# Patient Record
Sex: MALE | Race: WHITE | Employment: OTHER | URBAN - METROPOLITAN AREA
[De-identification: names, ages, dates, MRNs, and addresses within clinical notes are randomized per-mention and may not be internally consistent; named-entity substitution may affect disease eponyms.]

---

## 2021-01-01 ENCOUNTER — APPOINTMENT (OUTPATIENT)
Dept: MRI IMAGING | Age: 81
DRG: 190 | End: 2021-01-01
Payer: MEDICARE

## 2021-01-01 ENCOUNTER — APPOINTMENT (OUTPATIENT)
Dept: GENERAL RADIOLOGY | Age: 81
DRG: 291 | End: 2021-01-01
Payer: MEDICARE

## 2021-01-01 ENCOUNTER — APPOINTMENT (OUTPATIENT)
Dept: GENERAL RADIOLOGY | Age: 81
DRG: 190 | End: 2021-01-01
Payer: MEDICARE

## 2021-01-01 ENCOUNTER — HOSPITAL ENCOUNTER (INPATIENT)
Age: 81
LOS: 25 days | DRG: 291 | End: 2021-11-28
Attending: EMERGENCY MEDICINE | Admitting: INTERNAL MEDICINE
Payer: MEDICARE

## 2021-01-01 ENCOUNTER — APPOINTMENT (OUTPATIENT)
Dept: CT IMAGING | Age: 81
DRG: 190 | End: 2021-01-01
Payer: MEDICARE

## 2021-01-01 ENCOUNTER — ANESTHESIA EVENT (OUTPATIENT)
Dept: OPERATING ROOM | Age: 81
DRG: 291 | End: 2021-01-01
Payer: MEDICARE

## 2021-01-01 ENCOUNTER — APPOINTMENT (OUTPATIENT)
Dept: GENERAL RADIOLOGY | Age: 81
End: 2021-01-01
Payer: MEDICARE

## 2021-01-01 ENCOUNTER — APPOINTMENT (OUTPATIENT)
Dept: CT IMAGING | Age: 81
DRG: 291 | End: 2021-01-01
Payer: MEDICARE

## 2021-01-01 ENCOUNTER — HOSPITAL ENCOUNTER (EMERGENCY)
Age: 81
Discharge: HOME | End: 2021-04-30
Attending: EMERGENCY MEDICINE
Payer: MEDICARE

## 2021-01-01 ENCOUNTER — ANESTHESIA (OUTPATIENT)
Dept: OPERATING ROOM | Age: 81
DRG: 291 | End: 2021-01-01
Payer: MEDICARE

## 2021-01-01 ENCOUNTER — HOSPITAL ENCOUNTER (INPATIENT)
Age: 81
LOS: 7 days | Discharge: NURSING, REHAB OR LONG-TERM CARE | DRG: 190 | End: 2021-09-16
Attending: EMERGENCY MEDICINE | Admitting: INTERNAL MEDICINE
Payer: MEDICARE

## 2021-01-01 VITALS
BODY MASS INDEX: 33.65 KG/M2 | SYSTOLIC BLOOD PRESSURE: 101 MMHG | DIASTOLIC BLOOD PRESSURE: 59 MMHG | HEIGHT: 68 IN | RESPIRATION RATE: 28 BRPM | OXYGEN SATURATION: 80 % | HEART RATE: 93 BPM | WEIGHT: 222 LBS | TEMPERATURE: 97.1 F

## 2021-01-01 VITALS
OXYGEN SATURATION: 93 % | DIASTOLIC BLOOD PRESSURE: 80 MMHG | WEIGHT: 220 LBS | HEART RATE: 79 BPM | BODY MASS INDEX: 34.53 KG/M2 | TEMPERATURE: 97.3 F | RESPIRATION RATE: 20 BRPM | HEIGHT: 67 IN | SYSTOLIC BLOOD PRESSURE: 139 MMHG

## 2021-01-01 VITALS
TEMPERATURE: 97.8 F | SYSTOLIC BLOOD PRESSURE: 125 MMHG | RESPIRATION RATE: 20 BRPM | BODY MASS INDEX: 26.87 KG/M2 | WEIGHT: 177.3 LBS | OXYGEN SATURATION: 91 % | DIASTOLIC BLOOD PRESSURE: 83 MMHG | HEIGHT: 68 IN | HEART RATE: 79 BPM

## 2021-01-01 VITALS
DIASTOLIC BLOOD PRESSURE: 64 MMHG | TEMPERATURE: 97.2 F | SYSTOLIC BLOOD PRESSURE: 117 MMHG | RESPIRATION RATE: 24 BRPM | OXYGEN SATURATION: 96 %

## 2021-01-01 DIAGNOSIS — N35.919 STRICTURE OF MALE URETHRA, UNSPECIFIED STRICTURE TYPE: ICD-10-CM

## 2021-01-01 DIAGNOSIS — T45.515A WARFARIN-INDUCED COAGULOPATHY (HCC): ICD-10-CM

## 2021-01-01 DIAGNOSIS — D68.32 WARFARIN-INDUCED COAGULOPATHY (HCC): ICD-10-CM

## 2021-01-01 DIAGNOSIS — R77.8 ELEVATED TROPONIN: ICD-10-CM

## 2021-01-01 DIAGNOSIS — E87.70 HYPERVOLEMIA, UNSPECIFIED HYPERVOLEMIA TYPE: ICD-10-CM

## 2021-01-01 DIAGNOSIS — E87.29 RESPIRATORY ACIDOSIS: Primary | ICD-10-CM

## 2021-01-01 DIAGNOSIS — S61.412A SKIN TEAR OF LEFT HAND WITHOUT COMPLICATION, INITIAL ENCOUNTER: Primary | ICD-10-CM

## 2021-01-01 DIAGNOSIS — R62.7 ADULT FAILURE TO THRIVE SYNDROME: Primary | ICD-10-CM

## 2021-01-01 DIAGNOSIS — R06.89 HYPERCARBIA: ICD-10-CM

## 2021-01-01 DIAGNOSIS — R53.83 FATIGUE, UNSPECIFIED TYPE: ICD-10-CM

## 2021-01-01 LAB
ACANTHOCYTES: ABNORMAL
ALBUMIN SERPL-MCNC: 2.9 G/DL (ref 3.5–5.2)
ALBUMIN SERPL-MCNC: 2.9 G/DL (ref 3.5–5.2)
ALBUMIN SERPL-MCNC: 3 G/DL (ref 3.5–5.2)
ALBUMIN SERPL-MCNC: 3 G/DL (ref 3.5–5.2)
ALBUMIN SERPL-MCNC: 3.1 G/DL (ref 3.5–5.2)
ALBUMIN SERPL-MCNC: 3.1 G/DL (ref 3.5–5.2)
ALBUMIN SERPL-MCNC: 3.2 G/DL (ref 3.5–5.2)
ALBUMIN SERPL-MCNC: 3.3 G/DL (ref 3.5–5.2)
ALBUMIN SERPL-MCNC: 3.3 G/DL (ref 3.5–5.2)
ALBUMIN SERPL-MCNC: 3.5 G/DL (ref 3.5–5.2)
ALP BLD-CCNC: 108 U/L (ref 40–129)
ALP BLD-CCNC: 118 U/L (ref 40–129)
ALP BLD-CCNC: 118 U/L (ref 40–129)
ALP BLD-CCNC: 136 U/L (ref 40–129)
ALP BLD-CCNC: 137 U/L (ref 40–129)
ALP BLD-CCNC: 139 U/L (ref 40–129)
ALP BLD-CCNC: 144 U/L (ref 40–129)
ALP BLD-CCNC: 146 U/L (ref 40–129)
ALP BLD-CCNC: 154 U/L (ref 40–129)
ALP BLD-CCNC: 156 U/L (ref 40–129)
ALP BLD-CCNC: 156 U/L (ref 40–129)
ALP BLD-CCNC: 157 U/L (ref 40–129)
ALP BLD-CCNC: 158 U/L (ref 40–129)
ALP BLD-CCNC: 171 U/L (ref 40–129)
ALP BLD-CCNC: 192 U/L (ref 40–129)
ALT SERPL-CCNC: 10 U/L (ref 0–40)
ALT SERPL-CCNC: 10 U/L (ref 0–40)
ALT SERPL-CCNC: 11 U/L (ref 0–40)
ALT SERPL-CCNC: 15 U/L (ref 0–40)
ALT SERPL-CCNC: 16 U/L (ref 0–40)
ALT SERPL-CCNC: 16 U/L (ref 0–40)
ALT SERPL-CCNC: 18 U/L (ref 0–40)
ALT SERPL-CCNC: 19 U/L (ref 0–40)
ALT SERPL-CCNC: 20 U/L (ref 0–40)
ALT SERPL-CCNC: 20 U/L (ref 0–40)
ALT SERPL-CCNC: 25 U/L (ref 0–40)
ALT SERPL-CCNC: 8 U/L (ref 0–40)
ALT SERPL-CCNC: 9 U/L (ref 0–40)
ANION GAP SERPL CALCULATED.3IONS-SCNC: 10 MMOL/L (ref 7–16)
ANION GAP SERPL CALCULATED.3IONS-SCNC: 10 MMOL/L (ref 7–16)
ANION GAP SERPL CALCULATED.3IONS-SCNC: 11 MMOL/L (ref 7–16)
ANION GAP SERPL CALCULATED.3IONS-SCNC: 11 MMOL/L (ref 7–16)
ANION GAP SERPL CALCULATED.3IONS-SCNC: 12 MMOL/L (ref 7–16)
ANION GAP SERPL CALCULATED.3IONS-SCNC: 13 MMOL/L (ref 7–16)
ANION GAP SERPL CALCULATED.3IONS-SCNC: 5 MMOL/L (ref 7–16)
ANION GAP SERPL CALCULATED.3IONS-SCNC: 5 MMOL/L (ref 7–16)
ANION GAP SERPL CALCULATED.3IONS-SCNC: 6 MMOL/L (ref 7–16)
ANION GAP SERPL CALCULATED.3IONS-SCNC: 6 MMOL/L (ref 7–16)
ANION GAP SERPL CALCULATED.3IONS-SCNC: 7 MMOL/L (ref 7–16)
ANION GAP SERPL CALCULATED.3IONS-SCNC: 8 MMOL/L (ref 7–16)
ANION GAP SERPL CALCULATED.3IONS-SCNC: 9 MMOL/L (ref 7–16)
ANISOCYTOSIS: ABNORMAL
AST SERPL-CCNC: 14 U/L (ref 0–39)
AST SERPL-CCNC: 15 U/L (ref 0–39)
AST SERPL-CCNC: 16 U/L (ref 0–39)
AST SERPL-CCNC: 17 U/L (ref 0–39)
AST SERPL-CCNC: 18 U/L (ref 0–39)
AST SERPL-CCNC: 18 U/L (ref 0–39)
AST SERPL-CCNC: 20 U/L (ref 0–39)
AST SERPL-CCNC: 20 U/L (ref 0–39)
AST SERPL-CCNC: 21 U/L (ref 0–39)
AST SERPL-CCNC: 21 U/L (ref 0–39)
AST SERPL-CCNC: 22 U/L (ref 0–39)
AST SERPL-CCNC: 22 U/L (ref 0–39)
AST SERPL-CCNC: 23 U/L (ref 0–39)
AST SERPL-CCNC: 24 U/L (ref 0–39)
AST SERPL-CCNC: 40 U/L (ref 0–39)
B.E.: 0.3 MMOL/L (ref -3–3)
B.E.: 13.2 MMOL/L (ref -3–3)
B.E.: 2.5 MMOL/L (ref -3–3)
B.E.: 4 MMOL/L (ref -3–3)
BACTERIA: ABNORMAL /HPF
BACTERIA: NORMAL /HPF
BACTERIA: NORMAL /HPF
BASOPHILS ABSOLUTE: 0 E9/L (ref 0–0.2)
BASOPHILS ABSOLUTE: 0.01 E9/L (ref 0–0.2)
BASOPHILS ABSOLUTE: 0.03 E9/L (ref 0–0.2)
BASOPHILS ABSOLUTE: 0.04 E9/L (ref 0–0.2)
BASOPHILS ABSOLUTE: 0.05 E9/L (ref 0–0.2)
BASOPHILS RELATIVE PERCENT: 0 % (ref 0–2)
BASOPHILS RELATIVE PERCENT: 0.1 % (ref 0–2)
BASOPHILS RELATIVE PERCENT: 0.3 % (ref 0–2)
BASOPHILS RELATIVE PERCENT: 0.3 % (ref 0–2)
BASOPHILS RELATIVE PERCENT: 0.5 % (ref 0–2)
BASOPHILS RELATIVE PERCENT: 0.5 % (ref 0–2)
BASOPHILS RELATIVE PERCENT: 0.8 % (ref 0–2)
BILIRUB SERPL-MCNC: 0.3 MG/DL (ref 0–1.2)
BILIRUB SERPL-MCNC: 0.4 MG/DL (ref 0–1.2)
BILIRUB SERPL-MCNC: 0.5 MG/DL (ref 0–1.2)
BILIRUB SERPL-MCNC: 0.5 MG/DL (ref 0–1.2)
BILIRUB SERPL-MCNC: 0.6 MG/DL (ref 0–1.2)
BILIRUB SERPL-MCNC: 0.6 MG/DL (ref 0–1.2)
BILIRUB SERPL-MCNC: 0.7 MG/DL (ref 0–1.2)
BILIRUB SERPL-MCNC: 0.9 MG/DL (ref 0–1.2)
BILIRUB SERPL-MCNC: 1.1 MG/DL (ref 0–1.2)
BILIRUB SERPL-MCNC: 1.2 MG/DL (ref 0–1.2)
BILIRUBIN URINE: NEGATIVE
BLASTS RELATIVE PERCENT: 0 % (ref 0–0)
BLOOD, URINE: ABNORMAL
BUN BLDV-MCNC: 23 MG/DL (ref 6–23)
BUN BLDV-MCNC: 24 MG/DL (ref 6–23)
BUN BLDV-MCNC: 24 MG/DL (ref 6–23)
BUN BLDV-MCNC: 25 MG/DL (ref 6–23)
BUN BLDV-MCNC: 25 MG/DL (ref 6–23)
BUN BLDV-MCNC: 29 MG/DL (ref 6–23)
BUN BLDV-MCNC: 32 MG/DL (ref 6–23)
BUN BLDV-MCNC: 32 MG/DL (ref 6–23)
BUN BLDV-MCNC: 33 MG/DL (ref 6–23)
BUN BLDV-MCNC: 33 MG/DL (ref 6–23)
BUN BLDV-MCNC: 35 MG/DL (ref 6–23)
BUN BLDV-MCNC: 36 MG/DL (ref 6–23)
BUN BLDV-MCNC: 37 MG/DL (ref 6–23)
BUN BLDV-MCNC: 38 MG/DL (ref 6–23)
BUN BLDV-MCNC: 42 MG/DL (ref 6–23)
BUN BLDV-MCNC: 42 MG/DL (ref 6–23)
BUN BLDV-MCNC: 43 MG/DL (ref 6–23)
BUN BLDV-MCNC: 46 MG/DL (ref 6–23)
BUN BLDV-MCNC: 47 MG/DL (ref 6–23)
BUN BLDV-MCNC: 47 MG/DL (ref 6–23)
BUN BLDV-MCNC: 48 MG/DL (ref 6–23)
BUN BLDV-MCNC: 49 MG/DL (ref 6–23)
BUN BLDV-MCNC: 51 MG/DL (ref 6–23)
BUN BLDV-MCNC: 51 MG/DL (ref 6–23)
BUN BLDV-MCNC: 54 MG/DL (ref 6–23)
C-REACTIVE PROTEIN, HIGH SENSITIVITY: 79.4 MG/L (ref 0–3)
C-REACTIVE PROTEIN: 7.5 MG/DL (ref 0–0.4)
CALCIUM SERPL-MCNC: 8.1 MG/DL (ref 8.6–10.2)
CALCIUM SERPL-MCNC: 8.2 MG/DL (ref 8.6–10.2)
CALCIUM SERPL-MCNC: 8.2 MG/DL (ref 8.6–10.2)
CALCIUM SERPL-MCNC: 8.3 MG/DL (ref 8.6–10.2)
CALCIUM SERPL-MCNC: 8.4 MG/DL (ref 8.6–10.2)
CALCIUM SERPL-MCNC: 8.5 MG/DL (ref 8.6–10.2)
CALCIUM SERPL-MCNC: 8.6 MG/DL (ref 8.6–10.2)
CALCIUM SERPL-MCNC: 8.7 MG/DL (ref 8.6–10.2)
CALCIUM SERPL-MCNC: 8.8 MG/DL (ref 8.6–10.2)
CALCIUM SERPL-MCNC: 8.8 MG/DL (ref 8.6–10.2)
CALCIUM SERPL-MCNC: 8.9 MG/DL (ref 8.6–10.2)
CALCIUM SERPL-MCNC: 8.9 MG/DL (ref 8.6–10.2)
CALCIUM SERPL-MCNC: 9 MG/DL (ref 8.6–10.2)
CALCIUM SERPL-MCNC: 9 MG/DL (ref 8.6–10.2)
CHLORIDE BLD-SCNC: 100 MMOL/L (ref 98–107)
CHLORIDE BLD-SCNC: 103 MMOL/L (ref 98–107)
CHLORIDE BLD-SCNC: 103 MMOL/L (ref 98–107)
CHLORIDE BLD-SCNC: 106 MMOL/L (ref 98–107)
CHLORIDE BLD-SCNC: 106 MMOL/L (ref 98–107)
CHLORIDE BLD-SCNC: 107 MMOL/L (ref 98–107)
CHLORIDE BLD-SCNC: 108 MMOL/L (ref 98–107)
CHLORIDE BLD-SCNC: 92 MMOL/L (ref 98–107)
CHLORIDE BLD-SCNC: 93 MMOL/L (ref 98–107)
CHLORIDE BLD-SCNC: 93 MMOL/L (ref 98–107)
CHLORIDE BLD-SCNC: 94 MMOL/L (ref 98–107)
CHLORIDE BLD-SCNC: 94 MMOL/L (ref 98–107)
CHLORIDE BLD-SCNC: 95 MMOL/L (ref 98–107)
CHLORIDE BLD-SCNC: 96 MMOL/L (ref 98–107)
CHLORIDE BLD-SCNC: 97 MMOL/L (ref 98–107)
CHLORIDE BLD-SCNC: 98 MMOL/L (ref 98–107)
CHLORIDE BLD-SCNC: 99 MMOL/L (ref 98–107)
CHOLESTEROL, TOTAL: 130 MG/DL (ref 0–199)
CHOLESTEROL, TOTAL: 136 MG/DL (ref 0–199)
CHP ED QC CHECK: NORMAL
CK MB: 2.9 NG/ML (ref 0–7.7)
CLARITY: ABNORMAL
CLARITY: CLEAR
CLARITY: CLEAR
CO2: 21 MMOL/L (ref 22–29)
CO2: 22 MMOL/L (ref 22–29)
CO2: 23 MMOL/L (ref 22–29)
CO2: 25 MMOL/L (ref 22–29)
CO2: 26 MMOL/L (ref 22–29)
CO2: 26 MMOL/L (ref 22–29)
CO2: 32 MMOL/L (ref 22–29)
CO2: 33 MMOL/L (ref 22–29)
CO2: 34 MMOL/L (ref 22–29)
CO2: 35 MMOL/L (ref 22–29)
CO2: 35 MMOL/L (ref 22–29)
CO2: 36 MMOL/L (ref 22–29)
CO2: 37 MMOL/L (ref 22–29)
CO2: 38 MMOL/L (ref 22–29)
CO2: 39 MMOL/L (ref 22–29)
CO2: 40 MMOL/L (ref 22–29)
COHB: 0.2 % (ref 0–1.5)
COHB: 0.2 % (ref 0–1.5)
COHB: 0.5 % (ref 0–1.5)
COHB: 0.5 % (ref 0–1.5)
COLOR: ABNORMAL
COLOR: YELLOW
COLOR: YELLOW
COMMENT: ABNORMAL
CREAT SERPL-MCNC: 0.6 MG/DL (ref 0.7–1.2)
CREAT SERPL-MCNC: 0.7 MG/DL (ref 0.7–1.2)
CREAT SERPL-MCNC: 0.8 MG/DL (ref 0.7–1.2)
CREAT SERPL-MCNC: 0.9 MG/DL (ref 0.7–1.2)
CREAT SERPL-MCNC: 1 MG/DL (ref 0.7–1.2)
CRITICAL: ABNORMAL
CRITICAL: NORMAL
DATE ANALYZED: ABNORMAL
DATE ANALYZED: NORMAL
DATE OF COLLECTION: ABNORMAL
DATE OF COLLECTION: NORMAL
EKG ATRIAL RATE: 104 BPM
EKG ATRIAL RATE: 375 BPM
EKG ATRIAL RATE: 65 BPM
EKG Q-T INTERVAL: 374 MS
EKG Q-T INTERVAL: 374 MS
EKG Q-T INTERVAL: 396 MS
EKG QRS DURATION: 82 MS
EKG QRS DURATION: 84 MS
EKG QRS DURATION: 86 MS
EKG QTC CALCULATION (BAZETT): 412 MS
EKG QTC CALCULATION (BAZETT): 427 MS
EKG QTC CALCULATION (BAZETT): 428 MS
EKG R AXIS: -57 DEGREES
EKG R AXIS: -6 DEGREES
EKG R AXIS: -68 DEGREES
EKG T AXIS: 24 DEGREES
EKG T AXIS: 30 DEGREES
EKG T AXIS: 8 DEGREES
EKG VENTRICULAR RATE: 70 BPM
EKG VENTRICULAR RATE: 73 BPM
EKG VENTRICULAR RATE: 79 BPM
EOSINOPHILS ABSOLUTE: 0 E9/L (ref 0.05–0.5)
EOSINOPHILS ABSOLUTE: 0.04 E9/L (ref 0.05–0.5)
EOSINOPHILS ABSOLUTE: 0.05 E9/L (ref 0.05–0.5)
EOSINOPHILS ABSOLUTE: 0.06 E9/L (ref 0.05–0.5)
EOSINOPHILS ABSOLUTE: 0.08 E9/L (ref 0.05–0.5)
EOSINOPHILS ABSOLUTE: 0.15 E9/L (ref 0.05–0.5)
EOSINOPHILS ABSOLUTE: 0.2 E9/L (ref 0.05–0.5)
EOSINOPHILS ABSOLUTE: 0.36 E9/L (ref 0.05–0.5)
EOSINOPHILS RELATIVE PERCENT: 0 % (ref 0–6)
EOSINOPHILS RELATIVE PERCENT: 0.5 % (ref 0–6)
EOSINOPHILS RELATIVE PERCENT: 1 % (ref 0–6)
EOSINOPHILS RELATIVE PERCENT: 1 % (ref 0–6)
EOSINOPHILS RELATIVE PERCENT: 2.3 % (ref 0–6)
EOSINOPHILS RELATIVE PERCENT: 3.3 % (ref 0–6)
EOSINOPHILS RELATIVE PERCENT: 7 % (ref 0–6)
EPITHELIAL CELLS, UA: NORMAL /HPF
EPITHELIAL CELLS, UA: NORMAL /HPF
FIO2: 35 %
GFR AFRICAN AMERICAN: >60
GFR NON-AFRICAN AMERICAN: >60 ML/MIN/1.73
GLUCOSE BLD-MCNC: 102 MG/DL (ref 74–99)
GLUCOSE BLD-MCNC: 107 MG/DL (ref 74–99)
GLUCOSE BLD-MCNC: 112 MG/DL (ref 74–99)
GLUCOSE BLD-MCNC: 120 MG/DL (ref 74–99)
GLUCOSE BLD-MCNC: 121 MG/DL (ref 74–99)
GLUCOSE BLD-MCNC: 122 MG/DL (ref 74–99)
GLUCOSE BLD-MCNC: 122 MG/DL (ref 74–99)
GLUCOSE BLD-MCNC: 123 MG/DL (ref 74–99)
GLUCOSE BLD-MCNC: 127 MG/DL (ref 74–99)
GLUCOSE BLD-MCNC: 127 MG/DL (ref 74–99)
GLUCOSE BLD-MCNC: 131 MG/DL (ref 74–99)
GLUCOSE BLD-MCNC: 134 MG/DL (ref 74–99)
GLUCOSE BLD-MCNC: 135 MG/DL (ref 74–99)
GLUCOSE BLD-MCNC: 146 MG/DL (ref 74–99)
GLUCOSE BLD-MCNC: 82 MG/DL (ref 74–99)
GLUCOSE BLD-MCNC: 82 MG/DL (ref 74–99)
GLUCOSE BLD-MCNC: 83 MG/DL (ref 74–99)
GLUCOSE BLD-MCNC: 86 MG/DL (ref 74–99)
GLUCOSE BLD-MCNC: 86 MG/DL (ref 74–99)
GLUCOSE BLD-MCNC: 88 MG/DL (ref 74–99)
GLUCOSE BLD-MCNC: 88 MG/DL (ref 74–99)
GLUCOSE BLD-MCNC: 89 MG/DL (ref 74–99)
GLUCOSE BLD-MCNC: 91 MG/DL
GLUCOSE BLD-MCNC: 91 MG/DL (ref 74–99)
GLUCOSE BLD-MCNC: 92 MG/DL (ref 74–99)
GLUCOSE BLD-MCNC: 92 MG/DL (ref 74–99)
GLUCOSE BLD-MCNC: 93 MG/DL (ref 74–99)
GLUCOSE BLD-MCNC: 96 MG/DL (ref 74–99)
GLUCOSE BLD-MCNC: 97 MG/DL (ref 74–99)
GLUCOSE BLD-MCNC: 97 MG/DL (ref 74–99)
GLUCOSE URINE: NEGATIVE MG/DL
HCO3: 24 MMOL/L (ref 22–26)
HCO3: 30.7 MMOL/L (ref 22–26)
HCO3: 32.8 MMOL/L (ref 22–26)
HCO3: 39.1 MMOL/L (ref 22–26)
HCT VFR BLD CALC: 36.2 % (ref 37–54)
HCT VFR BLD CALC: 36.9 % (ref 37–54)
HCT VFR BLD CALC: 37.2 % (ref 37–54)
HCT VFR BLD CALC: 37.7 % (ref 37–54)
HCT VFR BLD CALC: 37.8 % (ref 37–54)
HCT VFR BLD CALC: 38.4 % (ref 37–54)
HCT VFR BLD CALC: 38.4 % (ref 37–54)
HCT VFR BLD CALC: 38.7 % (ref 37–54)
HCT VFR BLD CALC: 39 % (ref 37–54)
HCT VFR BLD CALC: 39.1 % (ref 37–54)
HCT VFR BLD CALC: 39.4 % (ref 37–54)
HCT VFR BLD CALC: 39.6 % (ref 37–54)
HCT VFR BLD CALC: 40.3 % (ref 37–54)
HCT VFR BLD CALC: 40.7 % (ref 37–54)
HCT VFR BLD CALC: 41.2 % (ref 37–54)
HCT VFR BLD CALC: 41.4 % (ref 37–54)
HCT VFR BLD CALC: 42.5 % (ref 37–54)
HCT VFR BLD CALC: 44.6 % (ref 37–54)
HDLC SERPL-MCNC: 67 MG/DL
HDLC SERPL-MCNC: 81 MG/DL
HEMOGLOBIN: 11 G/DL (ref 12.5–16.5)
HEMOGLOBIN: 11.1 G/DL (ref 12.5–16.5)
HEMOGLOBIN: 11.4 G/DL (ref 12.5–16.5)
HEMOGLOBIN: 11.7 G/DL (ref 12.5–16.5)
HEMOGLOBIN: 11.7 G/DL (ref 12.5–16.5)
HEMOGLOBIN: 11.8 G/DL (ref 12.5–16.5)
HEMOGLOBIN: 11.8 G/DL (ref 12.5–16.5)
HEMOGLOBIN: 11.9 G/DL (ref 12.5–16.5)
HEMOGLOBIN: 12 G/DL (ref 12.5–16.5)
HEMOGLOBIN: 12.1 G/DL (ref 12.5–16.5)
HEMOGLOBIN: 12.5 G/DL (ref 12.5–16.5)
HEMOGLOBIN: 12.6 G/DL (ref 12.5–16.5)
HEMOGLOBIN: 13.3 G/DL (ref 12.5–16.5)
HEMOGLOBIN: 13.4 G/DL (ref 12.5–16.5)
HEMOGLOBIN: 13.7 G/DL (ref 12.5–16.5)
HEMOGLOBIN: 14.6 G/DL (ref 12.5–16.5)
HHB: 2.5 % (ref 0–5)
HHB: 3.7 % (ref 0–5)
HHB: 3.8 % (ref 0–5)
HHB: 5.4 % (ref 0–5)
IMMATURE GRANULOCYTES #: 0.01 E9/L
IMMATURE GRANULOCYTES #: 0.02 E9/L
IMMATURE GRANULOCYTES #: 0.02 E9/L
IMMATURE GRANULOCYTES #: 0.03 E9/L
IMMATURE GRANULOCYTES #: 0.04 E9/L
IMMATURE GRANULOCYTES #: 0.05 E9/L
IMMATURE GRANULOCYTES #: 0.07 E9/L
IMMATURE GRANULOCYTES %: 0.3 % (ref 0–5)
IMMATURE GRANULOCYTES %: 0.4 % (ref 0–5)
IMMATURE GRANULOCYTES %: 0.4 % (ref 0–5)
IMMATURE GRANULOCYTES %: 0.6 % (ref 0–5)
IMMATURE GRANULOCYTES %: 0.7 % (ref 0–5)
INR BLD: 1.3
INR BLD: 1.4
INR BLD: 1.4
INR BLD: 1.5
INR BLD: 1.6
INR BLD: 1.6
INR BLD: 1.7
INR BLD: 1.8
INR BLD: 1.8
INR BLD: 1.9
INR BLD: 2
INR BLD: 2
INR BLD: 2.1
INR BLD: 2.2
INR BLD: 2.3
INR BLD: 2.3
INR BLD: 2.4
INR BLD: 2.7
INR BLD: 2.8
INR BLD: 2.9
INR BLD: 2.9
INR BLD: 3
INR BLD: 3
INR BLD: 3.2
INR BLD: 3.3
INR BLD: 3.5
INR BLD: 4.7
KETONES, URINE: ABNORMAL MG/DL
KETONES, URINE: NEGATIVE MG/DL
KETONES, URINE: NEGATIVE MG/DL
LAB: ABNORMAL
LAB: NORMAL
LACTIC ACID, SEPSIS: 1.1 MMOL/L (ref 0.5–1.9)
LACTIC ACID, SEPSIS: 1.5 MMOL/L (ref 0.5–1.9)
LACTIC ACID: 0.9 MMOL/L (ref 0.5–2.2)
LDL CHOLESTEROL CALCULATED: 46 MG/DL (ref 0–99)
LDL CHOLESTEROL CALCULATED: 51 MG/DL (ref 0–99)
LEUKOCYTE ESTERASE, URINE: ABNORMAL
LEUKOCYTE ESTERASE, URINE: NEGATIVE
LEUKOCYTE ESTERASE, URINE: NEGATIVE
LIPASE: 74 U/L (ref 13–60)
LYMPHOCYTES ABSOLUTE: 0.08 E9/L (ref 1.5–4)
LYMPHOCYTES ABSOLUTE: 0.15 E9/L (ref 1.5–4)
LYMPHOCYTES ABSOLUTE: 0.16 E9/L (ref 1.5–4)
LYMPHOCYTES ABSOLUTE: 0.16 E9/L (ref 1.5–4)
LYMPHOCYTES ABSOLUTE: 0.22 E9/L (ref 1.5–4)
LYMPHOCYTES ABSOLUTE: 0.3 E9/L (ref 1.5–4)
LYMPHOCYTES ABSOLUTE: 0.33 E9/L (ref 1.5–4)
LYMPHOCYTES ABSOLUTE: 0.51 E9/L (ref 1.5–4)
LYMPHOCYTES ABSOLUTE: 0.61 E9/L (ref 1.5–4)
LYMPHOCYTES ABSOLUTE: 0.62 E9/L (ref 1.5–4)
LYMPHOCYTES ABSOLUTE: 0.62 E9/L (ref 1.5–4)
LYMPHOCYTES ABSOLUTE: 0.63 E9/L (ref 1.5–4)
LYMPHOCYTES ABSOLUTE: 0.85 E9/L (ref 1.5–4)
LYMPHOCYTES ABSOLUTE: 0.86 E9/L (ref 1.5–4)
LYMPHOCYTES ABSOLUTE: 1.26 E9/L (ref 1.5–4)
LYMPHOCYTES RELATIVE PERCENT: 0.9 % (ref 20–42)
LYMPHOCYTES RELATIVE PERCENT: 1.4 % (ref 20–42)
LYMPHOCYTES RELATIVE PERCENT: 10 % (ref 20–42)
LYMPHOCYTES RELATIVE PERCENT: 10.2 % (ref 20–42)
LYMPHOCYTES RELATIVE PERCENT: 10.9 % (ref 20–42)
LYMPHOCYTES RELATIVE PERCENT: 13.3 % (ref 20–42)
LYMPHOCYTES RELATIVE PERCENT: 2.5 % (ref 20–42)
LYMPHOCYTES RELATIVE PERCENT: 2.6 % (ref 20–42)
LYMPHOCYTES RELATIVE PERCENT: 20 % (ref 20–42)
LYMPHOCYTES RELATIVE PERCENT: 4.3 % (ref 20–42)
LYMPHOCYTES RELATIVE PERCENT: 4.3 % (ref 20–42)
LYMPHOCYTES RELATIVE PERCENT: 6.4 % (ref 20–42)
LYMPHOCYTES RELATIVE PERCENT: 7.5 % (ref 20–42)
LYMPHOCYTES RELATIVE PERCENT: 7.8 % (ref 20–42)
LYMPHOCYTES RELATIVE PERCENT: 9.6 % (ref 20–42)
Lab: ABNORMAL
Lab: NORMAL
MAGNESIUM: 2.2 MG/DL (ref 1.6–2.6)
MAGNESIUM: 2.3 MG/DL (ref 1.6–2.6)
MCH RBC QN AUTO: 31.5 PG (ref 26–35)
MCH RBC QN AUTO: 31.5 PG (ref 26–35)
MCH RBC QN AUTO: 31.6 PG (ref 26–35)
MCH RBC QN AUTO: 31.7 PG (ref 26–35)
MCH RBC QN AUTO: 31.8 PG (ref 26–35)
MCH RBC QN AUTO: 31.8 PG (ref 26–35)
MCH RBC QN AUTO: 32.1 PG (ref 26–35)
MCH RBC QN AUTO: 32.1 PG (ref 26–35)
MCH RBC QN AUTO: 32.2 PG (ref 26–35)
MCH RBC QN AUTO: 32.2 PG (ref 26–35)
MCH RBC QN AUTO: 32.5 PG (ref 26–35)
MCH RBC QN AUTO: 32.5 PG (ref 26–35)
MCH RBC QN AUTO: 32.6 PG (ref 26–35)
MCH RBC QN AUTO: 32.7 PG (ref 26–35)
MCH RBC QN AUTO: 32.9 PG (ref 26–35)
MCH RBC QN AUTO: 33.2 PG (ref 26–35)
MCHC RBC AUTO-ENTMCNC: 29.8 % (ref 32–34.5)
MCHC RBC AUTO-ENTMCNC: 30 % (ref 32–34.5)
MCHC RBC AUTO-ENTMCNC: 30.2 % (ref 32–34.5)
MCHC RBC AUTO-ENTMCNC: 30.4 % (ref 32–34.5)
MCHC RBC AUTO-ENTMCNC: 30.4 % (ref 32–34.5)
MCHC RBC AUTO-ENTMCNC: 30.5 % (ref 32–34.5)
MCHC RBC AUTO-ENTMCNC: 30.5 % (ref 32–34.5)
MCHC RBC AUTO-ENTMCNC: 30.6 % (ref 32–34.5)
MCHC RBC AUTO-ENTMCNC: 30.7 % (ref 32–34.5)
MCHC RBC AUTO-ENTMCNC: 30.7 % (ref 32–34.5)
MCHC RBC AUTO-ENTMCNC: 30.8 % (ref 32–34.5)
MCHC RBC AUTO-ENTMCNC: 32 % (ref 32–34.5)
MCHC RBC AUTO-ENTMCNC: 32.1 % (ref 32–34.5)
MCHC RBC AUTO-ENTMCNC: 32.7 % (ref 32–34.5)
MCHC RBC AUTO-ENTMCNC: 32.8 % (ref 32–34.5)
MCHC RBC AUTO-ENTMCNC: 33.3 % (ref 32–34.5)
MCV RBC AUTO: 100.2 FL (ref 80–99.9)
MCV RBC AUTO: 101.1 FL (ref 80–99.9)
MCV RBC AUTO: 101.9 FL (ref 80–99.9)
MCV RBC AUTO: 102.8 FL (ref 80–99.9)
MCV RBC AUTO: 102.9 FL (ref 80–99.9)
MCV RBC AUTO: 103.1 FL (ref 80–99.9)
MCV RBC AUTO: 103.4 FL (ref 80–99.9)
MCV RBC AUTO: 103.7 FL (ref 80–99.9)
MCV RBC AUTO: 105.2 FL (ref 80–99.9)
MCV RBC AUTO: 105.4 FL (ref 80–99.9)
MCV RBC AUTO: 106.2 FL (ref 80–99.9)
MCV RBC AUTO: 106.6 FL (ref 80–99.9)
MCV RBC AUTO: 107.7 FL (ref 80–99.9)
MCV RBC AUTO: 108.3 FL (ref 80–99.9)
MCV RBC AUTO: 98.8 FL (ref 80–99.9)
MCV RBC AUTO: 99.3 FL (ref 80–99.9)
METAMYELOCYTES RELATIVE PERCENT: 0.9 % (ref 0–1)
METER GLUCOSE: 91 MG/DL (ref 74–99)
METHB: 0.3 % (ref 0–1.5)
METHB: 0.4 % (ref 0–1.5)
MODE: ABNORMAL
MODE: NORMAL
MONOCYTES ABSOLUTE: 0 E9/L (ref 0.1–0.95)
MONOCYTES ABSOLUTE: 0 E9/L (ref 0.1–0.95)
MONOCYTES ABSOLUTE: 0.05 E9/L (ref 0.1–0.95)
MONOCYTES ABSOLUTE: 0.1 E9/L (ref 0.1–0.95)
MONOCYTES ABSOLUTE: 0.15 E9/L (ref 0.1–0.95)
MONOCYTES ABSOLUTE: 0.28 E9/L (ref 0.1–0.95)
MONOCYTES ABSOLUTE: 0.3 E9/L (ref 0.1–0.95)
MONOCYTES ABSOLUTE: 0.31 E9/L (ref 0.1–0.95)
MONOCYTES ABSOLUTE: 0.38 E9/L (ref 0.1–0.95)
MONOCYTES ABSOLUTE: 0.38 E9/L (ref 0.1–0.95)
MONOCYTES ABSOLUTE: 0.49 E9/L (ref 0.1–0.95)
MONOCYTES ABSOLUTE: 0.62 E9/L (ref 0.1–0.95)
MONOCYTES ABSOLUTE: 0.64 E9/L (ref 0.1–0.95)
MONOCYTES ABSOLUTE: 0.64 E9/L (ref 0.1–0.95)
MONOCYTES ABSOLUTE: 0.85 E9/L (ref 0.1–0.95)
MONOCYTES RELATIVE PERCENT: 1.6 % (ref 2–12)
MONOCYTES RELATIVE PERCENT: 1.7 % (ref 2–12)
MONOCYTES RELATIVE PERCENT: 10.2 % (ref 2–12)
MONOCYTES RELATIVE PERCENT: 2.4 % (ref 2–12)
MONOCYTES RELATIVE PERCENT: 2.5 % (ref 2–12)
MONOCYTES RELATIVE PERCENT: 2.5 % (ref 2–12)
MONOCYTES RELATIVE PERCENT: 3.9 % (ref 2–12)
MONOCYTES RELATIVE PERCENT: 4.3 % (ref 2–12)
MONOCYTES RELATIVE PERCENT: 5.2 % (ref 2–12)
MONOCYTES RELATIVE PERCENT: 5.8 % (ref 2–12)
MONOCYTES RELATIVE PERCENT: 6 % (ref 2–12)
MONOCYTES RELATIVE PERCENT: 6 % (ref 2–12)
MONOCYTES RELATIVE PERCENT: 8.2 % (ref 2–12)
MONOCYTES RELATIVE PERCENT: 8.7 % (ref 2–12)
MONOCYTES RELATIVE PERCENT: 9.9 % (ref 2–12)
MRSA CULTURE ONLY: NORMAL
NEUTROPHILS ABSOLUTE: 10.57 E9/L (ref 1.8–7.3)
NEUTROPHILS ABSOLUTE: 2.75 E9/L (ref 1.8–7.3)
NEUTROPHILS ABSOLUTE: 3.93 E9/L (ref 1.8–7.3)
NEUTROPHILS ABSOLUTE: 4.59 E9/L (ref 1.8–7.3)
NEUTROPHILS ABSOLUTE: 4.6 E9/L (ref 1.8–7.3)
NEUTROPHILS ABSOLUTE: 4.75 E9/L (ref 1.8–7.3)
NEUTROPHILS ABSOLUTE: 4.85 E9/L (ref 1.8–7.3)
NEUTROPHILS ABSOLUTE: 4.99 E9/L (ref 1.8–7.3)
NEUTROPHILS ABSOLUTE: 5.28 E9/L (ref 1.8–7.3)
NEUTROPHILS ABSOLUTE: 6.13 E9/L (ref 1.8–7.3)
NEUTROPHILS ABSOLUTE: 6.69 E9/L (ref 1.8–7.3)
NEUTROPHILS ABSOLUTE: 7.14 E9/L (ref 1.8–7.3)
NEUTROPHILS ABSOLUTE: 7.19 E9/L (ref 1.8–7.3)
NEUTROPHILS ABSOLUTE: 7.87 E9/L (ref 1.8–7.3)
NEUTROPHILS ABSOLUTE: 8.13 E9/L (ref 1.8–7.3)
NEUTROPHILS RELATIVE PERCENT: 73 % (ref 43–80)
NEUTROPHILS RELATIVE PERCENT: 73.4 % (ref 43–80)
NEUTROPHILS RELATIVE PERCENT: 75.5 % (ref 43–80)
NEUTROPHILS RELATIVE PERCENT: 77 % (ref 43–80)
NEUTROPHILS RELATIVE PERCENT: 79 % (ref 43–80)
NEUTROPHILS RELATIVE PERCENT: 83.6 % (ref 43–80)
NEUTROPHILS RELATIVE PERCENT: 85.5 % (ref 43–80)
NEUTROPHILS RELATIVE PERCENT: 87 % (ref 43–80)
NEUTROPHILS RELATIVE PERCENT: 88.2 % (ref 43–80)
NEUTROPHILS RELATIVE PERCENT: 93.9 % (ref 43–80)
NEUTROPHILS RELATIVE PERCENT: 95 % (ref 43–80)
NEUTROPHILS RELATIVE PERCENT: 95.6 % (ref 43–80)
NEUTROPHILS RELATIVE PERCENT: 95.7 % (ref 43–80)
NITRITE, URINE: NEGATIVE
O2 CONTENT: 16.5 ML/DL
O2 CONTENT: 16.7 ML/DL
O2 CONTENT: 17.4 ML/DL
O2 CONTENT: 17.7 ML/DL
O2 SATURATION: 94.6 % (ref 92–98.5)
O2 SATURATION: 96.2 % (ref 92–98.5)
O2 SATURATION: 96.3 % (ref 92–98.5)
O2 SATURATION: 97.5 % (ref 92–98.5)
O2HB: 94.1 % (ref 94–97)
O2HB: 95.3 % (ref 94–97)
O2HB: 95.8 % (ref 94–97)
O2HB: 96.7 % (ref 94–97)
OPERATOR ID: 1102
OPERATOR ID: 2323
OPERATOR ID: 2658
OPERATOR ID: 516
ORGANISM: ABNORMAL
OVALOCYTES: ABNORMAL
PATIENT TEMP: 37
PATIENT TEMP: 37 C
PCO2: 35.9 MMHG (ref 35–45)
PCO2: 54.9 MMHG (ref 35–45)
PCO2: 56.2 MMHG (ref 35–45)
PCO2: 85 MMHG (ref 35–45)
PDW BLD-RTO: 14.1 FL (ref 11.5–15)
PDW BLD-RTO: 14.2 FL (ref 11.5–15)
PDW BLD-RTO: 14.4 FL (ref 11.5–15)
PDW BLD-RTO: 14.4 FL (ref 11.5–15)
PDW BLD-RTO: 14.6 FL (ref 11.5–15)
PDW BLD-RTO: 14.7 FL (ref 11.5–15)
PDW BLD-RTO: 14.7 FL (ref 11.5–15)
PDW BLD-RTO: 14.8 FL (ref 11.5–15)
PDW BLD-RTO: 14.8 FL (ref 11.5–15)
PDW BLD-RTO: 14.9 FL (ref 11.5–15)
PDW BLD-RTO: 15.2 FL (ref 11.5–15)
PDW BLD-RTO: 15.2 FL (ref 11.5–15)
PDW BLD-RTO: 15.5 FL (ref 11.5–15)
PFO2: 3.02 MMHG/%
PH BLOOD GAS: 7.2 (ref 7.35–7.45)
PH BLOOD GAS: 7.36 (ref 7.35–7.45)
PH BLOOD GAS: 7.44 (ref 7.35–7.45)
PH BLOOD GAS: 7.47 (ref 7.35–7.45)
PH UA: 5 (ref 5–9)
PH UA: 5.5 (ref 5–9)
PH UA: 5.5 (ref 5–9)
PHOSPHORUS: 4.2 MG/DL (ref 2.5–4.5)
PHOSPHORUS: 4.4 MG/DL (ref 2.5–4.5)
PLATELET # BLD: 101 E9/L (ref 130–450)
PLATELET # BLD: 106 E9/L (ref 130–450)
PLATELET # BLD: 109 E9/L (ref 130–450)
PLATELET # BLD: 112 E9/L (ref 130–450)
PLATELET # BLD: 112 E9/L (ref 130–450)
PLATELET # BLD: 115 E9/L (ref 130–450)
PLATELET # BLD: 115 E9/L (ref 130–450)
PLATELET # BLD: 116 E9/L (ref 130–450)
PLATELET # BLD: 120 E9/L (ref 130–450)
PLATELET # BLD: 121 E9/L (ref 130–450)
PLATELET # BLD: 146 E9/L (ref 130–450)
PLATELET # BLD: 155 E9/L (ref 130–450)
PLATELET # BLD: 182 E9/L (ref 130–450)
PLATELET # BLD: 199 E9/L (ref 130–450)
PLATELET # BLD: 76 E9/L (ref 130–450)
PLATELET # BLD: 85 E9/L (ref 130–450)
PLATELET CONFIRMATION: NORMAL
PLATELET CONFIRMATION: NORMAL
PMV BLD AUTO: 10.6 FL (ref 7–12)
PMV BLD AUTO: 11.1 FL (ref 7–12)
PMV BLD AUTO: 11.3 FL (ref 7–12)
PMV BLD AUTO: 11.4 FL (ref 7–12)
PMV BLD AUTO: 11.4 FL (ref 7–12)
PMV BLD AUTO: 11.5 FL (ref 7–12)
PMV BLD AUTO: 11.5 FL (ref 7–12)
PMV BLD AUTO: 11.8 FL (ref 7–12)
PMV BLD AUTO: 12 FL (ref 7–12)
PMV BLD AUTO: 12.1 FL (ref 7–12)
PO2: 105.6 MMHG (ref 75–100)
PO2: 69.4 MMHG (ref 75–100)
PO2: 85.1 MMHG (ref 75–100)
PO2: 97.4 MMHG (ref 75–100)
POIKILOCYTES: ABNORMAL
POLYCHROMASIA: ABNORMAL
POTASSIUM REFLEX MAGNESIUM: 4.8 MMOL/L (ref 3.5–5)
POTASSIUM REFLEX MAGNESIUM: 6.5 MMOL/L (ref 3.5–5)
POTASSIUM SERPL-SCNC: 3.7 MMOL/L (ref 3.5–5)
POTASSIUM SERPL-SCNC: 3.7 MMOL/L (ref 3.5–5)
POTASSIUM SERPL-SCNC: 3.9 MMOL/L (ref 3.5–5)
POTASSIUM SERPL-SCNC: 4 MMOL/L (ref 3.5–5)
POTASSIUM SERPL-SCNC: 4.1 MMOL/L (ref 3.5–5)
POTASSIUM SERPL-SCNC: 4.2 MMOL/L (ref 3.5–5)
POTASSIUM SERPL-SCNC: 4.4 MMOL/L (ref 3.5–5)
POTASSIUM SERPL-SCNC: 4.5 MMOL/L (ref 3.5–5)
POTASSIUM SERPL-SCNC: 4.6 MMOL/L (ref 3.5–5)
POTASSIUM SERPL-SCNC: 4.7 MMOL/L (ref 3.5–5)
POTASSIUM SERPL-SCNC: 5 MMOL/L (ref 3.5–5)
POTASSIUM SERPL-SCNC: 5 MMOL/L (ref 3.5–5)
PRO-BNP: 3901 PG/ML (ref 0–450)
PRO-BNP: 4499 PG/ML (ref 0–450)
PROCALCITONIN: 0.04 NG/ML (ref 0–0.08)
PROTEIN UA: NEGATIVE MG/DL
PROTHROMBIN TIME: 14.9 SEC (ref 9.3–12.4)
PROTHROMBIN TIME: 16.3 SEC (ref 9.3–12.4)
PROTHROMBIN TIME: 16.3 SEC (ref 9.3–12.4)
PROTHROMBIN TIME: 17.4 SEC (ref 9.3–12.4)
PROTHROMBIN TIME: 18.9 SEC (ref 9.3–12.4)
PROTHROMBIN TIME: 19 SEC (ref 9.3–12.4)
PROTHROMBIN TIME: 20.2 SEC (ref 9.3–12.4)
PROTHROMBIN TIME: 20.8 SEC (ref 9.3–12.4)
PROTHROMBIN TIME: 21.6 SEC (ref 9.3–12.4)
PROTHROMBIN TIME: 21.9 SEC (ref 9.3–12.4)
PROTHROMBIN TIME: 22.1 SEC (ref 9.3–12.4)
PROTHROMBIN TIME: 22.6 SEC (ref 9.3–12.4)
PROTHROMBIN TIME: 23.4 SEC (ref 9.3–12.4)
PROTHROMBIN TIME: 23.8 SEC (ref 9.3–12.4)
PROTHROMBIN TIME: 24.8 SEC (ref 9.3–12.4)
PROTHROMBIN TIME: 25.4 SEC (ref 9.3–12.4)
PROTHROMBIN TIME: 25.9 SEC (ref 9.3–12.4)
PROTHROMBIN TIME: 26.3 SEC (ref 9.3–12.4)
PROTHROMBIN TIME: 26.3 SEC (ref 9.3–12.4)
PROTHROMBIN TIME: 26.9 SEC (ref 9.3–12.4)
PROTHROMBIN TIME: 27.7 SEC (ref 9.3–12.4)
PROTHROMBIN TIME: 27.7 SEC (ref 9.3–12.4)
PROTHROMBIN TIME: 28.1 SEC (ref 9.3–12.4)
PROTHROMBIN TIME: 28.2 SEC (ref 9.3–12.4)
PROTHROMBIN TIME: 31.9 SEC (ref 9.3–12.4)
PROTHROMBIN TIME: 33.2 SEC (ref 9.3–12.4)
PROTHROMBIN TIME: 33.7 SEC (ref 9.3–12.4)
PROTHROMBIN TIME: 34 SEC (ref 9.3–12.4)
PROTHROMBIN TIME: 35 SEC (ref 9.3–12.4)
PROTHROMBIN TIME: 35.1 SEC (ref 9.3–12.4)
PROTHROMBIN TIME: 38 SEC (ref 9.3–12.4)
PROTHROMBIN TIME: 38.7 SEC (ref 9.3–12.4)
PROTHROMBIN TIME: 41.4 SEC (ref 9.3–12.4)
PROTHROMBIN TIME: 55.5 SEC (ref 9.3–12.4)
RBC # BLD: 3.36 E12/L (ref 3.8–5.8)
RBC # BLD: 3.49 E12/L (ref 3.8–5.8)
RBC # BLD: 3.55 E12/L (ref 3.8–5.8)
RBC # BLD: 3.62 E12/L (ref 3.8–5.8)
RBC # BLD: 3.65 E12/L (ref 3.8–5.8)
RBC # BLD: 3.67 E12/L (ref 3.8–5.8)
RBC # BLD: 3.72 E12/L (ref 3.8–5.8)
RBC # BLD: 3.78 E12/L (ref 3.8–5.8)
RBC # BLD: 3.79 E12/L (ref 3.8–5.8)
RBC # BLD: 3.8 E12/L (ref 3.8–5.8)
RBC # BLD: 3.8 E12/L (ref 3.8–5.8)
RBC # BLD: 3.84 E12/L (ref 3.8–5.8)
RBC # BLD: 3.96 E12/L (ref 3.8–5.8)
RBC # BLD: 4.13 E12/L (ref 3.8–5.8)
RBC # BLD: 4.17 E12/L (ref 3.8–5.8)
RBC # BLD: 4.49 E12/L (ref 3.8–5.8)
RBC # BLD: NORMAL 10*6/UL
RBC # BLD: NORMAL 10*6/UL
RBC UA: >20 /HPF (ref 0–2)
RBC UA: ABNORMAL /HPF (ref 0–2)
RBC UA: NORMAL /HPF (ref 0–2)
REASON FOR REJECTION: NORMAL
REASON FOR REJECTION: NORMAL
REJECTED TEST: NORMAL
REJECTED TEST: NORMAL
RI(T): 0.66
SARS-COV-2, NAAT: ABNORMAL
SARS-COV-2, NAAT: NOT DETECTED
SARS-COV-2, PCR: NOT DETECTED
SEDIMENTATION RATE, ERYTHROCYTE: 37 MM/HR (ref 0–15)
SODIUM BLD-SCNC: 136 MMOL/L (ref 132–146)
SODIUM BLD-SCNC: 137 MMOL/L (ref 132–146)
SODIUM BLD-SCNC: 137 MMOL/L (ref 132–146)
SODIUM BLD-SCNC: 138 MMOL/L (ref 132–146)
SODIUM BLD-SCNC: 139 MMOL/L (ref 132–146)
SODIUM BLD-SCNC: 140 MMOL/L (ref 132–146)
SODIUM BLD-SCNC: 141 MMOL/L (ref 132–146)
SODIUM BLD-SCNC: 142 MMOL/L (ref 132–146)
SODIUM BLD-SCNC: 143 MMOL/L (ref 132–146)
SODIUM BLD-SCNC: 143 MMOL/L (ref 132–146)
SODIUM BLD-SCNC: 144 MMOL/L (ref 132–146)
SOURCE, BLOOD GAS: ABNORMAL
SOURCE, BLOOD GAS: NORMAL
SPECIFIC GRAVITY UA: 1.01 (ref 1–1.03)
SPECIFIC GRAVITY UA: 1.01 (ref 1–1.03)
SPECIFIC GRAVITY UA: 1.02 (ref 1–1.03)
STREP PNEUMONIAE ANTIGEN, URINE: NORMAL
T4 FREE: 1.1 NG/DL (ref 0.93–1.7)
THB: 12 G/DL (ref 11.5–16.5)
THB: 12.4 G/DL (ref 11.5–16.5)
THB: 13.1 G/DL (ref 11.5–16.5)
THB: 13.1 G/DL (ref 11.5–16.5)
TIME ANALYZED: 454
TIME ANALYZED: 506
TIME ANALYZED: 904
TIME ANALYZED: 916
TOTAL CK: 76 U/L (ref 20–200)
TOTAL PROTEIN: 5.3 G/DL (ref 6.4–8.3)
TOTAL PROTEIN: 5.5 G/DL (ref 6.4–8.3)
TOTAL PROTEIN: 5.7 G/DL (ref 6.4–8.3)
TOTAL PROTEIN: 5.8 G/DL (ref 6.4–8.3)
TOTAL PROTEIN: 5.8 G/DL (ref 6.4–8.3)
TOTAL PROTEIN: 6 G/DL (ref 6.4–8.3)
TOTAL PROTEIN: 6.1 G/DL (ref 6.4–8.3)
TOTAL PROTEIN: 6.1 G/DL (ref 6.4–8.3)
TOTAL PROTEIN: 6.2 G/DL (ref 6.4–8.3)
TOTAL PROTEIN: 6.3 G/DL (ref 6.4–8.3)
TOTAL PROTEIN: 6.8 G/DL (ref 6.4–8.3)
TOTAL PROTEIN: 6.9 G/DL (ref 6.4–8.3)
TOTAL PROTEIN: 7 G/DL (ref 6.4–8.3)
TRIGL SERPL-MCNC: 46 MG/DL (ref 0–149)
TRIGL SERPL-MCNC: 58 MG/DL (ref 0–149)
TROPONIN, HIGH SENSITIVITY: 102 NG/L (ref 0–11)
TROPONIN, HIGH SENSITIVITY: 113 NG/L (ref 0–11)
TROPONIN, HIGH SENSITIVITY: 114 NG/L (ref 0–11)
TROPONIN, HIGH SENSITIVITY: 120 NG/L (ref 0–11)
TROPONIN, HIGH SENSITIVITY: 138 NG/L (ref 0–11)
TSH SERPL DL<=0.05 MIU/L-ACNC: 2.24 UIU/ML (ref 0.27–4.2)
TSH SERPL DL<=0.05 MIU/L-ACNC: 4.56 UIU/ML (ref 0.27–4.2)
URINE CULTURE, ROUTINE: ABNORMAL
URINE CULTURE, ROUTINE: NORMAL
URINE CULTURE, ROUTINE: NORMAL
UROBILINOGEN, URINE: 0.2 E.U./DL
VITAMIN D 25-HYDROXY: 23 NG/ML (ref 30–100)
VLDLC SERPL CALC-MCNC: 12 MG/DL
VLDLC SERPL CALC-MCNC: 9 MG/DL
WBC # BLD: 11.1 E9/L (ref 4.5–11.5)
WBC # BLD: 3.1 E9/L (ref 4.5–11.5)
WBC # BLD: 5.1 E9/L (ref 4.5–11.5)
WBC # BLD: 5.1 E9/L (ref 4.5–11.5)
WBC # BLD: 5.2 E9/L (ref 4.5–11.5)
WBC # BLD: 5.5 E9/L (ref 4.5–11.5)
WBC # BLD: 6.1 E9/L (ref 4.5–11.5)
WBC # BLD: 6.3 E9/L (ref 4.5–11.5)
WBC # BLD: 6.5 E9/L (ref 4.5–11.5)
WBC # BLD: 7.2 E9/L (ref 4.5–11.5)
WBC # BLD: 7.6 E9/L (ref 4.5–11.5)
WBC # BLD: 7.6 E9/L (ref 4.5–11.5)
WBC # BLD: 7.8 E9/L (ref 4.5–11.5)
WBC # BLD: 8.2 E9/L (ref 4.5–11.5)
WBC # BLD: 8.4 E9/L (ref 4.5–11.5)
WBC # BLD: 9.7 E9/L (ref 4.5–11.5)
WBC UA: ABNORMAL /HPF (ref 0–5)
WBC UA: NORMAL /HPF (ref 0–5)
WBC UA: NORMAL /HPF (ref 0–5)

## 2021-01-01 PROCEDURE — 87635 SARS-COV-2 COVID-19 AMP PRB: CPT

## 2021-01-01 PROCEDURE — 2700000000 HC OXYGEN THERAPY PER DAY

## 2021-01-01 PROCEDURE — 6360000002 HC RX W HCPCS: Performed by: INTERNAL MEDICINE

## 2021-01-01 PROCEDURE — 80048 BASIC METABOLIC PNL TOTAL CA: CPT

## 2021-01-01 PROCEDURE — 94640 AIRWAY INHALATION TREATMENT: CPT

## 2021-01-01 PROCEDURE — 6370000000 HC RX 637 (ALT 250 FOR IP)

## 2021-01-01 PROCEDURE — 6370000000 HC RX 637 (ALT 250 FOR IP): Performed by: INTERNAL MEDICINE

## 2021-01-01 PROCEDURE — 97535 SELF CARE MNGMENT TRAINING: CPT

## 2021-01-01 PROCEDURE — 85014 HEMATOCRIT: CPT

## 2021-01-01 PROCEDURE — 94002 VENT MGMT INPAT INIT DAY: CPT

## 2021-01-01 PROCEDURE — 2060000000 HC ICU INTERMEDIATE R&B

## 2021-01-01 PROCEDURE — 2580000003 HC RX 258: Performed by: INTERNAL MEDICINE

## 2021-01-01 PROCEDURE — 84100 ASSAY OF PHOSPHORUS: CPT

## 2021-01-01 PROCEDURE — 36415 COLL VENOUS BLD VENIPUNCTURE: CPT

## 2021-01-01 PROCEDURE — 94660 CPAP INITIATION&MGMT: CPT

## 2021-01-01 PROCEDURE — 87088 URINE BACTERIA CULTURE: CPT

## 2021-01-01 PROCEDURE — 97161 PT EVAL LOW COMPLEX 20 MIN: CPT | Performed by: PHYSICAL THERAPIST

## 2021-01-01 PROCEDURE — 97530 THERAPEUTIC ACTIVITIES: CPT

## 2021-01-01 PROCEDURE — 6360000004 HC RX CONTRAST MEDICATION: Performed by: UROLOGY

## 2021-01-01 PROCEDURE — 85018 HEMOGLOBIN: CPT

## 2021-01-01 PROCEDURE — 72146 MRI CHEST SPINE W/O DYE: CPT

## 2021-01-01 PROCEDURE — 82805 BLOOD GASES W/O2 SATURATION: CPT

## 2021-01-01 PROCEDURE — 6370000000 HC RX 637 (ALT 250 FOR IP): Performed by: GENERAL PRACTICE

## 2021-01-01 PROCEDURE — 85025 COMPLETE CBC W/AUTO DIFF WBC: CPT

## 2021-01-01 PROCEDURE — 97530 THERAPEUTIC ACTIVITIES: CPT | Performed by: OCCUPATIONAL THERAPIST

## 2021-01-01 PROCEDURE — C9113 INJ PANTOPRAZOLE SODIUM, VIA: HCPCS | Performed by: INTERNAL MEDICINE

## 2021-01-01 PROCEDURE — 85610 PROTHROMBIN TIME: CPT

## 2021-01-01 PROCEDURE — 80053 COMPREHEN METABOLIC PANEL: CPT

## 2021-01-01 PROCEDURE — 2580000003 HC RX 258: Performed by: NURSE ANESTHETIST, CERTIFIED REGISTERED

## 2021-01-01 PROCEDURE — 2580000003 HC RX 258: Performed by: UROLOGY

## 2021-01-01 PROCEDURE — 97110 THERAPEUTIC EXERCISES: CPT

## 2021-01-01 PROCEDURE — 84145 PROCALCITONIN (PCT): CPT

## 2021-01-01 PROCEDURE — 83605 ASSAY OF LACTIC ACID: CPT

## 2021-01-01 PROCEDURE — 81001 URINALYSIS AUTO W/SCOPE: CPT

## 2021-01-01 PROCEDURE — 84484 ASSAY OF TROPONIN QUANT: CPT

## 2021-01-01 PROCEDURE — 94760 N-INVAS EAR/PLS OXIMETRY 1: CPT

## 2021-01-01 PROCEDURE — 71045 X-RAY EXAM CHEST 1 VIEW: CPT

## 2021-01-01 PROCEDURE — 87077 CULTURE AEROBIC IDENTIFY: CPT

## 2021-01-01 PROCEDURE — 36600 WITHDRAWAL OF ARTERIAL BLOOD: CPT

## 2021-01-01 PROCEDURE — 80061 LIPID PANEL: CPT

## 2021-01-01 PROCEDURE — 3600000013 HC SURGERY LEVEL 3 ADDTL 15MIN: Performed by: UROLOGY

## 2021-01-01 PROCEDURE — 82962 GLUCOSE BLOOD TEST: CPT

## 2021-01-01 PROCEDURE — 84439 ASSAY OF FREE THYROXINE: CPT

## 2021-01-01 PROCEDURE — 82306 VITAMIN D 25 HYDROXY: CPT

## 2021-01-01 PROCEDURE — 86140 C-REACTIVE PROTEIN: CPT

## 2021-01-01 PROCEDURE — 83880 ASSAY OF NATRIURETIC PEPTIDE: CPT

## 2021-01-01 PROCEDURE — 72100 X-RAY EXAM L-S SPINE 2/3 VWS: CPT

## 2021-01-01 PROCEDURE — U0003 INFECTIOUS AGENT DETECTION BY NUCLEIC ACID (DNA OR RNA); SEVERE ACUTE RESPIRATORY SYNDROME CORONAVIRUS 2 (SARS-COV-2) (CORONAVIRUS DISEASE [COVID-19]), AMPLIFIED PROBE TECHNIQUE, MAKING USE OF HIGH THROUGHPUT TECHNOLOGIES AS DESCRIBED BY CMS-2020-01-R: HCPCS

## 2021-01-01 PROCEDURE — 85027 COMPLETE CBC AUTOMATED: CPT

## 2021-01-01 PROCEDURE — 86141 C-REACTIVE PROTEIN HS: CPT

## 2021-01-01 PROCEDURE — P9047 ALBUMIN (HUMAN), 25%, 50ML: HCPCS | Performed by: INTERNAL MEDICINE

## 2021-01-01 PROCEDURE — 83735 ASSAY OF MAGNESIUM: CPT

## 2021-01-01 PROCEDURE — 3600000003 HC SURGERY LEVEL 3 BASE: Performed by: UROLOGY

## 2021-01-01 PROCEDURE — 2580000003 HC RX 258: Performed by: EMERGENCY MEDICINE

## 2021-01-01 PROCEDURE — 83690 ASSAY OF LIPASE: CPT

## 2021-01-01 PROCEDURE — 3700000001 HC ADD 15 MINUTES (ANESTHESIA): Performed by: UROLOGY

## 2021-01-01 PROCEDURE — 93005 ELECTROCARDIOGRAM TRACING: CPT | Performed by: EMERGENCY MEDICINE

## 2021-01-01 PROCEDURE — 0TJB8ZZ INSPECTION OF BLADDER, VIA NATURAL OR ARTIFICIAL OPENING ENDOSCOPIC: ICD-10-PCS | Performed by: UROLOGY

## 2021-01-01 PROCEDURE — 2500000003 HC RX 250 WO HCPCS: Performed by: INTERNAL MEDICINE

## 2021-01-01 PROCEDURE — 93010 ELECTROCARDIOGRAM REPORT: CPT | Performed by: INTERNAL MEDICINE

## 2021-01-01 PROCEDURE — 85651 RBC SED RATE NONAUTOMATED: CPT

## 2021-01-01 PROCEDURE — 99231 SBSQ HOSP IP/OBS SF/LOW 25: CPT | Performed by: FAMILY MEDICINE

## 2021-01-01 PROCEDURE — 82553 CREATINE MB FRACTION: CPT

## 2021-01-01 PROCEDURE — 72148 MRI LUMBAR SPINE W/O DYE: CPT

## 2021-01-01 PROCEDURE — 71250 CT THORAX DX C-: CPT

## 2021-01-01 PROCEDURE — 84443 ASSAY THYROID STIM HORMONE: CPT

## 2021-01-01 PROCEDURE — 97161 PT EVAL LOW COMPLEX 20 MIN: CPT

## 2021-01-01 PROCEDURE — C1769 GUIDE WIRE: HCPCS | Performed by: UROLOGY

## 2021-01-01 PROCEDURE — 6360000004 HC RX CONTRAST MEDICATION: Performed by: RADIOLOGY

## 2021-01-01 PROCEDURE — 7100000000 HC PACU RECOVERY - FIRST 15 MIN: Performed by: UROLOGY

## 2021-01-01 PROCEDURE — 97165 OT EVAL LOW COMPLEX 30 MIN: CPT | Performed by: OCCUPATIONAL THERAPIST

## 2021-01-01 PROCEDURE — 73130 X-RAY EXAM OF HAND: CPT

## 2021-01-01 PROCEDURE — 93306 TTE W/DOPPLER COMPLETE: CPT

## 2021-01-01 PROCEDURE — 99221 1ST HOSP IP/OBS SF/LOW 40: CPT | Performed by: FAMILY MEDICINE

## 2021-01-01 PROCEDURE — 99282 EMERGENCY DEPT VISIT SF MDM: CPT

## 2021-01-01 PROCEDURE — 93005 ELECTROCARDIOGRAM TRACING: CPT | Performed by: GENERAL PRACTICE

## 2021-01-01 PROCEDURE — 94664 DEMO&/EVAL PT USE INHALER: CPT

## 2021-01-01 PROCEDURE — 51798 US URINE CAPACITY MEASURE: CPT

## 2021-01-01 PROCEDURE — U0005 INFEC AGEN DETEC AMPLI PROBE: HCPCS

## 2021-01-01 PROCEDURE — 2709999900 HC NON-CHARGEABLE SUPPLY: Performed by: UROLOGY

## 2021-01-01 PROCEDURE — 87449 NOS EACH ORGANISM AG IA: CPT

## 2021-01-01 PROCEDURE — 99442 PR PHYS/QHP TELEPHONE EVALUATION 11-20 MIN: CPT

## 2021-01-01 PROCEDURE — 87081 CULTURE SCREEN ONLY: CPT

## 2021-01-01 PROCEDURE — 93005 ELECTROCARDIOGRAM TRACING: CPT | Performed by: INTERNAL MEDICINE

## 2021-01-01 PROCEDURE — 0T7D8ZZ DILATION OF URETHRA, VIA NATURAL OR ARTIFICIAL OPENING ENDOSCOPIC: ICD-10-PCS | Performed by: UROLOGY

## 2021-01-01 PROCEDURE — 99285 EMERGENCY DEPT VISIT HI MDM: CPT

## 2021-01-01 PROCEDURE — 7100000001 HC PACU RECOVERY - ADDTL 15 MIN: Performed by: UROLOGY

## 2021-01-01 PROCEDURE — 87186 SC STD MICRODIL/AGAR DIL: CPT

## 2021-01-01 PROCEDURE — 82550 ASSAY OF CK (CPK): CPT

## 2021-01-01 PROCEDURE — 74400 UROGRAPHY IV +-KUB TOMOG: CPT

## 2021-01-01 PROCEDURE — 2720000010 HC SURG SUPPLY STERILE: Performed by: UROLOGY

## 2021-01-01 PROCEDURE — 97535 SELF CARE MNGMENT TRAINING: CPT | Performed by: OCCUPATIONAL THERAPIST

## 2021-01-01 PROCEDURE — 3700000000 HC ANESTHESIA ATTENDED CARE: Performed by: UROLOGY

## 2021-01-01 PROCEDURE — 6360000002 HC RX W HCPCS: Performed by: NURSE ANESTHETIST, CERTIFIED REGISTERED

## 2021-01-01 PROCEDURE — 99284 EMERGENCY DEPT VISIT MOD MDM: CPT

## 2021-01-01 PROCEDURE — 97110 THERAPEUTIC EXERCISES: CPT | Performed by: PHYSICAL THERAPIST

## 2021-01-01 PROCEDURE — 74176 CT ABD & PELVIS W/O CONTRAST: CPT

## 2021-01-01 PROCEDURE — 97165 OT EVAL LOW COMPLEX 30 MIN: CPT

## 2021-01-01 RX ORDER — SODIUM CHLORIDE, SODIUM LACTATE, POTASSIUM CHLORIDE, CALCIUM CHLORIDE 600; 310; 30; 20 MG/100ML; MG/100ML; MG/100ML; MG/100ML
INJECTION, SOLUTION INTRAVENOUS CONTINUOUS PRN
Status: DISCONTINUED | OUTPATIENT
Start: 2021-01-01 | End: 2021-01-01 | Stop reason: SDUPTHER

## 2021-01-01 RX ORDER — DOXYCYCLINE HYCLATE 100 MG/1
100 CAPSULE ORAL EVERY 12 HOURS SCHEDULED
Status: DISCONTINUED | OUTPATIENT
Start: 2021-01-01 | End: 2021-01-01

## 2021-01-01 RX ORDER — PREDNISONE 20 MG/1
20 TABLET ORAL DAILY
Status: DISCONTINUED | OUTPATIENT
Start: 2021-01-01 | End: 2021-01-01

## 2021-01-01 RX ORDER — SODIUM CHLORIDE 9 MG/ML
INJECTION, SOLUTION INTRAVENOUS CONTINUOUS
Status: DISCONTINUED | OUTPATIENT
Start: 2021-01-01 | End: 2021-01-01 | Stop reason: HOSPADM

## 2021-01-01 RX ORDER — TAMSULOSIN HYDROCHLORIDE 0.4 MG/1
0.4 CAPSULE ORAL DAILY
COMMUNITY

## 2021-01-01 RX ORDER — ASPIRIN 81 MG/1
TABLET, CHEWABLE ORAL
Status: DISPENSED
Start: 2021-01-01 | End: 2021-01-01

## 2021-01-01 RX ORDER — ALBUTEROL SULFATE 2.5 MG/3ML
2.5 SOLUTION RESPIRATORY (INHALATION) EVERY 4 HOURS PRN
Status: DISCONTINUED | OUTPATIENT
Start: 2021-01-01 | End: 2021-11-29 | Stop reason: HOSPADM

## 2021-01-01 RX ORDER — 0.9 % SODIUM CHLORIDE 0.9 %
1000 INTRAVENOUS SOLUTION INTRAVENOUS ONCE
Status: COMPLETED | OUTPATIENT
Start: 2021-01-01 | End: 2021-01-01

## 2021-01-01 RX ORDER — IPRATROPIUM BROMIDE AND ALBUTEROL SULFATE 2.5; .5 MG/3ML; MG/3ML
3 SOLUTION RESPIRATORY (INHALATION) 4 TIMES DAILY
Qty: 360 ML | DISCHARGE
Start: 2021-01-01

## 2021-01-01 RX ORDER — WARFARIN SODIUM 2 MG/1
4 TABLET ORAL DAILY
Status: DISCONTINUED | OUTPATIENT
Start: 2021-01-01 | End: 2021-11-29 | Stop reason: HOSPADM

## 2021-01-01 RX ORDER — PANTOPRAZOLE SODIUM 40 MG/10ML
40 INJECTION, POWDER, LYOPHILIZED, FOR SOLUTION INTRAVENOUS DAILY
Status: DISCONTINUED | OUTPATIENT
Start: 2021-01-01 | End: 2021-11-29 | Stop reason: HOSPADM

## 2021-01-01 RX ORDER — PREDNISONE 10 MG/1
10 TABLET ORAL DAILY
Status: DISCONTINUED | OUTPATIENT
Start: 2021-01-01 | End: 2021-11-29 | Stop reason: HOSPADM

## 2021-01-01 RX ORDER — METHYLPREDNISOLONE SODIUM SUCCINATE 40 MG/ML
40 INJECTION, POWDER, LYOPHILIZED, FOR SOLUTION INTRAMUSCULAR; INTRAVENOUS DAILY
Status: DISCONTINUED | OUTPATIENT
Start: 2021-01-01 | End: 2021-01-01

## 2021-01-01 RX ORDER — MIDODRINE HYDROCHLORIDE 5 MG/1
5 TABLET ORAL
Status: DISCONTINUED | OUTPATIENT
Start: 2021-01-01 | End: 2021-11-29 | Stop reason: HOSPADM

## 2021-01-01 RX ORDER — TAMSULOSIN HYDROCHLORIDE 0.4 MG/1
0.4 CAPSULE ORAL DAILY
Status: DISCONTINUED | OUTPATIENT
Start: 2021-01-01 | End: 2021-01-01 | Stop reason: HOSPADM

## 2021-01-01 RX ORDER — SODIUM CHLORIDE 9 MG/ML
10 INJECTION INTRAVENOUS DAILY
Status: DISCONTINUED | OUTPATIENT
Start: 2021-01-01 | End: 2021-11-29 | Stop reason: HOSPADM

## 2021-01-01 RX ORDER — FUROSEMIDE 10 MG/ML
20 INJECTION INTRAMUSCULAR; INTRAVENOUS DAILY
Status: DISCONTINUED | OUTPATIENT
Start: 2021-01-01 | End: 2021-01-01 | Stop reason: SDUPTHER

## 2021-01-01 RX ORDER — IPRATROPIUM BROMIDE AND ALBUTEROL SULFATE 2.5; .5 MG/3ML; MG/3ML
3 SOLUTION RESPIRATORY (INHALATION) 4 TIMES DAILY
Status: DISCONTINUED | OUTPATIENT
Start: 2021-01-01 | End: 2021-11-29 | Stop reason: HOSPADM

## 2021-01-01 RX ORDER — TRANEXAMIC ACID 100 MG/ML
100 INJECTION, SOLUTION INTRAVENOUS ONCE
Status: DISCONTINUED | OUTPATIENT
Start: 2021-01-01 | End: 2021-01-01 | Stop reason: HOSPADM

## 2021-01-01 RX ORDER — LORAZEPAM 2 MG/ML
0.5 INJECTION INTRAMUSCULAR
Status: DISCONTINUED | OUTPATIENT
Start: 2021-01-01 | End: 2021-11-29 | Stop reason: HOSPADM

## 2021-01-01 RX ORDER — ASPIRIN 81 MG/1
81 TABLET ORAL DAILY
Status: DISCONTINUED | OUTPATIENT
Start: 2021-01-01 | End: 2021-11-29 | Stop reason: HOSPADM

## 2021-01-01 RX ORDER — HYDROXYZINE PAMOATE 25 MG/1
25 CAPSULE ORAL EVERY 6 HOURS PRN
Status: DISCONTINUED | OUTPATIENT
Start: 2021-01-01 | End: 2021-11-29 | Stop reason: HOSPADM

## 2021-01-01 RX ORDER — DOXAZOSIN 2 MG/1
4 TABLET ORAL DAILY
Status: DISCONTINUED | OUTPATIENT
Start: 2021-01-01 | End: 2021-01-01

## 2021-01-01 RX ORDER — WARFARIN SODIUM 2 MG/1
4 TABLET ORAL DAILY
Status: DISCONTINUED | OUTPATIENT
Start: 2021-01-01 | End: 2021-01-01

## 2021-01-01 RX ORDER — FINASTERIDE 5 MG/1
5 TABLET, FILM COATED ORAL DAILY
Status: DISCONTINUED | OUTPATIENT
Start: 2021-01-01 | End: 2021-01-01

## 2021-01-01 RX ORDER — HYDROCODONE BITARTRATE AND ACETAMINOPHEN 5; 325 MG/1; MG/1
1 TABLET ORAL EVERY 4 HOURS PRN
Status: DISCONTINUED | OUTPATIENT
Start: 2021-01-01 | End: 2021-11-29 | Stop reason: HOSPADM

## 2021-01-01 RX ORDER — DOCUSATE SODIUM 100 MG/1
100 CAPSULE, LIQUID FILLED ORAL 2 TIMES DAILY
Status: DISCONTINUED | OUTPATIENT
Start: 2021-01-01 | End: 2021-11-29 | Stop reason: HOSPADM

## 2021-01-01 RX ORDER — ALBUTEROL SULFATE 2.5 MG/3ML
2.5 SOLUTION RESPIRATORY (INHALATION) EVERY 4 HOURS PRN
Qty: 120 EACH | Refills: 3 | DISCHARGE
Start: 2021-01-01

## 2021-01-01 RX ORDER — WARFARIN SODIUM 3 MG/1
6 TABLET ORAL DAILY
Status: DISCONTINUED | OUTPATIENT
Start: 2021-01-01 | End: 2021-01-01

## 2021-01-01 RX ORDER — FUROSEMIDE 10 MG/ML
40 INJECTION INTRAMUSCULAR; INTRAVENOUS ONCE
Status: COMPLETED | OUTPATIENT
Start: 2021-01-01 | End: 2021-01-01

## 2021-01-01 RX ORDER — ASPIRIN 81 MG/1
324 TABLET, CHEWABLE ORAL ONCE
Status: COMPLETED | OUTPATIENT
Start: 2021-01-01 | End: 2021-01-01

## 2021-01-01 RX ORDER — IPRATROPIUM BROMIDE AND ALBUTEROL SULFATE 2.5; .5 MG/3ML; MG/3ML
1 SOLUTION RESPIRATORY (INHALATION) EVERY 4 HOURS
Status: DISCONTINUED | OUTPATIENT
Start: 2021-01-01 | End: 2021-01-01 | Stop reason: HOSPADM

## 2021-01-01 RX ORDER — FUROSEMIDE 10 MG/ML
20 INJECTION INTRAMUSCULAR; INTRAVENOUS DAILY
Status: DISCONTINUED | OUTPATIENT
Start: 2021-01-01 | End: 2021-01-01

## 2021-01-01 RX ORDER — MORPHINE SULFATE 10 MG/.5ML
2.5 SOLUTION ORAL EVERY 4 HOURS PRN
Status: DISCONTINUED | OUTPATIENT
Start: 2021-01-01 | End: 2021-01-01

## 2021-01-01 RX ORDER — GLYCOPYRROLATE 0.2 MG/ML
0.2 INJECTION INTRAMUSCULAR; INTRAVENOUS EVERY 4 HOURS PRN
Status: DISCONTINUED | OUTPATIENT
Start: 2021-01-01 | End: 2021-11-29 | Stop reason: HOSPADM

## 2021-01-01 RX ORDER — DEXAMETHASONE 2 MG/1
2 TABLET ORAL DAILY
Qty: 24 TABLET | Refills: 0 | DISCHARGE
Start: 2021-01-01 | End: 2021-01-01

## 2021-01-01 RX ORDER — PROPOFOL 10 MG/ML
INJECTION, EMULSION INTRAVENOUS PRN
Status: DISCONTINUED | OUTPATIENT
Start: 2021-01-01 | End: 2021-01-01 | Stop reason: SDUPTHER

## 2021-01-01 RX ORDER — LIDOCAINE HYDROCHLORIDE 20 MG/ML
INJECTION, SOLUTION INTRAVENOUS PRN
Status: DISCONTINUED | OUTPATIENT
Start: 2021-01-01 | End: 2021-01-01 | Stop reason: SDUPTHER

## 2021-01-01 RX ORDER — ALBUMIN (HUMAN) 12.5 G/50ML
25 SOLUTION INTRAVENOUS ONCE
Status: COMPLETED | OUTPATIENT
Start: 2021-01-01 | End: 2021-01-01

## 2021-01-01 RX ORDER — FINASTERIDE 5 MG/1
5 TABLET, FILM COATED ORAL DAILY
Status: DISCONTINUED | OUTPATIENT
Start: 2021-01-01 | End: 2021-01-01 | Stop reason: HOSPADM

## 2021-01-01 RX ORDER — DEXAMETHASONE 6 MG/1
6 TABLET ORAL DAILY
Status: DISCONTINUED | OUTPATIENT
Start: 2021-01-01 | End: 2021-01-01 | Stop reason: SDUPTHER

## 2021-01-01 RX ORDER — METHYLPREDNISOLONE SODIUM SUCCINATE 125 MG/2ML
60 INJECTION, POWDER, LYOPHILIZED, FOR SOLUTION INTRAMUSCULAR; INTRAVENOUS EVERY 8 HOURS
Status: DISCONTINUED | OUTPATIENT
Start: 2021-01-01 | End: 2021-01-01

## 2021-01-01 RX ORDER — WARFARIN SODIUM 3 MG/1
3 TABLET ORAL DAILY
Status: DISCONTINUED | OUTPATIENT
Start: 2021-01-01 | End: 2021-01-01

## 2021-01-01 RX ORDER — ASPIRIN 81 MG/1
81 TABLET, CHEWABLE ORAL DAILY
Status: DISCONTINUED | OUTPATIENT
Start: 2021-01-01 | End: 2021-01-01 | Stop reason: HOSPADM

## 2021-01-01 RX ORDER — METOPROLOL SUCCINATE 50 MG/1
50 TABLET, EXTENDED RELEASE ORAL DAILY
Status: DISCONTINUED | OUTPATIENT
Start: 2021-01-01 | End: 2021-01-01 | Stop reason: HOSPADM

## 2021-01-01 RX ORDER — ALBUTEROL SULFATE 2.5 MG/3ML
2.5 SOLUTION RESPIRATORY (INHALATION)
Status: DISCONTINUED | OUTPATIENT
Start: 2021-01-01 | End: 2021-01-01 | Stop reason: HOSPADM

## 2021-01-01 RX ORDER — METOPROLOL SUCCINATE 25 MG/1
50 TABLET, EXTENDED RELEASE ORAL DAILY
Status: DISCONTINUED | OUTPATIENT
Start: 2021-01-01 | End: 2021-11-29 | Stop reason: HOSPADM

## 2021-01-01 RX ORDER — ACETAMINOPHEN 500 MG
500 TABLET ORAL EVERY 6 HOURS PRN
Status: DISCONTINUED | OUTPATIENT
Start: 2021-01-01 | End: 2021-11-29 | Stop reason: HOSPADM

## 2021-01-01 RX ORDER — FUROSEMIDE 10 MG/ML
40 INJECTION INTRAMUSCULAR; INTRAVENOUS 2 TIMES DAILY
Status: DISCONTINUED | OUTPATIENT
Start: 2021-01-01 | End: 2021-01-01

## 2021-01-01 RX ORDER — WARFARIN SODIUM 4 MG/1
TABLET ORAL
Qty: 30 TABLET | Refills: 3 | DISCHARGE
Start: 2021-01-01

## 2021-01-01 RX ORDER — VITAMIN B COMPLEX
2000 TABLET ORAL DAILY
Status: DISCONTINUED | OUTPATIENT
Start: 2021-01-01 | End: 2021-11-29 | Stop reason: HOSPADM

## 2021-01-01 RX ORDER — ACETAMINOPHEN 500 MG
500 TABLET ORAL EVERY 6 HOURS PRN
Status: DISCONTINUED | OUTPATIENT
Start: 2021-01-01 | End: 2021-01-01 | Stop reason: HOSPADM

## 2021-01-01 RX ORDER — HYDROMORPHONE HYDROCHLORIDE 1 MG/ML
0.5 INJECTION, SOLUTION INTRAMUSCULAR; INTRAVENOUS; SUBCUTANEOUS EVERY 4 HOURS PRN
Status: DISCONTINUED | OUTPATIENT
Start: 2021-01-01 | End: 2021-01-01

## 2021-01-01 RX ORDER — FENTANYL CITRATE 50 UG/ML
INJECTION, SOLUTION INTRAMUSCULAR; INTRAVENOUS PRN
Status: DISCONTINUED | OUTPATIENT
Start: 2021-01-01 | End: 2021-01-01 | Stop reason: SDUPTHER

## 2021-01-01 RX ORDER — FUROSEMIDE 10 MG/ML
INJECTION INTRAMUSCULAR; INTRAVENOUS
Status: DISCONTINUED
Start: 2021-01-01 | End: 2021-01-01

## 2021-01-01 RX ORDER — DEXAMETHASONE 6 MG/1
6 TABLET ORAL DAILY
Qty: 4 TABLET | Refills: 0 | DISCHARGE
Start: 2021-01-01 | End: 2021-01-01

## 2021-01-01 RX ORDER — WARFARIN SODIUM 5 MG/1
5 TABLET ORAL DAILY
Status: DISCONTINUED | OUTPATIENT
Start: 2021-01-01 | End: 2021-01-01

## 2021-01-01 RX ORDER — DIMETHICONE, CAMPHOR (SYNTHETIC), MENTHOL, AND PHENOL 1.1; .5; .625; .5 G/100G; G/100G; G/100G; G/100G
OINTMENT TOPICAL PRN
Status: DISCONTINUED | OUTPATIENT
Start: 2021-01-01 | End: 2021-11-29 | Stop reason: HOSPADM

## 2021-01-01 RX ORDER — DOXYCYCLINE HYCLATE 100 MG/1
100 CAPSULE ORAL 2 TIMES DAILY
Qty: 14 CAPSULE | Refills: 0 | DISCHARGE
Start: 2021-01-01 | End: 2021-01-01

## 2021-01-01 RX ORDER — POTASSIUM CHLORIDE 20 MEQ/1
20 TABLET, EXTENDED RELEASE ORAL ONCE
Status: COMPLETED | OUTPATIENT
Start: 2021-01-01 | End: 2021-01-01

## 2021-01-01 RX ORDER — WARFARIN SODIUM 7.5 MG/1
7.5 TABLET ORAL DAILY
Status: DISCONTINUED | OUTPATIENT
Start: 2021-01-01 | End: 2021-01-01

## 2021-01-01 RX ORDER — FINASTERIDE 5 MG/1
5 TABLET, FILM COATED ORAL DAILY
Status: DISCONTINUED | OUTPATIENT
Start: 2021-01-01 | End: 2021-11-29 | Stop reason: HOSPADM

## 2021-01-01 RX ORDER — TAMSULOSIN HYDROCHLORIDE 0.4 MG/1
0.4 CAPSULE ORAL
Status: DISCONTINUED | OUTPATIENT
Start: 2021-01-01 | End: 2021-11-29 | Stop reason: HOSPADM

## 2021-01-01 RX ORDER — HYDROMORPHONE HYDROCHLORIDE 1 MG/ML
1 INJECTION, SOLUTION INTRAMUSCULAR; INTRAVENOUS; SUBCUTANEOUS EVERY 4 HOURS PRN
Status: DISCONTINUED | OUTPATIENT
Start: 2021-01-01 | End: 2021-01-01

## 2021-01-01 RX ORDER — POLYETHYLENE GLYCOL 3350 17 G/17G
17 POWDER, FOR SOLUTION ORAL ONCE
Status: COMPLETED | OUTPATIENT
Start: 2021-01-01 | End: 2021-01-01

## 2021-01-01 RX ORDER — DEXAMETHASONE 6 MG/1
6 TABLET ORAL DAILY
Status: DISCONTINUED | OUTPATIENT
Start: 2021-01-01 | End: 2021-01-01 | Stop reason: HOSPADM

## 2021-01-01 RX ORDER — ARFORMOTEROL TARTRATE 15 UG/2ML
15 SOLUTION RESPIRATORY (INHALATION) 2 TIMES DAILY
Status: DISCONTINUED | OUTPATIENT
Start: 2021-01-01 | End: 2021-11-29 | Stop reason: HOSPADM

## 2021-01-01 RX ORDER — LORAZEPAM 2 MG/ML
1 INJECTION INTRAMUSCULAR
Status: DISCONTINUED | OUTPATIENT
Start: 2021-01-01 | End: 2021-11-29 | Stop reason: HOSPADM

## 2021-01-01 RX ORDER — WARFARIN SODIUM 1 MG/1
1 TABLET ORAL DAILY
Status: DISCONTINUED | OUTPATIENT
Start: 2021-01-01 | End: 2021-01-01 | Stop reason: HOSPADM

## 2021-01-01 RX ORDER — SODIUM CHLORIDE, SODIUM LACTATE, POTASSIUM CHLORIDE, CALCIUM CHLORIDE 600; 310; 30; 20 MG/100ML; MG/100ML; MG/100ML; MG/100ML
INJECTION, SOLUTION INTRAVENOUS CONTINUOUS
Status: DISCONTINUED | OUTPATIENT
Start: 2021-01-01 | End: 2021-01-01

## 2021-01-01 RX ORDER — WARFARIN SODIUM 1 MG/1
1 TABLET ORAL DAILY
Status: DISCONTINUED | OUTPATIENT
Start: 2021-01-01 | End: 2021-01-01

## 2021-01-01 RX ORDER — IPRATROPIUM BROMIDE AND ALBUTEROL SULFATE 2.5; .5 MG/3ML; MG/3ML
1 SOLUTION RESPIRATORY (INHALATION)
Status: DISCONTINUED | OUTPATIENT
Start: 2021-01-01 | End: 2021-01-01

## 2021-01-01 RX ORDER — FUROSEMIDE 10 MG/ML
40 INJECTION INTRAMUSCULAR; INTRAVENOUS 2 TIMES DAILY
Status: DISCONTINUED | OUTPATIENT
Start: 2021-01-01 | End: 2021-11-29 | Stop reason: HOSPADM

## 2021-01-01 RX ORDER — POLYETHYLENE GLYCOL 3350 17 G/17G
17 POWDER, FOR SOLUTION ORAL DAILY PRN
Status: DISCONTINUED | OUTPATIENT
Start: 2021-01-01 | End: 2021-11-29 | Stop reason: HOSPADM

## 2021-01-01 RX ORDER — WARFARIN SODIUM 2 MG/1
2 TABLET ORAL DAILY
Status: DISCONTINUED | OUTPATIENT
Start: 2021-01-01 | End: 2021-01-01

## 2021-01-01 RX ADMIN — IPRATROPIUM BROMIDE AND ALBUTEROL SULFATE 3 ML: .5; 2.5 SOLUTION RESPIRATORY (INHALATION) at 10:44

## 2021-01-01 RX ADMIN — FINASTERIDE 5 MG: 5 TABLET, FILM COATED ORAL at 10:36

## 2021-01-01 RX ADMIN — DOXAZOSIN 4 MG: 2 TABLET ORAL at 09:45

## 2021-01-01 RX ADMIN — Medication 2000 UNITS: at 09:34

## 2021-01-01 RX ADMIN — PANTOPRAZOLE SODIUM 40 MG: 40 INJECTION, POWDER, FOR SOLUTION INTRAVENOUS at 09:34

## 2021-01-01 RX ADMIN — FUROSEMIDE 40 MG: 10 INJECTION, SOLUTION INTRAMUSCULAR; INTRAVENOUS at 18:18

## 2021-01-01 RX ADMIN — ARFORMOTEROL TARTRATE 15 MCG: 15 SOLUTION RESPIRATORY (INHALATION) at 18:29

## 2021-01-01 RX ADMIN — DOCUSATE SODIUM 100 MG: 100 CAPSULE ORAL at 09:23

## 2021-01-01 RX ADMIN — ARFORMOTEROL TARTRATE 15 MCG: 15 SOLUTION RESPIRATORY (INHALATION) at 06:43

## 2021-01-01 RX ADMIN — SODIUM CHLORIDE, PRESERVATIVE FREE 10 ML: 5 INJECTION INTRAVENOUS at 09:15

## 2021-01-01 RX ADMIN — WARFARIN 6 MG: 3 TABLET ORAL at 17:53

## 2021-01-01 RX ADMIN — FUROSEMIDE 40 MG: 10 INJECTION, SOLUTION INTRAMUSCULAR; INTRAVENOUS at 10:29

## 2021-01-01 RX ADMIN — IPRATROPIUM BROMIDE AND ALBUTEROL SULFATE 3 ML: .5; 2.5 SOLUTION RESPIRATORY (INHALATION) at 17:43

## 2021-01-01 RX ADMIN — WARFARIN SODIUM 2 MG: 2 TABLET ORAL at 18:24

## 2021-01-01 RX ADMIN — DOXYCYCLINE HYCLATE 100 MG: 100 CAPSULE ORAL at 21:24

## 2021-01-01 RX ADMIN — FUROSEMIDE 40 MG: 10 INJECTION, SOLUTION INTRAMUSCULAR; INTRAVENOUS at 10:28

## 2021-01-01 RX ADMIN — PANTOPRAZOLE SODIUM 40 MG: 40 INJECTION, POWDER, FOR SOLUTION INTRAVENOUS at 08:56

## 2021-01-01 RX ADMIN — ARFORMOTEROL TARTRATE 15 MCG: 15 SOLUTION RESPIRATORY (INHALATION) at 07:23

## 2021-01-01 RX ADMIN — WATER 1000 MG: 1 INJECTION INTRAMUSCULAR; INTRAVENOUS; SUBCUTANEOUS at 12:11

## 2021-01-01 RX ADMIN — DOCUSATE SODIUM 100 MG: 100 CAPSULE ORAL at 12:30

## 2021-01-01 RX ADMIN — IPRATROPIUM BROMIDE AND ALBUTEROL SULFATE 3 ML: .5; 2.5 SOLUTION RESPIRATORY (INHALATION) at 08:26

## 2021-01-01 RX ADMIN — DOXYCYCLINE HYCLATE 100 MG: 100 CAPSULE ORAL at 08:41

## 2021-01-01 RX ADMIN — IPRATROPIUM BROMIDE AND ALBUTEROL SULFATE 3 ML: .5; 2.5 SOLUTION RESPIRATORY (INHALATION) at 08:11

## 2021-01-01 RX ADMIN — IPRATROPIUM BROMIDE AND ALBUTEROL SULFATE 1 AMPULE: .5; 3 SOLUTION RESPIRATORY (INHALATION) at 03:49

## 2021-01-01 RX ADMIN — TAMSULOSIN HYDROCHLORIDE 0.4 MG: 0.4 CAPSULE ORAL at 07:43

## 2021-01-01 RX ADMIN — ARFORMOTEROL TARTRATE 15 MCG: 15 SOLUTION RESPIRATORY (INHALATION) at 19:12

## 2021-01-01 RX ADMIN — ARFORMOTEROL TARTRATE 15 MCG: 15 SOLUTION RESPIRATORY (INHALATION) at 06:19

## 2021-01-01 RX ADMIN — IPRATROPIUM BROMIDE AND ALBUTEROL SULFATE 3 ML: .5; 2.5 SOLUTION RESPIRATORY (INHALATION) at 10:08

## 2021-01-01 RX ADMIN — ASPIRIN 81 MG: 81 TABLET, COATED ORAL at 09:47

## 2021-01-01 RX ADMIN — SERTRALINE 50 MG: 50 TABLET, FILM COATED ORAL at 09:36

## 2021-01-01 RX ADMIN — SODIUM CHLORIDE, PRESERVATIVE FREE 10 ML: 5 INJECTION INTRAVENOUS at 09:45

## 2021-01-01 RX ADMIN — FINASTERIDE 5 MG: 5 TABLET, FILM COATED ORAL at 09:47

## 2021-01-01 RX ADMIN — ASPIRIN 81 MG: 81 TABLET, CHEWABLE ORAL at 10:44

## 2021-01-01 RX ADMIN — ASPIRIN 81 MG: 81 TABLET, CHEWABLE ORAL at 08:52

## 2021-01-01 RX ADMIN — WATER 1000 MG: 1 INJECTION INTRAMUSCULAR; INTRAVENOUS; SUBCUTANEOUS at 11:45

## 2021-01-01 RX ADMIN — ARFORMOTEROL TARTRATE 15 MCG: 15 SOLUTION RESPIRATORY (INHALATION) at 18:16

## 2021-01-01 RX ADMIN — FINASTERIDE 5 MG: 5 TABLET, FILM COATED ORAL at 09:39

## 2021-01-01 RX ADMIN — METHYLPREDNISOLONE SODIUM SUCCINATE 60 MG: 125 INJECTION, POWDER, FOR SOLUTION INTRAMUSCULAR; INTRAVENOUS at 04:37

## 2021-01-01 RX ADMIN — ACETAMINOPHEN 500 MG: 500 TABLET ORAL at 00:50

## 2021-01-01 RX ADMIN — Medication 2000 UNITS: at 10:27

## 2021-01-01 RX ADMIN — ARFORMOTEROL TARTRATE 15 MCG: 15 SOLUTION RESPIRATORY (INHALATION) at 06:07

## 2021-01-01 RX ADMIN — IPRATROPIUM BROMIDE AND ALBUTEROL SULFATE 3 ML: .5; 2.5 SOLUTION RESPIRATORY (INHALATION) at 06:52

## 2021-01-01 RX ADMIN — PREDNISONE 20 MG: 20 TABLET ORAL at 10:03

## 2021-01-01 RX ADMIN — DOXAZOSIN 4 MG: 2 TABLET ORAL at 10:35

## 2021-01-01 RX ADMIN — WARFARIN 1 MG: 1 TABLET ORAL at 17:16

## 2021-01-01 RX ADMIN — METOPROLOL SUCCINATE 50 MG: 50 TABLET, EXTENDED RELEASE ORAL at 15:12

## 2021-01-01 RX ADMIN — IPRATROPIUM BROMIDE AND ALBUTEROL SULFATE 3 ML: .5; 2.5 SOLUTION RESPIRATORY (INHALATION) at 15:18

## 2021-01-01 RX ADMIN — FUROSEMIDE 40 MG: 10 INJECTION, SOLUTION INTRAMUSCULAR; INTRAVENOUS at 09:56

## 2021-01-01 RX ADMIN — DOXYCYCLINE HYCLATE 100 MG: 100 CAPSULE ORAL at 11:46

## 2021-01-01 RX ADMIN — IPRATROPIUM BROMIDE AND ALBUTEROL SULFATE 3 ML: .5; 2.5 SOLUTION RESPIRATORY (INHALATION) at 20:58

## 2021-01-01 RX ADMIN — SODIUM CHLORIDE, PRESERVATIVE FREE 10 ML: 5 INJECTION INTRAVENOUS at 10:25

## 2021-01-01 RX ADMIN — FINASTERIDE 5 MG: 5 TABLET, FILM COATED ORAL at 15:12

## 2021-01-01 RX ADMIN — PREDNISONE 20 MG: 20 TABLET ORAL at 11:34

## 2021-01-01 RX ADMIN — IPRATROPIUM BROMIDE AND ALBUTEROL SULFATE 3 ML: .5; 2.5 SOLUTION RESPIRATORY (INHALATION) at 14:13

## 2021-01-01 RX ADMIN — IPRATROPIUM BROMIDE AND ALBUTEROL SULFATE 3 ML: .5; 2.5 SOLUTION RESPIRATORY (INHALATION) at 19:19

## 2021-01-01 RX ADMIN — METHYLPREDNISOLONE SODIUM SUCCINATE 60 MG: 125 INJECTION, POWDER, FOR SOLUTION INTRAMUSCULAR; INTRAVENOUS at 20:25

## 2021-01-01 RX ADMIN — METOPROLOL SUCCINATE 50 MG: 25 TABLET, EXTENDED RELEASE ORAL at 09:23

## 2021-01-01 RX ADMIN — SERTRALINE 50 MG: 50 TABLET, FILM COATED ORAL at 09:54

## 2021-01-01 RX ADMIN — DEXAMETHASONE 6 MG: 6 TABLET ORAL at 09:39

## 2021-01-01 RX ADMIN — WATER 1000 MG: 1 INJECTION INTRAMUSCULAR; INTRAVENOUS; SUBCUTANEOUS at 12:20

## 2021-01-01 RX ADMIN — SODIUM CHLORIDE, PRESERVATIVE FREE 10 ML: 5 INJECTION INTRAVENOUS at 09:52

## 2021-01-01 RX ADMIN — SODIUM CHLORIDE, PRESERVATIVE FREE 10 ML: 5 INJECTION INTRAVENOUS at 16:11

## 2021-01-01 RX ADMIN — PROPOFOL 10 MG: 10 INJECTION, EMULSION INTRAVENOUS at 14:51

## 2021-01-01 RX ADMIN — SODIUM CHLORIDE, POTASSIUM CHLORIDE, SODIUM LACTATE AND CALCIUM CHLORIDE: 600; 310; 30; 20 INJECTION, SOLUTION INTRAVENOUS at 14:45

## 2021-01-01 RX ADMIN — IPRATROPIUM BROMIDE AND ALBUTEROL SULFATE 3 ML: .5; 2.5 SOLUTION RESPIRATORY (INHALATION) at 10:58

## 2021-01-01 RX ADMIN — ASPIRIN 81 MG: 81 TABLET, COATED ORAL at 12:20

## 2021-01-01 RX ADMIN — FUROSEMIDE 20 MG: 10 INJECTION, SOLUTION INTRAMUSCULAR; INTRAVENOUS at 09:36

## 2021-01-01 RX ADMIN — ASPIRIN 81 MG: 81 TABLET, COATED ORAL at 09:13

## 2021-01-01 RX ADMIN — IPRATROPIUM BROMIDE AND ALBUTEROL SULFATE 3 ML: .5; 2.5 SOLUTION RESPIRATORY (INHALATION) at 09:45

## 2021-01-01 RX ADMIN — WARFARIN 6 MG: 3 TABLET ORAL at 18:30

## 2021-01-01 RX ADMIN — DOXAZOSIN 4 MG: 2 TABLET ORAL at 08:43

## 2021-01-01 RX ADMIN — SODIUM CHLORIDE, PRESERVATIVE FREE 10 ML: 5 INJECTION INTRAVENOUS at 08:44

## 2021-01-01 RX ADMIN — Medication 2000 UNITS: at 09:28

## 2021-01-01 RX ADMIN — PANTOPRAZOLE SODIUM 40 MG: 40 INJECTION, POWDER, FOR SOLUTION INTRAVENOUS at 08:41

## 2021-01-01 RX ADMIN — IPRATROPIUM BROMIDE AND ALBUTEROL SULFATE 3 ML: .5; 2.5 SOLUTION RESPIRATORY (INHALATION) at 14:19

## 2021-01-01 RX ADMIN — DOCUSATE SODIUM 100 MG: 100 CAPSULE ORAL at 20:39

## 2021-01-01 RX ADMIN — MIDODRINE HYDROCHLORIDE 5 MG: 5 TABLET ORAL at 17:43

## 2021-01-01 RX ADMIN — PANTOPRAZOLE SODIUM 40 MG: 40 INJECTION, POWDER, FOR SOLUTION INTRAVENOUS at 10:28

## 2021-01-01 RX ADMIN — DOCUSATE SODIUM 100 MG: 100 CAPSULE ORAL at 13:20

## 2021-01-01 RX ADMIN — PANTOPRAZOLE SODIUM 40 MG: 40 INJECTION, POWDER, FOR SOLUTION INTRAVENOUS at 09:27

## 2021-01-01 RX ADMIN — PROPOFOL 10 MG: 10 INJECTION, EMULSION INTRAVENOUS at 15:06

## 2021-01-01 RX ADMIN — PROPOFOL 10 MG: 10 INJECTION, EMULSION INTRAVENOUS at 14:59

## 2021-01-01 RX ADMIN — PANTOPRAZOLE SODIUM 40 MG: 40 INJECTION, POWDER, FOR SOLUTION INTRAVENOUS at 16:11

## 2021-01-01 RX ADMIN — SODIUM CHLORIDE, POTASSIUM CHLORIDE, SODIUM LACTATE AND CALCIUM CHLORIDE: 600; 310; 30; 20 INJECTION, SOLUTION INTRAVENOUS at 04:43

## 2021-01-01 RX ADMIN — TAMSULOSIN HYDROCHLORIDE 0.4 MG: 0.4 CAPSULE ORAL at 17:46

## 2021-01-01 RX ADMIN — FUROSEMIDE 40 MG: 10 INJECTION, SOLUTION INTRAMUSCULAR; INTRAVENOUS at 09:50

## 2021-01-01 RX ADMIN — ARFORMOTEROL TARTRATE 15 MCG: 15 SOLUTION RESPIRATORY (INHALATION) at 06:12

## 2021-01-01 RX ADMIN — FUROSEMIDE 40 MG: 10 INJECTION, SOLUTION INTRAMUSCULAR; INTRAVENOUS at 16:52

## 2021-01-01 RX ADMIN — WARFARIN 6 MG: 3 TABLET ORAL at 17:19

## 2021-01-01 RX ADMIN — ARFORMOTEROL TARTRATE 15 MCG: 15 SOLUTION RESPIRATORY (INHALATION) at 18:35

## 2021-01-01 RX ADMIN — IPRATROPIUM BROMIDE AND ALBUTEROL SULFATE 3 ML: .5; 2.5 SOLUTION RESPIRATORY (INHALATION) at 06:53

## 2021-01-01 RX ADMIN — Medication 2000 UNITS: at 08:43

## 2021-01-01 RX ADMIN — Medication 2000 UNITS: at 09:22

## 2021-01-01 RX ADMIN — SERTRALINE 50 MG: 50 TABLET, FILM COATED ORAL at 09:13

## 2021-01-01 RX ADMIN — TAMSULOSIN HYDROCHLORIDE 0.4 MG: 0.4 CAPSULE ORAL at 18:28

## 2021-01-01 RX ADMIN — DOCUSATE SODIUM 100 MG: 100 CAPSULE ORAL at 09:28

## 2021-01-01 RX ADMIN — DOXYCYCLINE HYCLATE 100 MG: 100 CAPSULE ORAL at 20:26

## 2021-01-01 RX ADMIN — Medication 2000 UNITS: at 10:09

## 2021-01-01 RX ADMIN — IPRATROPIUM BROMIDE AND ALBUTEROL SULFATE 1 AMPULE: .5; 3 SOLUTION RESPIRATORY (INHALATION) at 09:28

## 2021-01-01 RX ADMIN — WARFARIN SODIUM 4 MG: 2 TABLET ORAL at 18:28

## 2021-01-01 RX ADMIN — ARFORMOTEROL TARTRATE 15 MCG: 15 SOLUTION RESPIRATORY (INHALATION) at 18:28

## 2021-01-01 RX ADMIN — FINASTERIDE 5 MG: 5 TABLET, FILM COATED ORAL at 08:18

## 2021-01-01 RX ADMIN — WARFARIN 1 MG: 1 TABLET ORAL at 16:52

## 2021-01-01 RX ADMIN — FUROSEMIDE 20 MG: 10 INJECTION, SOLUTION INTRAMUSCULAR; INTRAVENOUS at 15:54

## 2021-01-01 RX ADMIN — IPRATROPIUM BROMIDE AND ALBUTEROL SULFATE 1 AMPULE: .5; 3 SOLUTION RESPIRATORY (INHALATION) at 10:12

## 2021-01-01 RX ADMIN — IPRATROPIUM BROMIDE AND ALBUTEROL SULFATE 3 ML: .5; 2.5 SOLUTION RESPIRATORY (INHALATION) at 20:30

## 2021-01-01 RX ADMIN — DEXAMETHASONE 6 MG: 6 TABLET ORAL at 08:18

## 2021-01-01 RX ADMIN — WATER 1000 MG: 1 INJECTION INTRAMUSCULAR; INTRAVENOUS; SUBCUTANEOUS at 12:14

## 2021-01-01 RX ADMIN — SERTRALINE 50 MG: 50 TABLET, FILM COATED ORAL at 10:26

## 2021-01-01 RX ADMIN — DOXAZOSIN 4 MG: 2 TABLET ORAL at 09:34

## 2021-01-01 RX ADMIN — ARFORMOTEROL TARTRATE 15 MCG: 15 SOLUTION RESPIRATORY (INHALATION) at 07:47

## 2021-01-01 RX ADMIN — METHYLPREDNISOLONE SODIUM SUCCINATE 60 MG: 125 INJECTION, POWDER, FOR SOLUTION INTRAMUSCULAR; INTRAVENOUS at 13:20

## 2021-01-01 RX ADMIN — METOPROLOL SUCCINATE 50 MG: 25 TABLET, EXTENDED RELEASE ORAL at 09:49

## 2021-01-01 RX ADMIN — DOXYCYCLINE HYCLATE 100 MG: 100 CAPSULE ORAL at 09:34

## 2021-01-01 RX ADMIN — FUROSEMIDE 40 MG: 10 INJECTION, SOLUTION INTRAMUSCULAR; INTRAVENOUS at 11:34

## 2021-01-01 RX ADMIN — IPRATROPIUM BROMIDE AND ALBUTEROL SULFATE 3 ML: .5; 2.5 SOLUTION RESPIRATORY (INHALATION) at 11:07

## 2021-01-01 RX ADMIN — IPRATROPIUM BROMIDE AND ALBUTEROL SULFATE 1 AMPULE: .5; 3 SOLUTION RESPIRATORY (INHALATION) at 18:50

## 2021-01-01 RX ADMIN — IPRATROPIUM BROMIDE AND ALBUTEROL SULFATE 3 ML: .5; 2.5 SOLUTION RESPIRATORY (INHALATION) at 19:31

## 2021-01-01 RX ADMIN — PANTOPRAZOLE SODIUM 40 MG: 40 INJECTION, POWDER, FOR SOLUTION INTRAVENOUS at 08:48

## 2021-01-01 RX ADMIN — IPRATROPIUM BROMIDE AND ALBUTEROL SULFATE 3 ML: .5; 2.5 SOLUTION RESPIRATORY (INHALATION) at 14:52

## 2021-01-01 RX ADMIN — SODIUM CHLORIDE: 9 INJECTION, SOLUTION INTRAVENOUS at 15:13

## 2021-01-01 RX ADMIN — METOPROLOL SUCCINATE 50 MG: 50 TABLET, EXTENDED RELEASE ORAL at 08:52

## 2021-01-01 RX ADMIN — SODIUM CHLORIDE, PRESERVATIVE FREE 10 ML: 5 INJECTION INTRAVENOUS at 08:41

## 2021-01-01 RX ADMIN — IPRATROPIUM BROMIDE AND ALBUTEROL SULFATE 3 ML: .5; 2.5 SOLUTION RESPIRATORY (INHALATION) at 18:28

## 2021-01-01 RX ADMIN — IPRATROPIUM BROMIDE AND ALBUTEROL SULFATE 3 ML: .5; 2.5 SOLUTION RESPIRATORY (INHALATION) at 06:11

## 2021-01-01 RX ADMIN — FINASTERIDE 5 MG: 5 TABLET, FILM COATED ORAL at 10:27

## 2021-01-01 RX ADMIN — ARFORMOTEROL TARTRATE 15 MCG: 15 SOLUTION RESPIRATORY (INHALATION) at 20:59

## 2021-01-01 RX ADMIN — ASPIRIN 81 MG: 81 TABLET, COATED ORAL at 09:39

## 2021-01-01 RX ADMIN — FINASTERIDE 5 MG: 5 TABLET, FILM COATED ORAL at 09:15

## 2021-01-01 RX ADMIN — Medication 2000 UNITS: at 10:24

## 2021-01-01 RX ADMIN — DOXYCYCLINE HYCLATE 100 MG: 100 CAPSULE ORAL at 21:09

## 2021-01-01 RX ADMIN — DOCUSATE SODIUM 100 MG: 100 CAPSULE ORAL at 20:09

## 2021-01-01 RX ADMIN — IPRATROPIUM BROMIDE AND ALBUTEROL SULFATE 1 AMPULE: .5; 3 SOLUTION RESPIRATORY (INHALATION) at 10:06

## 2021-01-01 RX ADMIN — METHYLPREDNISOLONE SODIUM SUCCINATE 60 MG: 125 INJECTION, POWDER, FOR SOLUTION INTRAMUSCULAR; INTRAVENOUS at 13:22

## 2021-01-01 RX ADMIN — TAMSULOSIN HYDROCHLORIDE 0.4 MG: 0.4 CAPSULE ORAL at 18:38

## 2021-01-01 RX ADMIN — PREDNISONE 20 MG: 20 TABLET ORAL at 09:22

## 2021-01-01 RX ADMIN — ARFORMOTEROL TARTRATE 15 MCG: 15 SOLUTION RESPIRATORY (INHALATION) at 18:56

## 2021-01-01 RX ADMIN — FUROSEMIDE 40 MG: 10 INJECTION, SOLUTION INTRAMUSCULAR; INTRAVENOUS at 17:54

## 2021-01-01 RX ADMIN — DOXAZOSIN 4 MG: 2 TABLET ORAL at 10:24

## 2021-01-01 RX ADMIN — WARFARIN SODIUM 3 MG: 3 TABLET ORAL at 17:49

## 2021-01-01 RX ADMIN — IPRATROPIUM BROMIDE AND ALBUTEROL SULFATE 3 ML: .5; 2.5 SOLUTION RESPIRATORY (INHALATION) at 18:16

## 2021-01-01 RX ADMIN — FUROSEMIDE 40 MG: 10 INJECTION, SOLUTION INTRAMUSCULAR; INTRAVENOUS at 09:28

## 2021-01-01 RX ADMIN — METHYLPREDNISOLONE SODIUM SUCCINATE 40 MG: 40 INJECTION, POWDER, FOR SOLUTION INTRAMUSCULAR; INTRAVENOUS at 09:25

## 2021-01-01 RX ADMIN — IPRATROPIUM BROMIDE AND ALBUTEROL SULFATE 3 ML: .5; 2.5 SOLUTION RESPIRATORY (INHALATION) at 09:39

## 2021-01-01 RX ADMIN — METOPROLOL SUCCINATE 50 MG: 25 TABLET, EXTENDED RELEASE ORAL at 08:41

## 2021-01-01 RX ADMIN — WARFARIN 6 MG: 3 TABLET ORAL at 17:29

## 2021-01-01 RX ADMIN — FUROSEMIDE 40 MG: 10 INJECTION, SOLUTION INTRAMUSCULAR; INTRAVENOUS at 09:40

## 2021-01-01 RX ADMIN — METOPROLOL SUCCINATE 50 MG: 50 TABLET, EXTENDED RELEASE ORAL at 08:13

## 2021-01-01 RX ADMIN — SODIUM CHLORIDE, PRESERVATIVE FREE 10 ML: 5 INJECTION INTRAVENOUS at 09:00

## 2021-01-01 RX ADMIN — FUROSEMIDE 40 MG: 10 INJECTION, SOLUTION INTRAMUSCULAR; INTRAVENOUS at 08:43

## 2021-01-01 RX ADMIN — METOPROLOL SUCCINATE 50 MG: 25 TABLET, EXTENDED RELEASE ORAL at 09:48

## 2021-01-01 RX ADMIN — SODIUM CHLORIDE, PRESERVATIVE FREE 10 ML: 5 INJECTION INTRAVENOUS at 09:41

## 2021-01-01 RX ADMIN — IPRATROPIUM BROMIDE AND ALBUTEROL SULFATE 1 AMPULE: .5; 3 SOLUTION RESPIRATORY (INHALATION) at 19:36

## 2021-01-01 RX ADMIN — IPRATROPIUM BROMIDE AND ALBUTEROL SULFATE 3 ML: .5; 2.5 SOLUTION RESPIRATORY (INHALATION) at 19:01

## 2021-01-01 RX ADMIN — ASPIRIN 81 MG: 81 TABLET, COATED ORAL at 14:00

## 2021-01-01 RX ADMIN — ACETAMINOPHEN 500 MG: 500 TABLET ORAL at 21:35

## 2021-01-01 RX ADMIN — DOCUSATE SODIUM 100 MG: 100 CAPSULE ORAL at 20:58

## 2021-01-01 RX ADMIN — SODIUM CHLORIDE, PRESERVATIVE FREE 10 ML: 5 INJECTION INTRAVENOUS at 09:48

## 2021-01-01 RX ADMIN — TAMSULOSIN HYDROCHLORIDE 0.4 MG: 0.4 CAPSULE ORAL at 16:52

## 2021-01-01 RX ADMIN — IPRATROPIUM BROMIDE AND ALBUTEROL SULFATE 3 ML: .5; 2.5 SOLUTION RESPIRATORY (INHALATION) at 07:00

## 2021-01-01 RX ADMIN — ARFORMOTEROL TARTRATE 15 MCG: 15 SOLUTION RESPIRATORY (INHALATION) at 18:14

## 2021-01-01 RX ADMIN — SERTRALINE 50 MG: 50 TABLET, FILM COATED ORAL at 11:39

## 2021-01-01 RX ADMIN — ACETAMINOPHEN 500 MG: 500 TABLET ORAL at 16:34

## 2021-01-01 RX ADMIN — ASPIRIN 81 MG: 81 TABLET, COATED ORAL at 09:28

## 2021-01-01 RX ADMIN — METHYLPREDNISOLONE SODIUM SUCCINATE 60 MG: 125 INJECTION, POWDER, FOR SOLUTION INTRAMUSCULAR; INTRAVENOUS at 04:13

## 2021-01-01 RX ADMIN — IPRATROPIUM BROMIDE AND ALBUTEROL SULFATE 3 ML: .5; 2.5 SOLUTION RESPIRATORY (INHALATION) at 18:35

## 2021-01-01 RX ADMIN — METHYLPREDNISOLONE SODIUM SUCCINATE 60 MG: 125 INJECTION, POWDER, FOR SOLUTION INTRAMUSCULAR; INTRAVENOUS at 20:06

## 2021-01-01 RX ADMIN — METOPROLOL SUCCINATE 50 MG: 25 TABLET, EXTENDED RELEASE ORAL at 08:52

## 2021-01-01 RX ADMIN — FINASTERIDE 5 MG: 5 TABLET, FILM COATED ORAL at 10:25

## 2021-01-01 RX ADMIN — METHYLPREDNISOLONE SODIUM SUCCINATE 60 MG: 125 INJECTION, POWDER, FOR SOLUTION INTRAMUSCULAR; INTRAVENOUS at 12:38

## 2021-01-01 RX ADMIN — TAMSULOSIN HYDROCHLORIDE 0.4 MG: 0.4 CAPSULE ORAL at 17:53

## 2021-01-01 RX ADMIN — Medication 2000 UNITS: at 09:54

## 2021-01-01 RX ADMIN — IPRATROPIUM BROMIDE AND ALBUTEROL SULFATE 3 ML: .5; 2.5 SOLUTION RESPIRATORY (INHALATION) at 10:10

## 2021-01-01 RX ADMIN — SERTRALINE 50 MG: 50 TABLET, FILM COATED ORAL at 09:12

## 2021-01-01 RX ADMIN — METHYLPREDNISOLONE SODIUM SUCCINATE 60 MG: 125 INJECTION, POWDER, FOR SOLUTION INTRAMUSCULAR; INTRAVENOUS at 16:34

## 2021-01-01 RX ADMIN — ASPIRIN 81 MG: 81 TABLET, COATED ORAL at 09:49

## 2021-01-01 RX ADMIN — ARFORMOTEROL TARTRATE 15 MCG: 15 SOLUTION RESPIRATORY (INHALATION) at 17:43

## 2021-01-01 RX ADMIN — TAMSULOSIN HYDROCHLORIDE 0.4 MG: 0.4 CAPSULE ORAL at 19:46

## 2021-01-01 RX ADMIN — DOCUSATE SODIUM 100 MG: 100 CAPSULE ORAL at 09:12

## 2021-01-01 RX ADMIN — FUROSEMIDE 40 MG: 10 INJECTION, SOLUTION INTRAMUSCULAR; INTRAVENOUS at 16:27

## 2021-01-01 RX ADMIN — DOXAZOSIN 4 MG: 2 TABLET ORAL at 09:23

## 2021-01-01 RX ADMIN — FINASTERIDE 5 MG: 5 TABLET, FILM COATED ORAL at 09:11

## 2021-01-01 RX ADMIN — METOPROLOL SUCCINATE 50 MG: 25 TABLET, EXTENDED RELEASE ORAL at 10:07

## 2021-01-01 RX ADMIN — FINASTERIDE 5 MG: 5 TABLET, FILM COATED ORAL at 08:43

## 2021-01-01 RX ADMIN — Medication 2000 UNITS: at 09:13

## 2021-01-01 RX ADMIN — ASPIRIN 81 MG: 81 TABLET, COATED ORAL at 09:23

## 2021-01-01 RX ADMIN — TAMSULOSIN HYDROCHLORIDE 0.4 MG: 0.4 CAPSULE ORAL at 08:13

## 2021-01-01 RX ADMIN — TAMSULOSIN HYDROCHLORIDE 0.4 MG: 0.4 CAPSULE ORAL at 18:24

## 2021-01-01 RX ADMIN — ARFORMOTEROL TARTRATE 15 MCG: 15 SOLUTION RESPIRATORY (INHALATION) at 06:52

## 2021-01-01 RX ADMIN — TAMSULOSIN HYDROCHLORIDE 0.4 MG: 0.4 CAPSULE ORAL at 20:09

## 2021-01-01 RX ADMIN — IPRATROPIUM BROMIDE AND ALBUTEROL SULFATE 3 ML: .5; 2.5 SOLUTION RESPIRATORY (INHALATION) at 14:56

## 2021-01-01 RX ADMIN — IPRATROPIUM BROMIDE AND ALBUTEROL SULFATE 3 ML: .5; 2.5 SOLUTION RESPIRATORY (INHALATION) at 07:12

## 2021-01-01 RX ADMIN — ARFORMOTEROL TARTRATE 15 MCG: 15 SOLUTION RESPIRATORY (INHALATION) at 18:48

## 2021-01-01 RX ADMIN — IPRATROPIUM BROMIDE AND ALBUTEROL SULFATE 1 AMPULE: .5; 3 SOLUTION RESPIRATORY (INHALATION) at 01:39

## 2021-01-01 RX ADMIN — METHYLPREDNISOLONE SODIUM SUCCINATE 60 MG: 125 INJECTION, POWDER, FOR SOLUTION INTRAMUSCULAR; INTRAVENOUS at 04:43

## 2021-01-01 RX ADMIN — METHYLPREDNISOLONE SODIUM SUCCINATE 60 MG: 125 INJECTION, POWDER, FOR SOLUTION INTRAMUSCULAR; INTRAVENOUS at 04:57

## 2021-01-01 RX ADMIN — FINASTERIDE 5 MG: 5 TABLET, FILM COATED ORAL at 09:40

## 2021-01-01 RX ADMIN — ASPIRIN 81 MG: 81 TABLET, COATED ORAL at 08:41

## 2021-01-01 RX ADMIN — ASPIRIN 81 MG: 81 TABLET, CHEWABLE ORAL at 09:39

## 2021-01-01 RX ADMIN — IPRATROPIUM BROMIDE AND ALBUTEROL SULFATE 3 ML: .5; 2.5 SOLUTION RESPIRATORY (INHALATION) at 13:47

## 2021-01-01 RX ADMIN — Medication 2000 UNITS: at 09:51

## 2021-01-01 RX ADMIN — IPRATROPIUM BROMIDE AND ALBUTEROL SULFATE 1 AMPULE: .5; 3 SOLUTION RESPIRATORY (INHALATION) at 07:21

## 2021-01-01 RX ADMIN — PANTOPRAZOLE SODIUM 40 MG: 40 INJECTION, POWDER, FOR SOLUTION INTRAVENOUS at 08:43

## 2021-01-01 RX ADMIN — IPRATROPIUM BROMIDE AND ALBUTEROL SULFATE 3 ML: .5; 2.5 SOLUTION RESPIRATORY (INHALATION) at 08:41

## 2021-01-01 RX ADMIN — IPRATROPIUM BROMIDE AND ALBUTEROL SULFATE 3 ML: .5; 2.5 SOLUTION RESPIRATORY (INHALATION) at 13:59

## 2021-01-01 RX ADMIN — PANTOPRAZOLE SODIUM 40 MG: 40 INJECTION, POWDER, FOR SOLUTION INTRAVENOUS at 09:52

## 2021-01-01 RX ADMIN — FUROSEMIDE 40 MG: 10 INJECTION, SOLUTION INTRAMUSCULAR; INTRAVENOUS at 10:45

## 2021-01-01 RX ADMIN — WARFARIN SODIUM 5 MG: 5 TABLET ORAL at 18:18

## 2021-01-01 RX ADMIN — METHYLPREDNISOLONE SODIUM SUCCINATE 60 MG: 125 INJECTION, POWDER, FOR SOLUTION INTRAMUSCULAR; INTRAVENOUS at 11:46

## 2021-01-01 RX ADMIN — FUROSEMIDE 40 MG: 10 INJECTION, SOLUTION INTRAMUSCULAR; INTRAVENOUS at 14:59

## 2021-01-01 RX ADMIN — FUROSEMIDE 40 MG: 10 INJECTION, SOLUTION INTRAMUSCULAR; INTRAVENOUS at 09:23

## 2021-01-01 RX ADMIN — IPRATROPIUM BROMIDE AND ALBUTEROL SULFATE 3 ML: .5; 2.5 SOLUTION RESPIRATORY (INHALATION) at 18:23

## 2021-01-01 RX ADMIN — METOPROLOL SUCCINATE 50 MG: 25 TABLET, EXTENDED RELEASE ORAL at 17:18

## 2021-01-01 RX ADMIN — DOCUSATE SODIUM 100 MG: 100 CAPSULE ORAL at 10:27

## 2021-01-01 RX ADMIN — SODIUM CHLORIDE, PRESERVATIVE FREE 10 ML: 5 INJECTION INTRAVENOUS at 10:11

## 2021-01-01 RX ADMIN — ARFORMOTEROL TARTRATE 15 MCG: 15 SOLUTION RESPIRATORY (INHALATION) at 19:09

## 2021-01-01 RX ADMIN — DOXYCYCLINE HYCLATE 100 MG: 100 CAPSULE ORAL at 08:48

## 2021-01-01 RX ADMIN — FUROSEMIDE 40 MG: 10 INJECTION, SOLUTION INTRAMUSCULAR; INTRAVENOUS at 18:38

## 2021-01-01 RX ADMIN — WARFARIN 6 MG: 3 TABLET ORAL at 18:13

## 2021-01-01 RX ADMIN — DOXAZOSIN 4 MG: 2 TABLET ORAL at 18:22

## 2021-01-01 RX ADMIN — TAMSULOSIN HYDROCHLORIDE 0.4 MG: 0.4 CAPSULE ORAL at 17:43

## 2021-01-01 RX ADMIN — DOCUSATE SODIUM 100 MG: 100 CAPSULE ORAL at 20:36

## 2021-01-01 RX ADMIN — DOCUSATE SODIUM 100 MG: 100 CAPSULE ORAL at 08:48

## 2021-01-01 RX ADMIN — WATER 1000 MG: 1 INJECTION INTRAMUSCULAR; INTRAVENOUS; SUBCUTANEOUS at 12:38

## 2021-01-01 RX ADMIN — WARFARIN SODIUM 4 MG: 2 TABLET ORAL at 18:48

## 2021-01-01 RX ADMIN — METOPROLOL SUCCINATE 50 MG: 25 TABLET, EXTENDED RELEASE ORAL at 10:28

## 2021-01-01 RX ADMIN — TAMSULOSIN HYDROCHLORIDE 0.4 MG: 0.4 CAPSULE ORAL at 08:52

## 2021-01-01 RX ADMIN — DOXYCYCLINE 100 MG: 100 INJECTION, POWDER, LYOPHILIZED, FOR SOLUTION INTRAVENOUS at 07:43

## 2021-01-01 RX ADMIN — SODIUM CHLORIDE, PRESERVATIVE FREE 10 ML: 5 INJECTION INTRAVENOUS at 08:49

## 2021-01-01 RX ADMIN — IPRATROPIUM BROMIDE AND ALBUTEROL SULFATE 1 AMPULE: .5; 3 SOLUTION RESPIRATORY (INHALATION) at 19:06

## 2021-01-01 RX ADMIN — METOPROLOL SUCCINATE 50 MG: 25 TABLET, EXTENDED RELEASE ORAL at 08:48

## 2021-01-01 RX ADMIN — SODIUM CHLORIDE 1000 ML: 9 INJECTION, SOLUTION INTRAVENOUS at 20:38

## 2021-01-01 RX ADMIN — FUROSEMIDE 40 MG: 10 INJECTION, SOLUTION INTRAMUSCULAR; INTRAVENOUS at 18:17

## 2021-01-01 RX ADMIN — METOPROLOL SUCCINATE 50 MG: 25 TABLET, EXTENDED RELEASE ORAL at 11:34

## 2021-01-01 RX ADMIN — IPRATROPIUM BROMIDE AND ALBUTEROL SULFATE 3 ML: .5; 2.5 SOLUTION RESPIRATORY (INHALATION) at 15:46

## 2021-01-01 RX ADMIN — PANTOPRAZOLE SODIUM 40 MG: 40 INJECTION, POWDER, FOR SOLUTION INTRAVENOUS at 10:09

## 2021-01-01 RX ADMIN — DOXAZOSIN 4 MG: 2 TABLET ORAL at 10:07

## 2021-01-01 RX ADMIN — METHYLPREDNISOLONE SODIUM SUCCINATE 60 MG: 125 INJECTION, POWDER, FOR SOLUTION INTRAMUSCULAR; INTRAVENOUS at 03:44

## 2021-01-01 RX ADMIN — SODIUM CHLORIDE, PRESERVATIVE FREE 10 ML: 5 INJECTION INTRAVENOUS at 09:50

## 2021-01-01 RX ADMIN — PANTOPRAZOLE SODIUM 40 MG: 40 INJECTION, POWDER, FOR SOLUTION INTRAVENOUS at 09:47

## 2021-01-01 RX ADMIN — PANTOPRAZOLE SODIUM 40 MG: 40 INJECTION, POWDER, FOR SOLUTION INTRAVENOUS at 09:12

## 2021-01-01 RX ADMIN — METHYLPREDNISOLONE SODIUM SUCCINATE 60 MG: 125 INJECTION, POWDER, FOR SOLUTION INTRAMUSCULAR; INTRAVENOUS at 20:38

## 2021-01-01 RX ADMIN — IPRATROPIUM BROMIDE AND ALBUTEROL SULFATE 3 ML: .5; 2.5 SOLUTION RESPIRATORY (INHALATION) at 18:29

## 2021-01-01 RX ADMIN — IPRATROPIUM BROMIDE AND ALBUTEROL SULFATE 3 ML: .5; 2.5 SOLUTION RESPIRATORY (INHALATION) at 19:02

## 2021-01-01 RX ADMIN — FUROSEMIDE 40 MG: 10 INJECTION, SOLUTION INTRAMUSCULAR; INTRAVENOUS at 09:47

## 2021-01-01 RX ADMIN — IPRATROPIUM BROMIDE AND ALBUTEROL SULFATE 3 ML: .5; 2.5 SOLUTION RESPIRATORY (INHALATION) at 06:19

## 2021-01-01 RX ADMIN — IPRATROPIUM BROMIDE AND ALBUTEROL SULFATE 3 ML: .5; 2.5 SOLUTION RESPIRATORY (INHALATION) at 08:39

## 2021-01-01 RX ADMIN — TAMSULOSIN HYDROCHLORIDE 0.4 MG: 0.4 CAPSULE ORAL at 08:19

## 2021-01-01 RX ADMIN — WARFARIN SODIUM 7.5 MG: 7.5 TABLET ORAL at 18:15

## 2021-01-01 RX ADMIN — FUROSEMIDE 40 MG: 10 INJECTION, SOLUTION INTRAMUSCULAR; INTRAVENOUS at 17:16

## 2021-01-01 RX ADMIN — DOXYCYCLINE HYCLATE 100 MG: 100 CAPSULE ORAL at 10:27

## 2021-01-01 RX ADMIN — DOXAZOSIN 4 MG: 2 TABLET ORAL at 09:46

## 2021-01-01 RX ADMIN — ARFORMOTEROL TARTRATE 15 MCG: 15 SOLUTION RESPIRATORY (INHALATION) at 07:00

## 2021-01-01 RX ADMIN — WARFARIN SODIUM 4 MG: 2 TABLET ORAL at 17:40

## 2021-01-01 RX ADMIN — METOPROLOL SUCCINATE 50 MG: 50 TABLET, EXTENDED RELEASE ORAL at 07:42

## 2021-01-01 RX ADMIN — SODIUM CHLORIDE, PRESERVATIVE FREE 10 ML: 5 INJECTION INTRAVENOUS at 09:56

## 2021-01-01 RX ADMIN — PANTOPRAZOLE SODIUM 40 MG: 40 INJECTION, POWDER, FOR SOLUTION INTRAVENOUS at 09:51

## 2021-01-01 RX ADMIN — ARFORMOTEROL TARTRATE 15 MCG: 15 SOLUTION RESPIRATORY (INHALATION) at 19:23

## 2021-01-01 RX ADMIN — ARFORMOTEROL TARTRATE 15 MCG: 15 SOLUTION RESPIRATORY (INHALATION) at 17:22

## 2021-01-01 RX ADMIN — FINASTERIDE 5 MG: 5 TABLET, FILM COATED ORAL at 09:23

## 2021-01-01 RX ADMIN — METOPROLOL SUCCINATE 50 MG: 25 TABLET, EXTENDED RELEASE ORAL at 09:46

## 2021-01-01 RX ADMIN — DOXAZOSIN 4 MG: 2 TABLET ORAL at 10:27

## 2021-01-01 RX ADMIN — FUROSEMIDE 40 MG: 10 INJECTION, SOLUTION INTRAMUSCULAR; INTRAVENOUS at 09:12

## 2021-01-01 RX ADMIN — IPRATROPIUM BROMIDE AND ALBUTEROL SULFATE 3 ML: .5; 2.5 SOLUTION RESPIRATORY (INHALATION) at 19:36

## 2021-01-01 RX ADMIN — IPRATROPIUM BROMIDE AND ALBUTEROL SULFATE 3 ML: .5; 2.5 SOLUTION RESPIRATORY (INHALATION) at 04:58

## 2021-01-01 RX ADMIN — IPRATROPIUM BROMIDE AND ALBUTEROL SULFATE 1 AMPULE: .5; 3 SOLUTION RESPIRATORY (INHALATION) at 13:35

## 2021-01-01 RX ADMIN — FUROSEMIDE 40 MG: 10 INJECTION, SOLUTION INTRAMUSCULAR; INTRAVENOUS at 17:43

## 2021-01-01 RX ADMIN — ASPIRIN 81 MG: 81 TABLET, COATED ORAL at 08:52

## 2021-01-01 RX ADMIN — SODIUM CHLORIDE: 9 INJECTION, SOLUTION INTRAVENOUS at 18:16

## 2021-01-01 RX ADMIN — FINASTERIDE 5 MG: 5 TABLET, FILM COATED ORAL at 10:07

## 2021-01-01 RX ADMIN — METHYLPREDNISOLONE SODIUM SUCCINATE 60 MG: 125 INJECTION, POWDER, FOR SOLUTION INTRAMUSCULAR; INTRAVENOUS at 23:32

## 2021-01-01 RX ADMIN — ARFORMOTEROL TARTRATE 15 MCG: 15 SOLUTION RESPIRATORY (INHALATION) at 07:12

## 2021-01-01 RX ADMIN — PREDNISONE 20 MG: 20 TABLET ORAL at 11:38

## 2021-01-01 RX ADMIN — ASPIRIN 81 MG: 81 TABLET, COATED ORAL at 09:22

## 2021-01-01 RX ADMIN — Medication 2000 UNITS: at 08:52

## 2021-01-01 RX ADMIN — METOPROLOL SUCCINATE 50 MG: 25 TABLET, EXTENDED RELEASE ORAL at 09:35

## 2021-01-01 RX ADMIN — FINASTERIDE 5 MG: 5 TABLET, FILM COATED ORAL at 08:48

## 2021-01-01 RX ADMIN — PROPOFOL 10 MG: 10 INJECTION, EMULSION INTRAVENOUS at 14:55

## 2021-01-01 RX ADMIN — IPRATROPIUM BROMIDE AND ALBUTEROL SULFATE 3 ML: .5; 2.5 SOLUTION RESPIRATORY (INHALATION) at 07:46

## 2021-01-01 RX ADMIN — IPRATROPIUM BROMIDE AND ALBUTEROL SULFATE 3 ML: .5; 2.5 SOLUTION RESPIRATORY (INHALATION) at 15:10

## 2021-01-01 RX ADMIN — HYDROCODONE BITARTRATE AND ACETAMINOPHEN 1 TABLET: 5; 325 TABLET ORAL at 23:33

## 2021-01-01 RX ADMIN — ASPIRIN 81 MG: 81 TABLET, CHEWABLE ORAL at 15:12

## 2021-01-01 RX ADMIN — Medication 2000 UNITS: at 09:36

## 2021-01-01 RX ADMIN — Medication 2000 UNITS: at 10:30

## 2021-01-01 RX ADMIN — IOHEXOL 50 ML: 240 INJECTION, SOLUTION INTRATHECAL; INTRAVASCULAR; INTRAVENOUS; ORAL at 16:29

## 2021-01-01 RX ADMIN — METHYLPREDNISOLONE SODIUM SUCCINATE 60 MG: 125 INJECTION, POWDER, FOR SOLUTION INTRAMUSCULAR; INTRAVENOUS at 20:55

## 2021-01-01 RX ADMIN — ASPIRIN 81 MG: 81 TABLET, COATED ORAL at 10:08

## 2021-01-01 RX ADMIN — IPRATROPIUM BROMIDE AND ALBUTEROL SULFATE 3 ML: .5; 2.5 SOLUTION RESPIRATORY (INHALATION) at 13:51

## 2021-01-01 RX ADMIN — MIDODRINE HYDROCHLORIDE 5 MG: 5 TABLET ORAL at 13:13

## 2021-01-01 RX ADMIN — IPRATROPIUM BROMIDE AND ALBUTEROL SULFATE 3 ML: .5; 2.5 SOLUTION RESPIRATORY (INHALATION) at 18:48

## 2021-01-01 RX ADMIN — WARFARIN SODIUM 1 MG: 1 TABLET ORAL at 17:43

## 2021-01-01 RX ADMIN — ARFORMOTEROL TARTRATE 15 MCG: 15 SOLUTION RESPIRATORY (INHALATION) at 18:33

## 2021-01-01 RX ADMIN — FINASTERIDE 5 MG: 5 TABLET, FILM COATED ORAL at 09:36

## 2021-01-01 RX ADMIN — METHYLPREDNISOLONE SODIUM SUCCINATE 60 MG: 125 INJECTION, POWDER, FOR SOLUTION INTRAMUSCULAR; INTRAVENOUS at 05:00

## 2021-01-01 RX ADMIN — WATER 1000 MG: 1 INJECTION INTRAMUSCULAR; INTRAVENOUS; SUBCUTANEOUS at 12:30

## 2021-01-01 RX ADMIN — FINASTERIDE 5 MG: 5 TABLET, FILM COATED ORAL at 08:52

## 2021-01-01 RX ADMIN — IPRATROPIUM BROMIDE AND ALBUTEROL SULFATE 3 ML: .5; 2.5 SOLUTION RESPIRATORY (INHALATION) at 19:11

## 2021-01-01 RX ADMIN — DOXAZOSIN 4 MG: 2 TABLET ORAL at 12:15

## 2021-01-01 RX ADMIN — PROPOFOL 10 MG: 10 INJECTION, EMULSION INTRAVENOUS at 15:03

## 2021-01-01 RX ADMIN — FINASTERIDE 5 MG: 5 TABLET, FILM COATED ORAL at 11:34

## 2021-01-01 RX ADMIN — IPRATROPIUM BROMIDE AND ALBUTEROL SULFATE 3 ML: .5; 2.5 SOLUTION RESPIRATORY (INHALATION) at 05:29

## 2021-01-01 RX ADMIN — FUROSEMIDE 40 MG: 10 INJECTION, SOLUTION INTRAMUSCULAR; INTRAVENOUS at 17:53

## 2021-01-01 RX ADMIN — IPRATROPIUM BROMIDE AND ALBUTEROL SULFATE 3 ML: .5; 2.5 SOLUTION RESPIRATORY (INHALATION) at 13:48

## 2021-01-01 RX ADMIN — FINASTERIDE 5 MG: 5 TABLET, FILM COATED ORAL at 09:28

## 2021-01-01 RX ADMIN — Medication 2000 UNITS: at 09:12

## 2021-01-01 RX ADMIN — ARFORMOTEROL TARTRATE 15 MCG: 15 SOLUTION RESPIRATORY (INHALATION) at 08:11

## 2021-01-01 RX ADMIN — IPRATROPIUM BROMIDE AND ALBUTEROL SULFATE 1 AMPULE: .5; 3 SOLUTION RESPIRATORY (INHALATION) at 10:23

## 2021-01-01 RX ADMIN — IPRATROPIUM BROMIDE AND ALBUTEROL SULFATE 3 ML: .5; 2.5 SOLUTION RESPIRATORY (INHALATION) at 14:34

## 2021-01-01 RX ADMIN — FUROSEMIDE 40 MG: 10 INJECTION, SOLUTION INTRAMUSCULAR; INTRAVENOUS at 14:22

## 2021-01-01 RX ADMIN — DOXAZOSIN 4 MG: 2 TABLET ORAL at 09:28

## 2021-01-01 RX ADMIN — IPRATROPIUM BROMIDE AND ALBUTEROL SULFATE 1 AMPULE: .5; 3 SOLUTION RESPIRATORY (INHALATION) at 01:32

## 2021-01-01 RX ADMIN — ARFORMOTEROL TARTRATE 15 MCG: 15 SOLUTION RESPIRATORY (INHALATION) at 13:49

## 2021-01-01 RX ADMIN — METHYLPREDNISOLONE SODIUM SUCCINATE 60 MG: 125 INJECTION, POWDER, FOR SOLUTION INTRAMUSCULAR; INTRAVENOUS at 21:09

## 2021-01-01 RX ADMIN — DOXYCYCLINE HYCLATE 100 MG: 100 CAPSULE ORAL at 20:53

## 2021-01-01 RX ADMIN — METHYLPREDNISOLONE SODIUM SUCCINATE 60 MG: 125 INJECTION, POWDER, FOR SOLUTION INTRAMUSCULAR; INTRAVENOUS at 04:12

## 2021-01-01 RX ADMIN — FINASTERIDE 5 MG: 5 TABLET, FILM COATED ORAL at 08:13

## 2021-01-01 RX ADMIN — IPRATROPIUM BROMIDE AND ALBUTEROL SULFATE 1 AMPULE: .5; 3 SOLUTION RESPIRATORY (INHALATION) at 21:00

## 2021-01-01 RX ADMIN — DOXYCYCLINE HYCLATE 100 MG: 100 CAPSULE ORAL at 09:46

## 2021-01-01 RX ADMIN — IPRATROPIUM BROMIDE AND ALBUTEROL SULFATE 3 ML: .5; 2.5 SOLUTION RESPIRATORY (INHALATION) at 10:24

## 2021-01-01 RX ADMIN — DOXAZOSIN 4 MG: 2 TABLET ORAL at 08:41

## 2021-01-01 RX ADMIN — METHYLPREDNISOLONE SODIUM SUCCINATE 60 MG: 125 INJECTION, POWDER, FOR SOLUTION INTRAMUSCULAR; INTRAVENOUS at 04:54

## 2021-01-01 RX ADMIN — TAMSULOSIN HYDROCHLORIDE 0.4 MG: 0.4 CAPSULE ORAL at 17:40

## 2021-01-01 RX ADMIN — SODIUM CHLORIDE: 9 INJECTION, SOLUTION INTRAVENOUS at 21:31

## 2021-01-01 RX ADMIN — FUROSEMIDE 40 MG: 10 INJECTION, SOLUTION INTRAMUSCULAR; INTRAVENOUS at 18:02

## 2021-01-01 RX ADMIN — WARFARIN SODIUM 5 MG: 5 TABLET ORAL at 20:09

## 2021-01-01 RX ADMIN — WARFARIN SODIUM 2 MG: 2 TABLET ORAL at 17:46

## 2021-01-01 RX ADMIN — ARFORMOTEROL TARTRATE 15 MCG: 15 SOLUTION RESPIRATORY (INHALATION) at 07:46

## 2021-01-01 RX ADMIN — DOXAZOSIN 4 MG: 2 TABLET ORAL at 09:12

## 2021-01-01 RX ADMIN — FUROSEMIDE 40 MG: 10 INJECTION, SOLUTION INTRAMUSCULAR; INTRAVENOUS at 09:34

## 2021-01-01 RX ADMIN — PREDNISONE 10 MG: 10 TABLET ORAL at 08:52

## 2021-01-01 RX ADMIN — SERTRALINE 50 MG: 50 TABLET, FILM COATED ORAL at 09:48

## 2021-01-01 RX ADMIN — SODIUM CHLORIDE, PRESERVATIVE FREE 10 ML: 5 INJECTION INTRAVENOUS at 08:51

## 2021-01-01 RX ADMIN — ASPIRIN 81 MG: 81 TABLET, COATED ORAL at 08:43

## 2021-01-01 RX ADMIN — IPRATROPIUM BROMIDE AND ALBUTEROL SULFATE 1 AMPULE: .5; 3 SOLUTION RESPIRATORY (INHALATION) at 10:27

## 2021-01-01 RX ADMIN — IPRATROPIUM BROMIDE AND ALBUTEROL SULFATE 3 ML: .5; 2.5 SOLUTION RESPIRATORY (INHALATION) at 14:23

## 2021-01-01 RX ADMIN — IPRATROPIUM BROMIDE AND ALBUTEROL SULFATE 1 AMPULE: .5; 3 SOLUTION RESPIRATORY (INHALATION) at 06:16

## 2021-01-01 RX ADMIN — WARFARIN SODIUM 4 MG: 2 TABLET ORAL at 18:14

## 2021-01-01 RX ADMIN — MIDODRINE HYDROCHLORIDE 5 MG: 5 TABLET ORAL at 08:52

## 2021-01-01 RX ADMIN — ASPIRIN 81 MG: 81 TABLET, CHEWABLE ORAL at 08:18

## 2021-01-01 RX ADMIN — IPRATROPIUM BROMIDE AND ALBUTEROL SULFATE 3 ML: .5; 2.5 SOLUTION RESPIRATORY (INHALATION) at 11:15

## 2021-01-01 RX ADMIN — DOXAZOSIN 4 MG: 2 TABLET ORAL at 09:49

## 2021-01-01 RX ADMIN — PROPOFOL 10 MG: 10 INJECTION, EMULSION INTRAVENOUS at 14:45

## 2021-01-01 RX ADMIN — ARFORMOTEROL TARTRATE 15 MCG: 15 SOLUTION RESPIRATORY (INHALATION) at 06:46

## 2021-01-01 RX ADMIN — ALBUMIN (HUMAN) 25 G: 0.25 INJECTION, SOLUTION INTRAVENOUS at 14:47

## 2021-01-01 RX ADMIN — DOCUSATE SODIUM 100 MG: 100 CAPSULE ORAL at 09:49

## 2021-01-01 RX ADMIN — SODIUM CHLORIDE, PRESERVATIVE FREE 10 ML: 5 INJECTION INTRAVENOUS at 11:43

## 2021-01-01 RX ADMIN — METOPROLOL SUCCINATE 50 MG: 50 TABLET, EXTENDED RELEASE ORAL at 09:11

## 2021-01-01 RX ADMIN — PANTOPRAZOLE SODIUM 40 MG: 40 INJECTION, POWDER, FOR SOLUTION INTRAVENOUS at 09:46

## 2021-01-01 RX ADMIN — ARFORMOTEROL TARTRATE 15 MCG: 15 SOLUTION RESPIRATORY (INHALATION) at 19:01

## 2021-01-01 RX ADMIN — DOXYCYCLINE HYCLATE 100 MG: 100 CAPSULE ORAL at 20:55

## 2021-01-01 RX ADMIN — DOCUSATE SODIUM 100 MG: 100 CAPSULE ORAL at 21:24

## 2021-01-01 RX ADMIN — TAMSULOSIN HYDROCHLORIDE 0.4 MG: 0.4 CAPSULE ORAL at 09:42

## 2021-01-01 RX ADMIN — ARFORMOTEROL TARTRATE 15 MCG: 15 SOLUTION RESPIRATORY (INHALATION) at 19:36

## 2021-01-01 RX ADMIN — TAMSULOSIN HYDROCHLORIDE 0.4 MG: 0.4 CAPSULE ORAL at 18:22

## 2021-01-01 RX ADMIN — IPRATROPIUM BROMIDE AND ALBUTEROL SULFATE 3 ML: .5; 2.5 SOLUTION RESPIRATORY (INHALATION) at 09:28

## 2021-01-01 RX ADMIN — FUROSEMIDE 40 MG: 10 INJECTION, SOLUTION INTRAMUSCULAR; INTRAVENOUS at 08:51

## 2021-01-01 RX ADMIN — SERTRALINE 50 MG: 50 TABLET, FILM COATED ORAL at 09:47

## 2021-01-01 RX ADMIN — FINASTERIDE 5 MG: 5 TABLET, FILM COATED ORAL at 10:09

## 2021-01-01 RX ADMIN — ARFORMOTEROL TARTRATE 15 MCG: 15 SOLUTION RESPIRATORY (INHALATION) at 06:04

## 2021-01-01 RX ADMIN — IPRATROPIUM BROMIDE AND ALBUTEROL SULFATE 3 ML: .5; 2.5 SOLUTION RESPIRATORY (INHALATION) at 07:27

## 2021-01-01 RX ADMIN — FUROSEMIDE 40 MG: 10 INJECTION, SOLUTION INTRAMUSCULAR; INTRAVENOUS at 18:14

## 2021-01-01 RX ADMIN — IPRATROPIUM BROMIDE AND ALBUTEROL SULFATE 3 ML: .5; 2.5 SOLUTION RESPIRATORY (INHALATION) at 17:22

## 2021-01-01 RX ADMIN — SODIUM CHLORIDE, PRESERVATIVE FREE 10 ML: 5 INJECTION INTRAVENOUS at 09:12

## 2021-01-01 RX ADMIN — TAMSULOSIN HYDROCHLORIDE 0.4 MG: 0.4 CAPSULE ORAL at 18:13

## 2021-01-01 RX ADMIN — ASPIRIN 81 MG: 81 TABLET, COATED ORAL at 10:24

## 2021-01-01 RX ADMIN — IPRATROPIUM BROMIDE AND ALBUTEROL SULFATE 1 AMPULE: .5; 3 SOLUTION RESPIRATORY (INHALATION) at 06:46

## 2021-01-01 RX ADMIN — FINASTERIDE 5 MG: 5 TABLET, FILM COATED ORAL at 09:13

## 2021-01-01 RX ADMIN — IPRATROPIUM BROMIDE AND ALBUTEROL SULFATE 1 AMPULE: .5; 3 SOLUTION RESPIRATORY (INHALATION) at 19:13

## 2021-01-01 RX ADMIN — TAMSULOSIN HYDROCHLORIDE 0.4 MG: 0.4 CAPSULE ORAL at 15:12

## 2021-01-01 RX ADMIN — ASPIRIN 81 MG: 81 TABLET, COATED ORAL at 10:34

## 2021-01-01 RX ADMIN — Medication 2000 UNITS: at 08:41

## 2021-01-01 RX ADMIN — FINASTERIDE 5 MG: 5 TABLET, FILM COATED ORAL at 10:30

## 2021-01-01 RX ADMIN — METOPROLOL SUCCINATE 50 MG: 25 TABLET, EXTENDED RELEASE ORAL at 09:34

## 2021-01-01 RX ADMIN — IPRATROPIUM BROMIDE AND ALBUTEROL SULFATE 3 ML: .5; 2.5 SOLUTION RESPIRATORY (INHALATION) at 09:57

## 2021-01-01 RX ADMIN — TAMSULOSIN HYDROCHLORIDE 0.4 MG: 0.4 CAPSULE ORAL at 17:16

## 2021-01-01 RX ADMIN — METHYLPREDNISOLONE SODIUM SUCCINATE 40 MG: 40 INJECTION, POWDER, FOR SOLUTION INTRAMUSCULAR; INTRAVENOUS at 09:45

## 2021-01-01 RX ADMIN — METOPROLOL SUCCINATE 50 MG: 25 TABLET, EXTENDED RELEASE ORAL at 08:43

## 2021-01-01 RX ADMIN — FUROSEMIDE 40 MG: 10 INJECTION, SOLUTION INTRAMUSCULAR; INTRAVENOUS at 18:28

## 2021-01-01 RX ADMIN — FUROSEMIDE 40 MG: 10 INJECTION, SOLUTION INTRAMUSCULAR; INTRAVENOUS at 20:09

## 2021-01-01 RX ADMIN — ARFORMOTEROL TARTRATE 15 MCG: 15 SOLUTION RESPIRATORY (INHALATION) at 18:57

## 2021-01-01 RX ADMIN — IPRATROPIUM BROMIDE AND ALBUTEROL SULFATE 3 ML: .5; 2.5 SOLUTION RESPIRATORY (INHALATION) at 09:42

## 2021-01-01 RX ADMIN — ASPIRIN 81 MG: 81 TABLET, COATED ORAL at 10:35

## 2021-01-01 RX ADMIN — DOCUSATE SODIUM 100 MG: 100 CAPSULE ORAL at 21:16

## 2021-01-01 RX ADMIN — SERTRALINE 50 MG: 50 TABLET, FILM COATED ORAL at 11:34

## 2021-01-01 RX ADMIN — TAMSULOSIN HYDROCHLORIDE 0.4 MG: 0.4 CAPSULE ORAL at 16:10

## 2021-01-01 RX ADMIN — DOCUSATE SODIUM 100 MG: 100 CAPSULE ORAL at 09:46

## 2021-01-01 RX ADMIN — ARFORMOTEROL TARTRATE 15 MCG: 15 SOLUTION RESPIRATORY (INHALATION) at 19:31

## 2021-01-01 RX ADMIN — IPRATROPIUM BROMIDE AND ALBUTEROL SULFATE 3 ML: .5; 2.5 SOLUTION RESPIRATORY (INHALATION) at 06:46

## 2021-01-01 RX ADMIN — IPRATROPIUM BROMIDE AND ALBUTEROL SULFATE 1 AMPULE: .5; 3 SOLUTION RESPIRATORY (INHALATION) at 14:40

## 2021-01-01 RX ADMIN — WARFARIN SODIUM 7.5 MG: 7.5 TABLET ORAL at 19:19

## 2021-01-01 RX ADMIN — IPRATROPIUM BROMIDE AND ALBUTEROL SULFATE 3 ML: .5; 2.5 SOLUTION RESPIRATORY (INHALATION) at 06:28

## 2021-01-01 RX ADMIN — MIDODRINE HYDROCHLORIDE 5 MG: 5 TABLET ORAL at 18:48

## 2021-01-01 RX ADMIN — ASPIRIN 81 MG: 81 TABLET, CHEWABLE ORAL at 08:12

## 2021-01-01 RX ADMIN — PANTOPRAZOLE SODIUM 40 MG: 40 INJECTION, POWDER, FOR SOLUTION INTRAVENOUS at 10:35

## 2021-01-01 RX ADMIN — FINASTERIDE 5 MG: 5 TABLET, FILM COATED ORAL at 09:49

## 2021-01-01 RX ADMIN — ASPIRIN 81 MG: 81 TABLET, CHEWABLE ORAL at 09:11

## 2021-01-01 RX ADMIN — Medication 2000 UNITS: at 09:45

## 2021-01-01 RX ADMIN — TAMSULOSIN HYDROCHLORIDE 0.4 MG: 0.4 CAPSULE ORAL at 17:29

## 2021-01-01 RX ADMIN — PREDNISONE 20 MG: 20 TABLET ORAL at 10:30

## 2021-01-01 RX ADMIN — FUROSEMIDE 40 MG: 10 INJECTION, SOLUTION INTRAMUSCULAR; INTRAVENOUS at 17:04

## 2021-01-01 RX ADMIN — IPRATROPIUM BROMIDE AND ALBUTEROL SULFATE 1 AMPULE: .5; 3 SOLUTION RESPIRATORY (INHALATION) at 06:05

## 2021-01-01 RX ADMIN — FUROSEMIDE 40 MG: 10 INJECTION, SOLUTION INTRAMUSCULAR; INTRAVENOUS at 19:36

## 2021-01-01 RX ADMIN — DOCUSATE SODIUM 100 MG: 100 CAPSULE ORAL at 10:24

## 2021-01-01 RX ADMIN — DEXAMETHASONE 6 MG: 6 TABLET ORAL at 07:42

## 2021-01-01 RX ADMIN — FUROSEMIDE 40 MG: 10 INJECTION, SOLUTION INTRAMUSCULAR; INTRAVENOUS at 08:41

## 2021-01-01 RX ADMIN — DOCUSATE SODIUM 100 MG: 100 CAPSULE ORAL at 20:41

## 2021-01-01 RX ADMIN — IPRATROPIUM BROMIDE AND ALBUTEROL SULFATE 3 ML: .5; 2.5 SOLUTION RESPIRATORY (INHALATION) at 18:20

## 2021-01-01 RX ADMIN — METOPROLOL SUCCINATE 50 MG: 50 TABLET, EXTENDED RELEASE ORAL at 09:39

## 2021-01-01 RX ADMIN — METHYLPREDNISOLONE SODIUM SUCCINATE 40 MG: 40 INJECTION, POWDER, FOR SOLUTION INTRAMUSCULAR; INTRAVENOUS at 10:09

## 2021-01-01 RX ADMIN — METHYLPREDNISOLONE SODIUM SUCCINATE 40 MG: 40 INJECTION, POWDER, FOR SOLUTION INTRAMUSCULAR; INTRAVENOUS at 09:40

## 2021-01-01 RX ADMIN — IPRATROPIUM BROMIDE AND ALBUTEROL SULFATE 3 ML: .5; 2.5 SOLUTION RESPIRATORY (INHALATION) at 06:50

## 2021-01-01 RX ADMIN — IPRATROPIUM BROMIDE AND ALBUTEROL SULFATE 3 ML: .5; 2.5 SOLUTION RESPIRATORY (INHALATION) at 19:12

## 2021-01-01 RX ADMIN — IPRATROPIUM BROMIDE AND ALBUTEROL SULFATE 3 ML: .5; 2.5 SOLUTION RESPIRATORY (INHALATION) at 09:35

## 2021-01-01 RX ADMIN — METOPROLOL SUCCINATE 50 MG: 25 TABLET, EXTENDED RELEASE ORAL at 09:52

## 2021-01-01 RX ADMIN — FUROSEMIDE 40 MG: 10 INJECTION, SOLUTION INTRAMUSCULAR; INTRAVENOUS at 17:48

## 2021-01-01 RX ADMIN — PREDNISONE 20 MG: 20 TABLET ORAL at 18:38

## 2021-01-01 RX ADMIN — ARFORMOTEROL TARTRATE 15 MCG: 15 SOLUTION RESPIRATORY (INHALATION) at 19:02

## 2021-01-01 RX ADMIN — ARFORMOTEROL TARTRATE 15 MCG: 15 SOLUTION RESPIRATORY (INHALATION) at 07:29

## 2021-01-01 RX ADMIN — METOPROLOL SUCCINATE 50 MG: 25 TABLET, EXTENDED RELEASE ORAL at 09:47

## 2021-01-01 RX ADMIN — PANTOPRAZOLE SODIUM 40 MG: 40 INJECTION, POWDER, FOR SOLUTION INTRAVENOUS at 09:36

## 2021-01-01 RX ADMIN — DOXYCYCLINE HYCLATE 100 MG: 100 CAPSULE ORAL at 23:31

## 2021-01-01 RX ADMIN — DOXAZOSIN 4 MG: 2 TABLET ORAL at 11:35

## 2021-01-01 RX ADMIN — ARFORMOTEROL TARTRATE 15 MCG: 15 SOLUTION RESPIRATORY (INHALATION) at 07:36

## 2021-01-01 RX ADMIN — PREDNISONE 10 MG: 10 TABLET ORAL at 09:15

## 2021-01-01 RX ADMIN — PANTOPRAZOLE SODIUM 40 MG: 40 INJECTION, POWDER, FOR SOLUTION INTRAVENOUS at 09:40

## 2021-01-01 RX ADMIN — MIDODRINE HYDROCHLORIDE 5 MG: 5 TABLET ORAL at 13:40

## 2021-01-01 RX ADMIN — IPRATROPIUM BROMIDE AND ALBUTEROL SULFATE 3 ML: .5; 2.5 SOLUTION RESPIRATORY (INHALATION) at 14:42

## 2021-01-01 RX ADMIN — Medication 2000 UNITS: at 09:39

## 2021-01-01 RX ADMIN — FUROSEMIDE 40 MG: 10 INJECTION, SOLUTION INTRAMUSCULAR; INTRAVENOUS at 09:25

## 2021-01-01 RX ADMIN — FUROSEMIDE 20 MG: 10 INJECTION, SOLUTION INTRAMUSCULAR; INTRAVENOUS at 09:13

## 2021-01-01 RX ADMIN — DOCUSATE SODIUM 100 MG: 100 CAPSULE ORAL at 20:26

## 2021-01-01 RX ADMIN — METOPROLOL SUCCINATE 50 MG: 25 TABLET, EXTENDED RELEASE ORAL at 10:35

## 2021-01-01 RX ADMIN — HYDROCODONE BITARTRATE AND ACETAMINOPHEN 1 TABLET: 5; 325 TABLET ORAL at 01:07

## 2021-01-01 RX ADMIN — ACETAMINOPHEN 500 MG: 500 TABLET ORAL at 00:06

## 2021-01-01 RX ADMIN — LIDOCAINE HYDROCHLORIDE 20 MG: 20 INJECTION, SOLUTION INTRAVENOUS at 14:40

## 2021-01-01 RX ADMIN — METHYLPREDNISOLONE SODIUM SUCCINATE 60 MG: 125 INJECTION, POWDER, FOR SOLUTION INTRAMUSCULAR; INTRAVENOUS at 13:32

## 2021-01-01 RX ADMIN — DOXYCYCLINE HYCLATE 100 MG: 100 CAPSULE ORAL at 20:39

## 2021-01-01 RX ADMIN — PANTOPRAZOLE SODIUM 40 MG: 40 INJECTION, POWDER, FOR SOLUTION INTRAVENOUS at 10:25

## 2021-01-01 RX ADMIN — FINASTERIDE 5 MG: 5 TABLET, FILM COATED ORAL at 09:56

## 2021-01-01 RX ADMIN — DOCUSATE SODIUM 100 MG: 100 CAPSULE ORAL at 20:52

## 2021-01-01 RX ADMIN — METHYLPREDNISOLONE SODIUM SUCCINATE 60 MG: 125 INJECTION, POWDER, FOR SOLUTION INTRAMUSCULAR; INTRAVENOUS at 20:20

## 2021-01-01 RX ADMIN — FUROSEMIDE 40 MG: 10 INJECTION, SOLUTION INTRAMUSCULAR; INTRAVENOUS at 10:07

## 2021-01-01 RX ADMIN — DOCUSATE SODIUM 100 MG: 100 CAPSULE ORAL at 08:43

## 2021-01-01 RX ADMIN — ARFORMOTEROL TARTRATE 15 MCG: 15 SOLUTION RESPIRATORY (INHALATION) at 18:20

## 2021-01-01 RX ADMIN — SODIUM CHLORIDE, PRESERVATIVE FREE 10 ML: 5 INJECTION INTRAVENOUS at 09:42

## 2021-01-01 RX ADMIN — ARFORMOTEROL TARTRATE 15 MCG: 15 SOLUTION RESPIRATORY (INHALATION) at 06:53

## 2021-01-01 RX ADMIN — ASPIRIN 81 MG: 81 TABLET, COATED ORAL at 10:09

## 2021-01-01 RX ADMIN — IPRATROPIUM BROMIDE AND ALBUTEROL SULFATE 3 ML: .5; 2.5 SOLUTION RESPIRATORY (INHALATION) at 18:56

## 2021-01-01 RX ADMIN — ARFORMOTEROL TARTRATE 15 MCG: 15 SOLUTION RESPIRATORY (INHALATION) at 06:28

## 2021-01-01 RX ADMIN — PREDNISONE 20 MG: 20 TABLET ORAL at 09:47

## 2021-01-01 RX ADMIN — WARFARIN SODIUM 7.5 MG: 7.5 TABLET ORAL at 17:39

## 2021-01-01 RX ADMIN — METOPROLOL SUCCINATE 50 MG: 50 TABLET, EXTENDED RELEASE ORAL at 08:18

## 2021-01-01 RX ADMIN — IPRATROPIUM BROMIDE AND ALBUTEROL SULFATE 3 ML: .5; 2.5 SOLUTION RESPIRATORY (INHALATION) at 10:25

## 2021-01-01 RX ADMIN — DOXYCYCLINE HYCLATE 100 MG: 100 CAPSULE ORAL at 09:28

## 2021-01-01 RX ADMIN — PANTOPRAZOLE SODIUM 40 MG: 40 INJECTION, POWDER, FOR SOLUTION INTRAVENOUS at 09:25

## 2021-01-01 RX ADMIN — DOXYCYCLINE 100 MG: 100 INJECTION, POWDER, LYOPHILIZED, FOR SOLUTION INTRAVENOUS at 21:42

## 2021-01-01 RX ADMIN — ARFORMOTEROL TARTRATE 15 MCG: 15 SOLUTION RESPIRATORY (INHALATION) at 06:49

## 2021-01-01 RX ADMIN — IPRATROPIUM BROMIDE AND ALBUTEROL SULFATE 3 ML: .5; 2.5 SOLUTION RESPIRATORY (INHALATION) at 07:23

## 2021-01-01 RX ADMIN — TAMSULOSIN HYDROCHLORIDE 0.4 MG: 0.4 CAPSULE ORAL at 18:48

## 2021-01-01 RX ADMIN — SODIUM CHLORIDE, POTASSIUM CHLORIDE, SODIUM LACTATE AND CALCIUM CHLORIDE: 600; 310; 30; 20 INJECTION, SOLUTION INTRAVENOUS at 16:30

## 2021-01-01 RX ADMIN — IPRATROPIUM BROMIDE AND ALBUTEROL SULFATE 3 ML: .5; 2.5 SOLUTION RESPIRATORY (INHALATION) at 18:13

## 2021-01-01 RX ADMIN — TAMSULOSIN HYDROCHLORIDE 0.4 MG: 0.4 CAPSULE ORAL at 17:04

## 2021-01-01 RX ADMIN — ARFORMOTEROL TARTRATE 15 MCG: 15 SOLUTION RESPIRATORY (INHALATION) at 08:26

## 2021-01-01 RX ADMIN — DOCUSATE SODIUM 100 MG: 100 CAPSULE ORAL at 09:22

## 2021-01-01 RX ADMIN — FUROSEMIDE 40 MG: 10 INJECTION, SOLUTION INTRAMUSCULAR; INTRAVENOUS at 10:25

## 2021-01-01 RX ADMIN — IPRATROPIUM BROMIDE AND ALBUTEROL SULFATE 1 AMPULE: .5; 3 SOLUTION RESPIRATORY (INHALATION) at 22:48

## 2021-01-01 RX ADMIN — SERTRALINE 50 MG: 50 TABLET, FILM COATED ORAL at 09:49

## 2021-01-01 RX ADMIN — PREDNISONE 20 MG: 20 TABLET ORAL at 09:13

## 2021-01-01 RX ADMIN — IPRATROPIUM BROMIDE AND ALBUTEROL SULFATE 3 ML: .5; 2.5 SOLUTION RESPIRATORY (INHALATION) at 15:19

## 2021-01-01 RX ADMIN — FUROSEMIDE 40 MG: 10 INJECTION, SOLUTION INTRAMUSCULAR; INTRAVENOUS at 17:46

## 2021-01-01 RX ADMIN — DOXYCYCLINE HYCLATE 100 MG: 100 CAPSULE ORAL at 08:43

## 2021-01-01 RX ADMIN — ACETAMINOPHEN 500 MG: 500 TABLET ORAL at 14:04

## 2021-01-01 RX ADMIN — DOCUSATE SODIUM 100 MG: 100 CAPSULE ORAL at 09:36

## 2021-01-01 RX ADMIN — ARFORMOTEROL TARTRATE 15 MCG: 15 SOLUTION RESPIRATORY (INHALATION) at 19:52

## 2021-01-01 RX ADMIN — ASPIRIN 81 MG: 81 TABLET, CHEWABLE ORAL at 07:42

## 2021-01-01 RX ADMIN — DEXAMETHASONE 6 MG: 6 TABLET ORAL at 06:25

## 2021-01-01 RX ADMIN — WARFARIN SODIUM 4 MG: 2 TABLET ORAL at 17:04

## 2021-01-01 RX ADMIN — IPRATROPIUM BROMIDE AND ALBUTEROL SULFATE 3 ML: .5; 2.5 SOLUTION RESPIRATORY (INHALATION) at 07:36

## 2021-01-01 RX ADMIN — ASPIRIN 81 MG: 81 TABLET, COATED ORAL at 08:48

## 2021-01-01 RX ADMIN — DOXAZOSIN 4 MG: 2 TABLET ORAL at 17:23

## 2021-01-01 RX ADMIN — IPRATROPIUM BROMIDE AND ALBUTEROL SULFATE 1 AMPULE: .5; 3 SOLUTION RESPIRATORY (INHALATION) at 14:31

## 2021-01-01 RX ADMIN — ACETAMINOPHEN 500 MG: 500 TABLET ORAL at 17:39

## 2021-01-01 RX ADMIN — Medication 2000 UNITS: at 11:46

## 2021-01-01 RX ADMIN — SODIUM CHLORIDE, PRESERVATIVE FREE 10 ML: 5 INJECTION INTRAVENOUS at 09:37

## 2021-01-01 RX ADMIN — SODIUM CHLORIDE: 9 INJECTION, SOLUTION INTRAVENOUS at 18:05

## 2021-01-01 RX ADMIN — ARFORMOTEROL TARTRATE 15 MCG: 15 SOLUTION RESPIRATORY (INHALATION) at 07:27

## 2021-01-01 RX ADMIN — ASPIRIN 81 MG: 81 TABLET, COATED ORAL at 09:12

## 2021-01-01 RX ADMIN — PROPOFOL 20 MG: 10 INJECTION, EMULSION INTRAVENOUS at 14:43

## 2021-01-01 RX ADMIN — SODIUM CHLORIDE, PRESERVATIVE FREE 10 ML: 5 INJECTION INTRAVENOUS at 10:29

## 2021-01-01 RX ADMIN — Medication 2000 UNITS: at 11:34

## 2021-01-01 RX ADMIN — IPRATROPIUM BROMIDE AND ALBUTEROL SULFATE 3 ML: .5; 2.5 SOLUTION RESPIRATORY (INHALATION) at 18:57

## 2021-01-01 RX ADMIN — POTASSIUM CHLORIDE 20 MEQ: 1500 TABLET, EXTENDED RELEASE ORAL at 10:44

## 2021-01-01 RX ADMIN — DOCUSATE SODIUM 100 MG: 100 CAPSULE ORAL at 21:55

## 2021-01-01 RX ADMIN — TAMSULOSIN HYDROCHLORIDE 0.4 MG: 0.4 CAPSULE ORAL at 16:27

## 2021-01-01 RX ADMIN — ARFORMOTEROL TARTRATE 15 MCG: 15 SOLUTION RESPIRATORY (INHALATION) at 18:23

## 2021-01-01 RX ADMIN — HYDROCODONE BITARTRATE AND ACETAMINOPHEN 1 TABLET: 5; 325 TABLET ORAL at 09:13

## 2021-01-01 RX ADMIN — METHYLPREDNISOLONE SODIUM SUCCINATE 60 MG: 125 INJECTION, POWDER, FOR SOLUTION INTRAMUSCULAR; INTRAVENOUS at 13:52

## 2021-01-01 RX ADMIN — SERTRALINE 50 MG: 50 TABLET, FILM COATED ORAL at 09:23

## 2021-01-01 RX ADMIN — ASPIRIN 81 MG: 81 TABLET, COATED ORAL at 10:31

## 2021-01-01 RX ADMIN — PANTOPRAZOLE SODIUM 40 MG: 40 INJECTION, POWDER, FOR SOLUTION INTRAVENOUS at 09:23

## 2021-01-01 RX ADMIN — SODIUM CHLORIDE: 9 INJECTION, SOLUTION INTRAVENOUS at 04:04

## 2021-01-01 RX ADMIN — SERTRALINE 50 MG: 50 TABLET, FILM COATED ORAL at 09:40

## 2021-01-01 RX ADMIN — WARFARIN 6 MG: 3 TABLET ORAL at 17:45

## 2021-01-01 RX ADMIN — IPRATROPIUM BROMIDE AND ALBUTEROL SULFATE 3 ML: .5; 2.5 SOLUTION RESPIRATORY (INHALATION) at 06:42

## 2021-01-01 RX ADMIN — SERTRALINE 50 MG: 50 TABLET, FILM COATED ORAL at 10:24

## 2021-01-01 RX ADMIN — TAMSULOSIN HYDROCHLORIDE 0.4 MG: 0.4 CAPSULE ORAL at 10:44

## 2021-01-01 RX ADMIN — DOCUSATE SODIUM 100 MG: 100 CAPSULE ORAL at 19:59

## 2021-01-01 RX ADMIN — Medication 2000 UNITS: at 09:23

## 2021-01-01 RX ADMIN — FINASTERIDE 5 MG: 5 TABLET, FILM COATED ORAL at 07:42

## 2021-01-01 RX ADMIN — DOXYCYCLINE HYCLATE 100 MG: 100 CAPSULE ORAL at 20:23

## 2021-01-01 RX ADMIN — ACETAMINOPHEN 500 MG: 500 TABLET ORAL at 18:01

## 2021-01-01 RX ADMIN — SODIUM CHLORIDE: 9 INJECTION, SOLUTION INTRAVENOUS at 07:42

## 2021-01-01 RX ADMIN — WARFARIN SODIUM 1 MG: 1 TABLET ORAL at 18:05

## 2021-01-01 RX ADMIN — IPRATROPIUM BROMIDE AND ALBUTEROL SULFATE 3 ML: .5; 2.5 SOLUTION RESPIRATORY (INHALATION) at 14:38

## 2021-01-01 RX ADMIN — IPRATROPIUM BROMIDE AND ALBUTEROL SULFATE 3 ML: .5; 2.5 SOLUTION RESPIRATORY (INHALATION) at 19:09

## 2021-01-01 RX ADMIN — FINASTERIDE 5 MG: 5 TABLET, FILM COATED ORAL at 10:44

## 2021-01-01 RX ADMIN — IPRATROPIUM BROMIDE AND ALBUTEROL SULFATE 3 ML: .5; 2.5 SOLUTION RESPIRATORY (INHALATION) at 19:52

## 2021-01-01 RX ADMIN — Medication 2000 UNITS: at 09:47

## 2021-01-01 RX ADMIN — PANTOPRAZOLE SODIUM 40 MG: 40 INJECTION, POWDER, FOR SOLUTION INTRAVENOUS at 09:13

## 2021-01-01 RX ADMIN — IPRATROPIUM BROMIDE AND ALBUTEROL SULFATE 1 AMPULE: .5; 3 SOLUTION RESPIRATORY (INHALATION) at 07:18

## 2021-01-01 RX ADMIN — WARFARIN SODIUM 4 MG: 2 TABLET ORAL at 17:43

## 2021-01-01 RX ADMIN — SODIUM CHLORIDE, PRESERVATIVE FREE 10 ML: 5 INJECTION INTRAVENOUS at 08:53

## 2021-01-01 RX ADMIN — SODIUM CHLORIDE, PRESERVATIVE FREE 10 ML: 5 INJECTION INTRAVENOUS at 10:36

## 2021-01-01 RX ADMIN — FUROSEMIDE 40 MG: 10 INJECTION, SOLUTION INTRAMUSCULAR; INTRAVENOUS at 17:30

## 2021-01-01 RX ADMIN — TAMSULOSIN HYDROCHLORIDE 0.4 MG: 0.4 CAPSULE ORAL at 18:17

## 2021-01-01 RX ADMIN — WARFARIN SODIUM 2 MG: 2 TABLET ORAL at 19:36

## 2021-01-01 RX ADMIN — SERTRALINE 50 MG: 50 TABLET, FILM COATED ORAL at 12:00

## 2021-01-01 RX ADMIN — DOCUSATE SODIUM 100 MG: 100 CAPSULE ORAL at 09:47

## 2021-01-01 RX ADMIN — DOXAZOSIN 4 MG: 2 TABLET ORAL at 08:48

## 2021-01-01 RX ADMIN — IPRATROPIUM BROMIDE AND ALBUTEROL SULFATE 3 ML: .5; 2.5 SOLUTION RESPIRATORY (INHALATION) at 07:29

## 2021-01-01 RX ADMIN — IPRATROPIUM BROMIDE AND ALBUTEROL SULFATE 1 AMPULE: .5; 3 SOLUTION RESPIRATORY (INHALATION) at 14:23

## 2021-01-01 RX ADMIN — WATER 1000 MG: 1 INJECTION INTRAMUSCULAR; INTRAVENOUS; SUBCUTANEOUS at 13:20

## 2021-01-01 RX ADMIN — DOCUSATE SODIUM 100 MG: 100 CAPSULE ORAL at 20:23

## 2021-01-01 RX ADMIN — DEXAMETHASONE 6 MG: 6 TABLET ORAL at 09:11

## 2021-01-01 RX ADMIN — FUROSEMIDE 40 MG: 10 INJECTION, SOLUTION INTRAMUSCULAR; INTRAVENOUS at 18:24

## 2021-01-01 RX ADMIN — PROPOFOL 30 MG: 10 INJECTION, EMULSION INTRAVENOUS at 14:40

## 2021-01-01 RX ADMIN — TAMSULOSIN HYDROCHLORIDE 0.4 MG: 0.4 CAPSULE ORAL at 18:02

## 2021-01-01 RX ADMIN — SODIUM CHLORIDE, PRESERVATIVE FREE 10 ML: 5 INJECTION INTRAVENOUS at 09:23

## 2021-01-01 RX ADMIN — IPRATROPIUM BROMIDE AND ALBUTEROL SULFATE 3 ML: .5; 2.5 SOLUTION RESPIRATORY (INHALATION) at 19:23

## 2021-01-01 RX ADMIN — WATER 1000 MG: 1 INJECTION INTRAMUSCULAR; INTRAVENOUS; SUBCUTANEOUS at 10:34

## 2021-01-01 RX ADMIN — IPRATROPIUM BROMIDE AND ALBUTEROL SULFATE 3 ML: .5; 2.5 SOLUTION RESPIRATORY (INHALATION) at 04:09

## 2021-01-01 RX ADMIN — IPRATROPIUM BROMIDE AND ALBUTEROL SULFATE 1 AMPULE: .5; 3 SOLUTION RESPIRATORY (INHALATION) at 22:19

## 2021-01-01 RX ADMIN — IPRATROPIUM BROMIDE AND ALBUTEROL SULFATE 1 AMPULE: .5; 3 SOLUTION RESPIRATORY (INHALATION) at 04:27

## 2021-01-01 RX ADMIN — SERTRALINE 50 MG: 50 TABLET, FILM COATED ORAL at 09:25

## 2021-01-01 RX ADMIN — SODIUM CHLORIDE, PRESERVATIVE FREE 10 ML: 5 INJECTION INTRAVENOUS at 09:25

## 2021-01-01 RX ADMIN — FENTANYL CITRATE 25 MCG: 50 INJECTION, SOLUTION INTRAMUSCULAR; INTRAVENOUS at 14:48

## 2021-01-01 RX ADMIN — METHYLPREDNISOLONE SODIUM SUCCINATE 60 MG: 125 INJECTION, POWDER, FOR SOLUTION INTRAMUSCULAR; INTRAVENOUS at 13:40

## 2021-01-01 RX ADMIN — PROPOFOL 20 MG: 10 INJECTION, EMULSION INTRAVENOUS at 14:47

## 2021-01-01 RX ADMIN — IPRATROPIUM BROMIDE AND ALBUTEROL SULFATE 3 ML: .5; 2.5 SOLUTION RESPIRATORY (INHALATION) at 11:02

## 2021-01-01 RX ADMIN — Medication 2000 UNITS: at 08:48

## 2021-01-01 RX ADMIN — FUROSEMIDE 40 MG: 10 INJECTION, SOLUTION INTRAMUSCULAR; INTRAVENOUS at 18:12

## 2021-01-01 RX ADMIN — TAMSULOSIN HYDROCHLORIDE 0.4 MG: 0.4 CAPSULE ORAL at 17:19

## 2021-01-01 RX ADMIN — IPRATROPIUM BROMIDE AND ALBUTEROL SULFATE 3 ML: .5; 2.5 SOLUTION RESPIRATORY (INHALATION) at 06:07

## 2021-01-01 RX ADMIN — PREDNISONE 20 MG: 20 TABLET ORAL at 09:36

## 2021-01-01 RX ADMIN — PREDNISONE 20 MG: 20 TABLET ORAL at 09:50

## 2021-01-01 RX ADMIN — WARFARIN SODIUM 1 MG: 1 TABLET ORAL at 18:25

## 2021-01-01 RX ADMIN — PANTOPRAZOLE SODIUM 40 MG: 40 INJECTION, POWDER, FOR SOLUTION INTRAVENOUS at 09:56

## 2021-01-01 RX ADMIN — ASPIRIN 81 MG: 81 TABLET, COATED ORAL at 14:30

## 2021-01-01 RX ADMIN — ARFORMOTEROL TARTRATE 15 MCG: 15 SOLUTION RESPIRATORY (INHALATION) at 19:19

## 2021-01-01 RX ADMIN — IPRATROPIUM BROMIDE AND ALBUTEROL SULFATE 1 AMPULE: .5; 3 SOLUTION RESPIRATORY (INHALATION) at 13:53

## 2021-01-01 RX ADMIN — ARFORMOTEROL TARTRATE 15 MCG: 15 SOLUTION RESPIRATORY (INHALATION) at 04:58

## 2021-01-01 RX ADMIN — IPRATROPIUM BROMIDE AND ALBUTEROL SULFATE 3 ML: .5; 2.5 SOLUTION RESPIRATORY (INHALATION) at 14:06

## 2021-01-01 RX ADMIN — METOPROLOL SUCCINATE 50 MG: 25 TABLET, EXTENDED RELEASE ORAL at 10:24

## 2021-01-01 RX ADMIN — FINASTERIDE 5 MG: 5 TABLET, FILM COATED ORAL at 08:41

## 2021-01-01 RX ADMIN — PANTOPRAZOLE SODIUM 40 MG: 40 INJECTION, POWDER, FOR SOLUTION INTRAVENOUS at 11:34

## 2021-01-01 RX ADMIN — ASPIRIN 324 MG: 81 TABLET, CHEWABLE ORAL at 09:36

## 2021-01-01 RX ADMIN — FUROSEMIDE 40 MG: 10 INJECTION, SOLUTION INTRAMUSCULAR; INTRAVENOUS at 10:23

## 2021-01-01 RX ADMIN — TAMSULOSIN HYDROCHLORIDE 0.4 MG: 0.4 CAPSULE ORAL at 18:18

## 2021-01-01 RX ADMIN — FINASTERIDE 5 MG: 5 TABLET, FILM COATED ORAL at 09:34

## 2021-01-01 RX ADMIN — FUROSEMIDE 40 MG: 10 INJECTION, SOLUTION INTRAMUSCULAR; INTRAVENOUS at 08:48

## 2021-01-01 RX ADMIN — DOXYCYCLINE HYCLATE 100 MG: 100 CAPSULE ORAL at 09:40

## 2021-01-01 RX ADMIN — ARFORMOTEROL TARTRATE 15 MCG: 15 SOLUTION RESPIRATORY (INHALATION) at 20:30

## 2021-01-01 RX ADMIN — WATER 1000 MG: 1 INJECTION INTRAMUSCULAR; INTRAVENOUS; SUBCUTANEOUS at 12:00

## 2021-01-01 RX ADMIN — MORPHINE SULFATE 1 MG/HR: 10 INJECTION, SOLUTION INTRAMUSCULAR; INTRAVENOUS at 18:46

## 2021-01-01 RX ADMIN — FUROSEMIDE 40 MG: 10 INJECTION, SOLUTION INTRAMUSCULAR; INTRAVENOUS at 17:23

## 2021-01-01 RX ADMIN — WARFARIN SODIUM 5 MG: 5 TABLET ORAL at 18:38

## 2021-01-01 RX ADMIN — METOPROLOL SUCCINATE 50 MG: 50 TABLET, EXTENDED RELEASE ORAL at 10:44

## 2021-01-01 RX ADMIN — IPRATROPIUM BROMIDE AND ALBUTEROL SULFATE 3 ML: .5; 2.5 SOLUTION RESPIRATORY (INHALATION) at 10:04

## 2021-01-01 RX ADMIN — FUROSEMIDE 40 MG: 10 INJECTION, SOLUTION INTRAMUSCULAR; INTRAVENOUS at 10:35

## 2021-01-01 RX ADMIN — IPRATROPIUM BROMIDE AND ALBUTEROL SULFATE 3 ML: .5; 2.5 SOLUTION RESPIRATORY (INHALATION) at 11:19

## 2021-01-01 RX ADMIN — SERTRALINE 50 MG: 50 TABLET, FILM COATED ORAL at 08:52

## 2021-01-01 RX ADMIN — IPRATROPIUM BROMIDE AND ALBUTEROL SULFATE 3 ML: .5; 2.5 SOLUTION RESPIRATORY (INHALATION) at 14:55

## 2021-01-01 RX ADMIN — ARFORMOTEROL TARTRATE 15 MCG: 15 SOLUTION RESPIRATORY (INHALATION) at 05:29

## 2021-01-01 RX ADMIN — WARFARIN SODIUM 5 MG: 5 TABLET ORAL at 17:43

## 2021-01-01 RX ADMIN — METHYLPREDNISOLONE SODIUM SUCCINATE 60 MG: 125 INJECTION, POWDER, FOR SOLUTION INTRAMUSCULAR; INTRAVENOUS at 20:23

## 2021-01-01 RX ADMIN — IPRATROPIUM BROMIDE AND ALBUTEROL SULFATE 3 ML: .5; 2.5 SOLUTION RESPIRATORY (INHALATION) at 14:50

## 2021-01-01 RX ADMIN — IPRATROPIUM BROMIDE AND ALBUTEROL SULFATE 1 AMPULE: .5; 3 SOLUTION RESPIRATORY (INHALATION) at 10:07

## 2021-01-01 RX ADMIN — TAMSULOSIN HYDROCHLORIDE 0.4 MG: 0.4 CAPSULE ORAL at 09:11

## 2021-01-01 RX ADMIN — ARFORMOTEROL TARTRATE 15 MCG: 15 SOLUTION RESPIRATORY (INHALATION) at 04:09

## 2021-01-01 RX ADMIN — POLYETHYLENE GLYCOL 3350 17 G: 17 POWDER, FOR SOLUTION ORAL at 13:20

## 2021-01-01 RX ADMIN — IPRATROPIUM BROMIDE AND ALBUTEROL SULFATE 3 ML: .5; 2.5 SOLUTION RESPIRATORY (INHALATION) at 06:04

## 2021-01-01 RX ADMIN — ASPIRIN 81 MG: 81 TABLET, COATED ORAL at 11:34

## 2021-01-01 RX ADMIN — IPRATROPIUM BROMIDE AND ALBUTEROL SULFATE 3 ML: .5; 2.5 SOLUTION RESPIRATORY (INHALATION) at 18:33

## 2021-01-01 RX ADMIN — IPRATROPIUM BROMIDE AND ALBUTEROL SULFATE 3 ML: .5; 2.5 SOLUTION RESPIRATORY (INHALATION) at 07:47

## 2021-01-01 RX ADMIN — FUROSEMIDE 40 MG: 10 INJECTION, SOLUTION INTRAMUSCULAR; INTRAVENOUS at 18:49

## 2021-01-01 ASSESSMENT — PAIN SCALES - GENERAL
PAINLEVEL_OUTOF10: 0
PAINLEVEL_OUTOF10: 3
PAINLEVEL_OUTOF10: 0
PAINLEVEL_OUTOF10: 0
PAINLEVEL_OUTOF10: 4
PAINLEVEL_OUTOF10: 0
PAINLEVEL_OUTOF10: 6
PAINLEVEL_OUTOF10: 0
PAINLEVEL_OUTOF10: 0
PAINLEVEL_OUTOF10: 4
PAINLEVEL_OUTOF10: 0
PAINLEVEL_OUTOF10: 3
PAINLEVEL_OUTOF10: 3
PAINLEVEL_OUTOF10: 0
PAINLEVEL_OUTOF10: 3
PAINLEVEL_OUTOF10: 0
PAINLEVEL_OUTOF10: 5
PAINLEVEL_OUTOF10: 0
PAINLEVEL_OUTOF10: 6
PAINLEVEL_OUTOF10: 0
PAINLEVEL_OUTOF10: 4
PAINLEVEL_OUTOF10: 0
PAINLEVEL_OUTOF10: 9
PAINLEVEL_OUTOF10: 0
PAINLEVEL_OUTOF10: 4
PAINLEVEL_OUTOF10: 0
PAINLEVEL_OUTOF10: 6
PAINLEVEL_OUTOF10: 0
PAINLEVEL_OUTOF10: 6
PAINLEVEL_OUTOF10: 0
PAINLEVEL_OUTOF10: 0
PAINLEVEL_OUTOF10: 4
PAINLEVEL_OUTOF10: 0
PAINLEVEL_OUTOF10: 7
PAINLEVEL_OUTOF10: 0
PAINLEVEL_OUTOF10: 4
PAINLEVEL_OUTOF10: 0
PAINLEVEL_OUTOF10: 5
PAINLEVEL_OUTOF10: 0
PAINLEVEL_OUTOF10: 0

## 2021-01-01 ASSESSMENT — PAIN DESCRIPTION - PAIN TYPE
TYPE: ACUTE PAIN

## 2021-01-01 ASSESSMENT — PULMONARY FUNCTION TESTS
PIF_VALUE: 0
PIF_VALUE: 1
PIF_VALUE: 0
PIF_VALUE: 1
PIF_VALUE: 0
PIF_VALUE: 0
PIF_VALUE: 1
PIF_VALUE: 0
PIF_VALUE: 1

## 2021-01-01 ASSESSMENT — ENCOUNTER SYMPTOMS
EYES NEGATIVE: 1
ABDOMINAL PAIN: 0
COUGH: 0
EYE DISCHARGE: 0
COUGH: 1
ABDOMINAL DISTENTION: 0
VOMITING: 0
ABDOMINAL PAIN: 0
BLOOD IN STOOL: 0
DIARRHEA: 0
VOMITING: 0
DIARRHEA: 0
ABDOMINAL PAIN: 0
RHINORRHEA: 0
NAUSEA: 0
SORE THROAT: 0
TACHYPNEA: 1
COUGH: 1
VOMITING: 0
NAUSEA: 0
BACK PAIN: 0
CHEST TIGHTNESS: 0
SHORTNESS OF BREATH: 1
SORE THROAT: 0
DIARRHEA: 0
ALLERGIC/IMMUNOLOGIC NEGATIVE: 1
SHORTNESS OF BREATH: 0
WHEEZING: 0
COUGH: 0
WHEEZING: 0
GASTROINTESTINAL NEGATIVE: 1
EYE REDNESS: 0
SHORTNESS OF BREATH: 1
SHORTNESS OF BREATH: 1
NAUSEA: 0
BACK PAIN: 0
BACK PAIN: 0
SINUS PRESSURE: 0
COLOR CHANGE: 0
EYE PAIN: 0

## 2021-01-01 ASSESSMENT — LIFESTYLE VARIABLES: SMOKING_STATUS: 0

## 2021-01-01 ASSESSMENT — PAIN DESCRIPTION - DIRECTION: RADIATING_TOWARDS: 0

## 2021-01-01 ASSESSMENT — PAIN DESCRIPTION - LOCATION
LOCATION: ABDOMEN
LOCATION: LEG
LOCATION: LEG

## 2021-01-01 ASSESSMENT — PAIN DESCRIPTION - ORIENTATION
ORIENTATION: RIGHT
ORIENTATION: MID
ORIENTATION: RIGHT

## 2021-01-01 ASSESSMENT — PAIN DESCRIPTION - FREQUENCY: FREQUENCY: CONTINUOUS

## 2021-01-01 ASSESSMENT — PAIN - FUNCTIONAL ASSESSMENT: PAIN_FUNCTIONAL_ASSESSMENT: ACTIVITIES ARE NOT PREVENTED

## 2021-04-30 NOTE — ED NOTES
FIRST PROVIDER CONTACT ASSESSMENT NOTE      Department of Emergency Medicine   Admit Date: No admission date for patient encounter. Chief Complaint: Fall (mechanical fall this AM.  ) and Laceration (left hand laceration)      History of Present Illness:   Chayito Costello is a 79 y.o. male who presents to the ED for left hand injury. States that he reached for his dog on the steps today when he fell and hit his left hand. Skin tear to the dorsum of the left hand.  Claudia reports he can't get it to stop bleeding.         -----------------END OF FIRST PROVIDER CONTACT ASSESSMENT NOTE--------------  Electronically signed by SAMI Sadler   DD: 4/30/21               Sergei Sadler  04/30/21 1544

## 2021-04-30 NOTE — ED PROVIDER NOTES
Chief complaint:  Wound    HPI history provided by the patient and family  Patient states he slipped and fell striking his hand this morning with no other injuries, did not hit his head, no neck or back pain or injuries, no arm or leg pain or injuries otherwise, had a skin tear to the back of the left hand. They tried bandaging at home but he continued to ooze blood so they brought him in for evaluation as he is on Coumadin. He has no actual bony pain to that area. No complaints, no lightheadedness or syncope. No abdominal pain. No chest pain or shortness of breath. Review of Systems   Constitutional: Negative for chills, diaphoresis, fatigue and fever. HENT: Negative for congestion and sore throat. Respiratory: Negative for cough, chest tightness, shortness of breath and wheezing. Cardiovascular: Negative for chest pain and palpitations. Gastrointestinal: Negative for abdominal pain, diarrhea, nausea and vomiting. Genitourinary: Negative for dysuria, flank pain and frequency. Musculoskeletal: Negative for arthralgias, back pain, gait problem, joint swelling, myalgias, neck pain and neck stiffness. Skin: Positive for wound. Negative for rash. Neurological: Negative for dizziness, seizures, syncope, weakness, light-headedness, numbness and headaches. Hematological: Negative for adenopathy. Bruises/bleeds easily. All other systems reviewed and are negative. Physical Exam  Vitals signs and nursing note reviewed. Constitutional:       General: He is not in acute distress. Appearance: He is well-developed. He is not ill-appearing, toxic-appearing or diaphoretic. HENT:      Head: Normocephalic and atraumatic. Comments: No sign of acute head or face injury  Eyes:      Pupils: Pupils are equal, round, and reactive to light. Neck:      Musculoskeletal: Normal range of motion and neck supple. Normal range of motion.  No neck rigidity, injury, pain with movement, spinous process tenderness or muscular tenderness. Cardiovascular:      Rate and Rhythm: Normal rate and regular rhythm. Heart sounds: Normal heart sounds. No murmur. Pulmonary:      Effort: Pulmonary effort is normal. No respiratory distress. Breath sounds: Normal breath sounds. No stridor, decreased air movement or transmitted upper airway sounds. No decreased breath sounds, wheezing, rhonchi or rales. Chest:      Chest wall: No tenderness. Abdominal:      General: Bowel sounds are normal. There is no distension. Palpations: Abdomen is soft. Tenderness: There is no abdominal tenderness. There is no right CVA tenderness, left CVA tenderness, guarding or rebound. Musculoskeletal:         General: No swelling, tenderness, deformity or signs of injury. Right lower leg: No edema. Left lower leg: No edema. Comments: No cervical, thoracic or lumbar spine tenderness. Arms and legs are neurovascular intact with no signs of acute bony or joint injuries. Trace but equal bilateral lower extremity pretibial edema with no calf pain. Left hand dorsal surface with a mid hand skin tear with mild oozing of blood but no arterial bleeding. The hand is neurovascular intact. No actual lacerations otherwise. Surgicel applied, pressure dressing applied. INR being checked. Lymphadenopathy:      Cervical: No cervical adenopathy. Skin:     General: Skin is warm and dry. Coloration: Skin is not jaundiced or pale. Findings: No erythema or rash. Neurological:      General: No focal deficit present. Mental Status: He is alert and oriented to person, place, and time. GCS: GCS eye subscore is 4. GCS verbal subscore is 5. GCS motor subscore is 6. Cranial Nerves: Cranial nerves are intact. No cranial nerve deficit. Sensory: Sensation is intact. Motor: Motor function is intact.       Coordination: Coordination normal.          Procedures     MDM     ED Course as of Final Result   No acute osseous abnormality.             ------------------------- NURSING NOTES AND VITALS REVIEWED ---------------------------  Date / Time Roomed:  4/30/2021  5:00 PM  ED Bed Assignment:  23/23    The nursing notes within the ED encounter and vital signs as below have been reviewed. /80   Pulse 79   Temp 97.3 °F (36.3 °C) (Oral)   Resp 20   Ht 5' 7\" (1.702 m)   Wt 220 lb (99.8 kg)   SpO2 93%   BMI 34.46 kg/m²   Oxygen Saturation Interpretation: Normal      ------------------------------------------ PROGRESS NOTES ------------------------------------------  I have spoken with the patient and family and discussed todays results, in addition to providing specific details for the plan of care and counseling regarding the diagnosis and prognosis. Their questions are answered at this time and they are agreeable with the plan. I discussed at length with them reasons for immediate return here for re evaluation. They will followup with primary care by calling their office tomorrow. --------------------------------- ADDITIONAL PROVIDER NOTES ---------------------------------  At this time the patient is without objective evidence of an acute process requiring hospitalization or inpatient management. They have remained hemodynamically stable throughout their entire ED visit and are stable for discharge with outpatient follow-up. The plan has been discussed in detail and they are aware of the specific conditions for emergent return, as well as the importance of follow-up. New Prescriptions    No medications on file   Patient to hold his Coumadin for 1 dose today and start normal dose tomorrow. Diagnosis:  1. Skin tear of left hand without complication, initial encounter    2. Warfarin-induced coagulopathy ([address]        Disposition:  Patient's disposition: Discharge to home  Patient's condition is stable.            Camila Hawley DO  04/30/21 3068

## 2021-09-08 NOTE — ED NOTES
Bed: 05  Expected date:   Expected time:   Means of arrival:   Comments:  Jasson Willingham RN  09/08/21 [id number]

## 2021-09-08 NOTE — ED PROVIDER NOTES
Patient presents to the ED with a complaint of generalized weakness. Symptoms have been gradually worsening over the past week. Patient states he does live at home alone and his son frequently checks on him. He is still able to ambulate but states he is having difficulty now because he is just getting more weak. No resulting falls or head injuries. Patient is currently on Coumadin. He had a MVA nurse coming to his house today and they mentioned that his pulse ox was 90%. He states that he is little short of breath but no more than usual.  Denies any chest pain. Denies fevers or chills. Denies any nausea, vomiting, diarrhea, or constipation. Symptoms are moderate in severity and have been gradually worsening. He has not noted anything to make his symptoms better or worse. Currently denies any pain. Review of Systems   Constitutional: Positive for fatigue. Negative for chills, diaphoresis and fever. HENT: Negative for congestion and rhinorrhea. Eyes: Negative for visual disturbance. Respiratory: Positive for shortness of breath (chronic and unchanged). Negative for cough. Cardiovascular: Negative for chest pain, palpitations and leg swelling. Gastrointestinal: Negative for abdominal distention, abdominal pain, blood in stool, diarrhea, nausea and vomiting. Genitourinary: Negative for decreased urine volume, difficulty urinating, dysuria and hematuria. Musculoskeletal: Negative for back pain, joint swelling and neck pain. Skin: Negative for color change and pallor. Neurological: Negative for dizziness, syncope and light-headedness. Hematological: Bruises/bleeds easily. Psychiatric/Behavioral: Negative for agitation and confusion. All other systems reviewed and are negative. Physical Exam  Vitals and nursing note reviewed. Constitutional:       General: He is not in acute distress. Appearance: He is well-developed and normal weight. He is not diaphoretic. HENT:      Head: Normocephalic and atraumatic. Nose: No congestion. Mouth/Throat:      Mouth: Mucous membranes are moist.   Eyes:      General: No scleral icterus. Conjunctiva/sclera: Conjunctivae normal.   Cardiovascular:      Rate and Rhythm: Normal rate and regular rhythm. Heart sounds: Normal heart sounds. No murmur heard. Pulmonary:      Effort: Pulmonary effort is normal. No respiratory distress ( No tachypnea, conversational dyspnea, accessory muscle use. ). Breath sounds: Normal breath sounds. No wheezing or rales. Abdominal:      General: Bowel sounds are normal. There is no distension. Palpations: Abdomen is soft. Tenderness: There is no abdominal tenderness. There is no guarding or rebound. Musculoskeletal:      Cervical back: Normal range of motion and neck supple. Comments: There is no pretibial edema nor calf tenderness bilaterally      Skin:     General: Skin is warm and dry. Coloration: Skin is not jaundiced or pale. Neurological:      Mental Status: He is alert and oriented to person, place, and time. Procedures     MDM   Patient was brought in for evaluation of increased weakness and the inability to ambulate. Symptoms have been gradually worsening over the past week. Patient states that he does live at home alone but occasionally has his son come over during the evening to help him out. Does not have a home health nurse. Labs were assessed in the ED. Urinalysis showed no evidence of UTI. Patient's CMP shows no electrolyte abnormalities. The potassium was 6.5 but was moderately hemolyzed. No renal insufficiency appreciated. Initial troponin was 102. Repeat troponin is pending at this time. Patient not having any chest pain. EKG shows atrial fibrillation without any findings of ischemia. CBC shows no evidence of leukocytosis or anemia. Chest x-ray showed no acute process but did show cardiomegaly.   Patient was unable to ambulate even with assistance. He is going to admitted for further treatment evaluation and possible rehabilitation. EKG Interpretation    Interpreted by emergency department physician    Rhythm: atrial fibrillation - controlled  Rate: normal  Axis: left  Ectopy: none  Conduction: normal  ST Segments: normal  T Waves: normal  Q Waves: none    Clinical Impression: Atrial fibrillation with left axis deviation. No prior EKG for comparison. Darrian Vanegas DO     ED Course as of Sep 10 0606   Wed Sep 08, 2021   2201 Patient resting in bed no distress. No complaints at this time. Discussed results of labs and imaging thus far. [MS]   Thu Sep 09, 2021   0021 Patient was unable to ambulate even with nursing assisting him. Currently awaiting urinalysis at this time. [MS]      ED Course User Index  [MS] Darrian Vanegas DO       --------------------------------------------- PAST HISTORY ---------------------------------------------  Past Medical History:  has a past medical history of Atrial fibrillation ([facility] [address] CAD (coronary artery disease), COPD (chronic obstructive pulmonary disease) ([facility] [address] and Hypertension. Past Surgical History:  has a past surgical history that includes cyst removal; Cardiac surgery; Cataract removal with implant (Left, 04 01 2013); Colonoscopy; and eye surgery. Social History:  reports that he quit smoking about 29 years ago. He has never used smokeless tobacco. He reports that he does not drink alcohol and does not use drugs. Family History: family history is not on file. The patients home medications have been reviewed.     Allergies: Dye [iodides]    -------------------------------------------------- RESULTS -------------------------------------------------    LABS:  Results for orders placed or performed during the hospital encounter of 09/08/21   COVID-19, Rapid    Specimen: Nasopharyngeal Swab   Result Value Ref Range    SARS-CoV-2, NAAT Not Detected Not Detected CBC Auto Differential   Result Value Ref Range    WBC 6.3 4.5 - 11.5 E9/L    RBC 4.49 3.80 - 5.80 E12/L    Hemoglobin 14.6 12.5 - 16.5 g/dL    Hematocrit 44.6 37.0 - 54.0 %    MCV 99.3 80.0 - 99.9 fL    MCH 32.5 26.0 - 35.0 pg    MCHC 32.7 32.0 - 34.5 %    RDW 14.6 11.5 - 15.0 fL    Platelets 601 442 - 951 E9/L    MPV 11.3 7.0 - 12.0 fL    Neutrophils % 73.0 43.0 - 80.0 %    Lymphocytes % 20.0 20.0 - 42.0 %    Monocytes % 6.0 2.0 - 12.0 %    Eosinophils % 1.0 0.0 - 6.0 %    Basophils % 0.0 0.0 - 2.0 %    Neutrophils Absolute 4.60 1.80 - 7.30 E9/L    Lymphocytes Absolute 1.26 (L) 1.50 - 4.00 E9/L    Monocytes Absolute 0.38 0.10 - 0.95 E9/L    Eosinophils Absolute 0.06 0.05 - 0.50 E9/L    Basophils Absolute 0.00 0.00 - 0.20 E9/L    RBC Morphology Normal    Lactic Acid, Plasma   Result Value Ref Range    Lactic Acid 0.9 0.5 - 2.2 mmol/L   Urinalysis, reflex to microscopic   Result Value Ref Range    Color, UA DKYELLOW Straw/Yellow    Clarity, UA SLCLOUDY Clear    Glucose, Ur Negative Negative mg/dL    Bilirubin Urine Negative Negative    Ketones, Urine TRACE (A) Negative mg/dL    Specific Gravity, UA 1.015 1.005 - 1.030    Blood, Urine LARGE (A) Negative    pH, UA 5.5 5.0 - 9.0    Protein, UA Negative Negative mg/dL    Urobilinogen, Urine 0.2 <2.0 E.U./dL    Nitrite, Urine Negative Negative    Leukocyte Esterase, Urine Negative Negative   Protime-INR   Result Value Ref Range    Protime 28.1 (H) 9.3 - 12.4 sec    INR 2.4    Comprehensive Metabolic Panel w/ Reflex to MG   Result Value Ref Range    Sodium 141 132 - 146 mmol/L    Potassium reflex Magnesium 6.5 (H) 3.5 - 5.0 mmol/L    Chloride 107 98 - 107 mmol/L    CO2 26 22 - 29 mmol/L    Anion Gap 8 7 - 16 mmol/L    Glucose 91 74 - 99 mg/dL    BUN 25 (H) 6 - 23 mg/dL    CREATININE 0.8 0.7 - 1.2 mg/dL    GFR Non-African American >60 >=60 mL/min/1.73    GFR African American >60     Calcium 8.4 (L) 8.6 - 10.2 mg/dL    Total Protein 6.9 6.4 - 8.3 g/dL    Albumin 3.5 3.5 - 5.2 g/dL    Total Bilirubin 0.9 0.0 - 1.2 mg/dL    Alkaline Phosphatase 118 40 - 129 U/L    ALT 16 0 - 40 U/L    AST 40 (H) 0 - 39 U/L   Lipase   Result Value Ref Range    Lipase 74 (H) 13 - 60 U/L   SPECIMEN REJECTION   Result Value Ref Range    Rejected Test trp5,cmp,lipase     Reason for Rejection see below    Troponin   Result Value Ref Range    Troponin, High Sensitivity 102 (H) 0 - 11 ng/L   Microscopic Urinalysis   Result Value Ref Range    WBC, UA 0-1 0 - 5 /HPF    RBC, UA >20 0 - 2 /HPF    Epithelial Cells, UA RARE /HPF    Bacteria, UA NONE SEEN None Seen /HPF   Basic Metabolic Panel   Result Value Ref Range    Sodium 136 132 - 146 mmol/L    Potassium 4.4 3.5 - 5.0 mmol/L    Chloride 103 98 - 107 mmol/L    CO2 21 (L) 22 - 29 mmol/L    Anion Gap 12 7 - 16 mmol/L    Glucose 82 74 - 99 mg/dL    BUN 24 (H) 6 - 23 mg/dL    CREATININE 0.7 0.7 - 1.2 mg/dL    GFR Non-African American >60 >=60 mL/min/1.73    GFR African American >60     Calcium 8.7 8.6 - 10.2 mg/dL   Troponin   Result Value Ref Range    Troponin, High Sensitivity 114 (H) 0 - 11 ng/L   Protime-INR   Result Value Ref Range    Protime 26.9 (H) 9.3 - 12.4 sec    INR 2.3    POCT Glucose   Result Value Ref Range    Glucose 91 mg/dL    QC OK? pass    POCT Glucose   Result Value Ref Range    Meter Glucose 91 74 - 99 mg/dL   EKG 12 Lead   Result Value Ref Range    Ventricular Rate 70 BPM    Atrial Rate 65 BPM    QRS Duration 84 ms    Q-T Interval 396 ms    QTc Calculation (Bazett) 427 ms    R Axis -68 degrees    T Axis 24 degrees       RADIOLOGY:  XR CHEST PORTABLE   Final Result   No acute process. Cardiomegaly. ------------------------- NURSING NOTES AND VITALS REVIEWED ---------------------------  Date / Time Roomed:  9/8/2021  7:26 PM  ED Bed Assignment:  [id number]/[id number]    The nursing notes within the ED encounter and vital signs as below have been reviewed.      Patient Vitals for the past 24 hrs:   BP Temp Temp src Pulse Resp SpO2 Height Weight   09/10/21 0515 106/64 98.7 °F (37.1 °C) Oral 86 16 93 % -- --   09/09/21 2044 -- -- -- 86 -- -- -- --   09/09/21 2040 123/62 97.9 °F (36.6 °C) Oral 86 18 93 % -- --   09/09/21 1600 107/61 97.8 °F (36.6 °C) Oral 79 18 93 % -- --   09/09/21 1314 -- -- -- -- -- -- 5' 8\" (1.727 m) 165 lb (74.8 kg)   09/09/21 1250 137/75 98.5 °F (36.9 °C) Oral 82 20 93 % -- --   09/09/21 1153 134/76 98.4 °F (36.9 °C) Oral 80 20 94 % -- --   09/09/21 0711 131/74 97.6 °F (36.4 °C) Oral 82 22 94 % -- --       Oxygen Saturation Interpretation: Normal    ------------------------------------------ PROGRESS NOTES ------------------------------------------  Re-evaluation(s):  Refer to ED course above. Counseling:  I have spoken with the patient and discussed todays results, in addition to providing specific details for the plan of care and counseling regarding the diagnosis and prognosis. Their questions are answered at this time and they are agreeable with the plan of admission.    --------------------------------- ADDITIONAL PROVIDER NOTES ---------------------------------  Consultations:  Time: 9195. Spoke with Dr. Alejandra Bills for Dr Meghan Clark. Discussed case. They will admit the patient. This patient's ED course included: a personal history and physicial examination, re-evaluation prior to disposition, multiple bedside re-evaluations, IV medications, cardiac monitoring, continuous pulse oximetry and complex medical decision making and emergency management    This patient has remained hemodynamically stable during their ED course. Diagnosis:  1. Adult failure to thrive syndrome    2. Fatigue, unspecified type        Disposition:  Patient's disposition: Admit to med/surg floor  Patient's condition is fair.          Narinder Frausto DO  09/10/21 5866

## 2021-09-09 PROBLEM — G93.41 METABOLIC ENCEPHALOPATHY: Status: ACTIVE | Noted: 2021-01-01

## 2021-09-09 NOTE — ED NOTES
Patient is alert and oriented times 4, skin is warm and dry, respirations appear easy and unlabored, patient resting in bed in no acute distress.        July Morrison RN  09/09/21 9486

## 2021-09-09 NOTE — H&P
Department of Internal Medicine        CHIEF COMPLAINT: Generalized weakness, inability to ambulate, shortness of breath    Reason for Admission: Failure to thrive    HISTORY OF PRESENT ILLNESS:      The patient is a [de-identified] y.o. male who presents with generalized weakness which has been going on for about a week. Patient does live home alone but the patient son frequently checks on them. Patient is now having difficulty walking because of his weakness. Patient has not fallen yet. Patient though is on warfarin. He was seen by home health agency nurse the day of admission and his O2 sat was 90% because of shortness of breath and weakness she sent him to the emergency room. He denies any problem with fever/chills, nausea/vomiting, diarrhea/constipation. Patient denies any chest pain palpitation or dizziness. Patient had a potassium of 6.5 in the ED but was moderately hemolyzed. BUN/creatinine 25/0.8. WBC 6.3 with hemoglobin 14.6. Temperature is 90.5 with heart rate 82 and blood pressure 137/75. O2 sat 93% room air at rest.    Past Medical History:    Past Medical History:   Diagnosis Date    Atrial fibrillation ([address]     CAD (coronary artery disease)     coronary stent 12yrs ago    COPD [facility]chronic obstructive pulmonary disease) [facility] [address]     Hypertension      Past Surgical History:    Past Surgical History:   Procedure Laterality Date    CARDIAC SURGERY      coronary stent    CATARACT REMOVAL WITH IMPLANT Left 04 01 2013    COLONOSCOPY      CYST REMOVAL      tailbone    EYE SURGERY         Medications Prior to Admission:    @  Prior to Admission medications    Medication Sig Start Date End Date Taking? Authorizing Provider   tamsulosin (FLOMAX) 0.4 MG capsule Take 0.4 mg by mouth daily   Yes Historical Provider, MD   metoprolol (TOPROL-XL) 50 MG XL tablet Take 50 mg by mouth daily. Yes Historical Provider, MD   finasteride (PROSCAR) 5 MG tablet Take 5 mg by mouth daily.    Yes Historical Provider, MD LABGLOM >60 09/08/2021    GLUCOSE 91 09/08/2021    PROT 6.9 09/08/2021    LABALBU 3.5 09/08/2021    CALCIUM 8.4 09/08/2021    BILITOT 0.9 09/08/2021    ALKPHOS 118 09/08/2021    AST 40 09/08/2021    ALT 16 09/08/2021     Magnesium:  No results found for: MG  Phosphorus:  No results found for: PHOS  PT/INR:    Lab Results   Component Value Date    PROTIME 28.1 09/08/2021    INR 2.4 09/08/2021     Troponin:  No results found for: TROPONINI  U/A:    Lab Results   Component Value Date    COLORU DKYELLOW 09/08/2021    PROTEINU Negative 09/08/2021    PHUR 5.5 09/08/2021    WBCUA 0-1 09/08/2021    RBCUA >20 09/08/2021    BACTERIA NONE SEEN 09/08/2021    CLARITYU SLCLOUDY 09/08/2021    SPECGRAV 1.015 09/08/2021    LEUKOCYTESUR Negative 09/08/2021    UROBILINOGEN 0.2 09/08/2021    BILIRUBINUR Negative 09/08/2021    BLOODU LARGE 09/08/2021    GLUCOSEU Negative 09/08/2021     ABG:  No results found for: PH, PCO2, PO2, HCO3, BE, THGB, TCO2, O2SAT  HgBA1c:  No results found for: LABA1C  FLP:  No results found for: TRIG, HDL, LDLCALC, LDLDIRECT, LABVLDL  TSH:  No results found for: TSH  IRON:  No results found for: IRON  LIPASE:    Lab Results   Component Value Date    LIPASE 74 09/08/2021       ASSESSMENT AND PLAN:      Patient Active Problem List    Diagnosis Date Noted    Metabolic encephalopathy 97/37/3277     Impression:  1. Failure to thrive  2. History of coronary disease  3. History of chronic atrial fibrillation  4. History of COPD  5. History hypertension    Plan:  Patient admitted to monitored bed  Home medications reviewed  Monitor heart rate, blood pressure, O2 saturations  Repeat serum potassium 3 PM today  Repeat Troponin  Routine labs in a.m.   Consult PT/OT  Blood pressure supine and standing  Echocardiogram  IVF- NS 75 cc/hr    Nataliia King DO, D.OMark  9/9/2021  2:26 PM

## 2021-09-10 PROBLEM — R26.2 INABILITY TO WALK: Status: ACTIVE | Noted: 2021-01-01

## 2021-09-10 NOTE — PLAN OF CARE
Problem: Falls - Risk of:  Goal: Will remain free from falls  Description: Will remain free from falls  9/10/2021 0304 by Mellisa Garcia RN  Outcome: Met This Shift     Problem: Falls - Risk of:  Goal: Will remain free from falls  Description: Will remain free from falls  9/10/2021 0304 by Mellisa Garcia RN  Outcome: Met This Shift     Problem: Falls - Risk of:  Goal: Absence of physical injury  Description: Absence of physical injury  9/10/2021 0304 by Mellisa Garcia RN  Outcome: Met This Shift     Problem: Falls - Risk of:  Goal: Absence of physical injury  Description: Absence of physical injury  9/10/2021 0304 by Mellisa Garcia RN  Outcome: Met This Shift

## 2021-09-10 NOTE — CONSULTS
History Of Present Illness: The patient is a [de-identified] y.o. male who presents with generalized weakness which has been going on for about a week. Patient does live home alone but the patient son frequently checks on them. Patient is now having difficulty walking because of his weakness. Patient has not fallen yet. Patient though is on warfarin. He was seen by home health agency nurse the day of admission and his O2 sat was 90% because of shortness of breath and weakness she sent him to the emergency room. He denies any problem with fever/chills, nausea/vomiting, diarrhea/constipation. Patient denies any chest pain palpitation or dizziness. Patient had a potassium of 6.5 in the ED but was moderately hemolyzed. BUN/creatinine 25/0.8. WBC 6.3 with hemoglobin 14.6. Temperature is 90.5 with heart rate 82 and blood pressure 137/75. O2 sat 93% room air at rest.   as above per Dr Katharine Bundy. Patient interviewed and reports walking independently 2 months ago then needing cane followed by walker followed by inability to walk. He continues to have normal bladder control. He also reports recent complaint of pain near right hip with normal hip x rays per his report. The patient is a [de-identified] y.o. male with significant past medical history of negative who presents with above. The patient has the following symptoms:    Change in level of consciousness: alert    New Weakness: yes    Numbness or Tingling: no    Difficulty Swallowing: no    Current Medications:   Scheduled Meds:   ipratropium-albuterol  1 ampule Inhalation Q4H WA    metoprolol succinate  50 mg Oral Daily    finasteride  5 mg Oral Daily    aspirin  81 mg Oral Daily    warfarin  7.5 mg Oral Daily    tamsulosin  0.4 mg Oral Daily     Continuous Infusions:   sodium chloride 75 mL/hr at 09/10/21 0404     PRN Meds:trimethobenzamide, acetaminophen    Allergies:  Dye [iodides]    Social History:   TOBACCO:   reports that he quit smoking about 29 years ago.  He has never used smokeless tobacco.  ETOH:   reports no history of alcohol use. Past Medical History:        Diagnosis Date    [facility] [address]     CAD (coronary artery disease)     coronary stent 12yrs ago    COPD [facility]chronic obstructive pulmonary disease) [facility] [address]     Hypertension        Past Surgical History:        Procedure Laterality Date    CARDIAC SURGERY      coronary stent    CATARACT REMOVAL WITH IMPLANT Left 04 01 2013    COLONOSCOPY      CYST REMOVAL      tailbone    EYE SURGERY           Outside reports reviewed: ER records, historical medical records, lab reports and radiology reports. Patient's medications, allergies, past medical, surgical, social and family histories were reviewed and updated as appropriate. Review of Systems  A comprehensive review of systems was negative except for:       Objective:     Neuro exam 112/60 p 80 t 98  General: normal orientation and alertness. Cranial nerve testing was normal.  Funduscopic eye exam revealed not testable. Motor exam: uppers 5-/5; lowers 2/5. Deep tendon reflexes were 1+ bilaterally. Plantar responses were flexor bilaterally. Cerebellar exam noted finger to nose without dysmetria. Sensation was normal to joint position sense, light touch and a pin prick . Sarita Juan Assessment:   Inability to walk of uncertain etiology; suspect lumbar stenosis; doubt peripheral nerve problem  History of coronary stent      Plan:   Recommend MRI of lumbar and possibly thoracic spine if compatible with old stent  Consider tertiary care opinion regarding new onset inability to walk  Thanks for consult.

## 2021-09-10 NOTE — CARE COORDINATION
9/10/21 Negative covid 9/9/21- vaccinated. CM transition of care: Met with patient in PCU. Pt lives in two story home (uses only the main floor) alone. His son Brayden Tomlinson lives nearby. Pt is a [address] will be notified of the pts admission per his request. Pt has an Inogen at 4 liters at home- and a ww and cane. [facility] in [facility] and [facility] ramp into the home. Pts son will provide a ride at discharge. Therapies will be helpful for discharge planning. Pt has no home care and no snf history. CM/SS to follow.  Electronically signed by Leandro Valdez RN on 9/10/2021 at 9:46 AM

## 2021-09-10 NOTE — CARE COORDINATION
Called the [place], left a detailed message with patients intake information.  Electronically signed by Hope Mccray on 9/10/2021 at 11:39 AM

## 2021-09-10 NOTE — ACP (ADVANCE CARE PLANNING)
Advance Care Planning   Healthcare Decision Maker:    Primary Decision Maker: Marie Luke - Child - [phone]     Reviewed with patient.  Electronically signed by Millie Holt RN-BC on 9/10/2021 at 9:36 AM

## 2021-09-11 NOTE — PLAN OF CARE
Problem: Falls - Risk of:  Goal: Will remain free from falls  Description: Will remain free from falls  9/11/2021 0303 by Montrell Tidwell RN  Outcome: Met This Shift     Problem: Falls - Risk of:  Goal: Absence of physical injury  Description: Absence of physical injury  9/11/2021 0303 by Montrell Tidwell RN  Outcome: Met This Shift     Problem: Pain:  Goal: Pain level will decrease  Description: Pain level will decrease  Outcome: Met This Shift     Problem: Pain:  Goal: Control of acute pain  Description: Control of acute pain  Outcome: Met This Shift     Problem: Pain:  Goal: Control of chronic pain  Description: Control of chronic pain  Outcome: Met This Shift     Problem: Tissue Perfusion - Cardiopulmonary, Altered:  Goal: Absence of angina  Description: Absence of angina  Outcome: Met This Shift     Problem: Tissue Perfusion - Cardiopulmonary, Altered:  Goal: Circulatory function within specified parameters  Description: Circulatory function within specified parameters  Outcome: Met This Shift     Problem: Tissue Perfusion - Cardiopulmonary, Altered:  Goal: Hemodynamic stability will improve  Description: Hemodynamic stability will improve  Outcome: Met This Shift

## 2021-09-11 NOTE — PROGRESS NOTES
Subjective: The patient is somnolent but arousable. No problems overnight. Denies chest pain, angina, and dyspnea. Denies abdominal pain. Tolerating diet. No nausea or vomiting. Current Facility-Administered Medications: perflutren lipid microspheres (DEFINITY) injection 1.65 mg, 1.5 mL, IntraVENous, ONCE PRN  ipratropium-albuterol (DUONEB) nebulizer solution 1 ampule, 1 ampule, Inhalation, Q4H WA  metoprolol succinate (TOPROL XL) extended release tablet 50 mg, 50 mg, Oral, Daily  finasteride (PROSCAR) tablet 5 mg, 5 mg, Oral, Daily  aspirin chewable tablet 81 mg, 81 mg, Oral, Daily  warfarin (COUMADIN) tablet 7.5 mg, 7.5 mg, Oral, Daily  tamsulosin (FLOMAX) capsule 0.4 mg, 0.4 mg, Oral, Daily  trimethobenzamide (TIGAN) injection 200 mg, 200 mg, IntraMUSCular, Q6H PRN  acetaminophen (TYLENOL) tablet 500 mg, 500 mg, Oral, Q6H PRN  0.9 % sodium chloride infusion, , IntraVENous, Continuous    Objective:    /63   Pulse 87   Temp 98.3 °F (36.8 °C) (Oral)   Resp 18   Ht 5' 8\" (1.727 m)   Wt 165 lb (74.8 kg)   SpO2 94%   BMI 25.09 kg/m²   In: 540 [P.O.:540]  Out: 800    In: 540   Out: 800 [Urine:800]   A Fib 74, 2/6 MR and TR murmurs; 1/6 AS, no gallops, or rubs.   CTA bilaterally, no wheeze, rales or rhonchi  bowel sounds present, nontender, nondistended, no masses  No clubbing, cyanosis, or edema  No neuro changes     CBC with Differential:    Lab Results   Component Value Date    WBC 7.8 09/10/2021    RBC 4.13 09/10/2021    HGB 13.3 09/10/2021    HCT 41.4 09/10/2021     09/10/2021    .2 09/10/2021    MCH 32.2 09/10/2021    MCHC 32.1 09/10/2021    RDW 14.7 09/10/2021    LYMPHOPCT 10.9 09/10/2021    MONOPCT 8.2 09/10/2021    BASOPCT 0.5 09/10/2021    MONOSABS 0.64 09/10/2021    LYMPHSABS 0.85 09/10/2021    EOSABS 0.08 09/10/2021    BASOSABS 0.04 09/10/2021     Hemoglobin/Hematocrit:    Lab Results   Component Value Date    HGB 13.3 09/10/2021    HCT 41.4 09/10/2021     CMP:    Lab Results   Component Value Date     09/10/2021    K 4.0 09/10/2021    K 6.5 09/08/2021     09/10/2021    CO2 26 09/10/2021    BUN 24 09/10/2021    CREATININE 0.8 09/10/2021    GFRAA >60 09/10/2021    LABGLOM >60 09/10/2021    GLUCOSE 112 09/10/2021    PROT 6.2 09/10/2021    LABALBU 3.1 09/10/2021    CALCIUM 8.2 09/10/2021    BILITOT 1.1 09/10/2021    ALKPHOS 136 09/10/2021    AST 21 09/10/2021    ALT 8 09/10/2021     BMP:    Lab Results   Component Value Date     09/10/2021    K 4.0 09/10/2021    K 6.5 09/08/2021     09/10/2021    CO2 26 09/10/2021    BUN 24 09/10/2021    LABALBU 3.1 09/10/2021    CREATININE 0.8 09/10/2021    CALCIUM 8.2 09/10/2021    GFRAA >60 09/10/2021    LABGLOM >60 09/10/2021    GLUCOSE 112 09/10/2021     Hepatic Function Panel:    Lab Results   Component Value Date    ALKPHOS 136 09/10/2021    ALT 8 09/10/2021    AST 21 09/10/2021    PROT 6.2 09/10/2021    BILITOT 1.1 09/10/2021    LABALBU 3.1 09/10/2021     Albumin:    Lab Results   Component Value Date    LABALBU 3.1 09/10/2021     Last 3 Troponin:  No results found for: TROPONINI  FLP:    Lab Results   Component Value Date    TRIG 58 09/10/2021    HDL 67 09/10/2021    LDLCALC 51 09/10/2021    LABVLDL 12 09/10/2021     TSH:    Lab Results   Component Value Date    TSH 2.240 09/10/2021        Patient Active Problem List   Diagnosis    Metabolic encephalopathy    Inability to walk     Imp/Plan:     Failure to thrive  Depression- wife 61 years in NH/dementia  History of coronary disease-unknown vessel, stent x1                    NSTEMI type 2  No evidence AMI/ACS               Pericardial effusion without              Tamponade  TR with severe pulmonary hypertension               Denies any CP/Angina  Chronic atrial fibrillation on coumadin  Pulmonary fibrosis?  With History of COPD  History hypertension  MR  Progressive weakness  Protein malnutrition     Echocardiogram pending  Recheck EKG stable  CK  MB=low  Hscrp 76!  Monitor closely-no evidence of tamponade physiology  Add po steroid

## 2021-09-11 NOTE — CONSULTS
Cardiology Consult    The patient is a [de-identified] y.o. male who presents with generalized weakness which has been going on for about a week. Patient does live home alone but the patient son frequently checks on them. Patient is now having difficulty walking because of his weakness. Patient has not fallen yet. Patient though is on warfarin. He was seen by home health agency nurse the day of admission and his O2 sat was 90% because of shortness of breath and weakness she sent him to the emergency room. He denies any problem with fever/chills, nausea/vomiting, diarrhea/constipation. Patient denies any chest pain palpitation or dizziness. Patient had a potassium of 6.5 in the ED but was moderately hemolyzed. BUN/creatinine 25/0.8. WBC 6.3 with hemoglobin 14.6. Temperature is 90.5 with heart rate 82 and blood pressure 137/75. O2 sat 93% room air at rest.     Past Medical History:    Past Medical History[]Expand by Default        Past Medical History:   Diagnosis Date    Atrial fibrillation ([facility] [address]      CAD (coronary artery disease)       coronary stent 12yrs ago    COPD (chronic obstructive pulmonary disease) ([facility] [address]      Hypertension           Past Surgical History:    Past Surgical History[]Expand by Default         Past Surgical History:   Procedure Laterality Date    CARDIAC SURGERY         coronary stent    CATARACT REMOVAL WITH IMPLANT Left 04 01 2013    COLONOSCOPY        CYST REMOVAL         tailbone    EYE SURGERY                Medications Prior to Admission:    @  Home Medications[]Expand by Default           Prior to Admission medications    Medication Sig Start Date End Date Taking?  Authorizing Provider   tamsulosin (FLOMAX) 0.4 MG capsule Take 0.4 mg by mouth daily     Yes Historical Provider, MD   metoprolol (TOPROL-XL) 50 MG XL tablet Take 50 mg by mouth daily.     Yes Historical Provider, MD   finasteride (PROSCAR) 5 MG tablet Take 5 mg by mouth daily.     Yes Historical Provider, MD   aspirin 81 MG tablet Take 81 mg by mouth daily.     Yes Historical Provider, MD   warfarin (COUMADIN) 7.5 MG tablet Take 7.5 mg by mouth Daily.     Yes Historical Provider, MD   terazosin (HYTRIN) 5 MG capsule Take 10 mg by mouth daily.       Historical Provider, MD            Allergies:  Dye [iodides]     Social History:   Social History   []Expand by Default            Socioeconomic History    Marital status:        Spouse name: Not on file    Number of children: Not on file    Years of education: Not on file    Highest education level: Not on file   Occupational History    Not on file   Tobacco Use    Smoking status: Former Smoker       Quit date: 3/27/1992       Years since quittin.4    Smokeless tobacco: Never Used   Substance and Sexual Activity    Alcohol use: No    Drug use: No    Sexual activity: Not on file   Other Topics Concern    Not on file   Social History Narrative    Not on file      Social Determinants of Health          Financial Resource Strain:     Difficulty of Paying Living Expenses:    Food Insecurity:     Worried About Running Out of Food in the Last Year:     Ran Out of Food in the Last Year:    Transportation Needs:     Lack of Transportation (Medical):  Lack of Transportation (Non-Medical):    Physical Activity:     Days of Exercise per Week:     Minutes of Exercise per Session:    Stress:     Feeling of Stress :    Social Connections:     Frequency of Communication with Friends and Family:     Frequency of Social Gatherings with Friends and Family:     Attends Taoism Services:     Active Member of Clubs or Organizations:     Attends Club or Organization Meetings:     Marital Status:    Intimate Partner Violence:     Fear of Current or Ex-Partner:     Emotionally Abused:     Physically Abused:     Sexually Abused:             Family History:   Family History[]Expand by Default   History reviewed.  No pertinent family history.        REVIEW OF SYSTEMS:     Gen: Patient denies any lightheadedness or dizziness. No LOC or syncope. No fevers or chills. HEENT: No earache, sore throat or nasal congestion. Resp: Denies cough, hemoptysis or sputum production. Cardiac: Denies chest pain,+ mild SOB, no diaphoresis or palpitations. GI: No nausea, vomiting, diarrhea or constipation. No melena or hematochezia. : No urinary complaints, dysuria, hematuria or frequency. MSK: + Generalized weakness. No extremity weakness, paralysis or paresthesias.      PHYSICAL EXAM:     Vitals:  /75   Pulse 82   Temp 98.5 °F (36.9 °C) (Oral)   Resp 20   Ht 5' 8\" (1.727 m)   Wt 165 lb (74.8 kg)   SpO2 93%   BMI 25.09 kg/m²      General:  This is a [de-identified] y.o. yo male who is alert and oriented in NAD  HEENT:  Head is normocephalic and atraumatic, PERRLA, EOMI, mucus membranes moist with no pharyngeal erythema or exudate. Neck:  Supple with no carotid bruits, JVD or thyromegaly. No cervical adenopathy  CV:  Irregular rate and rhythm, 3/6 systolic MR murmur and 1/6 TR. No S3/Rub  Lungs:  Fibrotic rales  bilaterally with no wheezes, no cardiac rales or rhonchi  Abdomen:  Soft, nontender, nondistended, bowel sounds present.  No AAA/bruit  Extremities:  No edema, peripheral pulses intact bilaterally  Neuro:  Cranial nerves II-XII grossly intact; motor and sensory function intact with no focal deficits  Skin:  No rashes, lesions or wounds     DATA:  CBC with Differential:          Lab Results   Component Value Date     WBC 6.3 09/08/2021     RBC 4.49 09/08/2021     HGB 14.6 09/08/2021     HCT 44.6 09/08/2021      09/08/2021     MCV 99.3 09/08/2021     MCH 32.5 09/08/2021     MCHC 32.7 09/08/2021     RDW 14.6 09/08/2021     LYMPHOPCT 20.0 09/08/2021     MONOPCT 6.0 09/08/2021     BASOPCT 0.0 09/08/2021     MONOSABS 0.38 09/08/2021     LYMPHSABS 1.26 09/08/2021     EOSABS 0.06 09/08/2021     BASOSABS 0.00 09/08/2021      CMP:          Lab Results Component Value Date      09/08/2021     K 6.5 09/08/2021      09/08/2021     CO2 26 09/08/2021     BUN 25 09/08/2021     CREATININE 0.8 09/08/2021     GFRAA >60 09/08/2021     LABGLOM >60 09/08/2021     GLUCOSE 91 09/08/2021     PROT 6.9 09/08/2021     LABALBU 3.5 09/08/2021     CALCIUM 8.4 09/08/2021     BILITOT 0.9 09/08/2021     ALKPHOS 118 09/08/2021     AST 40 09/08/2021     ALT 16 09/08/2021      Magnesium:  No results found for: MG  Phosphorus:  No results found for: PHOS  PT/INR:          Lab Results   Component Value Date     PROTIME 28.1 09/08/2021     INR 2.4 09/08/2021      Troponin:  No results found for: TROPONINI  U/A:          Lab Results   Component Value Date     COLORU DKYELLOW 09/08/2021     PROTEINU Negative 09/08/2021     PHUR 5.5 09/08/2021     WBCUA 0-1 09/08/2021     RBCUA >20 09/08/2021     BACTERIA NONE SEEN 09/08/2021     CLARITYU SLCLOUDY 09/08/2021     SPECGRAV 1.015 09/08/2021     LEUKOCYTESUR Negative 09/08/2021     UROBILINOGEN 0.2 09/08/2021     BILIRUBINUR Negative 09/08/2021     BLOODU LARGE 09/08/2021     GLUCOSEU Negative 09/08/2021      ABG:  No results found for: PH, PCO2, PO2, HCO3, BE, THGB, TCO2, O2SAT  HgBA1c:  No results found for: LABA1C  FLP:  No results found for: TRIG, HDL, LDLCALC, LDLDIRECT, LABVLDL  TSH:  No results found for: TSH  IRON:  No results found for: IRON  LIPASE:          Lab Results   Component Value Date     LIPASE 74 09/08/2021          Patient Active Problem List     Diagnosis Date Noted    Metabolic encephalopathy 38/68/0295      EKG reviewed by me; Atrial fibrillation; controlled VR; LAHB; incomplete RBBB; no acute changes    Trop     Imp/Plan:    Failure to thrive  Depression- wife 60 years in NH/dementia  History of coronary disease-unknown vessel                    NSTEMI type 2               Denies any CP/Angina  Chronic atrial fibrillation on coumadin  Pulmonary fibrosis?  With History of COPD  History hypertension  MR  TR  Pulmonary HTN  Progressive weakness    Echocardiogram  Recheck EKG  CK  MB

## 2021-09-12 NOTE — RT PROTOCOL NOTE
RT Nebulizer Bronchodilator Protocol Note    There is a bronchodilator order in the chart from a provider indicating to follow the RT Bronchodilator Protocol and there is an Initiate RT Bronchodilator Protocol order as well (see protocol at bottom of note). The findings from the last RT Protocol Assessment were as follows:  Smoking: >15 Pack years  Surgical Status: No surgery  Xray: Clear  Respiratory Pattern: RR 12-20  Mental Status: Alert and Oriented  Breath Sounds: Clear  Cough: Strong, spontaneous, non-productive  Activity Level: Walking with assistance  Oxygen Requirement: Room Air - 2LNC/28% or home setting  Indication for Bronchodilator Therapy: None  Bronchodilator Assessment Score: 2    Aerosolized bronchodilator medication orders have been revised according to the RT Bronchodilator Protocol. RT Bronchodilator Protocol:    Respiratory Therapist to perform RT Therapy Protocol Assessment then follow the protocol. No Indications - adjust the frequency to every 6 hours PRN wheezing or bronchospasm, if no treatments needed after 48 hours then discontinue using Per Protocol order mode. If indication present, adjust the RT bronchodilator orders based on the Bronchodilator Assessment Score as follows:    0-6 - enter or revise RT Bronchodilator order to Albuterol Nebulizer order with frequency of every 2 hours PRN for wheezing or increased work of breathing using Per Protocol order mode. Repeat RT Therapy Protocol Assessment as needed. 7-10 - discontinue any other Inpatient aerosolized bronchodilator medication orders and enter or revise two Albuterol Nebulizer orders, one with BID frequency and one with frequency of every 2 hours PRN wheezing or increased work of breathing using Per Protocol order mode. Repeat RT Therapy Protocol Assessment with second treatment then BID and as needed.       11-13 - discontinue any other Inpatient aerosolized bronchodilator medication orders and enter DuoNeb Nebulizer order with QID frequency and an Albuterol Nebulizer order with frequency of every 2 hours PRN wheezing or increased work of breathing using Per Protocol order mode. Repeat RT Therapy Protocol Assessment with second treatment then QID and as needed. Greater than 13 - discontinue any other Inpatient aerosolized bronchodilator medication orders and enter a DuoNeb Nebulizer order with every 4 hours frequency and an Albuterol Nebulizer order with frequency of every 2 hours PRN wheezing or increased work of breathing using Per Protocol order mode. Repeat RT Therapy Protocol Assessment with second treatment then every 4 hours and as needed. RT to enter RT Home Evaluation for COPD & MDI Assessment order using Per Protocol order mode.     Electronically signed by Rachel Cantrell RCP on 9/12/2021 at 2:32 PM

## 2021-09-12 NOTE — PROGRESS NOTES
Comprehensive Nutrition Assessment    Type and Reason for Visit:  Initial, Positive Nutrition Screen    Nutrition Recommendations/Plan: Pt declined need for ONS during admission d/t eating well at meals. He is aware these can be requested. Nutrition Assessment:  Pt admitted w/ metabolic encephalopathy and FTT, PMH of COPD. Pt reported decreased appetite and intake r/t depression since admitting his wife into a nursing home several months ago. He was supplementing intake w/ Ensure Plus once daily at home but has been eating >75% of all meals since admission and does not wish to continue Ensure while admitted. Will continue to monitor. Malnutrition Assessment:  Malnutrition Status: Moderate malnutrition    Context:  Social/Environmental Circumstances     Findings of the 6 clinical characteristics of malnutrition:  Energy Intake:  1 - Less than 75% estimated energy requirements for 3 months or longer  Weight Loss:  Unable to assess (d/t lack of actual EMR wt hx)     Body Fat Loss:  1 - Mild body fat loss Triceps, Orbital   Muscle Mass Loss:  1 - Mild muscle mass loss Clavicles (pectoralis & deltoids), Temples (temporalis)  Fluid Accumulation:  No significant fluid accumulation     Strength:  Not Performed    Estimated Daily Nutrient Needs:  Energy (kcal):  0851-0928 (MSJ REE= 1489 x 1.2); Weight Used for Energy Requirements:  Current     Protein (g):   (1.3-1.5 gm/kg IBW); Weight Used for Protein Requirements:  Ideal        Fluid (ml/day):  9509-2387; Method Used for Fluid Requirements:  1 ml/kcal      Nutrition Related Findings:  Pt A/Ox4, missing teeth, abd WDL, +BS, -2.7L I/O, +1 BLE edema      Wounds:  None       Current Nutrition Therapies:    ADULT DIET;  Regular    Anthropometric Measures:  · Height: 5' 8\" (172.7 cm)  · Current Body Weight: 177 lb 4.8 oz (80.4 kg) (9/12 bed scale)   · Admission Body Weight: 177 lb 4.8 oz (80.4 kg) (9/12 bed scale)    · Usual Body Weight: 220 lb (99.8 kg) (4/30/2021 stated, no actual recent EMR wt hx)     · Ideal Body Weight: 154 lbs; % Ideal Body Weight 115.1 %   · BMI: 27  · Adjusted Body Weight: No Adjustment    · BMI Categories: Overweight (BMI 25.0-29. 9)       Nutrition Diagnosis:   · Moderate malnutrition, In context of social or environmental circumstances related to psychological cause or life stress as evidenced by poor intake prior to admission, mild muscle loss, mild loss of subcutaneous fat    Nutrition Interventions:   Food and/or Nutrient Delivery:  Continue Current Diet (Pt declined need for ONS during admission d/t eating well at meals.  He is aware these can be requested.)  Nutrition Education/Counseling:  Education not indicated   Coordination of Nutrition Care:  Continue to monitor while inpatient    Goals:  Pt is to consume >75% of most meals       Nutrition Monitoring and Evaluation:   Behavioral-Environmental Outcomes:  None Identified   Food/Nutrient Intake Outcomes:  Food and Nutrient Intake  Physical Signs/Symptoms Outcomes:  Biochemical Data, GI Status, Fluid Status or Edema, Nutrition Focused Physical Findings, Skin, Weight     Discharge Planning:    Continue current diet     Electronically signed by Jarrod Jessica RD, LD on 9/12/21 at 5:08 PM EDT    Contact: [phone]

## 2021-09-12 NOTE — PROCEDURES
[address]                                 ECHOCARDIOGRAM    PATIENT NAME: Ewa Harris                     :        1940  MED REC NO:   [id number]                            ROOM:       3138  ACCOUNT NO:   [de-identified]                           ADMIT DATE: 2021  PROVIDER:     Becky Perdomo MD    ECHOCARDIOGRAPHER:  Severiano Mulder    INDICATIONS:  Weakness, valvular heart disease, chronic atrial  fibrillation, non-STEMI. 2-D MEASUREMENTS:  Left ventricular outflow tract 2.0 cm. Right  ventricle diastole 2.8. Left ventricle diastole 3.9, systole 2.2. Septal and posterior wall thickness of the left ventricle 1.6 cm. Left  atrial dimension 3.6 cm. Left atrial area 28 cm2. Right atrial area 25  cm2. Aortic root diameter 3.7 cm. Aortic valve cusp separation 1.3 cm. IMPRESSION:  1. The left and right atria are both mildly to moderately dilated. The  remaining cardiac chamber sizes including aortic root size are within  normal limits. 2.  The left ventricle shows moderate concentric left ventricular  hypertrophy at 1.6 cm. Systolic function is hyperdynamic with ejection  fraction of 72%. No definite focal wall motion abnormality is seen. Diastolic filling is indeterminate. 3.  The mitral valve shows moderate to severe mitral annular  calcification. Maximal gradient across the mitral valve 7.3 mmHg. Mitral valve area by pressure half time 4.9 cm2. There is no  significant mitral stenosis. There is 1+ mitral regurgitation. 4.  Aortic valve is thickened and restricted. Maximal gradient 28 mmHg. Mean gradient 11.3. Aortic valve area estimated at 1.2 cm2. On  visualization, there is mild to at most moderate aortic stenosis with  trace aortic insufficiency only. 5.  There is no pulmonic stenosis nor insufficiency.   There is 3+  moderately severe tricuspid regurgitation with severe pulmonary  hypertension at 89 mmHg. 6.  There is a small to medium circumferential pericardial effusion. There is no convincing evidence of tamponade physiology. There is no  definite intracavitary mass or thrombus or shunt identified on this  study.         Delroy Luciano MD    D: 09/11/2021 23:35:50       T: 09/11/2021 23:38:13     [id number]  Job#: [id number]     Doc#: [id number]    CC:  Valentina Ham MD

## 2021-09-12 NOTE — PROGRESS NOTES
Department of Internal Medicine        CHIEF COMPLAINT: Generalized weakness, inability to ambulate, shortness of breath    Reason for Admission: Failure to thrive    HISTORY OF PRESENT ILLNESS:      The patient is a [de-identified] y.o. male who presents with generalized weakness which has been going on for about a week. Patient does live home alone but the patient son frequently checks on them. Patient is now having difficulty walking because of his weakness. Patient has not fallen yet. Patient though is on warfarin. He was seen by home health agency nurse the day of admission and his O2 sat was 90% because of shortness of breath and weakness she sent him to the emergency room. He denies any problem with fever/chills, nausea/vomiting, diarrhea/constipation. Patient denies any chest pain palpitation or dizziness. Patient had a potassium of 6.5 in the ED but was moderately hemolyzed. BUN/creatinine 25/0.8. WBC 6.3 with hemoglobin 14.6. Temperature is 90.5 with heart rate 82 and blood pressure 137/75. O2 sat 93% room air at rest.    9/10/2021 patient seen examined on PCU. Patient again denies any problem with chest pain, abdominal pain, palpitations. Patient still very very weak in the lower extremities. Patient denies any numbness in his legs. Patient has some mild low back pain but no history of any chronic disc problems. Patient serum troponin increased from 102 up to 114 yesterday. ECG reviewed and showed chronic A. fib with no ST-T changes to suggest acute ischemia. Temperature 97.7 with heart rate of 80. Blood pressure 112/60. O2 sat was 87% on room air at rest early this morning so nursing put patient on 2 L nasal cannula and O2 sat currently 93%. Urine output 600 cc last 2 shifts. Melchor catheter is in place and was put in by ED. Patient does not want the catheter taken out at this time with his inability to ambulate. Patient also has home O2 but he only wears it periodically.   We will consult cardiology and neurology. Check x-ray lumbar spine and CT of the chest.    9/11/2021  Patient seen and examined in PCU. INR 2.7 today with patient having markedly elevated high-sensitivity CRP. Temperature 98.3 with heart rate 87 with blood pressure 110/63 with O2 sat 94% on 2-1/2 L. Patient has a history having home O2 and uses as needed. This was set up by his family physician. Urine output seems to be adequate. Oral intake is fair. CT the chest yesterday showed enlarged pulmonary artery suspicious for pulmonary artery hypertension 1 with diffuse prominence interstitial density both lungs with consideration for interstitial scarring or fibrosis. There was cardiomegaly and small-moderate pericardial effusion. 9/12/2021  Patient seen examined in PCU. Patient still alert and oriented x4. Patient states the leg weakness is still present and possibly a little bit better. Patient's leg strength is still extremely weak with the patient again denied any numbness or paresthesias in his legs. BUN/creatinine was 25/0.9. Liver enzymes essentially normal with BBC 6.5 and hemoglobin 12.6. Sed rate was only 37. INR elevated 3.3 today. High-sensitivity CRP is markedly elevated at 79.4. Temperature is 97.3 with heart rate 87 blood pressure 107/96. O2 sat 93% on 2-1/2 L nasal cannula. Patient urine output is good ranging 750-1050 cc a shift. Patient did have 1 bowel movement last night. Cardiology and neurology notes reviewed. MRI of the lumbar thoracic spine has not been ordered yet and will do so today. Echocardiogram reviewed with patient having severe pulmonary hypertension at 89 mmHg and a +3 TR. EF was 72% with no significant diastolic dysfunction.       Past Medical History:    Past Medical History:   Diagnosis Date    Atrial fibrillation ([facility] [address]     CAD (coronary artery disease)     coronary stent 12yrs ago    COPD (chronic obstructive pulmonary disease) ([facility] [address]     Hypertension      Past Surgical Session:    Stress:     Feeling of Stress :    Social Connections:     Frequency of Communication with Friends and Family:     Frequency of Social Gatherings with Friends and Family:     Attends Sabianism Services:     Active Member of Clubs or Organizations:     Attends Club or Organization Meetings:     Marital Status:    Intimate Partner Violence:     Fear of Current or Ex-Partner:     Emotionally Abused:     Physically Abused:     Sexually Abused:        Family History:   History reviewed. No pertinent family history. REVIEW OF SYSTEMS:    Gen: Patient denies any lightheadedness or dizziness. No LOC or syncope. No fevers or chills. HEENT: No earache, sore throat or nasal congestion. Resp: Denies cough, hemoptysis or sputum production. Cardiac: Denies chest pain,+ mild SOB, no diaphoresis or palpitations. GI: No nausea, vomiting, diarrhea or constipation. No melena or hematochezia. : No urinary complaints, dysuria, hematuria or frequency. MSK: + Generalized weakness. No extremity weakness, paralysis or paresthesias. PHYSICAL EXAM:    Vitals:  BP (!) 107/96   Pulse 87   Temp 97.3 °F (36.3 °C) (Oral)   Resp 18   Ht 5' 8\" (1.727 m)   Wt 165 lb (74.8 kg)   SpO2 93%   BMI 25.09 kg/m²     General:  This is a [de-identified] y.o. yo male who is alert and oriented in NAD  HEENT:  Head is normocephalic and atraumatic, PERRLA, EOMI, mucus membranes moist with no pharyngeal erythema or exudate. Neck:  Supple with no carotid bruits, JVD or thyromegaly.   No cervical adenopathy  CV:  Irregular rate and rhythm, 2/6 systolic murmurs  Lungs:  Clear to auscultation bilaterally with no wheezes, rales or rhonchi  Abdomen:  Soft, nontender, nondistended, bowel sounds present  Extremities:  No edema, peripheral pulses intact bilaterally  Neuro:  Cranial nerves II-XII grossly intact; motor and sensory function intact with no focal deficits  Skin:  No rashes, lesions or wounds    DATA:  CBC with Differential:    Lab Results   Component Value Date    WBC 6.5 09/12/2021    RBC 3.80 09/12/2021    HGB 12.6 09/12/2021    HCT 38.4 09/12/2021     09/12/2021    .1 09/12/2021    MCH 33.2 09/12/2021    MCHC 32.8 09/12/2021    RDW 14.8 09/12/2021    LYMPHOPCT 13.3 09/12/2021    MONOPCT 9.9 09/12/2021    BASOPCT 0.8 09/12/2021    MONOSABS 0.64 09/12/2021    LYMPHSABS 0.86 09/12/2021    EOSABS 0.15 09/12/2021    BASOSABS 0.05 09/12/2021     CMP:    Lab Results   Component Value Date     09/12/2021    K 4.7 09/12/2021    K 6.5 09/08/2021     09/12/2021    CO2 23 09/12/2021    BUN 25 09/12/2021    CREATININE 0.9 09/12/2021    GFRAA >60 09/12/2021    LABGLOM >60 09/12/2021    GLUCOSE 88 09/12/2021    PROT 6.0 09/12/2021    LABALBU 3.0 09/12/2021    CALCIUM 8.3 09/12/2021    BILITOT 0.6 09/12/2021    ALKPHOS 158 09/12/2021    AST 20 09/12/2021    ALT 15 09/12/2021     Magnesium:  No results found for: MG  Phosphorus:  No results found for: PHOS  PT/INR:    Lab Results   Component Value Date    PROTIME 38.7 09/12/2021    INR 3.3 09/12/2021     Troponin:  No results found for: TROPONINI  U/A:    Lab Results   Component Value Date    COLORU DKYELLOW 09/08/2021    PROTEINU Negative 09/08/2021    PHUR 5.5 09/08/2021    WBCUA 0-1 09/08/2021    RBCUA >20 09/08/2021    BACTERIA NONE SEEN 09/08/2021    CLARITYU SLCLOUDY 09/08/2021    SPECGRAV 1.015 09/08/2021    LEUKOCYTESUR Negative 09/08/2021    UROBILINOGEN 0.2 09/08/2021    BILIRUBINUR Negative 09/08/2021    BLOODU LARGE 09/08/2021    GLUCOSEU Negative 09/08/2021     ABG:  No results found for: PH, PCO2, PO2, HCO3, BE, THGB, TCO2, O2SAT  HgBA1c:  No results found for: LABA1C  FLP:    Lab Results   Component Value Date    TRIG 58 09/10/2021    HDL 67 09/10/2021    LDLCALC 51 09/10/2021    LABVLDL 12 09/10/2021     TSH:    Lab Results   Component Value Date    TSH 2.240 09/10/2021     IRON:  No results found for: IRON  LIPASE:    Lab Results   Component Value Date    LIPASE 74 09/08/2021       ASSESSMENT AND PLAN:      Patient Active Problem List    Diagnosis Date Noted    Inability to walk 09/85/2462    Metabolic encephalopathy 27/05/0095     Impression:  1. Failure to thrive  2. History of coronary disease  3. History of chronic atrial fibrillation  4. History of COPD with acute exacerbation  5. History hypertension  6. Elevated troponin  7. Acute on chronic hypoxic respiratory failure secondary to #4    Plan:  Patient admitted to monitored bed  Home medications reviewed  Monitor heart rate, blood pressure, O2 saturations  Repeat serum potassium 3 PM today  Repeat Troponin- 102-114  Consult PT/OT    Consult cardiology-elevated troponin, chronic A. Fib  Consult neurology-bilateral leg weakness  X-ray lumbar spine  O2 nasal cannula  DuoNeb aerosols    CT the chest without contrast-enlarged pulmonary arteries-patient with documented severe pulmonary hypertension on echo, cardiomegaly with small to moderate pericardial effusion, diffuse prominence of interstitial density both lungs possible interstitial scarring/fibrosis.     MRI of the thoracic and lumbar spine  Consult pulmonology with severe pulmonary hypertension      Nataliia King DO, D.O.  9/12/2021  10:12 AM

## 2021-09-12 NOTE — PLAN OF CARE
Problem: Tissue Perfusion - Cardiopulmonary, Altered:  Goal: Absence of angina  Description: Absence of angina  Outcome: Met This Shift     Problem: Falls - Risk of:  Goal: Will remain free from falls  Description: Will remain free from falls  Outcome: Met This Shift

## 2021-09-12 NOTE — PROGRESS NOTES
Department of Internal Medicine        CHIEF COMPLAINT: Generalized weakness, inability to ambulate, shortness of breath    Reason for Admission: Failure to thrive    HISTORY OF PRESENT ILLNESS:      The patient is a [de-identified] y.o. male who presents with generalized weakness which has been going on for about a week. Patient does live home alone but the patient son frequently checks on them. Patient is now having difficulty walking because of his weakness. Patient has not fallen yet. Patient though is on warfarin. He was seen by home health agency nurse the day of admission and his O2 sat was 90% because of shortness of breath and weakness she sent him to the emergency room. He denies any problem with fever/chills, nausea/vomiting, diarrhea/constipation. Patient denies any chest pain palpitation or dizziness. Patient had a potassium of 6.5 in the ED but was moderately hemolyzed. BUN/creatinine 25/0.8. WBC 6.3 with hemoglobin 14.6. Temperature is 90.5 with heart rate 82 and blood pressure 137/75. O2 sat 93% room air at rest.    9/10/2021 patient seen examined on PCU. Patient again denies any problem with chest pain, abdominal pain, palpitations. Patient still very very weak in the lower extremities. Patient denies any numbness in his legs. Patient has some mild low back pain but no history of any chronic disc problems. Patient serum troponin increased from 102 up to 114 yesterday. ECG reviewed and showed chronic A. fib with no ST-T changes to suggest acute ischemia. Temperature 97.7 with heart rate of 80. Blood pressure 112/60. O2 sat was 87% on room air at rest early this morning so nursing put patient on 2 L nasal cannula and O2 sat currently 93%. Urine output 600 cc last 2 shifts. Melchor catheter is in place and was put in by ED. Patient does not want the catheter taken out at this time with his inability to ambulate. Patient also has home O2 but he only wears it periodically.   We will consult cardiology and neurology. Check x-ray lumbar spine and CT of the chest.    9/11/2021  Patient seen and examined PCU. INR 2.7 today with patient having markedly elevated high-sensitivity CRP. Temperature 98.3 with heart rate 87 with blood pressure 110/63 with an O2 sat 94% on 2 and half liters. Patient has a history of having home O2 uses as needed. This was set up by his family physician. Urine output seems to be adequate. Oral intake is fair. CT of the chest yesterday showed enlarged pulmonary artery suspicious pulmonary artery hypertension 1 with diffuse prominence interstitial density both lungs with consideration for interstitial scarring or fibrosis. There was cardiomegaly with small-moderate pericardial effusion. Past Medical History:    Past Medical History:   Diagnosis Date    At[facility] ([facility] [address]     CAD (coronary artery disease)     coronary stent 12yrs ago    COPD (chronic obstructive pulmonary disease) ([facility] [address]     Hypertension      Past Surgical History:    Past Surgical History:   Procedure Laterality Date    CARDIAC SURGERY      coronary stent    CATARACT REMOVAL WITH IMPLANT Left 04 01 2013    COLONOSCOPY      CYST REMOVAL      tailbone    EYE SURGERY         Medications Prior to Admission:    @  Prior to Admission medications    Medication Sig Start Date End Date Taking? Authorizing Provider   tamsulosin (FLOMAX) 0.4 MG capsule Take 0.4 mg by mouth daily   Yes Historical Provider, MD   metoprolol (TOPROL-XL) 50 MG XL tablet Take 50 mg by mouth daily. Yes Historical Provider, MD   finasteride (PROSCAR) 5 MG tablet Take 5 mg by mouth daily. Yes Historical Provider, MD   aspirin 81 MG tablet Take 81 mg by mouth daily. Yes Historical Provider, MD   warfarin (COUMADIN) 7.5 MG tablet Take 7.5 mg by mouth Daily. Yes Historical Provider, MD   terazosin (HYTRIN) 5 MG capsule Take 10 mg by mouth daily.     Historical Provider, MD       Allergies:  Dye [iodides]    Social History:   Social History     Socioeconomic History    Marital status:      Spouse name: Not on file    Number of children: Not on file    Years of education: Not on file    Highest education level: Not on file   Occupational History    Not on file   Tobacco Use    Smoking status: Former Smoker     Quit date: 3/27/1992     Years since quittin.4    Smokeless tobacco: Never Used   Substance and Sexual Activity    Alcohol use: No    Drug use: No    Sexual activity: Not on file   Other Topics Concern    Not on file   Social History Narrative    Not on file     Social Determinants of Health     Financial Resource Strain:     Difficulty of Paying Living Expenses:    Food Insecurity:     Worried About Running Out of Food in the Last Year:     [address] in the Last Year:    Transportation Needs:     Lack of Transportation (Medical):  Lack of Transportation (Non-Medical):    Physical Activity:     Days of Exercise per Week:     Minutes of Exercise per Session:    Stress:     Feeling of Stress :    Social Connections:     Frequency of Communication with Friends and Family:     Frequency of Social Gatherings with Friends and Family:     Attends Jehovah's witness Services:     Active Member of Clubs or Organizations:     Attends Club or Organization Meetings:     Marital Status:    Intimate Partner Violence:     Fear of Current or Ex-Partner:     Emotionally Abused:     Physically Abused:     Sexually Abused:        Family History:   History reviewed. No pertinent family history. REVIEW OF SYSTEMS:    Gen: Patient denies any lightheadedness or dizziness. No LOC or syncope. No fevers or chills. HEENT: No earache, sore throat or nasal congestion. Resp: Denies cough, hemoptysis or sputum production. Cardiac: Denies chest pain,+ mild SOB, no diaphoresis or palpitations. GI: No nausea, vomiting, diarrhea or constipation. No melena or hematochezia.     : No urinary No results found for: PHOS  PT/INR:    Lab Results   Component Value Date    PROTIME 38.7 09/12/2021    INR 3.3 09/12/2021     Troponin:  No results found for: TROPONINI  U/A:    Lab Results   Component Value Date    COLORU DKYELLOW 09/08/2021    PROTEINU Negative 09/08/2021    PHUR 5.5 09/08/2021    WBCUA 0-1 09/08/2021    RBCUA >20 09/08/2021    BACTERIA NONE SEEN 09/08/2021    CLARITYU SLCLOUDY 09/08/2021    SPECGRAV 1.015 09/08/2021    LEUKOCYTESUR Negative 09/08/2021    UROBILINOGEN 0.2 09/08/2021    BILIRUBINUR Negative 09/08/2021    BLOODU LARGE 09/08/2021    GLUCOSEU Negative 09/08/2021     ABG:  No results found for: PH, PCO2, PO2, HCO3, BE, THGB, TCO2, O2SAT  HgBA1c:  No results found for: LABA1C  FLP:    Lab Results   Component Value Date    TRIG 58 09/10/2021    HDL 67 09/10/2021    LDLCALC 51 09/10/2021    LABVLDL 12 09/10/2021     TSH:    Lab Results   Component Value Date    TSH 2.240 09/10/2021     IRON:  No results found for: IRON  LIPASE:    Lab Results   Component Value Date    LIPASE 74 09/08/2021       ASSESSMENT AND PLAN:      Patient Active Problem List    Diagnosis Date Noted    Inability to walk 36/91/5070    Metabolic encephalopathy 53/30/8195     Impression:  1. Failure to thrive  2. History of coronary disease  3. History of chronic atrial fibrillation  4. History of COPD with acute exacerbation  5. History hypertension  6. Elevated troponin  7. Acute hypoxic respiratory failure secondary to #4    Plan:  Patient admitted to monitored bed  Home medications reviewed  Monitor heart rate, blood pressure, O2 saturations  Repeat serum potassium 3 PM today  Repeat Troponin- 102-114  Consult PT/OT  Blood pressure supine and standing  Echocardiogram  IVF- NS 75 cc/hr    Consult cardiology-elevated troponin, chronic A.  Fib  Consult neurology-bilateral leg weakness  X-ray lumbar spine  O2 nasal cannula  DuoNeb aerosols  CT the chest without contrast -see progress note 9/11      Olya HANNA Maria Alejandra Mackenzie DO, D.O.  9/11/2021  12:31 PM

## 2021-09-12 NOTE — PROGRESS NOTES
Subjective: The patient is somnolent but arousable. No problems overnight. Denies chest pain, angina, and dyspnea. Denies abdominal pain. Tolerating diet. No nausea or vomiting. Current Facility-Administered Medications: dexamethasone (DECADRON) tablet 6 mg, 6 mg, Oral, Daily  warfarin (COUMADIN) tablet 1 mg, 1 mg, Oral, Daily  albuterol (PROVENTIL) nebulizer solution 2.5 mg, 2.5 mg, Nebulization, Q2H PRN  perflutren lipid microspheres (DEFINITY) injection 1.65 mg, 1.5 mL, IntraVENous, ONCE PRN  metoprolol succinate (TOPROL XL) extended release tablet 50 mg, 50 mg, Oral, Daily  finasteride (PROSCAR) tablet 5 mg, 5 mg, Oral, Daily  aspirin chewable tablet 81 mg, 81 mg, Oral, Daily  tamsulosin (FLOMAX) capsule 0.4 mg, 0.4 mg, Oral, Daily  trimethobenzamide (TIGAN) injection 200 mg, 200 mg, IntraMUSCular, Q6H PRN  acetaminophen (TYLENOL) tablet 500 mg, 500 mg, Oral, Q6H PRN  0.9 % sodium chloride infusion, , IntraVENous, Continuous    Objective:    /67   Pulse 79   Temp 98.2 °F (36.8 °C) (Oral)   Resp 16   Ht 5' 8\" (1.727 m)   Wt 177 lb 4.8 oz (80.4 kg)   SpO2 92%   BMI 26.96 kg/m²   In: 600 [P.O.:600]  Out: 4300    In: 600   Out: 4300 [Urine:4300]   A Fib 74, 2/6 MR and TR murmurs; 1/6 AS, no gallops, or rubs.   CTA bilaterally, no wheeze, rales or rhonchi  bowel sounds present, nontender, nondistended, no masses  No clubbing, cyanosis, or edema  No neuro changes     CBC with Differential:    Lab Results   Component Value Date    WBC 6.5 09/12/2021    RBC 3.80 09/12/2021    HGB 12.6 09/12/2021    HCT 38.4 09/12/2021     09/12/2021    .1 09/12/2021    MCH 33.2 09/12/2021    MCHC 32.8 09/12/2021    RDW 14.8 09/12/2021    LYMPHOPCT 13.3 09/12/2021    MONOPCT 9.9 09/12/2021    BASOPCT 0.8 09/12/2021    MONOSABS 0.64 09/12/2021    LYMPHSABS 0.86 09/12/2021    EOSABS 0.15 09/12/2021    BASOSABS 0.05 09/12/2021     Hemoglobin/Hematocrit:    Lab Results   Component Value Date    HGB 12.6 09/12/2021    HCT 38.4 09/12/2021     CMP:    Lab Results   Component Value Date     09/12/2021    K 4.7 09/12/2021    K 6.5 09/08/2021     09/12/2021    CO2 23 09/12/2021    BUN 25 09/12/2021    CREATININE 0.9 09/12/2021    GFRAA >60 09/12/2021    LABGLOM >60 09/12/2021    GLUCOSE 88 09/12/2021    PROT 6.0 09/12/2021    LABALBU 3.0 09/12/2021    CALCIUM 8.3 09/12/2021    BILITOT 0.6 09/12/2021    ALKPHOS 158 09/12/2021    AST 20 09/12/2021    ALT 15 09/12/2021     BMP:    Lab Results   Component Value Date     09/12/2021    K 4.7 09/12/2021    K 6.5 09/08/2021     09/12/2021    CO2 23 09/12/2021    BUN 25 09/12/2021    LABALBU 3.0 09/12/2021    CREATININE 0.9 09/12/2021    CALCIUM 8.3 09/12/2021    GFRAA >60 09/12/2021    LABGLOM >60 09/12/2021    GLUCOSE 88 09/12/2021     Hepatic Function Panel:    Lab Results   Component Value Date    ALKPHOS 158 09/12/2021    ALT 15 09/12/2021    AST 20 09/12/2021    PROT 6.0 09/12/2021    BILITOT 0.6 09/12/2021    LABALBU 3.0 09/12/2021     Albumin:    Lab Results   Component Value Date    LABALBU 3.0 09/12/2021     Last 3 Troponin:  No results found for: TROPONINI  FLP:    Lab Results   Component Value Date    TRIG 58 09/10/2021    HDL 67 09/10/2021    LDLCALC 51 09/10/2021    LABVLDL 12 09/10/2021     TSH:    Lab Results   Component Value Date    TSH 2.240 09/10/2021        Patient Active Problem List   Diagnosis    Metabolic encephalopathy    Inability to walk     Imp/Plan:     Failure to thrive  Depression- wife 61 years in NH/dementia  History of coronary disease-unknown vessel, stent x1                    NSTEMI type 2  No evidence AMI/ACS               Pericardial effusion without              Tamponade  TR with severe pulmonary hypertension               Denies any CP/Angina  Chronic atrial fibrillation on coumadin  Pulmonary fibrosis?  With History of COPD  History hypertension  MR  Progressive weakness  Protein malnutrition     Echocardiogram pending  Recheck EKG stable  CK  MB=low  Hscrp 76!   Monitor closely-no evidence of tamponade physiology  Added po steroid; mildly improved  Needs MRI

## 2021-09-13 NOTE — PROGRESS NOTES
09/13/2021     Hemoglobin/Hematocrit:    Lab Results   Component Value Date    HGB 13.7 09/13/2021    HCT 41.2 09/13/2021     CMP:    Lab Results   Component Value Date     09/13/2021    K 5.0 09/13/2021    K 6.5 09/08/2021     09/13/2021    CO2 22 09/13/2021    BUN 29 09/13/2021    CREATININE 0.7 09/13/2021    GFRAA >60 09/13/2021    LABGLOM >60 09/13/2021    GLUCOSE 146 09/13/2021    PROT 7.0 09/13/2021    LABALBU 3.5 09/13/2021    CALCIUM 9.0 09/13/2021    BILITOT 0.4 09/13/2021    ALKPHOS 171 09/13/2021    AST 24 09/13/2021    ALT 20 09/13/2021     BMP:    Lab Results   Component Value Date     09/13/2021    K 5.0 09/13/2021    K 6.5 09/08/2021     09/13/2021    CO2 22 09/13/2021    BUN 29 09/13/2021    LABALBU 3.5 09/13/2021    CREATININE 0.7 09/13/2021    CALCIUM 9.0 09/13/2021    GFRAA >60 09/13/2021    LABGLOM >60 09/13/2021    GLUCOSE 146 09/13/2021     Hepatic Function Panel:    Lab Results   Component Value Date    ALKPHOS 171 09/13/2021    ALT 20 09/13/2021    AST 24 09/13/2021    PROT 7.0 09/13/2021    BILITOT 0.4 09/13/2021    LABALBU 3.5 09/13/2021     Albumin:    Lab Results   Component Value Date    LABALBU 3.5 09/13/2021     Last 3 Troponin:  No results found for: TROPONINI  FLP:    Lab Results   Component Value Date    TRIG 58 09/10/2021    HDL 67 09/10/2021    LDLCALC 51 09/10/2021    LABVLDL 12 09/10/2021     TSH:    Lab Results   Component Value Date    TSH 2.240 09/10/2021        Patient Active Problem List   Diagnosis    Metabolic encephalopathy    Inability to walk     Imp/Plan:     Failure to thrive  Depression- wife 61 years in NH/dementia  History of coronary disease-unknown vessel, stent x1                    NSTEMI type 2  No evidence AMI/ACS               Pericardial effusion without              Tamponade  TR with severe pulmonary hypertension               Denies any CP/Angina  Chronic atrial fibrillation on coumadin  Pulmonary fibrosis?  With History of COPD  History hypertension  MR  Progressive weakness  Protein malnutrition     Echocardiogram mod-severe TR with severe pulm HTN  Recheck EKG stable  CK  MB=low  Hscrp 76!   Monitor closely-no evidence of tamponade physiology  Added po steroid; moderately improved walking/strength  Had MRI-pending  TR with severe pulm htn may be decreasing his forward output somewhat but not enough to explain his leg symptoms

## 2021-09-13 NOTE — PROGRESS NOTES
Physical Therapy Initial Evaluation/Plan of Care    Room #:  3456/7281-52  Patient Name: Jose Duvall  YOB: 1940  MRN: [id number]    Date of Service: 9/13/2021     Tentative placement recommendation: Subacute rehab  Equipment recommendation: To be determined      Evaluating Physical Therapist: Irwin Rivas PT #[id number]      Specific Provider Orders/Date/Referring Provider :  09/12/21 1030   PT eval and treat Start: 09/12/21 1030, End: 09/12/21 1030, ONE TIME, Standing Count: 1 Occurrences, R    [place], DO      Admitting Diagnosis:   Metabolic encephalopathy [X42.12]  Adult failure to thrive syndrome [R62.7]  Fatigue, unspecified type [R53.83]  generalized weakness. Visit Diagnoses       Codes    Adult failure to thrive syndrome    -  Primary R62.7    Fatigue, unspecified type     R53.83        Surgery: -    Patient Active Problem List   Diagnosis    Metabolic encephalopathy    Inability to walk        ASSESSMENT of Current Deficits Patient exhibits decreased strength, balance and endurance impairing functional mobility, transfers, gait , gait distance and tolerance to activity bilateral lower extremities weakness with difficulty sit to stand from low surface, shuffling steps and minimal assist for standing and dynamic balance throughout due to posterior lean. Patient requires continued skilled physical therapy to address concerns listed above for increased safety and function at discharge.          PHYSICAL THERAPY  PLAN OF CARE       Physical therapy plan of care is established based on physician order,  patient diagnosis and clinical assessment    Current Treatment Recommendations:    -Bed Mobility: Lower extremity exercises  and Trunk control activities   -Sitting Balance: Facilitate active trunk muscle engagement  and Facilitate postural control in all planes   -Standing Balance: Perform strengthening exercises in standing to promote motor control with or without upper extremity support   -Transfers: Provide instruction on proper hand and foot position for adequate transfer of weight onto lower extremities and use of gait device if needed, Cues for hand placement, technique and safety. Provide stabilization to prevent fall  and Support transfer of weight on to lower extremities  -Gait: Gait training, Standing activities to improve: base of support, weight shift, weight bearing  and Pregait training to emphasize: Sequencing , Base of support, Increased step length , Upright and Safety   -Endurance: Utilize Supervised activities to increase level of endurance to allow for safe functional mobility including transfers and gait     PT long term treatment goals are located in below grid    Patient and or family understand(s) diagnosis, prognosis, and plan of care. Frequency of treatments: Patient will be seen  daily. Prior Level of Function: Patient ambulated with wheeled walker    Rehab Potential: good  - for baseline    Past medical history:   Past Medical History:   Diagnosis Date    [facility]     CAD (coronary artery disease)     coronary stent 12yrs ago    COPD [facility]chronic obstructive [facility] disease) [facility]     Hypertension      Past Surgical History:   Procedure Laterality Date    CARDIAC SURGERY      coronary stent    CATARACT REMOVAL WITH IMPLANT Left 04 01 2013    COLONOSCOPY      CYST REMOVAL      tailbone    EYE SURGERY         SUBJECTIVE:    Precautions:  Activity as tolerated, falls and O2 ,    Social history: Patient lives alone son frequently checks on him drives and shops for him in a two story home resides first  with 4 steps  to enter with Rail  Tub shower uses wipes    Equipment owned: [place], Wheeled Walker and O2,  Prn in am and pm per patient     [address] [facility]  Mobility Inpatient   How much difficulty turning over in bed?: A Lot  How much difficulty sitting down on / standing up from a chair with arms?: A Lot  How much difficulty moving from lying on back to sitting on side of bed?: A Lot  How much help from another person moving to and from a bed to a chair?: A Lot  How much help from another person needed to walk in hospital room?: A Lot  How much help from another person for climbing 3-5 steps with a railing?: Total  AM-PAC Inpatient Mobility Raw Score : 11  AM-PAC Inpatient T-Scale Score : 33.86  Mobility Inpatient CMS 0-100% Score: 72.57  Mobility Inpatient CMS G-Code Modifier : CL    Nursing cleared patient for PT evaluation. The admitting diagnosis and active problem list as listed above have been reviewed prior to the initiation of this evaluation. OBJECTIVE;   Initial Evaluation  Date: 9/13/2021 Treatment Date:     Short Term/ Long Term   Goals   Was pt agreeable to Eval/treatment? Yes  To be met in 3 days   Pain level   0/10        Bed Mobility    Rolling: Moderate assist of 1    Supine to sit: Not assessed     Sit to supine: Moderate assist of 1    Scooting: Moderate assist of 1    Rolling: Minimal assist of 1    Supine to sit: Minimal assist of 1    Sit to supine: Minimal assist of 1    Scooting: Minimal assist of 1     Transfers Sit to stand:  Moderate assist of 1 Cues for hand placement and safety posteriorly lean flexed posture   Sit to stand: Minimal assist of 1     Ambulation    1 x 8 forward steps, 3 backward steps, 1 x 3   feet using  wheeled walker with Moderate assist of 1  progressing to max assist 2nd rep to return to bed, unable to step backward, bed brought to patient  Patient with shuffling steps, flexed posture, decreased vinod, decreased step length and posterior lean and cues for sequencing, upright posture, increased step length, increased vinod and safety   20 feet using  wheeled walker with Minimal assist of 1    Stair negotiation: ascended and descended   Not assessed          ROM Within functional limits    Increase range of motion 10% of affected joints    Strength BUE:  refer to OT eval  RLE:  3/5  LLE:  3-/5  Increase strength in affected mm groups by 1/3 grade   Balance Sitting EOB:  fair    Dynamic Standing:  fair - posterior lean wheeled walker   Sitting EOB:  good    Dynamic Standing: fair +wheeled walker      Patient is Alert & Oriented x person, place, time and situation and follows directions    Sensation:  Patient  reports numbness bilateral lower extremities     Edema:  yes bilateral lower extremities   Endurance: fair       Vitals: 3 liters nasal cannula   Blood Pressure at rest  Blood Pressure during session    Heart Rate at rest 73 Heart Rate during session 88   SPO2 at rest 98%  SPO2 during session 90% room air; donned 2 liters improved 96% < 1 min     Patient education  Patient educated on role of Physical Therapy, risks of immobility, safety and plan of care,  importance of mobility while in hospital  and ankle pumps, quad set and glut set for edema control, blood clot prevention     Patient response to education:   Pt verbalized understanding Pt demonstrated skill Pt requires further education in this area   Yes Partial Yes      Treatment:  Patient practiced and was instructed/facilitated in the following treatment: Patient in chair, ambulated as above, returned to chair performed exercises. fatigeud request return to bed,   Sat edge of bed 5 minutes with Minimal assist of 1 to increase dynamic sitting balance and activity tolerance. returned to supine, Maximal assist of 1 to scoot to Head of bed in trendelenburg       Therapeutic Exercises:  ankle pumps, heel raises, long arc quad and seated marching  x 15  reps. Active assist Left lower extremity for marching    At end of session, patient in bed with alarm call light and phone within reach,  all lines and tubes intact, nursing notified. Patient would benefit from continued skilled Physical Therapy to improve functional independence and quality of life.          Patient's/ family goals   get stronger    Time in 5333  Time out  1031    Total Treatment Time  12 minutes    Evaluation time includes thorough review of current medical information, gathering information on past medical history/social history and prior level of function, completion of standardized testing/informal observation of tasks, assessment of data, and development of Plan of care and goals.      CPT codes:  Low Complexity PT evaluation (55098)  Therapeutic exercises (52150)   12 minutes  1 unit(s)    Benitez Foster PT

## 2021-09-13 NOTE — PROGRESS NOTES
[address] [facility]  [address]         Date:2021                                                   Patient Name: Maylin Shukla     MRN: [id number]     : 1940     Room: [address] [place] [address]       Evaluating OT: Robin Fallon OTR/L; [id number]       Referring Provider and Orders/Date:   OT eval and treat Start: 21 1030, End: 21 1030, ONE TIME, Standing Count: 1 Occurrences, R    Carl Horowitz DO     Diagnosis:   1. Adult failure to thrive syndrome    2.  Fatigue, unspecified type         Pertinent Medical History:        Past Medical History:   Diagnosis Date    [facility] [address]     CAD (coronary artery disease)     coronary stent 12yrs ago    [facility]     Hypertension           Past Surgical History:   Procedure Laterality Date    CARDIAC SURGERY      coronary stent    CATARACT REMOVAL WITH IMPLANT Left 2013    COLONOSCOPY      CYST REMOVAL      tailbone    EYE SURGERY         Precautions:  Fall Risk, 3L, stewart    Recommended placement: home with HH and family care    Assessment of current deficits     [x] Functional mobility  [x]ADLs  [x] Strength               []Cognition     [x] Functional transfers   [x] IADLs         [x] Safety Awareness   [x]Endurance     [] Fine Coordination              [x] Balance      [] Vision/perception   []Sensation      [x]Gross Motor Coordination  [] ROM  [] Delirium                   [] Motor Control     OT PLAN OF CARE   OT POC based on physician orders, patient diagnosis and results of clinical assessment    Frequency/Duration 1-3 days/wk for 2 weeks PRN   Specific OT Treatment Interventions to include:   * Instruction/training on adapted ADL techniques and AE recommendations to increase functional independence within precautions       * Training on energy conservation strategies, correct breathing pattern and techniques to improve independence/tolerance for self-care routine  * Functional transfer/mobility training/DME recommendations for increased independence, safety, and fall prevention  * Patient/Family education to increase follow through with safety techniques and functional independence  * Recommendation of environmental modifications for increased safety with functional transfers/mobility and ADLs  * Therapeutic exercise to improve motor endurance, ROM, and functional strength for ADLs/functional transfers  * Therapeutic activities to facilitate/challenge dynamic balance, stand tolerance for increased safety and independence with ADLs  * Therapeutic activities to facilitate gross/fine motor skills for increased independence with ADLs     Recommended Adaptive Equipment/DME: TTB;  TBD      Home Living: Pt lives in a 2 story home with first floor set up, alone. Son does live local. 4 steps with right rail and assist from son to enter home. [facility] is to [facility] ramp to entrance. Bathroom setup: tub shower with grab bar; no DME; standard toilet    DME owned: cane, walker     Prior Level of Function: indep with ADLs , assist from son with IADLs; ambulated indep with AD   Driving: no   Occupation: retired   Enjoys: very limited; being outside    Pain Level: none  Cognition: A&O: 3/4 confused with situation;  Follows 3 step directions with son confusion and anxiety   Memory:  Intact   Sequencing:  Fair-   Problem solving:  Fair-   Judgement/safety:  Fair-  AM-[facility] Daily Activity Inpatient   How much help for putting on and taking off regular lower body clothing?: Total  How much help for Bathing?: A Lot  How much help for Toileting?: Total  How much help for putting on and taking off regular upper body clothing?: A Lot  How much help for taking care of personal grooming?: A Little  How much help for eating meals?: A Little  AM-[facility] Inpatient Daily Activity Raw Score: 12  AM-PAC Inpatient ADL T-Scale Score : 30.6  ADL Inpatient CMS 0-100% Score: 66.57  ADL Inpatient CMS G-Code Modifier : CL     Functional Assessment:     Initial Eval Status  Date: 9/13/2021   Treatment Status  Date: STGs = LTGs  Time frame: 10-14 days   Feeding Supervision and set up for breakfast tray. Indep   Grooming Minimal Assist with encouragement for oral care and face/hand wash. Some confusion noted. Completed from sitting up in chair  Independent    UB Dressing Moderate Assist with gown management and fear of falling while sitting EOb  Supervision    LB Dressing Dependent with socks and pants with pt fearful to release hands from walker to assist in pulling up. Assist to manage stewart  Stand by Assist    Bathing Moderate Assist for LB and buttocks from sitting/standing EOB with walker for support  Stand by Assist    Toileting Dependent for stewart management and management of yomaira care in standing  Supervision    Bed Mobility  Supine to sit: Moderate Assist  Assist to manage dong LE to EOB and trunk support once EOB to scoot hips forward   Supine to sit: Supervision   Sit to supine: Supervision    Functional Transfers Moderate Assist for sit to stand from bed x 4 with ADL and arm chair x 1; use of walker  Supervision    Functional Mobility Moderate Assist with stand pivot without device due to pt anxiety and fear of falling with walker.  Dong knees blocked for fall prevention  Supervision    Balance Sitting:     Static:  fair    Dynamic:fair  Standing: fair-  Sitting:     Static:  good    Dynamic:good  Standing: fair   Activity Tolerance Vitals with activity:3 L O2 with fluctuation with standing and ADLs from 88-93%  Standing tolerance 1min average with fear of falling  Increase standing tolerance for >5min with stable vital signs for carry over into toileting, functional tranfers and indep in ADLs   Visual/  Perceptual Glasses: reading; not Present; WFL    Reports change in vision since admission: No     NA   Dong UE Strengthening  4/5 generally  4+/5MMT generally for carry over into self care, functional transfers and functional mobility with AD. Hand Dominance  [x] Right  [] Left    AROM (PROM) Strength Additional Info:    RUE  WFL with 15degree shoulder limitations with history of bursitis  4/5 good  and  FMC/dexterity noted during ADL tasks  Opposition [x] Intact [] Impaired  Finger to nose [x] Intact [] Impaired     LUE WFL 4+/5 good  and FMC/dexterity noted during ADL tasks  Opposition [x] Intact [] Impaired  Finger to nose [x] Intact [] Impaired     Hearing: WFL   Sensation: c/o numbness or tingling in R LE   Tone: WFL   Edema: none    Comments: Upon arrival patient supine in bed and hesitant to get out of bed. Pt required mod A for most UB ADLs and depA LB ADLs tasks. Limited with mod A for standing during LB ADLs and functional transfers. The biggest barriers reflect that of functional transfers, functional mobility, UB/LB ADLs, cognition, activity tolerance, balance, safety and strengthening. At end of session, patient sitting up in arm chair with call light and phone within reach, all lines and tubes intact. Overall patient demonstrated decreased independence and safety during completion of ADL/functional transfer/mobility tasks. Nursing updated on pt position and status following OT eval. Pt would benefit from continued skilled OT to increase safety and independence with completion of ADL/IADL tasks for functional independence and quality of life. Treatment: OT treatment provided this date includes:   Instruction, education and training on safe facilitation and adapted techniques for completion of ADLs. These include neuromuscular reeducation to facilitate balance/righting reactions, safe functional transfer techniques and on energy conservation/work simplification for completion of ADLs. Education provided on hand/feet placement with walker, bed rails, arm chair and body mechanics for fall prevention.  Cues for energy conservation and safety for in the home at [place], including modifications and DME. Extended time to complete all tasks, including skilled monitoring of patient's response during treatment session and vital signs. Prior to and at the end of session, environmental modifications / line management completed for patients safety and efficiency of treatment session. See above for further details. Rehab Potential: Good for established goals     Patient / Family Goal: O2 management, LB strengthening      Patient and/or family were instructed on functional diagnosis, prognosis/goals and OT plan of care. Demonstrated good understanding. Eval Complexity: Low  · History: Brief review of medical records and additional review of physical, cognitive, or psychosocial history related to current functional performance  · Exam: 3+ performance deficits  · Assistance/Modification: Mod assistance or modifications required to perform tasks. May have comorbidities that affect occupational performance. Time In: 0832  Time Out: 0908  Total Treatment Time: 16    Min Units   OT Eval Low 97165  x  1   OT Eval Medium 74520      OT Eval High 55847      OT Re-Eval [id number]       Therapeutic Ex 11646       Therapeutic Activities 40156  16 1    ADL/Self Care 69158       Orthotic Management 29508       Manual [id number]     Neuro Re-Ed [id number]       Non-Billable Time          Evaluation Time additionally includes thorough review of current medical information, gathering information on past medical history/social history and prior level of function, interpretation of standardized testing/informal observation of tasks, assessment of data and development of plan of care and goals.             Jaime Weaver OTR/L; [id number]

## 2021-09-13 NOTE — CARE COORDINATION
9/13/21 1156 CM note: COVID (-) 9/9/21. Met with patient at the bedside to discuss discharge planning. Discharge plan is home with [facility] if appropriate. Patient choiced for [facility]- referral given to Tao Webber- awaiting her review & response. Pts PCP is Karli Ramirez (per representative at [employer]). Patient states he recently lost his PCP is is deciding whether to keep just his [address] PCP vs establishing with another PCP and is going to discuss this with his sons. Patient states [facility] now to get ramp, wc, hospital bed, tub transfer bench, and meal delivery 5 days/week. Patient has home [employer] at 4L. [employer] pamphlet provided to patient for possible light housekeeping assistance. If rehab is needed prior to pts return home to get stronger patient is agreeable and SNF list provided for patient to review. CM/SW will continue to follow for discharge needs. Electronically signed by Micheal Ovalles RN on 9/13/2021 at 12:27 PM     Update: 9/13/21 1321 Per [facility] liaison, they can accept patient and are following for discharge.  Electronically signed by Micheal Ovalles RN on 9/13/2021 at 1:22 PM

## 2021-09-13 NOTE — PROGRESS NOTES
Department of Internal Medicine        CHIEF COMPLAINT: Generalized weakness, inability to ambulate, shortness of breath    Reason for Admission: Failure to thrive    HISTORY OF PRESENT ILLNESS:      The patient is a [de-identified] y.o. male who presents with generalized weakness which has been going on for about a week. Patient does live home alone but the patient son frequently checks on them. Patient is now having difficulty walking because of his weakness. Patient has not fallen yet. Patient though is on warfarin. He was seen by home health agency nurse the day of admission and his O2 sat was 90% because of shortness of breath and weakness she sent him to the emergency room. He denies any problem with fever/chills, nausea/vomiting, diarrhea/constipation. Patient denies any chest pain palpitation or dizziness. Patient had a potassium of 6.5 in the ED but was moderately hemolyzed. BUN/creatinine 25/0.8. WBC 6.3 with hemoglobin 14.6. Temperature is 90.5 with heart rate 82 and blood pressure 137/75. O2 sat 93% room air at rest.    9/10/2021 patient seen examined on PCU. Patient again denies any problem with chest pain, abdominal pain, palpitations. Patient still very very weak in the lower extremities. Patient denies any numbness in his legs. Patient has some mild low back pain but no history of any chronic disc problems. Patient serum troponin increased from 102 up to 114 yesterday. ECG reviewed and showed chronic A. fib with no ST-T changes to suggest acute ischemia. Temperature 97.7 with heart rate of 80. Blood pressure 112/60. O2 sat was 87% on room air at rest early this morning so nursing put patient on 2 L nasal cannula and O2 sat currently 93%. Urine output 600 cc last 2 shifts. Melchor catheter is in place and was put in by ED. Patient does not want the catheter taken out at this time with his inability to ambulate. Patient also has home O2 but he only wears it periodically.   We will consult cardiology and neurology. Check x-ray lumbar spine and CT of the chest.    9/11/2021  Patient seen and examined in PCU. INR 2.7 today with patient having markedly elevated high-sensitivity CRP. Temperature 98.3 with heart rate 87 with blood pressure 110/63 with O2 sat 94% on 2-1/2 L. Patient has a history having home O2 and uses as needed. This was set up by his family physician. Urine output seems to be adequate. Oral intake is fair. CT the chest yesterday showed enlarged pulmonary artery suspicious for pulmonary artery hypertension 1 with diffuse prominence interstitial density both lungs with consideration for interstitial scarring or fibrosis. There was cardiomegaly and small-moderate pericardial effusion. 9/12/2021  Patient seen examined in PCU. Patient still alert and oriented x4. Patient states the leg weakness is still present and possibly a little bit better. Patient's leg strength is still extremely weak with the patient again denied any numbness or paresthesias in his legs. BUN/creatinine was 25/0.9. Liver enzymes essentially normal with BBC 6.5 and hemoglobin 12.6. Sed rate was only 37. INR elevated 3.3 today. High-sensitivity CRP is markedly elevated at 79.4. Temperature is 97.3 with heart rate 87 blood pressure 107/96. O2 sat 93% on 2-1/2 L nasal cannula. Patient urine output is good ranging 750-1050 cc a shift. Patient did have 1 bowel movement last night. Cardiology and neurology notes reviewed. MRI of the lumbar thoracic spine has not been ordered yet and will do so today. Echocardiogram reviewed with patient having severe pulmonary hypertension at 89 mmHg and a +3 TR. EF was 72% with no significant diastolic dysfunction. 9/13/2021   Patient seen examined in PCU. Patient still with bilateral leg weakness but seems to be slowly improving. Patient denies any chest pain abdominal pain nausea vomiting or shortness of breath. Lab work is still pending.   Temperature is Sexual activity: Not on file   Other Topics Concern    Not on file   Social History Narrative    Not on file     Social Determinants of Health     Financial Resource Strain:     Difficulty of Paying Living Expenses:    Food Insecurity:     Worried About Running Out of Food in the Last Year:     [address] in the Last Year:    Transportation Needs:     Lack of Transportation (Medical):  Lack of Transportation (Non-Medical):    Physical Activity:     Days of Exercise per Week:     Minutes of Exercise per Session:    Stress:     Feeling of Stress :    Social Connections:     Frequency of Communication with Friends and Family:     Frequency of Social Gatherings with Friends and Family:     Attends Sikhism Services:     Active Member of Clubs or Organizations:     Attends Club or Organization Meetings:     Marital Status:    Intimate Partner Violence:     Fear of Current or Ex-Partner:     Emotionally Abused:     Physically Abused:     Sexually Abused:        Family History:   History reviewed. No pertinent family history. REVIEW OF SYSTEMS:    Gen: Patient denies any lightheadedness or dizziness. No LOC or syncope. No fevers or chills. HEENT: No earache, sore throat or nasal congestion. Resp: Denies cough, hemoptysis or sputum production. Cardiac: Denies chest pain,+ mild SOB, no diaphoresis or palpitations. GI: No nausea, vomiting, diarrhea or constipation. No melena or hematochezia. : No urinary complaints, dysuria, hematuria or frequency. MSK: + Generalized weakness. No extremity weakness, paralysis or paresthesias.      PHYSICAL EXAM:    Vitals:  /72 Comment: manual  Pulse 71   Temp 97.8 °F (36.6 °C) (Oral)   Resp 20   Ht 5' 8\" (1.727 m)   Wt 177 lb 4.8 oz (80.4 kg)   SpO2 97%   BMI 26.96 kg/m²     General:  This is a [de-identified] y.o. yo male who is alert and oriented in NAD  HEENT:  Head is normocephalic and atraumatic, PERRLA, EOMI, mucus membranes moist with no pharyngeal erythema or exudate. Neck:  Supple with no carotid bruits, JVD or thyromegaly.   No cervical adenopathy  CV:  Irregular rate and rhythm, 2/6 systolic murmurs  Lungs:  Clear to auscultation bilaterally with no wheezes, rales or rhonchi  Abdomen:  Soft, nontender, nondistended, bowel sounds present  Extremities:  No edema, peripheral pulses intact bilaterally  Neuro:  Cranial nerves II-XII grossly intact; motor and sensory function intact with no focal deficits  Skin:  No rashes, lesions or wounds    DATA:  CBC with Differential:    Lab Results   Component Value Date    WBC 6.5 09/12/2021    RBC 3.80 09/12/2021    HGB 12.6 09/12/2021    HCT 38.4 09/12/2021     09/12/2021    .1 09/12/2021    MCH 33.2 09/12/2021    MCHC 32.8 09/12/2021    RDW 14.8 09/12/2021    LYMPHOPCT 13.3 09/12/2021    MONOPCT 9.9 09/12/2021    BASOPCT 0.8 09/12/2021    MONOSABS 0.64 09/12/2021    LYMPHSABS 0.86 09/12/2021    EOSABS 0.15 09/12/2021    BASOSABS 0.05 09/12/2021     CMP:    Lab Results   Component Value Date     09/12/2021    K 4.7 09/12/2021    K 6.5 09/08/2021     09/12/2021    CO2 23 09/12/2021    BUN 25 09/12/2021    CREATININE 0.9 09/12/2021    GFRAA >60 09/12/2021    LABGLOM >60 09/12/2021    GLUCOSE 88 09/12/2021    PROT 6.0 09/12/2021    LABALBU 3.0 09/12/2021    CALCIUM 8.3 09/12/2021    BILITOT 0.6 09/12/2021    ALKPHOS 158 09/12/2021    AST 20 09/12/2021    ALT 15 09/12/2021     Magnesium:  No results found for: MG  Phosphorus:  No results found for: PHOS  PT/INR:    Lab Results   Component Value Date    PROTIME 38.7 09/12/2021    INR 3.3 09/12/2021     Troponin:  No results found for: TROPONINI  U/A:    Lab Results   Component Value Date    COLORU Yellow 09/12/2021    PROTEINU Negative 09/12/2021    PHUR 5.5 09/12/2021    WBCUA 0-1 09/12/2021    RBCUA NONE 09/12/2021    BACTERIA NONE SEEN 09/12/2021    CLARITYU Clear 09/12/2021    SPECGRAV 1.015 09/12/2021    LEUKOCYTESUR TRACE 09/12/2021    UROBILINOGEN 0.2 09/12/2021    BILIRUBINUR Negative 09/12/2021    BLOODU SMALL 09/12/2021    GLUCOSEU Negative 09/12/2021     ABG:    Lab Results   Component Value Date    PH 7.443 09/13/2021    PCO2 35.9 09/13/2021    PO2 85.1 09/13/2021    HCO3 24.0 09/13/2021    BE 0.3 09/13/2021    O2SAT 96.3 09/13/2021     HgBA1c:  No results found for: LABA1C  FLP:    Lab Results   Component Value Date    TRIG 58 09/10/2021    HDL 67 09/10/2021    LDLCALC 51 09/10/2021    LABVLDL 12 09/10/2021     TSH:    Lab Results   Component Value Date    TSH 2.240 09/10/2021     IRON:  No results found for: IRON  LIPASE:    Lab Results   Component Value Date    LIPASE 74 09/08/2021       ASSESSMENT AND PLAN:      Patient Active Problem List    Diagnosis Date Noted    Inability to walk 86/18/6476    Metabolic encephalopathy 26/32/3539     Impression:  1. Failure to thrive  2. History of coronary disease  3. History of chronic atrial fibrillation  4. History of COPD with acute exacerbation  5. History hypertension  6. Elevated troponin  7. Acute on chronic hypoxic respiratory failure secondary to #4    Plan:  Patient admitted to monitored bed  Home medications reviewed  Monitor heart rate, blood pressure, O2 saturations  Repeat serum potassium 3 PM today  Repeat Troponin- 102-114  Consult PT/OT    Consult cardiology-elevated troponin, chronic A. Fib  Consult neurology-bilateral leg weakness  X-ray lumbar spine  O2 nasal cannula  DuoNeb aerosols    CT the chest without contrast-enlarged pulmonary arteries-patient with documented severe pulmonary hypertension on echo, cardiomegaly with small to moderate pericardial effusion, diffuse prominence of interstitial density both lungs possible interstitial scarring/fibrosis.     MRI of the thoracic and lumbar spine  Consult pulmonology with severe pulmonary hypertension    Pending lab work from this morning      Breanne Corbin DO, D.O.  9/13/2021  1:02 PM

## 2021-09-13 NOTE — CARE COORDINATION
9/14/21 1056 CM note: COVID (-) 9/9/21. Met with patient at the bedside to f/u on our discussion for discharge planning. Discussed therapy recommendations for SNF and patient is agreeable that he needs rehab to get stronger prior to going home. After a discussion with his son, Veronika Millan, both are agreeable to SNF at [facility]. Referral given to Alexx Stock to review. Patient came to hospital from home alone (son lives nearby) and [facility] is working with him now to get ramp, wc, hospital bed, tub transfer bench, and meal delivery 5 days/week. Patient has home [employer] at [address]. [employer] pamphlet provided to patient for possible light housekeeping assistance. [facility] accepted patient and Veronika Ontiveros is following. CM/SW to follow for discharge needs.  Electronically signed by Jacquelin Dietz RN on 9/14/2021 at 11:07 AM

## 2021-09-14 NOTE — PROGRESS NOTES
exercises in standing to promote motor control with or without upper extremity support   -Transfers: Provide instruction on proper hand and foot position for adequate transfer of weight onto lower extremities and use of gait device if needed, Cues for hand placement, technique and safety. Provide stabilization to prevent fall  and Support transfer of weight on to lower extremities  -Gait: Gait training, Standing activities to improve: base of support, weight shift, weight bearing  and Pregait training to emphasize: Sequencing , Base of support, Increased step length , Upright and Safety   -Endurance: Utilize Supervised activities to increase level of endurance to allow for safe functional mobility including transfers and gait     PT long term treatment goals are located in below grid    Patient and or family understand(s) diagnosis, prognosis, and plan of care. Frequency of treatments: Patient will be seen  daily. Prior Level of Function: Patient ambulated with wheeled walker    Rehab Potential: good  - for baseline    Past medical history:   Past Medical History:   Diagnosis Date    [facility] [address]     CAD (coronary artery disease)     coronary stent 12yrs ago    COPD (chronic obstructive pulmonary disease) ([facility] [address]     Hypertension      Past Surgical History:   Procedure Laterality Date    CARDIAC SURGERY      coronary stent    CATARACT REMOVAL WITH IMPLANT Left 04 01 2013    COLONOSCOPY      CYST REMOVAL      tailbone    EYE SURGERY         SUBJECTIVE:    Precautions:  Activity as tolerated, falls and O2 ,    Social history: Patient lives alone son frequently checks on him drives and shops for him in a two story home resides first  with 4 steps  to enter with Rail  Tub shower uses wipes    Equipment owned: [place], Wheeled Walker and O2,  Prn in am and pm per patient     [id number] Mobility Inpatient   How much difficulty turning over in bed?: A Lot  How much difficulty sitting down on / standing up from a chair with arms?: A Lot  How much difficulty moving from lying on back to sitting on side of bed?: A Lot  How much help from another person moving to and from a bed to a chair?: A Lot  How much help from another person needed to walk in hospital room?: A Lot  How much help from another person for climbing 3-5 steps with a railing?: Total  AM-PAC Inpatient Mobility Raw Score : 11  AM-PAC Inpatient T-Scale Score : 33.86  Mobility Inpatient CMS 0-100% Score: 72.57  Mobility Inpatient CMS G-Code Modifier : CL    Nursing cleared patient for PT treatment. .   OBJECTIVE;   Initial Evaluation  Date: 9/13/2021 Treatment Date:  9/14/2021       Short Term/ Long Term   Goals   Was pt agreeable to Eval/treatment? Yes yes To be met in 3 days   Pain level   0/10    0/10    Bed Mobility    Rolling: Moderate assist of 1    Supine to sit: Not assessed     Sit to supine: Moderate assist of 1    Scooting: Moderate assist of 1   Rolling: Moderate assist of 1   Supine to sit: Moderate assist of 1   Sit to supine: Moderate assist of 1   Scooting: Moderate assist of 1    Rolling: Minimal assist of 1    Supine to sit: Minimal assist of 1    Sit to supine: Minimal assist of 1    Scooting: Minimal assist of 1     Transfers Sit to stand: Moderate assist of 1 Cues for hand placement and safety posteriorly lean flexed posture  Sit to stand:  Moderate assist of 1      Sit to stand: Minimal assist of 1     Ambulation    1 x 8 forward steps, 3 backward steps, 1 x 3   feet using  wheeled walker with Moderate assist of 1  progressing to max assist 2nd rep to return to bed, unable to step backward, bed brought to patient  Patient with shuffling steps, flexed posture, decreased vinod, decreased step length and posterior lean and cues for sequencing, upright posture, increased step length, increased vinod and safety 4 x 4 feet using  wheeled walker with Moderate assist of 1   with cues for upright posture, walker approximation, increased base of support, increased step length, safety and pacing    20 feet using  wheeled walker with Minimal assist of 1    Stair negotiation: ascended and descended   Not assessed          ROM Within functional limits    Increase range of motion 10% of affected joints    Strength BUE:  refer to OT agnieszkaal  RLE:  3/5  LLE:  3-/5  Increase strength in affected mm groups by 1/3 grade   Balance Sitting EOB:  fair    Dynamic Standing:  fair - posterior lean wheeled walker  Sitting EOB: good   Dynamic Standing: fair wheeled walker   Sitting EOB:  good    Dynamic Standing: fair +wheeled walker      Patient is Alert & Oriented x person, place, time and situation and follows directions    Sensation:  Patient  reports numbness bilateral lower extremities     Edema:  yes bilateral lower extremities   Endurance: fair       Vitals: 2 liters nasal cannula   Blood Pressure at rest  Blood Pressure during session    Heart Rate at rest  Heart Rate during session    SPO2 at rest 96%  SPO2 during session 92-96%      Patient education  Patient educated on role of Physical Therapy, risks of immobility, safety and plan of care,  importance of mobility while in hospital  and ankle pumps, quad set and glut set for edema control, blood clot prevention     Patient response to education:   Pt verbalized understanding Pt demonstrated skill Pt requires further education in this area   Yes Partial Yes      Treatment:  Patient practiced and was instructed/facilitated in the following treatment: Patient assisted to edge of bed. Pt  Sat edge of bed 15 minutes with Minimal assist of 1 to increase dynamic sitting balance and activity tolerance. Pt stood, ambulated 4 feet forwards/backwards x 2 reps with a seated rest period in between. Therapeutic Exercises:  not performed    At end of session, patient in bed with . call light and phone within reach,  all lines and tubes intact, nursing notified.       Patient would benefit from continued skilled Physical Therapy to improve functional independence and quality of life. Patient's/ family goals   get stronger    Time in  4:03  Time out  4:27    Total Treatment Time  24 minutes      CPT codes:    Therapeutic activities (78942)   24 minutes  2 unit(s)   Alejandra Blake  [facility]  LIC # [id number]

## 2021-09-14 NOTE — PROGRESS NOTES
Department of Internal Medicine        CHIEF COMPLAINT: Generalized weakness, inability to ambulate, shortness of breath    Reason for Admission: Failure to thrive    HISTORY OF PRESENT ILLNESS:      The patient is a [de-identified] y.o. male who presents with generalized weakness which has been going on for about a week. Patient does live home alone but the patient son frequently checks on them. Patient is now having difficulty walking because of his weakness. Patient has not fallen yet. Patient though is on warfarin. He was seen by home health agency nurse the day of admission and his O2 sat was 90% because of shortness of breath and weakness she sent him to the emergency room. He denies any problem with fever/chills, nausea/vomiting, diarrhea/constipation. Patient denies any chest pain palpitation or dizziness. Patient had a potassium of 6.5 in the ED but was moderately hemolyzed. BUN/creatinine 25/0.8. WBC 6.3 with hemoglobin 14.6. Temperature is 90.5 with heart rate 82 and blood pressure 137/75. O2 sat 93% room air at rest.    9/10/2021 patient seen examined on PCU. Patient again denies any problem with chest pain, abdominal pain, palpitations. Patient still very very weak in the lower extremities. Patient denies any numbness in his legs. Patient has some mild low back pain but no history of any chronic disc problems. Patient serum troponin increased from 102 up to 114 yesterday. ECG reviewed and showed chronic A. fib with no ST-T changes to suggest acute ischemia. Temperature 97.7 with heart rate of 80. Blood pressure 112/60. O2 sat was 87% on room air at rest early this morning so nursing put patient on 2 L nasal cannula and O2 sat currently 93%. Urine output 600 cc last 2 shifts. Melchor catheter is in place and was put in by ED. Patient does not want the catheter taken out at this time with his inability to ambulate. Patient also has home O2 but he only wears it periodically.   We will consult cardiology and neurology. Check x-ray lumbar spine and CT of the chest.    9/11/2021  Patient seen and examined in PCU. INR 2.7 today with patient having markedly elevated high-sensitivity CRP. Temperature 98.3 with heart rate 87 with blood pressure 110/63 with O2 sat 94% on 2-1/2 L. Patient has a history having home O2 and uses as needed. This was set up by his family physician. Urine output seems to be adequate. Oral intake is fair. CT the chest yesterday showed enlarged pulmonary artery suspicious for pulmonary artery hypertension 1 with diffuse prominence interstitial density both lungs with consideration for interstitial scarring or fibrosis. There was cardiomegaly and small-moderate pericardial effusion. 9/12/2021  Patient seen examined in PCU. Patient still alert and oriented x4. Patient states the leg weakness is still present and possibly a little bit better. Patient's leg strength is still extremely weak with the patient again denied any numbness or paresthesias in his legs. BUN/creatinine was 25/0.9. Liver enzymes essentially normal with BBC 6.5 and hemoglobin 12.6. Sed rate was only 37. INR elevated 3.3 today. High-sensitivity CRP is markedly elevated at 79.4. Temperature is 97.3 with heart rate 87 blood pressure 107/96. O2 sat 93% on 2-1/2 L nasal cannula. Patient urine output is good ranging 750-1050 cc a shift. Patient did have 1 bowel movement last night. Cardiology and neurology notes reviewed. MRI of the lumbar thoracic spine has not been ordered yet and will do so today. Echocardiogram reviewed with patient having severe pulmonary hypertension at 89 mmHg and a +3 TR. EF was 72% with no significant diastolic dysfunction. 9/13/2021   Patient seen examined in PCU. Patient still with bilateral leg weakness but seems to be slowly improving. Patient denies any chest pain abdominal pain nausea vomiting or shortness of breath. Lab work is still pending.   Temperature is 97.8 with heart rate of 71. Blood pressure 108/72. O2 saturation 97% on 3 L nasal cannula. Urinary output ranges 450-2500 cc a shift. Patient is doing better with physical therapy today and is able to stand and pivot and apparently walk to the cart with assistance. 9/14/2021  Patient seen examined in PCU. Patient states he feels a bit better today. Neuro exam shows patient movement of his feet are more brisk and the patient is able to bend his knees little bit more today than yesterday. Patient denies any problem with chest or abdominal pain. He denies any more than usual shortness of breath. Patient denies any pain in his legs. INR 3.0. MRI of the thoracic spine showed no significant abnormality. Temperature 97.6 with heart rate 86 with patient atrial fibrillation. Blood pressure 121/61 with O2 sat 92-96% on 3 L nasal cannula. Urine output range 400-750 cc a shift. Past Medical History:    Past Medical History:   Diagnosis Date    [facility] fibrillation [facility] [address]     CAD (coronary artery disease)     coronary stent 12yrs ago    COPD (chronic obstructive pulmonary disease) ([facility] [address]     Hypertension      Past Surgical History:    Past Surgical History:   Procedure Laterality Date    CARDIAC SURGERY      coronary stent    CATARACT REMOVAL WITH IMPLANT Left 04 01 2013    COLONOSCOPY      CYST REMOVAL      tailbone    EYE SURGERY         Medications Prior to Admission:    @  Prior to Admission medications    Medication Sig Start Date End Date Taking? Authorizing Provider   tamsulosin (FLOMAX) 0.4 MG capsule Take 0.4 mg by mouth daily   Yes Historical Provider, MD   metoprolol (TOPROL-XL) 50 MG XL tablet Take 50 mg by mouth daily. Yes Historical Provider, MD   finasteride (PROSCAR) 5 MG tablet Take 5 mg by mouth daily. Yes Historical Provider, MD   aspirin 81 MG tablet Take 81 mg by mouth daily. Yes Historical Provider, MD   warfarin (COUMADIN) 7.5 MG tablet Take 7.5 mg by mouth Daily.    Yes Historical Provider, MD   terazosin (HYTRIN) 5 MG capsule Take 10 mg by mouth daily. Historical Provider, MD       Allergies:  Dye [iodides]    Social History:   Social History     Socioeconomic History    Marital status:      Spouse name: Not on file    Number of children: Not on file    Years of education: Not on file    Highest education level: Not on file   Occupational History    Not on file   Tobacco Use    Smoking status: Former Smoker     Quit date: 3/27/1992     Years since quittin.4    Smokeless tobacco: Never Used   Substance and Sexual Activity    Alcohol use: No    Drug use: No    Sexual activity: Not on file   Other Topics Concern    Not on file   Social History Narrative    Not on file     Social Determinants of Health     Financial Resource Strain:     Difficulty of Paying Living Expenses:    Food Insecurity:     Worried About Running Out of Food in the Last Year:     [address] in the Last Year:    Transportation Needs:     Lack of Transportation (Medical):  Lack of Transportation (Non-Medical):    Physical Activity:     Days of Exercise per Week:     Minutes of Exercise per Session:    Stress:     Feeling of Stress :    Social Connections:     Frequency of Communication with Friends and Family:     Frequency of Social Gatherings with Friends and Family:     Attends Muslim Services:     Active Member of Clubs or Organizations:     Attends Club or Organization Meetings:     Marital Status:    Intimate Partner Violence:     Fear of Current or Ex-Partner:     Emotionally Abused:     Physically Abused:     Sexually Abused:        Family History:   History reviewed. No pertinent family history. REVIEW OF SYSTEMS:    Gen: Patient denies any lightheadedness or dizziness. No LOC or syncope. No fevers or chills. HEENT: No earache, sore throat or nasal congestion. Resp: Denies cough, hemoptysis or sputum production.     Cardiac: Denies chest 09/13/2021    BILITOT 0.4 09/13/2021    ALKPHOS 171 09/13/2021    AST 24 09/13/2021    ALT 20 09/13/2021     Magnesium:  No results found for: MG  Phosphorus:  No results found for: PHOS  PT/INR:    Lab Results   Component Value Date    PROTIME 35.0 09/14/2021    INR 3.0 09/14/2021     Troponin:  No results found for: TROPONINI  U/A:    Lab Results   Component Value Date    COLORU Yellow 09/12/2021    PROTEINU Negative 09/12/2021    PHUR 5.5 09/12/2021    WBCUA 0-1 09/12/2021    RBCUA NONE 09/12/2021    BACTERIA NONE SEEN 09/12/2021    CLARITYU Clear 09/12/2021    SPECGRAV 1.015 09/12/2021    LEUKOCYTESUR TRACE 09/12/2021    UROBILINOGEN 0.2 09/12/2021    BILIRUBINUR Negative 09/12/2021    BLOODU SMALL 09/12/2021    GLUCOSEU Negative 09/12/2021     ABG:    Lab Results   Component Value Date    PH 7.443 09/13/2021    PCO2 35.9 09/13/2021    PO2 85.1 09/13/2021    HCO3 24.0 09/13/2021    BE 0.3 09/13/2021    O2SAT 96.3 09/13/2021     HgBA1c:  No results found for: LABA1C  FLP:    Lab Results   Component Value Date    TRIG 58 09/10/2021    HDL 67 09/10/2021    LDLCALC 51 09/10/2021    LABVLDL 12 09/10/2021     TSH:    Lab Results   Component Value Date    TSH 2.240 09/10/2021     IRON:  No results found for: IRON  LIPASE:    Lab Results   Component Value Date    LIPASE 74 09/08/2021       ASSESSMENT AND PLAN:      Patient Active Problem List    Diagnosis Date Noted    Inability to walk 38/89/8859    Metabolic encephalopathy 31/97/6656     Impression:  1. Failure to thrive  2. History of coronary disease  3. History of chronic atrial fibrillation  4. History of COPD with acute exacerbation  5. History hypertension  6. Elevated troponin  7. Acute on chronic hypoxic respiratory failure secondary to #4  8.   Possible [place]    Plan:  Patient admitted to monitored bed  Home medications reviewed  Monitor heart rate, blood pressure, O2 saturations  Repeat serum potassium 3 PM today  Repeat Troponin- 102-114  Consult PT/OT    Consult cardiology-elevated troponin, chronic A. Fib  Consult neurology-bilateral leg weakness  X-ray lumbar spine  O2 nasal cannula  DuoNeb aerosols    CT the chest without contrast-enlarged pulmonary arteries-patient with documented severe pulmonary hypertension on echo, cardiomegaly with small to moderate pericardial effusion, diffuse prominence of interstitial density both lungs possible interstitial scarring/fibrosis. MRI of the thoracic and lumbar spine  Consult pulmonology with severe pulmonary hypertension    MRI of the thoracic spine was negative but pending MRI of lumbar spine. If this is normal we will get a spinal tap for evaluation of protein.     EMG of lower extremities in 2 weeks if not improved    Consult  for possible discharge plan to rehab with patient showing mild improvement in his leg strength    Lexie Flood DO, MAI.AMADEO.  9/14/2021  1:07 PM

## 2021-09-14 NOTE — PLAN OF CARE
Problem: Falls - Risk of:  Goal: Will remain free from falls  Description: Will remain free from falls  Outcome: Met This Shift  Goal: Absence of physical injury  Description: Absence of physical injury  Outcome: Met This Shift     Problem: Pain:  Goal: Pain level will decrease  Description: Pain level will decrease  Outcome: Met This Shift  Goal: Control of acute pain  Description: Control of acute pain  Outcome: Met This Shift  Goal: Control of chronic pain  Description: Control of chronic pain  Outcome: Met This Shift     Problem: Tissue Perfusion - Cardiopulmonary, Altered:  Goal: Absence of angina  Description: Absence of angina  Outcome: Met This Shift  Goal: Circulatory function within specified parameters  Description: Circulatory function within specified parameters  Outcome: Met This Shift  Goal: Hemodynamic stability will improve  Description: Hemodynamic stability will improve  Outcome: Met This Shift     Problem: Pain:  Goal: Pain level will decrease  Description: Pain level will decrease  Outcome: Met This Shift  Goal: Control of acute pain  Description: Control of acute pain  Outcome: Met This Shift  Goal: Control of chronic pain  Description: Control of chronic pain  Outcome: Met This Shift

## 2021-09-15 NOTE — PROGRESS NOTES
Subjective: The patient is awake and alert. No problems overnight. Denies chest pain, angina, and dyspnea. Denies abdominal pain. Tolerating diet; eating better. No nausea or vomiting. Current Facility-Administered Medications: dexamethasone (DECADRON) tablet 6 mg, 6 mg, Oral, Daily  warfarin (COUMADIN) tablet 1 mg, 1 mg, Oral, Daily  albuterol (PROVENTIL) nebulizer solution 2.5 mg, 2.5 mg, Nebulization, Q2H PRN  ipratropium-albuterol (DUONEB) nebulizer solution 1 ampule, 1 ampule, Inhalation, Q4H  perflutren lipid microspheres (DEFINITY) injection 1.65 mg, 1.5 mL, IntraVENous, ONCE PRN  metoprolol succinate (TOPROL XL) extended release tablet 50 mg, 50 mg, Oral, Daily  finasteride (PROSCAR) tablet 5 mg, 5 mg, Oral, Daily  aspirin chewable tablet 81 mg, 81 mg, Oral, Daily  tamsulosin (FLOMAX) capsule 0.4 mg, 0.4 mg, Oral, Daily  trimethobenzamide (TIGAN) injection 200 mg, 200 mg, IntraMUSCular, Q6H PRN  acetaminophen (TYLENOL) tablet 500 mg, 500 mg, Oral, Q6H PRN  0.9 % sodium chloride infusion, , IntraVENous, Continuous    Objective:    /72   Pulse 80   Temp 98.3 °F (36.8 °C) (Oral)   Resp 20   Ht 5' 8\" (1.727 m)   Wt 177 lb 4.8 oz (80.4 kg)   SpO2 94%   BMI 26.96 kg/m²   In: 1386.7 [P.O.:1080; I.V.:306.7]  Out: 3400    In: 1386.7   Out: 3400 [Urine:3400]   A Fib 74, 2/6 MR and TR murmurs; 1/6 AS, no gallops, or rubs.   CTA bilaterally, no wheeze, rales or rhonchi  bowel sounds present, nontender, nondistended, no masses  No clubbing, cyanosis, or edema  No neuro changes     CBC with Differential:    Lab Results   Component Value Date    WBC 8.2 09/13/2021    RBC 4.17 09/13/2021    HGB 13.7 09/13/2021    HCT 41.2 09/13/2021     09/13/2021    MCV 98.8 09/13/2021    MCH 32.9 09/13/2021    MCHC 33.3 09/13/2021    RDW 14.6 09/13/2021    LYMPHOPCT 4.3 09/13/2021    MONOPCT 2.4 09/13/2021    BASOPCT 0.0 09/13/2021    MONOSABS 0.00 09/13/2021    LYMPHSABS 0.33 09/13/2021    EOSABS 0.00 09/13/2021    BASOSABS 0.00 09/13/2021     Hemoglobin/Hematocrit:    Lab Results   Component Value Date    HGB 13.7 09/13/2021    HCT 41.2 09/13/2021     CMP:    Lab Results   Component Value Date     09/13/2021    K 5.0 09/13/2021    K 6.5 09/08/2021     09/13/2021    CO2 22 09/13/2021    BUN 29 09/13/2021    CREATININE 0.7 09/13/2021    GFRAA >60 09/13/2021    LABGLOM >60 09/13/2021    GLUCOSE 146 09/13/2021    PROT 7.0 09/13/2021    LABALBU 3.5 09/13/2021    CALCIUM 9.0 09/13/2021    BILITOT 0.4 09/13/2021    ALKPHOS 171 09/13/2021    AST 24 09/13/2021    ALT 20 09/13/2021     BMP:    Lab Results   Component Value Date     09/13/2021    K 5.0 09/13/2021    K 6.5 09/08/2021     09/13/2021    CO2 22 09/13/2021    BUN 29 09/13/2021    LABALBU 3.5 09/13/2021    CREATININE 0.7 09/13/2021    CALCIUM 9.0 09/13/2021    GFRAA >60 09/13/2021    LABGLOM >60 09/13/2021    GLUCOSE 146 09/13/2021     Hepatic Function Panel:    Lab Results   Component Value Date    ALKPHOS 171 09/13/2021    ALT 20 09/13/2021    AST 24 09/13/2021    PROT 7.0 09/13/2021    BILITOT 0.4 09/13/2021    LABALBU 3.5 09/13/2021     Albumin:    Lab Results   Component Value Date    LABALBU 3.5 09/13/2021     Last 3 Troponin:  No results found for: TROPONINI  FLP:    Lab Results   Component Value Date    TRIG 58 09/10/2021    HDL 67 09/10/2021    LDLCALC 51 09/10/2021    LABVLDL 12 09/10/2021     TSH:    Lab Results   Component Value Date    TSH 2.240 09/10/2021        Patient Active Problem List   Diagnosis    Metabolic encephalopathy    Inability to walk     Imp/Plan:     Failure to thrive  Depression- wife 61 years in NH/dementia  History of coronary disease-unknown vessel, stent x1                    NSTEMI type 2  No evidence AMI/ACS               Pericardial effusion without              Tamponade  TR with severe pulmonary hypertension               Denies any CP/Angina  Chronic atrial fibrillation on coumadin  Pulmonary fibrosis? With History of COPD  History hypertension  MR  Progressive weakness  Protein malnutrition     Echocardiogram mod-severe TR with severe pulm HTN  Recheck EKG stable  CK  MB=low  Hscrp 76!   Monitor closely-no evidence of tamponade physiology  Had MRI-noted  TR with severe pulm htn may be decreasing his forward output somewhat but not enough to explain his leg symptoms  Added po steroid dexamethasone; significant improvent walking/strength  Dr Brant Matthews and I discussed  I will sign off

## 2021-09-15 NOTE — PROGRESS NOTES
Subjective: The patient is awake and alert. No problems overnight. Denies chest pain, angina, and dyspnea. Denies abdominal pain. Tolerating diet; eating better. No nausea or vomiting. Current Facility-Administered Medications: dexamethasone (DECADRON) tablet 6 mg, 6 mg, Oral, Daily  warfarin (COUMADIN) tablet 1 mg, 1 mg, Oral, Daily  albuterol (PROVENTIL) nebulizer solution 2.5 mg, 2.5 mg, Nebulization, Q2H PRN  ipratropium-albuterol (DUONEB) nebulizer solution 1 ampule, 1 ampule, Inhalation, Q4H  perflutren lipid microspheres (DEFINITY) injection 1.65 mg, 1.5 mL, IntraVENous, ONCE PRN  metoprolol succinate (TOPROL XL) extended release tablet 50 mg, 50 mg, Oral, Daily  finasteride (PROSCAR) tablet 5 mg, 5 mg, Oral, Daily  aspirin chewable tablet 81 mg, 81 mg, Oral, Daily  tamsulosin (FLOMAX) capsule 0.4 mg, 0.4 mg, Oral, Daily  trimethobenzamide (TIGAN) injection 200 mg, 200 mg, IntraMUSCular, Q6H PRN  acetaminophen (TYLENOL) tablet 500 mg, 500 mg, Oral, Q6H PRN  0.9 % sodium chloride infusion, , IntraVENous, Continuous    Objective:    /66   Pulse 88   Temp 97.8 °F (36.6 °C) (Oral)   Resp 20   Ht 5' 8\" (1.727 m)   Wt 177 lb 4.8 oz (80.4 kg)   SpO2 92%   BMI 26.96 kg/m²   In: -   Out: 900    In: -   Out: 900 [Urine:900]   A Fib 74, 2/6 MR and TR murmurs; 1/6 AS, no gallops, or rubs.   CTA bilaterally, no wheeze, rales or rhonchi  bowel sounds present, nontender, nondistended, no masses  No clubbing, cyanosis, or edema  No neuro changes     CBC with Differential:    Lab Results   Component Value Date    WBC 8.2 09/13/2021    RBC 4.17 09/13/2021    HGB 13.7 09/13/2021    HCT 41.2 09/13/2021     09/13/2021    MCV 98.8 09/13/2021    MCH 32.9 09/13/2021    MCHC 33.3 09/13/2021    RDW 14.6 09/13/2021    LYMPHOPCT 4.3 09/13/2021    MONOPCT 2.4 09/13/2021    BASOPCT 0.0 09/13/2021    MONOSABS 0.00 09/13/2021    LYMPHSABS 0.33 09/13/2021    EOSABS 0.00 09/13/2021    BASOSABS 0.00 09/13/2021 Hemoglobin/Hematocrit:    Lab Results   Component Value Date    HGB 13.7 09/13/2021    HCT 41.2 09/13/2021     CMP:    Lab Results   Component Value Date     09/13/2021    K 5.0 09/13/2021    K 6.5 09/08/2021     09/13/2021    CO2 22 09/13/2021    BUN 29 09/13/2021    CREATININE 0.7 09/13/2021    GFRAA >60 09/13/2021    LABGLOM >60 09/13/2021    GLUCOSE 146 09/13/2021    PROT 7.0 09/13/2021    LABALBU 3.5 09/13/2021    CALCIUM 9.0 09/13/2021    BILITOT 0.4 09/13/2021    ALKPHOS 171 09/13/2021    AST 24 09/13/2021    ALT 20 09/13/2021     BMP:    Lab Results   Component Value Date     09/13/2021    K 5.0 09/13/2021    K 6.5 09/08/2021     09/13/2021    CO2 22 09/13/2021    BUN 29 09/13/2021    LABALBU 3.5 09/13/2021    CREATININE 0.7 09/13/2021    CALCIUM 9.0 09/13/2021    GFRAA >60 09/13/2021    LABGLOM >60 09/13/2021    GLUCOSE 146 09/13/2021     Hepatic Function Panel:    Lab Results   Component Value Date    ALKPHOS 171 09/13/2021    ALT 20 09/13/2021    AST 24 09/13/2021    PROT 7.0 09/13/2021    BILITOT 0.4 09/13/2021    LABALBU 3.5 09/13/2021     Albumin:    Lab Results   Component Value Date    LABALBU 3.5 09/13/2021     Last 3 Troponin:  No results found for: TROPONINI  FLP:    Lab Results   Component Value Date    TRIG 58 09/10/2021    HDL 67 09/10/2021    LDLCALC 51 09/10/2021    LABVLDL 12 09/10/2021     TSH:    Lab Results   Component Value Date    TSH 2.240 09/10/2021        Patient Active Problem List   Diagnosis    Metabolic encephalopathy    Inability to walk     Imp/Plan:     Failure to thrive  Depression- wife 61 years in NH/dementia  History of coronary disease-unknown vessel, stent x1                    NSTEMI type 2  No evidence AMI/ACS               Pericardial effusion without              Tamponade  TR with severe pulmonary hypertension               Denies any CP/Angina  Chronic atrial fibrillation on coumadin  Pulmonary fibrosis?  With History of COPD  History hypertension  MR  Progressive weakness  Protein malnutrition     Echocardiogram mod-severe TR with severe pulm HTN  Recheck EKG stable  CK  MB=low  Hscrp 76!   Monitor closely-no evidence of tamponade physiology  Added po steroid dexamethasone; significant improvent walking/strength  Had MRI-pending  TR with severe pulm htn may be decreasing his forward output somewhat but not enough to explain his leg symptoms  Dr Bishop Aguilar and I discussed

## 2021-09-15 NOTE — PLAN OF CARE
Problem: Falls - Risk of:  Goal: Will remain free from falls  Description: Will remain free from falls  9/15/2021 0418 by Evonne Allison RN  Outcome: Met This Shift  9/14/2021 1557 by Peterson Gamble RN  Outcome: Met This Shift  Goal: Absence of physical injury  Description: Absence of physical injury  9/15/2021 0418 by Evonne Allison RN  Outcome: Met This Shift  9/14/2021 1557 by Peterson Gamble RN  Outcome: Met This Shift     Problem: Pain:  Goal: Pain level will decrease  Description: Pain level will decrease  9/15/2021 0418 by Evonne Allison RN  Outcome: Met This Shift  9/14/2021 1557 by Peterson Gamble RN  Outcome: Met This Shift  Goal: Control of acute pain  Description: Control of acute pain  9/15/2021 0418 by Evonne Allison RN  Outcome: Met This Shift  9/14/2021 1557 by Peterson Gamble RN  Outcome: Met This Shift  Goal: Control of chronic pain  Description: Control of chronic pain  9/15/2021 0418 by Evonne Allison RN  Outcome: Met This Shift  9/14/2021 1557 by Peterson Gamble RN  Outcome: Met This Shift     Problem: Tissue Perfusion - Cardiopulmonary, Altered:  Goal: Absence of angina  Description: Absence of angina  9/15/2021 0418 by Evonne Allison RN  Outcome: Met This Shift  9/14/2021 1557 by Peterson Gamble RN  Outcome: Met This Shift  Goal: Circulatory function within specified parameters  Description: Circulatory function within specified parameters  9/14/2021 1557 by Peterson Gamble RN  Outcome: Met This Shift  Goal: Hemodynamic stability will improve  Description: Hemodynamic stability will improve  9/14/2021 1557 by Peterson Gamble RN  Outcome: Met This Shift     Problem: Pain:  Goal: Pain level will decrease  Description: Pain level will decrease  9/15/2021 0418 by Evonne Allison RN  Outcome: Met This Shift  9/14/2021 1557 by Peterson Gamble RN  Outcome: Met This Shift  Goal: Control of acute pain  Description: Control of acute pain  9/15/2021 0418 by

## 2021-09-15 NOTE — CARE COORDINATION
9/15/21 0949 CM note: COVID (-) 9/9/21. Per [facility], they can accept patient; David PENDING. WILL NEED HENS COMPLETED WHEN DISCHARGE ORDER PLACED, RAPID COVID WITHIN 24 HRS OF DISCHARGE, AND SIGNED EMILY. Electronically signed by Shala Shaw RN on 9/15/2021 at 10:00 AM     Update: 9/15/21 1610 Per [facility], she has obtained Alla Tommie from insurance for patient to come to their facility. Will need another negative covid test prior to discharge. Set transport up with with [facility] for  between 3-4 on 9/16/21. Ambulance form started and on soft chart.  Electronically signed by Shala Shaw RN on 9/15/2021 at 4:33 PM

## 2021-09-15 NOTE — PROGRESS NOTES
S: no new complaints  He reports ambulating with assist with walker  Objective:      Neuro exam 140/70 p 80 t 98  General: normal orientation and alertness. Cranial nerve testing was normal.  Funduscopic eye exam revealed not testable. Motor exam: uppers 5-/5; lowers 2/5. Deep tendon reflexes were 1+ bilaterally. Plantar responses were flexor bilaterally. Cerebellar exam noted finger to nose without dysmetria. Sensation was normal to joint position sense, light touch and a pin prick . .        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 Sign off.  Thanks for consult

## 2021-09-15 NOTE — PROGRESS NOTES
Department of Internal Medicine        CHIEF COMPLAINT: Generalized weakness, inability to ambulate, shortness of breath    Reason for Admission: Failure to thrive    HISTORY OF PRESENT ILLNESS:      The patient is a [de-identified] y.o. male who presents with generalized weakness which has been going on for about a week. Patient does live home alone but the patient son frequently checks on them. Patient is now having difficulty walking because of his weakness. Patient has not fallen yet. Patient though is on warfarin. He was seen by home health agency nurse the day of admission and his O2 sat was 90% because of shortness of breath and weakness she sent him to the emergency room. He denies any problem with fever/chills, nausea/vomiting, diarrhea/constipation. Patient denies any chest pain palpitation or dizziness. Patient had a potassium of 6.5 in the ED but was moderately hemolyzed. BUN/creatinine 25/0.8. WBC 6.3 with hemoglobin 14.6. Temperature is 90.5 with heart rate 82 and blood pressure 137/75. O2 sat 93% room air at rest.    9/10/2021 patient seen examined on PCU. Patient again denies any problem with chest pain, abdominal pain, palpitations. Patient still very very weak in the lower extremities. Patient denies any numbness in his legs. Patient has some mild low back pain but no history of any chronic disc problems. Patient serum troponin increased from 102 up to 114 yesterday. ECG reviewed and showed chronic A. fib with no ST-T changes to suggest acute ischemia. Temperature 97.7 with heart rate of 80. Blood pressure 112/60. O2 sat was 87% on room air at rest early this morning so nursing put patient on 2 L nasal cannula and O2 sat currently 93%. Urine output 600 cc last 2 shifts. Melchor catheter is in place and was put in by ED. Patient does not want the catheter taken out at this time with his inability to ambulate. Patient also has home O2 but he only wears it periodically.   We will consult cardiology and neurology. Check x-ray lumbar spine and CT of the chest.    9/11/2021  Patient seen and examined in PCU. INR 2.7 today with patient having markedly elevated high-sensitivity CRP. Temperature 98.3 with heart rate 87 with blood pressure 110/63 with O2 sat 94% on 2-1/2 L. Patient has a history having home O2 and uses as needed. This was set up by his family physician. Urine output seems to be adequate. Oral intake is fair. CT the chest yesterday showed enlarged pulmonary artery suspicious for pulmonary artery hypertension 1 with diffuse prominence interstitial density both lungs with consideration for interstitial scarring or fibrosis. There was cardiomegaly and small-moderate pericardial effusion. 9/12/2021  Patient seen examined in PCU. Patient still alert and oriented x4. Patient states the leg weakness is still present and possibly a little bit better. Patient's leg strength is still extremely weak with the patient again denied any numbness or paresthesias in his legs. BUN/creatinine was 25/0.9. Liver enzymes essentially normal with BBC 6.5 and hemoglobin 12.6. Sed rate was only 37. INR elevated 3.3 today. High-sensitivity CRP is markedly elevated at 79.4. Temperature is 97.3 with heart rate 87 blood pressure 107/96. O2 sat 93% on 2-1/2 L nasal cannula. Patient urine output is good ranging 750-1050 cc a shift. Patient did have 1 bowel movement last night. Cardiology and neurology notes reviewed. MRI of the lumbar thoracic spine has not been ordered yet and will do so today. Echocardiogram reviewed with patient having severe pulmonary hypertension at 89 mmHg and a +3 TR. EF was 72% with no significant diastolic dysfunction. 9/13/2021   Patient seen examined in PCU. Patient still with bilateral leg weakness but seems to be slowly improving. Patient denies any chest pain abdominal pain nausea vomiting or shortness of breath. Lab work is still pending.   Temperature is neurosurgery. Past Medical History:    Past Medical History:   Diagnosis Date    [facility] [address]     CAD (coronary artery disease)     coronary stent 12yrs ago    COPD (chronic obstructive pulmonary disease) ([facility] [address]     Hypertension      Past Surgical History:    Past Surgical History:   Procedure Laterality Date    CARDIAC SURGERY      coronary stent    CATARACT REMOVAL WITH IMPLANT Left 2013    COLONOSCOPY      CYST REMOVAL      tailbone    EYE SURGERY         Medications Prior to Admission:    @  Prior to Admission medications    Medication Sig Start Date End Date Taking? Authorizing Provider   tamsulosin (FLOMAX) 0.4 MG capsule Take 0.4 mg by mouth daily   Yes Historical Provider, MD   metoprolol (TOPROL-XL) 50 MG XL tablet Take 50 mg by mouth daily. Yes Historical Provider, MD   finasteride (PROSCAR) 5 MG tablet Take 5 mg by mouth daily. Yes Historical Provider, MD   aspirin 81 MG tablet Take 81 mg by mouth daily. Yes Historical Provider, MD   warfarin (COUMADIN) 7.5 MG tablet Take 7.5 mg by mouth Daily. Yes Historical Provider, MD   terazosin (HYTRIN) 5 MG capsule Take 10 mg by mouth daily.     Historical Provider, MD       Allergies:  Dye [iodides]    Social History:   Social History     Socioeconomic History    Marital status:      Spouse name: Not on file    Number of children: Not on file    Years of education: Not on file    Highest education level: Not on file   Occupational History    Not on file   Tobacco Use    Smoking status: Former Smoker     Quit date: 3/27/1992     Years since quittin.4    Smokeless tobacco: Never Used   Substance and Sexual Activity    Alcohol use: No    Drug use: No    Sexual activity: Not on file   Other Topics Concern    Not on file   Social History Narrative    Not on file     Social Determinants of Health     Financial Resource Strain:     Difficulty of Paying Living Expenses:    Food Insecurity:     Worried About Running Out of Food in the Last Year:    [address] in the Last Year:    Transportation Needs:     Lack of Transportation (Medical):  Lack of Transportation (Non-Medical):    Physical Activity:     Days of Exercise per Week:     Minutes of Exercise per Session:    Stress:     Feeling of Stress :    Social Connections:     Frequency of Communication with Friends and Family:     Frequency of Social Gatherings with Friends and Family:     Attends [employer] Services:     Active Member of Clubs or Organizations:     Attends Club or Organization Meetings:     Marital Status:    Intimate Partner Violence:     Fear of Current or Ex-Partner:     Emotionally Abused:     Physically Abused:     Sexually Abused:        Family History:   History reviewed. No pertinent family history. REVIEW OF SYSTEMS:    Gen: Patient denies any lightheadedness or dizziness. No LOC or syncope. No fevers or chills. HEENT: No earache, sore throat or nasal congestion. Resp: Denies cough, hemoptysis or sputum production. Cardiac: Denies chest pain,+ mild SOB, no diaphoresis or palpitations. GI: No nausea, vomiting, diarrhea or constipation. No melena or hematochezia. : No urinary complaints, dysuria, hematuria or frequency. MSK: + Generalized weakness. No extremity weakness, paralysis or paresthesias. PHYSICAL EXAM:    Vitals:  BP (!) 140/74   Pulse 84   Temp 98.1 °F (36.7 °C) (Oral)   Resp 20   Ht 5' 8\" (1.727 m)   Wt 177 lb 4.8 oz (80.4 kg)   SpO2 97%   BMI 26.96 kg/m²     General:  This is a [de-identified] y.o. yo male who is alert and oriented in NAD  HEENT:  Head is normocephalic and atraumatic, PERRLA, EOMI, mucus membranes moist with no pharyngeal erythema or exudate. Neck:  Supple with no carotid bruits, JVD or thyromegaly.   No cervical adenopathy  CV:  Irregular rate and rhythm, 2/6 systolic murmurs  Lungs:  Clear to auscultation bilaterally with no wheezes, rales or rhonchi  Abdomen: Soft, nontender, nondistended, bowel sounds present  Extremities:  No edema, peripheral pulses intact bilaterally  Neuro:  Cranial nerves II-XII grossly intact; motor and sensory function intact with no focal deficits  Skin:  No rashes, lesions or wounds    DATA:  CBC with Differential:    Lab Results   Component Value Date    WBC 8.2 09/13/2021    RBC 4.17 09/13/2021    HGB 13.7 09/13/2021    HCT 41.2 09/13/2021     09/13/2021    MCV 98.8 09/13/2021    MCH 32.9 09/13/2021    MCHC 33.3 09/13/2021    RDW 14.6 09/13/2021    LYMPHOPCT 4.3 09/13/2021    MONOPCT 2.4 09/13/2021    BASOPCT 0.0 09/13/2021    MONOSABS 0.00 09/13/2021    LYMPHSABS 0.33 09/13/2021    EOSABS 0.00 09/13/2021    BASOSABS 0.00 09/13/2021     CMP:    Lab Results   Component Value Date     09/13/2021    K 5.0 09/13/2021    K 6.5 09/08/2021     09/13/2021    CO2 22 09/13/2021    BUN 29 09/13/2021    CREATININE 0.7 09/13/2021    GFRAA >60 09/13/2021    LABGLOM >60 09/13/2021    GLUCOSE 146 09/13/2021    PROT 7.0 09/13/2021    LABALBU 3.5 09/13/2021    CALCIUM 9.0 09/13/2021    BILITOT 0.4 09/13/2021    ALKPHOS 171 09/13/2021    AST 24 09/13/2021    ALT 20 09/13/2021     Magnesium:  No results found for: MG  Phosphorus:  No results found for: PHOS  PT/INR:    Lab Results   Component Value Date    PROTIME 22.6 09/15/2021    INR 1.9 09/15/2021     Troponin:  No results found for: TROPONINI  U/A:    Lab Results   Component Value Date    COLORU Yellow 09/12/2021    PROTEINU Negative 09/12/2021    PHUR 5.5 09/12/2021    WBCUA 0-1 09/12/2021    RBCUA NONE 09/12/2021    BACTERIA NONE SEEN 09/12/2021    CLARITYU Clear 09/12/2021    SPECGRAV 1.015 09/12/2021    LEUKOCYTESUR TRACE 09/12/2021    UROBILINOGEN 0.2 09/12/2021    BILIRUBINUR Negative 09/12/2021    BLOODU SMALL 09/12/2021    GLUCOSEU Negative 09/12/2021     ABG:    Lab Results   Component Value Date    PH 7.443 09/13/2021    PCO2 35.9 09/13/2021    PO2 85.1 09/13/2021    HCO3 24.0 09/13/2021    BE 0.3 09/13/2021    O2SAT 96.3 09/13/2021     HgBA1c:  No results found for: LABA1C  FLP:    Lab Results   Component Value Date    TRIG 58 09/10/2021    HDL 67 09/10/2021    LDLCALC 51 09/10/2021    LABVLDL 12 09/10/2021     TSH:    Lab Results   Component Value Date    TSH 2.240 09/10/2021     IRON:  No results found for: IRON  LIPASE:    Lab Results   Component Value Date    LIPASE 74 09/08/2021       ASSESSMENT AND PLAN:      Patient Active Problem List    Diagnosis Date Noted    Inability to walk 41/05/0232    Metabolic encephalopathy 75/13/6386     Impression:  1. Failure to thrive  2. Acute/subacute bilateral leg weakness- ? Thi Barr[facility]  3. History of chronic atrial fibrillation  4. History of COPD with acute exacerbation  5. History hypertension  6. Elevated troponin  7. Acute on chronic hypoxic respiratory failure secondary to #4  8. History of coronary disease      Plan:  Patient admitted to monitored bed  Home medications reviewed  Monitor heart rate, blood pressure, O2 saturations  Repeat serum potassium 3 PM today  Repeat Troponin- 102-114  Consult PT/OT    Consult cardiology-elevated troponin, chronic A. Fib  Consult neurology-bilateral leg weakness  X-ray lumbar spine  O2 nasal cannula  DuoNeb aerosols    CT the chest without contrast-enlarged pulmonary arteries-patient with documented severe pulmonary hypertension on echo, cardiomegaly with small to moderate pericardial effusion, diffuse prominence of interstitial density both lungs possible interstitial scarring/fibrosis. MRI of the thoracic and lumbar spine  Consult pulmonology with severe pulmonary hypertension    MRI of the thoracic spine was negative but pending MRI of lumbar spine.     EMG of lower extremities in 4 weeks if not improved    Consult  for discharge plan to rehab with patient showing mild improvement in his leg strength    Consult neurosurgery    CT of the abdomen pelvis with IV and oral contrast with attention to the pelvis    Jade Sepulveda DO, D.O.  9/15/2021  11:49 AM

## 2021-09-15 NOTE — DISCHARGE INSTR - COC
Continuity of Care Form    Patient Name: Maylin Shukla   :    MRN:  [id number]    Admit date:  2021  Discharge date: 2021      Code Status Order: Full Code   Advance Directives:     Admitting Physician:  Carl Horowitz DO  PCP: LILIANA Herrera CNP    Discharging Nurse: [facility] Unit/Room#: 5903/7584-02  Discharging Unit Phone Number: [phone]    Emergency Contact:   Extended Emergency Contact Information  Primary Emergency Contact: Nayeli Tapia Phone: [phone]  Mobile Phone: [phone]  Relation: Child  Preferred language: English   needed? No    Past Surgical History:  Past Surgical History:   Procedure Laterality Date    CARDIAC SURGERY      coronary stent    CATARACT REMOVAL WITH IMPLANT Left 2013    COLONOSCOPY      CYST REMOVAL      tailbone    EYE SURGERY         Immunization History: There is no immunization history on file for this patient.     Active Problems:  Patient Active Problem List   Diagnosis Code    Metabolic encephalopathy S25.99    Inability to walk R26.2       Isolation/Infection:   Isolation          No Isolation        Patient Infection Status     Infection Onset Added Last Indicated Last Indicated By Review Planned Expiration Resolved Resolved By    COVID-19 09/15/21 09/15/21 09/15/21 COVID-19, Rapid 21      Resolved    COVID-19 Rule Out 21 COVID-19, Rapid (Ordered)   21 Rule-Out Test Resulted          Nurse Assessment:  Last Vital Signs: BP (!) 140/74   Pulse 84   Temp 98.1 °F (36.7 °C) (Oral)   Resp 20   Ht 5' 8\" (1.727 m)   Wt 177 lb 4.8 oz (80.4 kg)   SpO2 97%   BMI 26.96 kg/m²     Last documented pain score (0-10 scale): Pain Level: 0  Last Weight:   Wt Readings from Last 1 Encounters:   21 177 lb 4.8 oz (80.4 kg)     Mental Status:  oriented and alert     IV Access:  - None    Nursing Mobility/ADLs:  Walking   Assisted  Transfer Assisted  Bathing  Assisted  Dressing  Assisted  Toileting  Assisted  Feeding  Assisted  Med Admin  Assisted  Med Delivery   whole    Wound Care Documentation and Therapy:        Elimination:  Continence:   · Bowel: Yes  · Bladder: Yes  Urinary Catheter: Insertion Date: 9/10/2021   Colostomy/Ileostomy/Ileal Conduit: No       Date of Last BM: 9/15/2021    Intake/Output Summary (Last 24 hours) at 9/15/2021 1606  Last data filed at 9/15/2021 1351  Gross per 24 hour   Intake 1386.68 ml   Output 4300 ml   Net -2913.32 ml     I/O last 3 completed shifts: In: 1386.7 [P.O.:1080; I.V.:306.7]  Out: 4300 [Urine:4300]    Safety Concerns: At Risk for Falls    Impairments/Disabilities:      None    Nutrition Therapy:  Current Nutrition Therapy:   - Oral Diet:  General    Routes of Feeding: Oral  Liquids: Thin Liquids  Daily Fluid Restriction: no  Last Modified Barium Swallow with Video (Video Swallowing Test): not done    Treatments at the Time of Hospital Discharge:   Respiratory Treatments: 9/16/2021  Oxygen Therapy:  is on oxygen at 3 L/min per nasal cannula.   Ventilator:    - No ventilator support    Rehab Therapies: Physical Therapy  Weight Bearing Status/Restrictions: No weight bearing restirctions  Other Medical Equipment (for information only, NOT a DME order):  walker  Other Treatments: ***    Patient's personal belongings (please select all that are sent with patient):  None    RN SIGNATURE:  Electronically signed by Irena Woo RN on 9/16/21 at 2:36 PM EDT    CASE MANAGEMENT/SOCIAL WORK SECTION    Inpatient Status Date: 9/8/21    Readmission Risk Assessment Score:  Readmission Risk              Risk of Unplanned Readmission:  12           Discharg[facility]/ Agency   · [facility] [place]  [facility] [address]  · Phone: [phone]  · Fax: [phone]    Dialysis Facility (if applicable)   · Name:  · Address:  · Dialysis Schedule:  · Phone:  · Fax:    Case Manager/ signature: Electronically signed by Micheal Ovalles RN on 9/15/2021 at 4:09 PM      PHYSICIAN SECTION    Prognosis: Fair    Condition at Discharge: Stable    Rehab Potential (if transferring to Rehab): Fair    Recommended Labs or Other Treatments After Discharge: ***    Physician Certification: I certify the above information and transfer of [place] [facility]  is necessary for the continuing treatment of the diagnosis listed and that he requires [facility] for less 30 days.      Update Admission H&P: No change in H&P    PHYSICIAN SIGNATURE:  Telephone order per Dr Baltazar Krishna Electronically signed by Micheal Ovalles RN on 9/16/2021 at 3:39 PM

## 2021-09-16 NOTE — DISCHARGE SUMMARY
Department of Internal Medicine        CHIEF COMPLAINT: Generalized weakness, inability to ambulate, shortness of breath    Reason for Admission: Failure to thrive    HISTORY OF PRESENT ILLNESS:      The patient is a [de-identified] y.o. male who presents with generalized weakness which has been going on for about a week. Patient does live home alone but the patient son frequently checks on them. Patient is now having difficulty walking because of his weakness. Patient has not fallen yet. Patient though is on warfarin. He was seen by home health agency nurse the day of admission and his O2 sat was 90% because of shortness of breath and weakness she sent him to the emergency room. He denies any problem with fever/chills, nausea/vomiting, diarrhea/constipation. Patient denies any chest pain palpitation or dizziness. Patient had a potassium of 6.5 in the ED but was moderately hemolyzed. BUN/creatinine 25/0.8. WBC 6.3 with hemoglobin 14.6. Temperature is 90.5 with heart rate 82 and blood pressure 137/75. O2 sat 93% room air at rest.    9/10/2021 patient seen examined on PCU. Patient again denies any problem with chest pain, abdominal pain, palpitations. Patient still very very weak in the lower extremities. Patient denies any numbness in his legs. Patient has some mild low back pain but no history of any chronic disc problems. Patient serum troponin increased from 102 up to 114 yesterday. ECG reviewed and showed chronic A. fib with no ST-T changes to suggest acute ischemia. Temperature 97.7 with heart rate of 80. Blood pressure 112/60. O2 sat was 87% on room air at rest early this morning so nursing put patient on 2 L nasal cannula and O2 sat currently 93%. Urine output 600 cc last 2 shifts. Melchor catheter is in place and was put in by ED. Patient does not want the catheter taken out at this time with his inability to ambulate. Patient also has home O2 but he only wears it periodically.   We will consult cardiology and neurology. Check x-ray lumbar spine and CT of the chest.    9/11/2021  Patient seen and examined in PCU. INR 2.7 today with patient having markedly elevated high-sensitivity CRP. Temperature 98.3 with heart rate 87 with blood pressure 110/63 with O2 sat 94% on 2-1/2 L. Patient has a history having home O2 and uses as needed. This was set up by his family physician. Urine output seems to be adequate. Oral intake is fair. CT the chest yesterday showed enlarged pulmonary artery suspicious for pulmonary artery hypertension 1 with diffuse prominence interstitial density both lungs with consideration for interstitial scarring or fibrosis. There was cardiomegaly and small-moderate pericardial effusion. 9/12/2021  Patient seen examined in PCU. Patient still alert and oriented x4. Patient states the leg weakness is still present and possibly a little bit better. Patient's leg strength is still extremely weak with the patient again denied any numbness or paresthesias in his legs. BUN/creatinine was 25/0.9. Liver enzymes essentially normal with BBC 6.5 and hemoglobin 12.6. Sed rate was only 37. INR elevated 3.3 today. High-sensitivity CRP is markedly elevated at 79.4. Temperature is 97.3 with heart rate 87 blood pressure 107/96. O2 sat 93% on 2-1/2 L nasal cannula. Patient urine output is good ranging 750-1050 cc a shift. Patient did have 1 bowel movement last night. Cardiology and neurology notes reviewed. MRI of the lumbar thoracic spine has not been ordered yet and will do so today. Echocardiogram reviewed with patient having severe pulmonary hypertension at 89 mmHg and a +3 TR. EF was 72% with no significant diastolic dysfunction. 9/13/2021   Patient seen examined in PCU. Patient still with bilateral leg weakness but seems to be slowly improving. Patient denies any chest pain abdominal pain nausea vomiting or shortness of breath. Lab work is still pending.   Temperature is 97.8 with heart rate of 71. Blood pressure 108/72. O2 saturation 97% on 3 L nasal cannula. Urinary output ranges 450-2500 cc a shift. Patient is doing better with physical therapy today and is able to stand and pivot and apparently walk to the cart with assistance. 9/14/2021  Patient seen examined in PCU. Patient states he feels a bit better today. Neuro exam shows patient movement of his feet are more brisk and the patient is able to bend his knees little bit more today than yesterday. Patient denies any problem with chest or abdominal pain. He denies any more than usual shortness of breath. Patient denies any pain in his legs. INR 3.0. MRI of the thoracic spine showed no significant abnormality. Temperature 97.6 with heart rate 86 with patient atrial fibrillation. Blood pressure 121/61 with O2 sat 92-96% on 3 L nasal cannula. Urine output range 400-750 cc a shift. 9/15/2021  Patient seen and examined in PCU. Patient denies any chest or abdominal pain. Patient leg strength seems to be improving again today with the patient able to flex both his knees better than the last 2 days. Patient again denies any leg pain. MRI of the lumbar spine finally came back and showed multilevel degenerative disc disease most pronounced at L2-3 with foraminal stenosis narrowing most prominent at on the right at L3-4. There did not appear to be any severe disc disease to suggest bilateral leg weakness. Of interest is in soft tissue portion there was a large fluid-filled distended structure partially visualized in the pelvis with patient having Melchor catheter in place. This was not mentioned in the impressions. Temperature 90.1 heart rate 84 and a blood pressure 140/74. O2 saturation 97% on 3 L nasal cannula. We will do a CT of the abdomen with IV and oral contrast with patient had questionable cystic mass in pelvis. Patient has Meclhor catheter in place and should have decompressed bladder.   Consult neurosurgery. 9/16/2021  Patient seen examined on PCU. Patient feels little bit better again today. Patient definitely has better leg strength in his legs now than on admission. Patient denies any problem with chest pain, abdominal pain, nausea or vomiting or dizziness. Patient is ready to go to rehab and once discharged today. Neurosurgical note reviewed. INR 1.7 today with the patient's BMP normal.  Blood pressure 125/83 with heart rate of 79 and O2 sat 98-94% on 2 L nasal cannula. Case discussed with cardiology. Pulmonary note reviewed. Patient stable for discharge      Past Medical History:    Past Medical History:   Diagnosis Date    Atrial fibrillation ([facility] [address]     CAD (coronary artery disease)     coronary stent 12yrs ago    COPD (chronic obstructive pulmonary disease) ([facility] [address]     Hypertension      Past Surgical History:    Past Surgical History:   Procedure Laterality Date    CARDIAC SURGERY      coronary stent    CATARACT REMOVAL WITH IMPLANT Left 04 01 2013    COLONOSCOPY      CYST REMOVAL      tailbone    EYE SURGERY         Medications Prior to Admission:    @  Prior to Admission medications    Medication Sig Start Date End Date Taking? Authorizing Provider   ipratropium-albuterol (DUONEB) 0.5-2.5 (3) MG/3ML SOLN nebulizer solution Inhale 3 mLs into the lungs 4 times daily 9/16/21  Yes Alex López,    albuterol (PROVENTIL) (2.5 MG/3ML) 0.083% nebulizer solution Take 3 mLs by nebulization every 4 hours as needed for Wheezing or Shortness of Breath 9/16/21  Yes Ruperto Zurita DO   dexamethasone (DECADRON) 6 MG tablet Take 1 tablet by mouth daily for 4 days 9/17/21 9/21/21 Yes Ruperto Zurita DO   doxycycline hyclate (VIBRAMYCIN) 100 MG capsule Take 1 capsule by mouth 2 times daily for 7 days 9/16/21 9/23/21 Yes Ruperto Zurita DO   Respiratory Therapy Supplies (FULL KIT NEBULIZER SET) MISC Use as directed with nebulized medication.  9/16/21  Yes Ruperto Zurita DO tamsulosin (FLOMAX) 0.4 MG capsule Take 0.4 mg by mouth daily   Yes Historical Provider, MD   metoprolol (TOPROL-XL) 50 MG XL tablet Take 50 mg by mouth daily. Yes Historical Provider, MD   finasteride (PROSCAR) 5 MG tablet Take 5 mg by mouth daily. Yes Historical Provider, MD   aspirin 81 MG tablet Take 81 mg by mouth daily. Yes Historical Provider, MD   warfarin (COUMADIN) 7.5 MG tablet Take 7.5 mg by mouth Daily. Yes Historical Provider, MD   terazosin (HYTRIN) 5 MG capsule Take 10 mg by mouth daily. Historical Provider, MD       Allergies:  Dye [iodides]    Social History:   Social History     Socioeconomic History    Marital status:      Spouse name: Not on file    Number of children: Not on file    Years of education: Not on file    Highest education level: Not on file   Occupational History    Not on file   Tobacco Use    Smoking status: Former Smoker     Quit date: 3/27/1992     Years since quittin.4    Smokeless tobacco: Never Used   Substance and Sexual Activity    Alcohol use: No    Drug use: No    Sexual activity: Not on file   Other Topics Concern    Not on file   Social History Narrative    Not on file     Social Determinants of Health     Financial Resource Strain:     Difficulty of Paying Living Expenses:    Food Insecurity:     Worried About Running Out of Food in the Last Year:     [address] in the Last Year:    Transportation Needs:     Lack of Transportation (Medical):      Lack of Transportation (Non-Medical):    Physical Activity:     Days of Exercise per Week:     Minutes of Exercise per Session:    Stress:     Feeling of Stress :    Social Connections:     Frequency of Communication with Friends and Family:     Frequency of Social Gatherings with Friends and Family:     Attends Confucianist Services:     Active Member of Clubs or Organizations:     Attends Club or Organization Meetings:     Marital Status:    Intimate Partner Violence:     Fear of Current or Ex-Partner:     Emotionally Abused:     Physically Abused:     Sexually Abused:        Family History:   History reviewed. No pertinent family history. REVIEW OF SYSTEMS:    Gen: Patient denies any lightheadedness or dizziness. No LOC or syncope. No fevers or chills. HEENT: No earache, sore throat or nasal congestion. Resp: Denies cough, hemoptysis or sputum production. Cardiac: Denies chest pain,+ mild SOB, no diaphoresis or palpitations. GI: No nausea, vomiting, diarrhea or constipation. No melena or hematochezia. : No urinary complaints, dysuria, hematuria or frequency. MSK: + Generalized weakness. No extremity weakness, paralysis or paresthesias. PHYSICAL EXAM:    Vitals:  /83   Pulse 79   Temp 97.8 °F (36.6 °C) (Oral)   Resp 20   Ht 5' 8\" (1.727 m)   Wt 177 lb 4.8 oz (80.4 kg)   SpO2 91%   BMI 26.96 kg/m²     General:  This is a [de-identified] y.o. yo male who is alert and oriented in NAD  HEENT:  Head is normocephalic and atraumatic, PERRLA, EOMI, mucus membranes moist with no pharyngeal erythema or exudate. Neck:  Supple with no carotid bruits, JVD or thyromegaly.   No cervical adenopathy  CV:  Irregular rate and rhythm, 2/6 systolic murmurs  Lungs:  Clear to auscultation bilaterally with no wheezes, rales or rhonchi  Abdomen:  Soft, nontender, nondistended, bowel sounds present  Extremities:  No edema, peripheral pulses intact bilaterally  Neuro:  Cranial nerves II-XII grossly intact; motor and sensory function intact with no focal deficits  Skin:  No rashes, lesions or wounds    DATA:  CBC with Differential:    Lab Results   Component Value Date    WBC 8.2 09/13/2021    RBC 4.17 09/13/2021    HGB 13.7 09/13/2021    HCT 41.2 09/13/2021     09/13/2021    MCV 98.8 09/13/2021    MCH 32.9 09/13/2021    MCHC 33.3 09/13/2021    RDW 14.6 09/13/2021    LYMPHOPCT 4.3 09/13/2021    MONOPCT 2.4 09/13/2021    BASOPCT 0.0 09/13/2021    MONOSABS 0.00 09/13/2021 LYMPHSABS 0.33 09/13/2021    EOSABS 0.00 09/13/2021    BASOSABS 0.00 09/13/2021     CMP:    Lab Results   Component Value Date     09/16/2021    K 5.0 09/16/2021    K 6.5 09/08/2021     09/16/2021    CO2 25 09/16/2021    BUN 23 09/16/2021    CREATININE 0.7 09/16/2021    GFRAA >60 09/16/2021    LABGLOM >60 09/16/2021    GLUCOSE 92 09/16/2021    PROT 7.0 09/13/2021    LABALBU 3.5 09/13/2021    CALCIUM 8.8 09/16/2021    BILITOT 0.4 09/13/2021    ALKPHOS 171 09/13/2021    AST 24 09/13/2021    ALT 20 09/13/2021     Magnesium:  No results found for: MG  Phosphorus:  No results found for: PHOS  PT/INR:    Lab Results   Component Value Date    PROTIME 20.2 09/16/2021    INR 1.7 09/16/2021     Troponin:  No results found for: TROPONINI  U/A:    Lab Results   Component Value Date    COLORU Yellow 09/12/2021    PROTEINU Negative 09/12/2021    PHUR 5.5 09/12/2021    WBCUA 0-1 09/12/2021    RBCUA NONE 09/12/2021    BACTERIA NONE SEEN 09/12/2021    CLARITYU Clear 09/12/2021    SPECGRAV 1.015 09/12/2021    LEUKOCYTESUR TRACE 09/12/2021    UROBILINOGEN 0.2 09/12/2021    BILIRUBINUR Negative 09/12/2021    BLOODU SMALL 09/12/2021    GLUCOSEU Negative 09/12/2021     ABG:    Lab Results   Component Value Date    PH 7.443 09/13/2021    PCO2 35.9 09/13/2021    PO2 85.1 09/13/2021    HCO3 24.0 09/13/2021    BE 0.3 09/13/2021    O2SAT 96.3 09/13/2021     HgBA1c:  No results found for: LABA1C  FLP:    Lab Results   Component Value Date    TRIG 58 09/10/2021    HDL 67 09/10/2021    LDLCALC 51 09/10/2021    LABVLDL 12 09/10/2021     TSH:    Lab Results   Component Value Date    TSH 2.240 09/10/2021     IRON:  No results found for: IRON  LIPASE:    Lab Results   Component Value Date    LIPASE 74 09/08/2021       ASSESSMENT AND PLAN:      Patient Active Problem List    Diagnosis Date Noted    Inability to walk 82/10/3106    Metabolic encephalopathy 91/78/6506     Impression:  1. Failure to thrive  2.   Acute/subacute bilateral

## 2021-09-16 NOTE — CONSULTS
[address]                                  CONSULTATION    PATIENT NAME: Yong Jones                     :        1940  MED REC NO:   [id number]                            ROOM:       5093  ACCOUNT NO:   [de-identified]                           ADMIT DATE: 2021  PROVIDER:     Jennifer Lacy MD    CONSULT DATE:  09/15/2021    CHIEF COMPLAINT:  Inability to walk because of weakness in the legs. HISTORY OF PRESENT ILLNESS:  This 76-year-old male who was admitted  because of shortness of breath on exertion. The patient has also been  having difficulty with ambulation for the past several months. Initially he was using a cane and now he is using walker because of the  weakness in both lower extremities more so on the left side. He denies  any significant back pain, other leg pain. He also denies sphincter  disturbances. He denies any falls or trauma to the back. An MRI scan  of the lumbar spine was completed 2 days ago and was reviewed. It  revealed multilevel degenerative disk disease and spondylotic changes,  worse at the level of L3-4 on the right. The MRI scan of the thoracic  spine was also reviewed and it revealed spondylotic changes. PAST MEDICAL HISTORY:  Atrial fibrillation, coronary artery disease,  COPD, hypertension. PAST SURGICAL HISTORY:  Cardiac surgery, cataract removal with implant,  colonoscopy, cyst removal and eye surgery. SOCIAL HISTORY:  Former smoker. Does not use alcohol or street drugs. PERSONAL HISTORY:  Allergy to IVP DYE,IODINE. PHYSICAL EXAMINATION:  GENERAL:  He is well-developed, well-nourished male, in no acute  distress. He is alert, awake and oriented to the month but not to the  date. He knows that he in the hospital and going to rehab tomorrow. NEUROLOGIC:  Speech is good. Memory is good. He can count the fingers  well.   Handgrip is equal bilaterally. No focal deficit noted in the  upper extremity. In the lower extremity, he does have slight weakness  of the dorsiflexion bilaterally, worse on the left side and also  weakness of the quadriceps muscle, again worse on the left side. Strength is about 3-4/5 on the right side and 3/5 on the left. He can  barely lift his legs off the bed on the left side and weak on the right  side. Sensations are normal in the upper extremity, are diminished in  the lower extremity, 80% present on the right side and 60% present on  the left side. Deep tendon reflexes are diminished. Ankle jerks are  absent. I reviewed the MRI of thoracic and the lumbar spine. IMPRESSION:  Lumbar stenosis and spondylosis of various levels, worse at  level of L2-3, more so on the right side. Multiple medical  comorbidities. The patient does not have significant pain or radicular symptoms to  account for the weakness. He does have a lumbar stenosis at the level  of L2-3, which could account for some of his weakness. RECOMMENDATIONS:  I recommend an EMG study of the lower extremities to  further define the etiology of his weakness. The patient is going to  rehab tomorrow. The patient is also on Coumadin and EMG study may not  be indicated at this time. If the walking ability does not improve with  the rehab, then the EMG study will be helpful to further define the  etiology of his symptoms. I would be glad to follow him up in my office  in 3-4 weeks.         Jen Nolan MD    D: 09/15/2021 17:27:12       T: 09/15/2021 17:33:58     [email or web address]  Job#: [id number]     Doc#: [id number]    CC:

## 2021-09-16 NOTE — CARE COORDINATION
9/16/21 1531 CM note: COVID (-) X 2 on 9/15 & 9/16. Discharge order noted. Discharge plan remains SNF at [facility]. Per Camilo, [id number] obtained from insurance for patient to come to their facility. HENS completed. Ambulance transport set up with [facility] for pick between 3-4 pm. Ambulance form on soft chart. Called and updated pts son, Aditya . RN to call N/N [phone].  Electronically signed by Joan Aguirre RN on 9/16/2021 at 3:43 PM

## 2021-09-16 NOTE — PROGRESS NOTES
Comprehensive Nutrition Assessment    Type and Reason for Visit:  Reassess    Nutrition Recommendations/Plan: Continue with current nutr'l regimen    Nutrition Assessment:  Pt admitted w/ metabolic encephalopathy and FTT, PMH of COPD. Pt reported decreased appetite and intake r/t depression since admitting his wife into a nursing home several months ago. Pt p.o. intakes remain >75% and pt continues to decline ONS. Malnutrition Assessment:  Malnutrition Status: Moderate malnutrition    Context:  Social/Environmental Circumstances     Findings of the 6 clinical characteristics of malnutrition:  Energy Intake:  1 - Less than 75% estimated energy requirements for 3 months or longer  Weight Loss:  Unable to assess (d/t lack of actual EMR wt hx)     Body Fat Loss:  1 - Mild body fat loss Triceps, Orbital   Muscle Mass Loss:  1 - Mild muscle mass loss Clavicles (pectoralis & deltoids), Temples (temporalis)  Fluid Accumulation:  No significant fluid accumulation     Strength:  Not Performed    Estimated Daily Nutrient Needs:  Energy (kcal):  4597-7340 (MSJ REE= 1489 x 1.2); Weight Used for Energy Requirements:  Current     Protein (g):   (1.3-1.5 gm/kg IBW); Weight Used for Protein Requirements:  Ideal        Fluid (ml/day):  0564-0986; Method Used for Fluid Requirements:  1 ml/kcal      Nutrition Related Findings:  A/ox4, missing teeth, abd WDL,+BS, -I/O -8.6L, BLE +1 pitting edema      Wounds:  None       Current Nutrition Therapies:    ADULT DIET; Regular    Anthropometric Measures:  · Height: 5' 8\" (172.7 cm)  · Current Body Weight: 177 lb 4.8 oz (80.4 kg) (9/12 bed scale)   · Admission Body Weight: 177 lb 4.8 oz (80.4 kg) (9/12 bed scale)    · Usual Body Weight: 220 lb (99.8 kg) (4/30/2021 stated, no actual recent EMR wt hx)     · Ideal Body Weight: 154 lbs; % Ideal Body Weight 115.1 %   · BMI: 27  · Adjusted Body Weight:  ; No Adjustment   · BMI Categories: Overweight (BMI 25.0-29. 9)       Nutrition Diagnosis:   · Moderate malnutrition, In context of social or environmental circumstances related to psychological cause or life stress as evidenced by poor intake prior to admission, mild muscle loss, mild loss of subcutaneous fat      Nutrition Interventions:   Food and/or Nutrient Delivery:  Continue Current Diet (Pt declined need for ONS during admission d/t eating well at meals.  He is aware these can be requested.)  Nutrition Education/Counseling:  Education not indicated   Coordination of Nutrition Care:  Continue to monitor while inpatient    Goals:  Pt is to consume >75% of most meals       Nutrition Monitoring and Evaluation:   Behavioral-Environmental Outcomes:  None Identified   Food/Nutrient Intake Outcomes:  Food and Nutrient Intake  Physical Signs/Symptoms Outcomes:  Biochemical Data, GI Status, Fluid Status or Edema, Nutrition Focused Physical Findings, Skin, Weight     Discharge Planning:    Continue current diet     Electronically signed by Alexus Canchola RD, LD on 9/16/21 at 1:10 PM EDT    Contact: [phone]

## 2021-09-16 NOTE — PROGRESS NOTES
diagnosis and results of clinical assessment     Frequency/Duration 1-3 days/wk for 2 weeks PRN   Specific OT Treatment Interventions to include:   * Instruction/training on adapted ADL techniques and AE recommendations to increase functional independence within precautions       * Training on energy conservation strategies, correct breathing pattern and techniques to improve independence/tolerance for self-care routine  * Functional transfer/mobility training/DME recommendations for increased independence, safety, and fall prevention  * Patient/Family education to increase follow through with safety techniques and functional independence  * Recommendation of environmental modifications for increased safety with functional transfers/mobility and ADLs  * Therapeutic exercise to improve motor endurance, ROM, and functional strength for ADLs/functional transfers  * Therapeutic activities to facilitate/challenge dynamic balance, stand tolerance for increased safety and independence with ADLs  * Therapeutic activities to facilitate gross/fine motor skills for increased independence with ADLs      Recommended Adaptive Equipment/DME: TTB;  TBD       Home Living: Pt lives in a 2 story home with first floor set up, alone. Son does live local. 4 steps with right rail and assist from son to enter home. [facility] is to place ramp to entrance. Bathroom setup: tub shower with grab bar; no DME; standard toilet    DME owned: cane, walker      Prior Level of Function: indep with ADLs , assist from son with IADLs; ambulated indep with AD   Driving: no   Occupation: retired   Enjoys: very limited; being outside     Pain Level: none  Cognition: A&O: 3/4 confused with situation;  Follows 3 step directions with son confusion and anxiety              Memory:  Intact              Sequencing:  Fair-              Problem solving:  Fair-              Judgement/safety:  Fair-    AM-PAC Daily Activity Inpatient   How much help for putting on and taking off regular lower body clothing?: Total  How much help for Bathing?: A Lot  How much help for Toileting?: Total  How much help for putting on and taking off regular upper body clothing?: A Lot  How much help for taking care of personal grooming?: A Little  How much help for eating meals?: A Little  AM-[facility] Inpatient Daily Activity Raw Score: 12  AM-PAC Inpatient ADL T-Scale Score : 30.6  ADL Inpatient CMS 0-100% Score: 66.57  ADL Inpatient CMS G-Code Modifier : CL                Functional Assessment:      Initial Eval Status  Date: 9/13/2021   Treatment Status  Date:9/16/21 STGs = LTGs  Time frame: 10-14 days   Feeding Supervision and set up for breakfast tray. Set up and extended time.   Indep   Grooming Minimal Assist with encouragement for oral care and face/hand wash. Some confusion noted. Completed from sitting up in chair SBA sitting EOB for oral care, hand wash and face wash.   Independent    UB Dressing Moderate Assist with gown management and fear of falling while sitting EOb  Mod assist with gown management EOB Supervision    LB Dressing Dependent with socks and pants with pt fearful to release hands from walker to assist in pulling up. Assist to manage stewart  Max A to don/thread undergarments and pants with assist for stewart management. Pt was able to pull up to thighs in sitting, but assist to pull up over hips in standing. Stand by Assist    Bathing Moderate Assist for LB and buttocks from sitting/standing EOB with walker for support Moderate assist for khari feet and buttocks in standing. Pt required thoroughness in standing for balance and safety. [facility] for support. Stand by Assist    Toileting Dependent for stewart management and management of yomaira care in standing  Dep for stewart management and yomaira care for thoroughness. Pt did attempt for upgrade.   Supervision    Bed Mobility  Supine to sit: Moderate Assist  Assist to manage khari LE to EOB and trunk support once EOB to scoot hips forward   Min A for all bed mobility, including supine to EOB with trunk support and extended time. Supine to sit: Supervision   Sit to supine: Supervision    Functional Transfers Moderate Assist for sit to stand from bed x 4 with ADL and arm chair x 1; use of walker Mod A with cues for hand placement and body mechanics. Use of bed rails and walker. Completed x 3 with ADLs  Supervision    Functional Mobility Moderate Assist with stand pivot without device due to pt anxiety and fear of falling with walker. Dong knees blocked for fall prevention  Moderate with walker for upgrade and cues for safety, mechanics and balance assist.  Supervision    Balance Sitting:     Static:  fair    Dynamic:fair  Standing: fair- Sitting:     Static:  fair    Dynamic:fair  Standing: fair-  Sitting:     Static:  good    Dynamic:good  Standing: fair   Activity Tolerance Vitals with activity:3 L O2 with fluctuation with standing and ADLs from 88-93%  Standing tolerance 1min average with fear of falling Pt on 2 L with 90-94% throughout ADLs and functional transfers. Standing tolerance 1min general, but less assist required and increased safety.   Increase standing tolerance for >5min with stable vital signs for carry over into toileting, functional tranfers and indep in ADLs   Visual/  Perceptual Glasses: reading; not Present; WFL     Reports change in vision since admission: No      NA   Dong UE Strengthening  4/5 generally   4+/5MMT generally for carry over into self care, functional transfers and functional mobility with AD.       Hand Dominance  [x]? Right   []? Left     AROM (PROM) Strength Additional Info:    RUE  WFL with 15degree shoulder limitations with history of bursitis  4/5 good  and  FMC/dexterity noted during ADL tasks  Opposition [x]? Intact []? Impaired  Finger to nose [x]? Intact []? Impaired      LUE WFL 4+/5 good  and FMC/dexterity noted during ADL tasks  Opposition [x]? Intact []? Impaired  Finger to nose [x]? Intact []? Impaired      Hearing: WFL   Sensation: c/o numbness or tingling in R LE   Tone: WFL   Edema: none                   Comments: Upon arrival patient supine in bed with breathing treatment just finishing. Pt required mod A for most UB ADLs and max A LB ADLs tasks. Limited with mod A for standing during LB ADLs and functional transfers. The biggest barriers reflect that of functional transfers, functional mobility, UB/LB ADLs, O2 management, activity tolerance, balance, safety and strengthening. At end of session, patient sitting up in arm chair with call light and phone within reach, all lines and tubes intact. Overall patient demonstrated decreased independence and safety during completion of ADL/functional transfer/mobility tasks. Nursing updated on pt position and status following OT treatment. Pt would benefit from continued skilled OT to increase safety and independence with completion of ADL/IADL tasks for functional independence and quality of life. Treatment: OT treatment provided this date includes:   Instruction, education and training on safe facilitation and adapted techniques for completion of ADLs. These include neuromuscular reeducation to facilitate balance/righting reactions, safe functional transfer techniques and on energy conservation/work simplification for completion of ADLs. Education provided on hand/feet placement with bed rail, walker, arm chair and body mechanics for fall prevention. Cues for energy conservation and safety for in the home at PR, including modifications and DME. Extended time to complete all tasks, including skilled monitoring of patient's response during treatment session and vital signs. Prior to and at the end of session, environmental modifications / line management completed for patients safety and efficiency of treatment session. See above for further details.   · Pt has made fair progress towards set goals    · OT 1-3x/week for 5-7 days during hospitalization Treatment Time also includes thorough review of current medical information, gathering information on past medical history/social history and prior level of function, informal observation of tasks, assessment of data and education on plan of care and goals.      Treatment Time In: 1041    Treatment Time Out: 1100     Treatment Charges: Mins Units   ADL/Home Mgt     00689 19 1   Thera Activities     68669 12 1   Ther Ex                 83221       Manual Therapy    69201       Neuro Re-ed         51904       Orthotic manage/training                               54055       Non Billable Time       Total Timed Treatment 31 2     Rod Hernandez OTR/L; [id number]

## 2021-09-16 NOTE — PROGRESS NOTES
Pulmonary/Critical Care Progress Note    We are following patient for pulmonary hypertension, COPD, dyspnea on exertion, possible interstitial lung disease    SUBJECTIVE:  Patient states he overall feels better today and denies any shortness of breath at rest on 3 L nasal cannula. Patient has normal respiratory effort, no accessory muscle use, denies cough, wheezing, dyspnea at rest, or chest pain. However the patient states he becomes severely dyspneic at any exertion. Patient he states he plans to be discharged today to a rehabilitation facility to help regain his leg strength. MEDICATIONS:   doxycycline (VIBRAMYCIN) IV  100 mg IntraVENous Q12H    dexamethasone  6 mg Oral Daily    warfarin  1 mg Oral Daily    ipratropium-albuterol  1 ampule Inhalation Q4H    metoprolol succinate  50 mg Oral Daily    finasteride  5 mg Oral Daily    aspirin  81 mg Oral Daily    tamsulosin  0.4 mg Oral Daily      sodium chloride 50 mL/hr at 09/14/21 1805     albuterol, perflutren lipid microspheres, trimethobenzamide, acetaminophen      REVIEW OF SYSTEMS:  Constitutional: Denies fever, weight loss, night sweats, and fatigue  Skin: Denies pigmentation, dark lesions, and rashes   HEENT: Denies hearing loss, tinnitus, ear drainage, epistaxis, sore throat, and hoarseness. Cardiovascular: Denies palpitations, chest pain, and chest pressure. Respiratory: Denies cough, dyspnea at rest, reports dyspnea on exertion, denies hemoptysis, apnea, and choking.   Gastrointestinal: Denies nausea, vomiting, poor appetite, diarrhea, heartburn or reflux  Genitourinary: Denies dysuria, frequency, urgency or hematuria  Musculoskeletal: Denies myalgias, reports bilateral leg muscle weakness, and bone pain  Neurological: Denies dizziness, vertigo, headache, and focal weakness  Psychological: Denies anxiety and depression  Endocrine: Denies heat intolerance and cold intolerance  Hematopoietic/Lymphatic: Denies bleeding problems and blood transfusions    OBJECTIVE:  Vitals:    09/15/21 2044   BP: 120/62   Pulse: 68   Resp: 20   Temp: 98.7 °F (37.1 °C)   SpO2: 97%        O2 Flow Rate (L/min): 2 L/min  O2 Device: Nasal cannula    PHYSICAL EXAM:  Constitutional: No fever, chills, diaphoresis  HEENT: No head lesions, PERRL, EOMI, mouth without lesions, no nasal lesions, no cervical adenopathy palpated   Respiratory: Lungs with equal breath sounds diminished bilaterally with faint expiratory wheezes,  no accessory muscle use   CV: Regular rate and rhythm, no murmurs, JVD, no leg edema   Abdomen: Soft, non tender, + bowel sounds, no lesions   Skin: Adequate turgor, no rash, capillary refill <2 seconds   Extremities: Muscular strength 4/4 in arms and 3/4 both legs, moves 4 limbs spontaneously, distal pulses present   Neurology: Awake and alert, follows commands, moves 4 limbs on command and spontaneously, equal sensation, no dysmetria, neck is supple, no meningitic signs present.      LABS:  WBC   Date Value Ref Range Status   09/13/2021 8.2 4.5 - 11.5 E9/L Final   09/12/2021 6.5 4.5 - 11.5 E9/L Final   09/10/2021 7.8 4.5 - 11.5 E9/L Final     Hemoglobin   Date Value Ref Range Status   09/13/2021 13.7 12.5 - 16.5 g/dL Final   09/12/2021 12.6 12.5 - 16.5 g/dL Final   09/10/2021 13.3 12.5 - 16.5 g/dL Final     Hematocrit   Date Value Ref Range Status   09/13/2021 41.2 37.0 - 54.0 % Final   09/12/2021 38.4 37.0 - 54.0 % Final   09/10/2021 41.4 37.0 - 54.0 % Final     MCV   Date Value Ref Range Status   09/13/2021 98.8 80.0 - 99.9 fL Final   09/12/2021 101.1 (H) 80.0 - 99.9 fL Final   09/10/2021 100.2 (H) 80.0 - 99.9 fL Final     Platelets   Date Value Ref Range Status   09/13/2021 182 130 - 450 E9/L Final   09/12/2021 146 130 - 450 E9/L Final   09/10/2021 155 130 - 450 E9/L Final     Sodium   Date Value Ref Range Status   09/13/2021 139 132 - 146 mmol/L Final   09/12/2021 136 132 - 146 mmol/L Final   09/10/2021 136 132 - 146 mmol/L Final     Potassium   Date Value Ref Range Status   09/13/2021 5.0 3.5 - 5.0 mmol/L Final   09/12/2021 4.7 3.5 - 5.0 mmol/L Final   09/10/2021 4.0 3.5 - 5.0 mmol/L Final     Potassium reflex Magnesium   Date Value Ref Range Status   09/08/2021 6.5 (H) 3.5 - 5.0 mmol/L Final     Comment:     Specimen is moderately Hemolyzed. Result may be artificially increased.      Chloride   Date Value Ref Range Status   09/13/2021 108 (H) 98 - 107 mmol/L Final   09/12/2021 106 98 - 107 mmol/L Final   09/10/2021 103 98 - 107 mmol/L Final     CO2   Date Value Ref Range Status   09/13/2021 22 22 - 29 mmol/L Final   09/12/2021 23 22 - 29 mmol/L Final   09/10/2021 26 22 - 29 mmol/L Final     BUN   Date Value Ref Range Status   09/13/2021 29 (H) 6 - 23 mg/dL Final   09/12/2021 25 (H) 6 - 23 mg/dL Final   09/10/2021 24 (H) 6 - 23 mg/dL Final     CREATININE   Date Value Ref Range Status   09/13/2021 0.7 0.7 - 1.2 mg/dL Final   09/12/2021 0.9 0.7 - 1.2 mg/dL Final   09/10/2021 0.8 0.7 - 1.2 mg/dL Final     Glucose   Date Value Ref Range Status   09/13/2021 146 (H) 74 - 99 mg/dL Final   09/12/2021 88 74 - 99 mg/dL Final   09/10/2021 112 (H) 74 - 99 mg/dL Final     Calcium   Date Value Ref Range Status   09/13/2021 9.0 8.6 - 10.2 mg/dL Final   09/12/2021 8.3 (L) 8.6 - 10.2 mg/dL Final   09/10/2021 8.2 (L) 8.6 - 10.2 mg/dL Final     Total Protein   Date Value Ref Range Status   09/13/2021 7.0 6.4 - 8.3 g/dL Final   09/12/2021 6.0 (L) 6.4 - 8.3 g/dL Final   09/10/2021 6.2 (L) 6.4 - 8.3 g/dL Final     Albumin   Date Value Ref Range Status   09/13/2021 3.5 3.5 - 5.2 g/dL Final   09/12/2021 3.0 (L) 3.5 - 5.2 g/dL Final   09/10/2021 3.1 (L) 3.5 - 5.2 g/dL Final     Total Bilirubin   Date Value Ref Range Status   09/13/2021 0.4 0.0 - 1.2 mg/dL Final   09/12/2021 0.6 0.0 - 1.2 mg/dL Final   09/10/2021 1.1 0.0 - 1.2 mg/dL Final     Alkaline Phosphatase   Date Value Ref Range Status   09/13/2021 171 (H) 40 - 129 U/L Final   09/12/2021 158 (H) 40 - 129 U/L Final   09/10/2021 136 (H) 40 - 129 U/L Final     AST   Date Value Ref Range Status   09/13/2021 24 0 - 39 U/L Final   09/12/2021 20 0 - 39 U/L Final   09/10/2021 21 0 - 39 U/L Final     ALT   Date Value Ref Range Status   09/13/2021 20 0 - 40 U/L Final   09/12/2021 15 0 - 40 U/L Final   09/10/2021 8 0 - 40 U/L Final     GFR Non-   Date Value Ref Range Status   09/13/2021 >60 >=60 mL/min/1.73 Final     Comment:     Chronic Kidney Disease: less than 60 ml/min/1.73 sq.m. Kidney Failure: less than 15 ml/min/1.73 sq.m. Results valid for patients 18 years and older. 09/12/2021 >60 >=60 mL/min/1.73 Final     Comment:     Chronic Kidney Disease: less than 60 ml/min/1.73 sq.m. Kidney Failure: less than 15 ml/min/1.73 sq.m. Results valid for patients 18 years and older. 09/10/2021 >60 >=60 mL/min/1.73 Final     Comment:     Chronic Kidney Disease: less than 60 ml/min/1.73 sq.m. Kidney Failure: less than 15 ml/min/1.73 sq.m. Results valid for patients 18 years and older. GFR    Date Value Ref Range Status   09/13/2021 >60  Final   09/12/2021 >60  Final   09/10/2021 >60  Final     No results found for: MG  No results found for: PHOS  Recent Labs     09/13/21  0454   PH 7.443   PO2 85.1   PCO2 35.9   HCO3 24.0   BE 0.3   O2SAT 96.3       RADIOLOGY:  MRI THORACIC SPINE WO CONTRAST   Final Result   No evidence for marrow edema no evidence for fracture no signal abnormality   in the cord substance. Minimal degenerative changes with minimal disc bulge   at T8-9         MRI LUMBAR SPINE WO CONTRAST   Final Result   Multilevel degenerative disc changes most pronounced L2-3. However the most   significant neural foraminal narrowing is noted on the right at L3-4. Correlation with site of radiculopathy. CT CHEST HIGH RESOLUTION   Final Result   1.  There is redemonstration of nonspecific, basilar-predominant interstitial   thickening with mild nodular and dendriform hyperattenuating foci. Considerations include pulmonary ossification, postinflammatory changes,   sequelae of previous barium contrast aspiration or a late sign of pulmonary   hypertension. 2. Biatrial enlargement with marked coronary artery calcification and   moderate pericardial effusion. 3. Small left pleural effusion is increased. 4. Pulmonary arterial dilation suggests pulmonary artery hypertension. 5. The liver appears slightly irregular in contour with dilation of the IVC   and hepatic veins, suggesting possible congestive hepatopathy. 6. Left axillary and left subpectoral adenopathy could be reactive or   neoplastic in etiology. 7. Noncalcified pulmonary nodules measure up to 4 mm. Please see the   recommendation section below. RECOMMENDATIONS:   Multiple pulmonary nodules. Most severe: 4.0 mm solid pulmonary nodule within   the upper lobe. If patient is low risk for malignancy, no routine follow-up   imaging is recommended; if patient is high risk for malignancy, a   non-contrast Chest CT at 12 months is optional. If performed and the nodule   is stable at 12 months, no further follow-up is recommended. These guidelines do not apply to immunocompromised patients and patients with   cancer. Follow up in patients with significant comorbidities as clinically   warranted. For lung cancer screening, adhere to Lung-RADS guidelines. Reference: Radiology. 2017; 284(1):228-43. XR LUMBAR SPINE (2-3 VIEWS)   Final Result   Minimal anterior wedging of T12 of indeterminate acuity which may be chronic. Degenerative spondylotic changes. CT CHEST WO CONTRAST   Final Result   Enlarged pulmonary arteries suspicious for pulmonary arterial hypertension      Diffuse prominence of interstitial density in both lungs is present with   lower lobe predominance. Considerations include interstitial   scarring/fibrosis.   If acute, considerations include interstitial edema, atelectasis, and/or interstitial pneumonitis      Cardiomegaly with small to moderate pericardial effusion      Right and left coronary artery calcification and/or stents      Nonspecific single lymph node enlargement in the left axilla may be reactive,   inflammatory, or infectious. Neoplastic etiology considered less likely. Dense chronic appearing calcification in left adrenal may be from prior   trauma, infection, or hemorrhage. XR CHEST PORTABLE   Final Result   No acute process. Cardiomegaly. CT ABDOMEN PELVIS WO CONTRAST Additional Contrast? Oral    (Results Pending)           PROBLEM LIST:  Principal Problem:    Inability to walk  Active Problems:    Metabolic encephalopathy  Resolved Problems:    * No resolved hospital problems. *      IMPRESSION:  COPD exacerbation  Severe pulmonary hypertension  Suspected underlying interstitial lung disease  Pericardial effusion    PLAN:  Continue oxygen administration to keep SPO2 greater than 90%  Start doxycycline  Continue inhaled bronchodilators and continue Decadron for total of 7 days      Electronically signed by LILIANA Chery CNP on 9/15/2021 at 9:19 PM    ATTESTATION:  ICU Staff Physician note of personal involvement in Care  As the attending physician, I certify that I personally reviewed the patients history and personnally examined the patient to confirm the physical findings described above,  I discussed the patient's assessment and plan with Shavon VICENTE. And that I reviewed the relevant imaging studies and available reports. I also discussed the differential diagnosis and all of the proposed management plans with the patient and individuals accompanying the patient to this visit. They had the opportunity to ask questions about the proposed management plans and to have those questions answered.          Deuce Gillette MD  Pulmonary and Critical Care Medicine

## 2021-09-21 NOTE — PROGRESS NOTES
Physician Progress Note      PATIENT:               Shikha Merlos  CSN #:                  [id number]  :                       1940  ADMIT DATE:       2021 7:26 PM  [address] DATE:        2021 3:14 PM  RESPONDING  PROVIDER #:        Casie LAU DO          QUERY TEXT:    Patient admitted with acute on chronic respiratory failure and has moderate   malnutrition documented per dietary consult. If possible, please document in   progress notes and discharge summary if you are evaluating and /or treating   any of the following: The medical record reflects the following:  Risk Factors: per documentation history of COPD, diagnosis of Respiratory   failure  Clinical Indicators: per dietary consult Moderate malnutrition, Mild body fat   loss, Mild muscle mass loss,  Less than 75% estimated energy requirements for   3 months or longer  Treatment: dietary consult    Thank you  Fox Naidu RN CCDS  [phone]  Options provided:  -- Protein calorie malnutrition moderate  -- Other - I will add my own diagnosis  -- Disagree - Not applicable / Not valid  -- Disagree - Clinically unable to determine / Unknown  -- Refer to Clinical Documentation Reviewer    PROVIDER RESPONSE TEXT:    This patient has moderate protein calorie malnutrition.     Query created by: Josiane Gore on 2021 1:16 PM      Electronically signed by:  Carlos Avila DO 2021 10:35 AM

## 2021-11-03 PROBLEM — J96.22 ACUTE ON CHRONIC RESPIRATORY FAILURE WITH HYPOXIA AND HYPERCAPNIA (HCC): Status: ACTIVE | Noted: 2021-01-01

## 2021-11-03 PROBLEM — J96.21 ACUTE ON CHRONIC RESPIRATORY FAILURE WITH HYPOXIA AND HYPERCAPNIA (HCC): Status: ACTIVE | Noted: 2021-01-01

## 2021-11-03 NOTE — PROGRESS NOTES
ABG drawn x1 1 from Left Brachial. Patient had neg Daniel's Test.  Patient was on 6 liters/min via nasal cannula  at time of puncture. Pressure held for 5. No bleeding or bruising noted at puncture site.   Patient tolerated procedure well      Performed by Katharian Hernandez RCP

## 2021-11-03 NOTE — H&P
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 male who presents with altered mental status at the nursing home. Patient symptoms started about a week ago patient was also noted to have increased weight gain and increasing lower leg edema. Patient normally alert and oriented x4 but has become much more lethargic and interactive. Patient gained 20 pounds at the nursing home. Patient was brought into the emergency room with patient chest x-ray showed interstitial lung disease and also cardiomegaly. ABG showed a pH 7.204 with a CO2 of 85 on 6 L nasal cannula. Patient then was started on BiPAP.  BUN/creatinine 43/1.0 with proBNP 3900 and troponin initially was 120. Liver enzymes are normal.  Heart rate was 75 normal temperature and blood pressure initially at 976 systolic currently is 74/07. Patient currently on 40% FiO2 BiPAP with O2 sat of 93%. Patient states he has been wearing O2 nasal cannula for last couple weeks at the nursing home but not before that. Patient denies wearing BiPAP in the past.  He denies any problem with any chest pain, abdominal pain, nausea vomiting or dizziness. ECG reviewed with patient have atrial fibrillation with no significant ST-T changes to suggest acute myocardial injury troponin was elevated on admission but troponin I-II hours later was less than on admission.     Past Medical History:    Past Medical History:   Diagnosis Date    Atrial fibrillation ([address]     CAD (coronary artery disease)     coronary stent 12yrs ago    COPD (chronic obstructive pulmonary disease) ([facility] [address]     Hypertension      Past Surgical History:    Past Surgical History:   Procedure Laterality Date    CARDIAC SURGERY      coronary stent    CATARACT REMOVAL WITH IMPLANT Left 04 01 2013    COLONOSCOPY      CYST REMOVAL      tailbone    EYE SURGERY         Medications Prior to Admission:    @  Prior to Admission medications    Medication Sig Start Date End Date Taking? Authorizing Provider   ipratropium-albuterol (DUONEB) 0.5-2.5 (3) MG/3ML SOLN nebulizer solution Inhale 3 mLs into the lungs 4 times daily 21   China Quispe, DO   albuterol (PROVENTIL) (2.5 MG/3ML) 0.083% nebulizer solution Take 3 mLs by nebulization every 4 hours as needed for Wheezing or Shortness of Breath 21   China Quispe, DO   Respiratory Therapy Supplies (FULL KIT NEBULIZER SET) MISC Use as directed with nebulized medication. 21   China Quispe, DO   warfarin (COUMADIN) 4 MG tablet 4 mg daily 6 PM 21   China Quispe, DO   tamsulosin (FLOMAX) 0.4 MG capsule Take 0.4 mg by mouth daily    Historical Provider, MD   terazosin (HYTRIN) 5 MG capsule Take 10 mg by mouth daily. Historical Provider, MD   metoprolol (TOPROL-XL) 50 MG XL tablet Take 50 mg by mouth daily. Historical Provider, MD   finasteride (PROSCAR) 5 MG tablet Take 5 mg by mouth daily. Historical Provider, MD   aspirin 81 MG tablet Take 81 mg by mouth daily.     Historical Provider, MD       Allergies:  Dye [iodides]    Social History:   Social History     Socioeconomic History    Marital status:      Spouse name: Not on file    Number of children: Not on file    Years of education: Not on file    Highest education level: Not on file   Occupational History    Not on file   Tobacco Use    Smoking status: Former Smoker     Quit date: 3/27/1992     Years since quittin.6    Smokeless tobacco: Never Used   Substance and Sexual Activity    Alcohol use: No    Drug use: No    Sexual activity: Not on file   Other Topics Concern    Not on file   Social History Narrative    Not on file     Social Determinants of Health     Financial Resource Strain:     Difficulty of Paying Living Expenses:    Food Insecurity:     Worried About Running Out of Food in the Last Year:    [address] in the Last Year:    Transportation Needs:     Lack of Transportation (Medical):  Lack of Transportation (Non-Medical):    Physical Activity:     Days of Exercise per Week:     Minutes of Exercise per Session:    Stress:     Feeling of Stress :    Social Connections:     Frequency of Communication with Friends and Family:     Frequency of Social Gatherings with Friends and Family:     Attends Sikh Services:     Active Member of Clubs or Organizations:     Attends Club or Organization Meetings:     Marital Status:    Intimate Partner Violence:     Fear of Current or Ex-Partner:     Emotionally Abused:     Physically Abused:     Sexually Abused:        Family History:   No family history on file. REVIEW OF SYSTEMS:   Gen: Patient initially admitted from the nursing home with altered mental status but is doing much better at this time. Patient denies any lightheadedness or dizziness. No LOC or syncope. No fevers or chills. HEENT: No earache, sore throat or nasal congestion. Resp: Denies cough, hemoptysis or sputum production. Cardiac: Denies chest pain, +SOB, no diaphoresis or palpitations. GI: No nausea, vomiting, diarrhea or constipation. No melena or hematochezia. : No urinary complaints, dysuria, hematuria or frequency. MSK: + Diffuse lower extremity edema. No extremity weakness, paralysis or paresthesias. PHYSICAL EXAM:    Vitals:  BP (!) 98/55   Pulse 75   Temp 97.8 °F (36.6 °C) (Oral)   Resp 22   SpO2 94%     General:  This is a [de-identified] y.o. yo male who is alert and oriented in mild respiratory distress  HEENT:  Head is normocephalic and atraumatic, PERRLA, EOMI, mucus membranes moist with no pharyngeal erythema or exudate. Neck:  Supple with no carotid bruits, JVD or thyromegaly.   No cervical adenopathy  CV:  Regular rate and rhythm, no murmurs  Lungs: Coarse decreased breath sounds to auscultation bilaterally with no wheezes, rales or rhonchi  Abdomen:  Soft, nontender,+ abdominal fat, nondistended, bowel sounds present  Extremities:  + 2-3 leg edema, peripheral pulses intact bilaterally  Neuro:  Cranial nerves II-XII grossly intact; motor and sensory function intact with no focal deficits  Skin:  No rashes, lesions or wounds    DATA:  CBC with Differential:    Lab Results   Component Value Date    WBC 6.1 11/03/2021    RBC 3.72 11/03/2021    HGB 12.1 11/03/2021    HCT 40.3 11/03/2021     11/03/2021    .3 11/03/2021    MCH 32.5 11/03/2021    MCHC 30.0 11/03/2021    RDW 15.5 11/03/2021    LYMPHOPCT 10.2 11/03/2021    MONOPCT 10.2 11/03/2021    BASOPCT 0.5 11/03/2021    MONOSABS 0.62 11/03/2021    LYMPHSABS 0.62 11/03/2021    EOSABS 0.20 11/03/2021    BASOSABS 0.03 11/03/2021     CMP:    Lab Results   Component Value Date     11/03/2021    K 4.8 11/03/2021    CL 98 11/03/2021    CO2 32 11/03/2021    BUN 43 11/03/2021    CREATININE 1.0 11/03/2021    GFRAA >60 11/03/2021    LABGLOM >60 11/03/2021    GLUCOSE 102 11/03/2021    PROT 6.8 11/03/2021    LABALBU 3.5 11/03/2021    CALCIUM 8.9 11/03/2021    BILITOT 0.5 11/03/2021    ALKPHOS 192 11/03/2021    AST 17 11/03/2021    ALT 9 11/03/2021     Magnesium:  No results found for: MG  Phosphorus:  No results found for: PHOS  PT/INR:    Lab Results   Component Value Date    PROTIME 21.9 11/03/2021    INR 1.9 11/03/2021     Troponin:  No results found for: TROPONINI  U/A:    Lab Results   Component Value Date    COLORU Yellow 09/12/2021    PROTEINU Negative 09/12/2021    PHUR 5.5 09/12/2021    WBCUA 0-1 09/12/2021    RBCUA NONE 09/12/2021    BACTERIA NONE SEEN 09/12/2021    CLARITYU Clear 09/12/2021    SPECGRAV 1.015 09/12/2021    LEUKOCYTESUR TRACE 09/12/2021    UROBILINOGEN 0.2 09/12/2021    BILIRUBINUR Negative 09/12/2021    BLOODU SMALL 09/12/2021    GLUCOSEU Negative 09/12/2021     ABG:    Lab Results   Component Value Date    PH 7.204 11/03/2021 PCO2 85.0 11/03/2021    PO2 97.4 11/03/2021    HCO3 32.8 11/03/2021    BE 2.5 11/03/2021    O2SAT 96.2 11/03/2021     HgBA1c:  No results found for: LABA1C  FLP:    Lab Results   Component Value Date    TRIG 58 09/10/2021    HDL 67 09/10/2021    LDLCALC 51 09/10/2021    LABVLDL 12 09/10/2021     TSH:    Lab Results   Component Value Date    TSH 2.240 09/10/2021     IRON:  No results found for: IRON  LIPASE:    Lab Results   Component Value Date    LIPASE 74 09/08/2021       ASSESSMENT AND PLAN:      Patient Active Problem List    Diagnosis Date Noted    Acute on chronic respiratory failure with hypoxia and hypercapnia ([facility] [address] 11/03/2021    CAD (coronary artery disease)  Acute diastolic congestive heart failure     Atrial fibrillation - chronic     COPD with exacerbation     Hypertension-patient hypotensive in the ED      History of severe pulmonary hypertension at 99 mmHg with +34 tricuspid regurg 09/10/2021     Diffuse leg edema probably combination of the severe pulmonary hypertension and tricuspid regurg along with acute diastolic CHF 73/01/1430     Plan:  Admit to IMC/PCU  Nursing home meds reviewed BiPAP  BiPAP -continuous at this time  DuoNeb aerosols every 4 hours  Pulmicort aerosol twice daily  Brovana aerosol twice daily  Continue warfarin   Daily INRs  Lasix 40 mg IV push twice daily  Accurate I's and O's  BiPAP 12/6 at at bedtime and as needed  Warfarin daily   SCDs lower extremities  CMP, CBC in a.m.       Christian Gutierrez DO, D.OMark  11/3/2021  9:58 AM

## 2021-11-03 NOTE — ED NOTES
Bed: 06  Expected date:   Expected time:   Means of arrival:   Comments:  Henry Velarde, ETHAN  11/03/21 6715

## 2021-11-03 NOTE — PROGRESS NOTES
Date: 11/3/2021    Time: 7:58 PM    Patient Placed On BIPAP/CPAP/ Non-Invasive Ventilation? Yes    If no must comment. Facial area red/color change? No           If YES are Blister/Lesion present? No   If yes must notify nursing staff  BIPAP/CPAP skin barrier?   Yes    Skin barrier type:mepilexlite       Comments:     11/03/21 1956   NIV Type   Skin Assessment Clean, dry, & intact   Skin Protection for O2 Device Yes   NIV Started/Stopped On  (placed pt on at this time)   Equipment Type v60   Mode Bilevel   Mask Type Full face mask   Mask Size Large   Settings/Measurements   IPAP 12 cmH20   CPAP/EPAP 6 cmH2O   Rate Ordered 12   Resp 26   FiO2  35 %   I Time/ I Time % 1.25 s   Vt Exhaled 443 mL   Minute Volume 12.1 Liters   Mask Leak (lpm) 62 lpm   Comfort Level Good   Using Accessory Muscles No         El Frey, [facility]

## 2021-11-03 NOTE — ED PROVIDER NOTES
ED  Provider Note  Admit Date/RoomTime: 11/3/2021  8:03 AM  ED Room: 06/06     HPI:   Nyla Smith is a [de-identified] y.o. male presenting to the ED for altered mental status, beginning a week ago. History comes primarily from EMS and the Medical Record. Past medical history includes atrial fibrillation, COPD, coronary artery disease, hypertension. The complaint has been persistent, moderate in severity, improved by nothing and worsened by nothing. Associated symptoms include 20 pounds of unintentional weight gain. The patient is reportedly alert and oriented x4 and conversive at baseline, however over the course the last week the patient has become progressively less active and more removed from his baseline level of mentation and interaction. During the same period of time he is reportedly put on approximately 20 pounds of weight and has had worsening swelling of his bilateral lower extremities. For this reason he was transferred to [facility] emergency department for further evaluation and treatment. On arrival, the patient was assessed with history, physical exam, imaging studies, laboratory studies and ekg, vital signs. Vital signs were stable on arrival (on patient's baseline 6 L nasal cannula) and the patient was afebrile. Review of Systems   Constitutional: Positive for activity change, appetite change, fatigue and unexpected weight change. Negative for chills and fever. HENT: Negative for ear pain, sinus pressure and sore throat. Eyes: Negative for pain, discharge and redness. Respiratory: Positive for cough and shortness of breath. Negative for wheezing. Cardiovascular: Positive for leg swelling. Negative for chest pain. Gastrointestinal: Negative for abdominal pain, diarrhea, nausea and vomiting. Genitourinary: Negative for dysuria and frequency. Musculoskeletal: Negative for arthralgias and back pain. Skin: Negative for rash and wound.    Neurological: Negative for weakness and headaches. Hematological: Negative for adenopathy. All other systems reviewed and are negative. Physical Exam  Vitals reviewed. Constitutional:       General: He is not in acute distress. Appearance: He is well-developed. He is ill-appearing. He is not diaphoretic. HENT:      Head: Normocephalic and atraumatic. Mouth/Throat:      Dentition: Abnormal dentition. Eyes:      Pupils: Pupils are equal, round, and reactive to light. Neck:      Vascular: No JVD. Trachea: No tracheal deviation. Cardiovascular:      Rate and Rhythm: Regular rhythm. Heart sounds: No murmur heard. No friction rub. No gallop. Pulmonary:      Effort: Pulmonary effort is normal. No respiratory distress. Breath sounds: Normal breath sounds. No stridor. No wheezing or rales. Chest:      Chest wall: No tenderness. Abdominal:      General: Bowel sounds are normal. There is distension. Palpations: Abdomen is soft. Tenderness: There is no abdominal tenderness. There is no guarding. Hernia: A hernia (umbillical) is present. Musculoskeletal:         General: Normal range of motion. Cervical back: Normal range of motion. Skin:     General: Skin is warm and dry. Capillary Refill: Capillary refill takes less than 2 seconds. Neurological:      General: No focal deficit present. Mental Status: He is alert and oriented to person, place, and time. Cranial Nerves: No cranial nerve deficit.    Psychiatric:         Behavior: Behavior normal.          Procedures     MDM  Number of Diagnoses or Management Options  Elevated troponin  Hypercarbia  Hypervolemia, unspecified hypervolemia type  Respiratory acidosis  Diagnosis management comments: Emergency department evaluation was notable for respiratory acidosis with a pH of 7.2, hypercarbia with a PCO2 of 85, fluid overload on chest x-ray consistent with congestive heart failure exacerbation, pitting smokeless tobacco. He reports that he does not drink alcohol and does not use drugs. Family History: family history is not on file. The patients home medications have been reviewed.     Allergies: Dye [iodides]    -------------------------------------------------- RESULTS -------------------------------------------------    LABS:  Results for orders placed or performed during the hospital encounter of 11/03/21   COVID-19, Rapid    Specimen: Nasopharyngeal Swab   Result Value Ref Range    SARS-CoV-2, NAAT Not Detected Not Detected   CBC Auto Differential   Result Value Ref Range    WBC 6.1 4.5 - 11.5 E9/L    RBC 3.72 (L) 3.80 - 5.80 E12/L    Hemoglobin 12.1 (L) 12.5 - 16.5 g/dL    Hematocrit 40.3 37.0 - 54.0 %    .3 (H) 80.0 - 99.9 fL    MCH 32.5 26.0 - 35.0 pg    MCHC 30.0 (L) 32.0 - 34.5 %    RDW 15.5 (H) 11.5 - 15.0 fL    Platelets 687 (L) 560 - 450 E9/L    MPV 11.5 7.0 - 12.0 fL    Neutrophils % 75.5 43.0 - 80.0 %    Immature Granulocytes % 0.3 0.0 - 5.0 %    Lymphocytes % 10.2 (L) 20.0 - 42.0 %    Monocytes % 10.2 2.0 - 12.0 %    Eosinophils % 3.3 0.0 - 6.0 %    Basophils % 0.5 0.0 - 2.0 %    Neutrophils Absolute 4.59 1.80 - 7.30 E9/L    Immature Granulocytes # 0.02 E9/L    Lymphocytes Absolute 0.62 (L) 1.50 - 4.00 E9/L    Monocytes Absolute 0.62 0.10 - 0.95 E9/L    Eosinophils Absolute 0.20 0.05 - 0.50 E9/L    Basophils Absolute 0.03 0.00 - 0.20 E9/L   Comprehensive Metabolic Panel w/ Reflex to MG   Result Value Ref Range    Sodium 138 132 - 146 mmol/L    Potassium reflex Magnesium 4.8 3.5 - 5.0 mmol/L    Chloride 98 98 - 107 mmol/L    CO2 32 (H) 22 - 29 mmol/L    Anion Gap 8 7 - 16 mmol/L    Glucose 102 (H) 74 - 99 mg/dL    BUN 43 (H) 6 - 23 mg/dL    CREATININE 1.0 0.7 - 1.2 mg/dL    GFR Non-African American >60 >=60 mL/min/1.73    GFR African American >60     Calcium 8.9 8.6 - 10.2 mg/dL    Total Protein 6.8 6.4 - 8.3 g/dL    Albumin 3.5 3.5 - 5.2 g/dL    Total Bilirubin 0.5 0.0 - 1.2 mg/dL Alkaline Phosphatase 192 (H) 40 - 129 U/L    ALT 9 0 - 40 U/L    AST 17 0 - 39 U/L   Troponin   Result Value Ref Range    Troponin, High Sensitivity 120 (H) 0 - 11 ng/L   Brain Natriuretic Peptide   Result Value Ref Range    Pro-BNP 3,901 (H) 0 - 450 pg/mL   Lactate, Sepsis   Result Value Ref Range    Lactic Acid, Sepsis 1.1 0.5 - 1.9 mmol/L   Protime-INR   Result Value Ref Range    Protime 21.9 (H) 9.3 - 12.4 sec    INR 1.9    Blood Gas, Arterial   Result Value Ref Range    Date Analyzed [id number]     Time Analyzed 0904     Source: Blood Arterial     pH, Blood Gas 7.204 (LL) 7.350 - 7.450    PCO2 85.0 (HH) 35.0 - 45.0 mmHg    PO2 97.4 75.0 - 100.0 mmHg    HCO3 32.8 (H) 22.0 - 26.0 mmol/L    B.E. 2.5 -3.0 - 3.0 mmol/L    O2 Sat 96.2 92.0 - 98.5 %    O2Hb 95.3 94.0 - 97.0 %    COHb 0.5 0.0 - 1.5 %    MetHb 0.4 0.0 - 1.5 %    O2 Content 16.7 mL/dL    HHb 3.8 0.0 - 5.0 %    tHb (est) 12.4 11.5 - 16.5 g/dL    Mode NC- 6 L     Comment       critical results called and read back to Kj Angel RN @1587    Date Of Collection      Time Collected      Pt Temp 37      ID [id number]     Lab [id number]     Critical(s) Notified Called to Israel Quispe RN    EKG 12 Lead   Result Value Ref Range    Ventricular Rate 79 BPM    Atrial Rate 104 BPM    QRS Duration 86 ms    Q-T Interval 374 ms    QTc Calculation (Bazett) 428 ms    R Axis -6 degrees    T Axis 30 degrees       RADIOLOGY:  XR CHEST PORTABLE   Final Result   Interstitial lung disease which may relate to chronic fibrosis. The   appearance may be somewhat exacerbated by suboptimal inspiration. Cardiomegaly.              EKG:  EKG interpretation  Rate 79  Atrial fibrillation  No significant QRS, QT prolongation  No significant ST segment depressions  Rate controlled atrial fibrillation    ------------------------- NURSING NOTES AND VITALS REVIEWED ---------------------------  Date / Time Roomed:  11/3/2021  8:03 AM  ED Bed Assignment:  06/06    The nursing notes within the ED encounter and vital signs as below have been reviewed. Patient Vitals for the past 24 hrs:   BP Temp Temp src Pulse Resp SpO2   11/03/21 0810 121/76 97.8 °F (36.6 °C) Oral 78 24 97 %       Oxygen Saturation Interpretation: Abnormal - but at baseline    ------------------------------------------ PROGRESS NOTES ------------------------------------------  Re-evaluation(s):  Time: 9:47 AM EDT  Patients symptoms show no change  Repeat physical examination is not changed    Counseling:  I have spoken with the patient and discussed todays results, in addition to providing specific details for the plan of care and counseling regarding the diagnosis and prognosis. Their questions are answered at this time and they are agreeable with the plan of admission.    --------------------------------- ADDITIONAL PROVIDER NOTES ---------------------------------  Consultations:  Time: 9:47 AM EDT. Spoke with Dr. Rashawn Waterman. Discussed case. They will admit the patient. This patient's ED course included: a personal history and physicial examination, re-evaluation prior to disposition, multiple bedside re-evaluations, IV medications, cardiac monitoring and continuous pulse oximetry    This patient has remained hemodynamically stable during their ED course. Diagnosis:  1. Respiratory acidosis    2. Elevated troponin    3. Hypercarbia    4. Hypervolemia, unspecified hypervolemia type        Disposition:  Patient's disposition: Admit to telemetry  Patient's condition is fair. Please note that the withdrawal or failure to initiate urgent interventions for this patient would likely result in a life threatening deterioration or permanent disability. Accordingly this patient received 30 minutes of critical care time, excluding separately billable procedures.            Jaz Orr [address]., DO  Resident  11/03/21 [id number]

## 2021-11-03 NOTE — PROGRESS NOTES
Bipap setup with size lrg Full face mask and settings of 18/6 and 40% to achieve Vt of 385 and Spo2 of 92%.  Attempted NT sxn with minimal results RN notified

## 2021-11-04 NOTE — PROGRESS NOTES
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 male who presents with altered mental status at the nursing home. Patient symptoms started about a week ago patient was also noted to have increased weight gain and increasing lower leg edema. Patient normally alert and oriented x4 but has become much more lethargic and interactive. Patient gained 20 pounds at the nursing home. Patient was brought into the emergency room with patient chest x-ray showed interstitial lung disease and also cardiomegaly. ABG showed a pH 7.204 with a CO2 of 85 on 6 L nasal cannula. Patient then was started on BiPAP.  BUN/creatinine 43/1.0 with proBNP 3900 and troponin initially was 120. Liver enzymes are normal.  Heart rate was 75 normal temperature and blood pressure initially at 873 systolic currently is 34/28. Patient currently on 40% FiO2 BiPAP with O2 sat of 93%. Patient states he has been wearing O2 nasal cannula for last couple weeks at the nursing home but not before that. Patient denies wearing BiPAP in the past.  He denies any problem with any chest pain, abdominal pain, nausea vomiting or dizziness. ECG reviewed with patient have atrial fibrillation with no significant ST-T changes to suggest acute myocardial injury troponin was elevated on admission but troponin I-II hours later was less than on admission. 11/4/2021  Patient seen examined on PCU. Patient still very short of breath even at rest.  Patient seems to do fairly good on the BiPAP once he is off of it he seemed to have some more respiratory distress. BUN/creatinine 46/1.0 with patient's transaminases normal TSH 4.56. WBC 5.1 with hemoglobin 11. INR is 2.2 today. Temperature 98.3 with a heart rate of 100 blood pressure 130/40. O2 sat 91% on 7 L nasal cannula.  ABGs today were improved but the patient had a pH increase 7.355 with PCO2 decreased 56.2 while the patient was on BiPAP. Patient urine output has picked up since early this morning. Past Medical History:    Past Medical History:   Diagnosis Date    [facility] [address]     CAD (coronary artery disease)     coronary stent 12yrs ago    COPD (chronic obstructive pulmonary disease) ([facility] [address]     Hypertension      Past Surgical History:    Past Surgical History:   Procedure Laterality Date    CARDIAC SURGERY      coronary stent    CATARACT REMOVAL WITH IMPLANT Left 04 01 2013    COLONOSCOPY      CYST REMOVAL      tailbone    EYE SURGERY         Medications Prior to Admission:    @  Prior to Admission medications    Medication Sig Start Date End Date Taking? Authorizing Provider   ipratropium-albuterol (DUONEB) 0.5-2.5 (3) MG/3ML SOLN nebulizer solution Inhale 3 mLs into the lungs 4 times daily 9/16/21   Norma Igelsias, DO   albuterol (PROVENTIL) (2.5 MG/3ML) 0.083% nebulizer solution Take 3 mLs by nebulization every 4 hours as needed for Wheezing or Shortness of Breath 9/16/21   Norma Iglesias, DO   Respiratory Therapy Supplies (FULL KIT NEBULIZER SET) MISC Use as directed with nebulized medication. 9/16/21   Norma Iglesias, DO   warfarin (COUMADIN) 4 MG tablet 4 mg daily 6 PM 9/16/21   Norma Iglesias, DO   tamsulosin (FLOMAX) 0.4 MG capsule Take 0.4 mg by mouth daily    Historical Provider, MD   terazosin (HYTRIN) 5 MG capsule Take 10 mg by mouth daily. Historical Provider, MD   metoprolol (TOPROL-XL) 50 MG XL tablet Take 50 mg by mouth daily. Historical Provider, MD   finasteride (PROSCAR) 5 MG tablet Take 5 mg by mouth daily. Historical Provider, MD   aspirin 81 MG tablet Take 81 mg by mouth daily.     Historical Provider, MD       Allergies:  Dye [iodides]    Social History:   Social History     Socioeconomic History    Marital status:      Spouse name: Not on file    Number of children: Not on file    Years of education: Not on file    Highest education level: Not on file   Occupational History    Not on file   Tobacco Use    Smoking status: Former Smoker     Quit date: 3/27/1992     Years since quittin.6    Smokeless tobacco: Never Used   Substance and Sexual Activity    Alcohol use: No    Drug use: No    Sexual activity: Not on file   Other Topics Concern    Not on file   Social History Narrative    Not on file     Social Determinants of Health     Financial Resource Strain:     Difficulty of Paying Living Expenses:    Food Insecurity:     Worried About Running Out of Food in the Last Year:     [address] in the Last Year:    Transportation Needs:     Lack of Transportation (Medical):  Lack of Transportation (Non-Medical):    Physical Activity:     Days of Exercise per Week:     Minutes of Exercise per Session:    Stress:     Feeling of Stress :    Social Connections:     Frequency of Communication with Friends and Family:     Frequency of Social Gatherings with Friends and Family:     Attends [employer] Services:     Active Member of Clubs or Organizations:     Attends Club or Organization Meetings:     Marital Status:    Intimate Partner Violence:     Fear of Current or Ex-Partner:     Emotionally Abused:     Physically Abused:     Sexually Abused:        Family History:   History reviewed. No pertinent family history. REVIEW OF SYSTEMS:   Gen: Patient initially admitted from the nursing home with altered mental status but is doing much better at this time. Patient denies any lightheadedness or dizziness. No LOC or syncope. No fevers or chills. HEENT: No earache, sore throat or nasal congestion. Resp: Denies cough, hemoptysis or sputum production. Cardiac: Denies chest pain, +SOB, no diaphoresis or palpitations. GI: No nausea, vomiting, diarrhea or constipation. No melena or hematochezia.     : No urinary complaints, dysuria, hematuria or frequency. MSK: + Diffuse lower extremity edema. No extremity weakness, paralysis or paresthesias. PHYSICAL EXAM:    Vitals:  BP (!) 130/40   Pulse 100   Temp 98 °F (36.7 °C) (Oral)   Resp 16   Ht 5' 8\" (1.727 m)   Wt 222 lb 0.1 oz (100.7 kg)   SpO2 91%   BMI 33.76 kg/m²     General:  This is a [de-identified] y.o. yo male who is alert and oriented in mild respiratory distress  HEENT:  Head is normocephalic and atraumatic, PERRLA, EOMI, mucus membranes moist with no pharyngeal erythema or exudate. Neck:  Supple with no carotid bruits, JVD or thyromegaly.   No cervical adenopathy  CV:  Regular rate and rhythm, no murmurs  Lungs: Coarse decreased breath sounds to auscultation bilaterally with no wheezes, rales or rhonchi  Abdomen:  Soft, nontender,+ abdominal fat, nondistended, bowel sounds present  Extremities:  + 2-3 leg edema, peripheral pulses intact bilaterally  Neuro:  Cranial nerves II-XII grossly intact; motor and sensory function intact with no focal deficits  Skin:  No rashes, lesions or wounds    DATA:  CBC with Differential:    Lab Results   Component Value Date    WBC 5.1 11/04/2021    RBC 3.36 11/04/2021    HGB 11.0 11/04/2021    HCT 36.2 11/04/2021     11/04/2021    .7 11/04/2021    MCH 32.7 11/04/2021    MCHC 30.4 11/04/2021    RDW 15.2 11/04/2021    BLASTSPCT 0.0 11/04/2021    LYMPHOPCT 10.0 11/04/2021    MONOPCT 6.0 11/04/2021    BASOPCT 0.0 11/04/2021    MONOSABS 0.31 11/04/2021    LYMPHSABS 0.51 11/04/2021    EOSABS 0.36 11/04/2021    BASOSABS 0.00 11/04/2021     CMP:    Lab Results   Component Value Date     11/04/2021    K 4.5 11/04/2021    K 4.8 11/03/2021    CL 99 11/04/2021    CO2 33 11/04/2021    BUN 46 11/04/2021    CREATININE 1.0 11/04/2021    GFRAA >60 11/04/2021    LABGLOM >60 11/04/2021    GLUCOSE 82 11/04/2021    PROT 6.2 11/04/2021    LABALBU 3.2 11/04/2021    CALCIUM 8.8 11/04/2021    BILITOT 0.4 11/04/2021    ALKPHOS 156 11/04/2021    AST 15 11/04/2021    ALT 10 11/04/2021     Magnesium:    Lab Results   Component Value Date    MG 2.2 11/04/2021     Phosphorus:    Lab Results   Component Value Date    PHOS 4.2 11/04/2021     PT/INR:    Lab Results   Component Value Date    PROTIME 25.9 11/04/2021    INR 2.2 11/04/2021     Troponin:  No results found for: TROPONINI  U/A:    Lab Results   Component Value Date    COLORU Yellow 11/03/2021    PROTEINU Negative 11/03/2021    PHUR 5.0 11/03/2021    WBCUA 1-3 11/03/2021    RBCUA 10-20 11/03/2021    BACTERIA RARE 11/03/2021    CLARITYU Clear 11/03/2021    SPECGRAV 1.025 11/03/2021    LEUKOCYTESUR Negative 11/03/2021    UROBILINOGEN 0.2 11/03/2021    BILIRUBINUR Negative 11/03/2021    BLOODU LARGE 11/03/2021    GLUCOSEU Negative 11/03/2021     ABG:    Lab Results   Component Value Date    PH 7.355 11/04/2021    PCO2 56.2 11/04/2021    PO2 105.6 11/04/2021    HCO3 30.7 11/04/2021    BE 4.0 11/04/2021    O2SAT 97.5 11/04/2021     HgBA1c:  No results found for: LABA1C  FLP:    Lab Results   Component Value Date    TRIG 46 11/04/2021    HDL 81 11/04/2021    LDLCALC 46 11/04/2021    LABVLDL 9 11/04/2021     TSH:    Lab Results   Component Value Date    TSH 4.560 11/04/2021     IRON:  No results found for: IRON  LIPASE:    Lab Results   Component Value Date    LIPASE 74 09/08/2021       ASSESSMENT AND PLAN:      Patient Active Problem List    Diagnosis Date Noted    Acute on chronic respiratory failure with hypoxia and hypercapnia ([facility] [address] 11/03/2021    CAD (coronary artery disease)  Acute diastolic congestive heart failure     Atrial fibrillation - chronic     COPD with exacerbation     Hypertension-patient hypotensive in the ED      History of severe pulmonary hypertension at 99 mmHg with +34 tricuspid regurg 09/10/2021     Diffuse leg edema probably combination of the severe pulmonary hypertension and tricuspid regurg along with acute diastolic CHF 10/06/8514     Plan:  Admit to IMC/PCU  Nursing home meds reviewed BiPAP  BiPAP -continuous at this time  DuoNeb aerosols every 4 hours  Pulmicort aerosol twice daily  Brovana aerosol twice daily  Continue warfarin   Daily INRs  Lasix 40 mg IV push twice daily  Accurate I's and O's  BiPAP 12/6 at at bedtime and as needed  Warfarin daily   SCDs lower extremities    Consult pulmonology    CMP, CBC in honey Ayala DO, DJEANNA  11/4/2021  11:47 AM

## 2021-11-04 NOTE — CONSULTS
Pulmonary/Critical Care Consult Note    CHIEF COMPLAINT: Shortness of breath and respiratory failure    HISTORY OF PRESENT ILLNESS: 81-year-old gentleman presents from local nursing home with altered mental status. Patient symptoms have been progressive over the week prior to his presentation with generalized edema marked in his lower legs. His weight is up 20 pounds as well. Patient underwent extensive evaluation in the emergency room positive for hypercapnic and hypoxic respiratory failure chest x-ray showing diffuse interstitial edema. Patient was placed on NIV BiPAP on 100% holding his saturations marginally. Considerations for transfer to unit. Past medical history is positive for chronic atrial fibrillation, coronary disease, COPD, hypertension and valvular heart disease. ALLERGY:  Dye [iodides]    FAMILY HISTORY:  History reviewed. No pertinent family history. SOCIAL HISTORY:  Social History     Socioeconomic History    Marital status:      Spouse name: Not on file    Number of children: Not on file    Years of education: Not on file    Highest education level: Not on file   Occupational History    Not on file   Tobacco Use    Smoking status: Former Smoker     Quit date: 3/27/1992     Years since quittin.6    Smokeless tobacco: Never Used   Substance and Sexual Activity    Alcohol use: No    Drug use: No    Sexual activity: Not on file   Other Topics Concern    Not on file   Social History Narrative    Not on file     Social Determinants of Health     Financial Resource Strain:     Difficulty of Paying Living Expenses:    Food Insecurity:     Worried About Running Out of Food in the Last Year:     [address] in the Last Year:    Transportation Needs:     Lack of Transportation (Medical):      Lack of Transportation (Non-Medical):    Physical Activity:     Days of Exercise per Week:     Minutes of Exercise per Session:    Stress:     Feeling of Stress : Social Connections:     Frequency of Communication with Friends and Family:     Frequency of Social Gatherings with Friends and Family:     Attends [employer] Services:     Active Member of Clubs or Organizations:     Attends Club or Organization Meetings:     Marital Status:    Intimate Partner Violence:     Fear of Current or Ex-Partner:     Emotionally Abused:     Physically Abused:     Sexually Abused:        MEDICAL HISTORY:  Past Medical History:   Diagnosis Date    [facility]     CAD (coronary artery disease)     coronary stent 12yrs ago    COPD (ch[facility] obstructive [facility] disease) ([facility]     Hypertension        MEDICATIONS:   methylPREDNISolone  60 mg IntraVENous Q8H    nitroglycerin  0.5 inch Topical Once    doxazosin  4 mg Oral Daily    metoprolol succinate  50 mg Oral Daily    aspirin  81 mg Oral Daily    tamsulosin  0.4 mg Oral Dinner    ipratropium-albuterol  3 mL Inhalation 4x Daily    warfarin  4 mg Oral Daily    furosemide  40 mg IntraVENous BID    Arformoterol Tartrate  15 mcg Nebulization BID    finasteride  5 mg Oral Daily    influenza virus vaccine  0.5 mL IntraMUSCular Prior to discharge       albuterol, acetaminophen, trimethobenzamide, polyethylene glycol    Review of Systems   HENT: Positive for congestion. Eyes: Negative. Respiratory: Positive for cough and shortness of breath. Patient has been bedbound for at least a month. He wears oxygen on and off at home   Cardiovascular: Negative. Gastrointestinal: Negative. Endocrine: Negative. Genitourinary: Negative. Musculoskeletal: Positive for arthralgias. Skin: Negative. Allergic/Immunologic: Negative. Neurological: Positive for weakness. Hematological: Negative. Psychiatric/Behavioral: Negative.         PHYSICAL EXAM:  Vitals:    11/04/21 1347   BP:    Pulse:    Resp: 16   Temp:    SpO2:      FiO2 : (S) 100 %  O2 Flow Rate (L/min): 7 L/min  O2 Device: PAP (positive airway pressure)    Constitutional: Ill by his appearance with scruffy beard on his NIV support. Skin: +4 edema almost weeping in his lower legs bilaterally. He has generalized anasarca  HEENT: Normocephalic neck is supple pharynx is clear no sinus tenderness external nose grossly lesion  Neck: No JVD no carotid bruits  Cardiovascular: Rate and rhythm Irregular no gallop no murmur  Respiratory: Few rales bilaterally short excursions  Gastrointestinal: Soft bowel sounds present full  Genitourinary: Deferred  Extremities: Marked edema as above no ischemia no palpable DVT  Neurological: Patient is profoundly weak but is able to speak in short responses while he is on the BiPAP pulling his mask away. Patient understands his illness and his day-to-day status which is profoundly weak and bedbound.   Patient agrees with DNR CCA no intubation and he agrees with my talking with his son who is his POA  Psychological: Patient appears appropriate understands is almost    LABS:  WBC   Date Value Ref Range Status   11/04/2021 5.1 4.5 - 11.5 E9/L Final   11/03/2021 6.1 4.5 - 11.5 E9/L Final   09/13/2021 8.2 4.5 - 11.5 E9/L Final     Hemoglobin   Date Value Ref Range Status   11/04/2021 11.0 (L) 12.5 - 16.5 g/dL Final   11/03/2021 12.1 (L) 12.5 - 16.5 g/dL Final   09/13/2021 13.7 12.5 - 16.5 g/dL Final     Hematocrit   Date Value Ref Range Status   11/04/2021 36.2 (L) 37.0 - 54.0 % Final   11/03/2021 40.3 37.0 - 54.0 % Final   09/13/2021 41.2 37.0 - 54.0 % Final     MCV   Date Value Ref Range Status   11/04/2021 107.7 (H) 80.0 - 99.9 fL Final   11/03/2021 108.3 (H) 80.0 - 99.9 fL Final   09/13/2021 98.8 80.0 - 99.9 fL Final     Platelets   Date Value Ref Range Status   11/04/2021 115 (L) 130 - 450 E9/L Final   11/03/2021 120 (L) 130 - 450 E9/L Final   09/13/2021 182 130 - 450 E9/L Final     Sodium   Date Value Ref Range Status   11/04/2021 139 132 - 146 mmol/L Final   11/03/2021 138 132 - 146 mmol/L Final   09/16/2021 138 132 - 146 mmol/L Final     Potassium   Date Value Ref Range Status   11/04/2021 4.5 3.5 - 5.0 mmol/L Final   09/16/2021 5.0 3.5 - 5.0 mmol/L Final   09/13/2021 5.0 3.5 - 5.0 mmol/L Final     Potassium reflex Magnesium   Date Value Ref Range Status   11/03/2021 4.8 3.5 - 5.0 mmol/L Final   09/08/2021 6.5 (H) 3.5 - 5.0 mmol/L Final     Comment:     Specimen is moderately Hemolyzed. Result may be artificially increased.      Chloride   Date Value Ref Range Status   11/04/2021 99 98 - 107 mmol/L Final   11/03/2021 98 98 - 107 mmol/L Final   09/16/2021 106 98 - 107 mmol/L Final     CO2   Date Value Ref Range Status   11/04/2021 33 (H) 22 - 29 mmol/L Final   11/03/2021 32 (H) 22 - 29 mmol/L Final   09/16/2021 25 22 - 29 mmol/L Final     BUN   Date Value Ref Range Status   11/04/2021 46 (H) 6 - 23 mg/dL Final   11/03/2021 43 (H) 6 - 23 mg/dL Final   09/16/2021 23 6 - 23 mg/dL Final     CREATININE   Date Value Ref Range Status   11/04/2021 1.0 0.7 - 1.2 mg/dL Final   11/03/2021 1.0 0.7 - 1.2 mg/dL Final   09/16/2021 0.7 0.7 - 1.2 mg/dL Final     Glucose   Date Value Ref Range Status   11/04/2021 82 74 - 99 mg/dL Final   11/03/2021 102 (H) 74 - 99 mg/dL Final   09/16/2021 92 74 - 99 mg/dL Final     Calcium   Date Value Ref Range Status   11/04/2021 8.8 8.6 - 10.2 mg/dL Final   11/03/2021 8.9 8.6 - 10.2 mg/dL Final   09/16/2021 8.8 8.6 - 10.2 mg/dL Final     Total Protein   Date Value Ref Range Status   11/04/2021 6.2 (L) 6.4 - 8.3 g/dL Final   11/03/2021 6.8 6.4 - 8.3 g/dL Final   09/13/2021 7.0 6.4 - 8.3 g/dL Final     Albumin   Date Value Ref Range Status   11/04/2021 3.2 (L) 3.5 - 5.2 g/dL Final   11/03/2021 3.5 3.5 - 5.2 g/dL Final   09/13/2021 3.5 3.5 - 5.2 g/dL Final     Total Bilirubin   Date Value Ref Range Status   11/04/2021 0.4 0.0 - 1.2 mg/dL Final   11/03/2021 0.5 0.0 - 1.2 mg/dL Final   09/13/2021 0.4 0.0 - 1.2 mg/dL Final     Alkaline Phosphatase   Date Value Ref Range Status   11/04/2021 156 (H) 40 - 129 U/L Final   11/03/2021 192 (H) 40 - 129 U/L Final   09/13/2021 171 (H) 40 - 129 U/L Final     AST   Date Value Ref Range Status   11/04/2021 15 0 - 39 U/L Final   11/03/2021 17 0 - 39 U/L Final   09/13/2021 24 0 - 39 U/L Final     ALT   Date Value Ref Range Status   11/04/2021 10 0 - 40 U/L Final   11/03/2021 9 0 - 40 U/L Final   09/13/2021 20 0 - 40 U/L Final     GFR Non-   Date Value Ref Range Status   11/04/2021 >60 >=60 mL/min/1.73 Final     Comment:     Chronic Kidney Disease: less than 60 ml/min/1.73 sq.m. Kidney Failure: less than 15 ml/min/1.73 sq.m. Results valid for patients 18 years and older. 11/03/2021 >60 >=60 mL/min/1.73 Final     Comment:     Chronic Kidney Disease: less than 60 ml/min/1.73 sq.m. Kidney Failure: less than 15 ml/min/1.73 sq.m. Results valid for patients 18 years and older. 09/16/2021 >60 >=60 mL/min/1.73 Final     Comment:     Chronic Kidney Disease: less than 60 ml/min/1.73 sq.m. Kidney Failure: less than 15 ml/min/1.73 sq.m. Results valid for patients 18 years and older. GFR    Date Value Ref Range Status   11/04/2021 >60  Final   11/03/2021 >60  Final   09/16/2021 >60  Final     Magnesium   Date Value Ref Range Status   11/04/2021 2.2 1.6 - 2.6 mg/dL Final     Phosphorus   Date Value Ref Range Status   11/04/2021 4.2 2.5 - 4.5 mg/dL Final     Recent Labs     11/04/21  0506   PH 7.355   PO2 105.6*   PCO2 56.2*   HCO3 30.7*   BE 4.0*   O2SAT 97.5       RADIOLOGY:  XR CHEST PORTABLE   Final Result   Interstitial lung disease which may relate to chronic fibrosis. The   appearance may be somewhat exacerbated by suboptimal inspiration. Cardiomegaly. IMPRESSION:  1. Acute on chronic respiratory failure with hypercapnia and hypoxia  2. Coronary disease with diastolic heart failure severe  3. Chronic atrial fibrillation  4. Severe pulmonary hypertension with plus for tricuspid regurg  5.  Protein caloric malnutrition    PLAN:  1. Diuresis  2. Aggressive pulmonary support with nebulizers steroids  3. Continue NIV support  4. DNR CCA patient appears to understand his illness and his quality of life which is poor prior to this presentation. 5. Serial labs  6. Transfer of the [facility] if beds are available    ATTESTATION:  ICU Staff Physician note of personal involvement in Care  As the attending physician, I certify that I personally reviewed the patients history and personnally examined the patient to confirm the physical findings described above,  And that I reviewed the relevant imaging studies and available reports. I also discussed the differential diagnosis and all of the proposed management plans with the patient and individuals accompanying the patient to this visit. They had the opportunity to ask questions about the proposed management plans and to have those questions answered. This patient has a high probability of sudden, clinically significant deterioration, which requires the highest level of physician preparedness to intervene urgently. I managed/supervised life or organ supporting interventions that required frequent physician assessment. I devoted my full attention to the direct care of this patient for the amount of time indicated below. Time I spent with the family or surrogate(s) is included only if the patient was incapable of providing the necessary information or participating in medical decisions  Time devoted to teaching and to any procedures I billed separately is not included.     CRITICAL CARE TIME:  45 minutes    Electronically signed by Susie Alexander DO on 11/4/2021 at 2:26 PM

## 2021-11-04 NOTE — PROGRESS NOTES
Physical Therapy Initial Evaluation/Plan of Care    Room #:  0181/7070-14  Patient Name: Noah Marks  YOB: 1940  MRN: [id number]    Date of Service: 11/4/2021     Tentative placement recommendation: Subacute rehab  Equipment recommendation: To be determined      Evaluating Physical Therapist: Rosa Schwarz #[id number]      Specific Provider Orders/Date/Referring Provider :   11/03/21 1300   PT eval and treat Start: 11/03/21 1300, End: 11/03/21 1300, ONE TIME, Standing Count: 1 Occurrences, R    Isabell Norris DO    Admitting Diagnosis:   Respiratory acidosis [E87.2]  Hypercarbia [R06.89]  Elevated troponin [R77.8]  Acute on chronic respiratory failure with hypoxia and hypercapnia (HCC) [J96.21, J96.22]  Hypervolemia, unspecified hypervolemia type [E87.70]      Surgery: none  Visit Diagnoses       Codes    Respiratory acidosis    -  Primary E87.2    Elevated troponin     R77.8    Hypercarbia     R06.89    Hypervolemia, unspecified hypervolemia type     E87.70          Patient Active Problem List   Diagnosis    Metabolic encephalopathy    Inability to walk    Acute on chronic respiratory failure with hypoxia and hypercapnia (HCC)    CAD (coronary artery disease)    Atrial fibrillation (HCC)    COPD (chronic obstructive [facility] disease) ([facility]    Hypertension        ASSESSMENT of Current Deficits Patient exhibits decreased strength, balance and endurance impairing functional mobility, transfers and gait . Maximal assist to sit EOB. The patient  demonstrates decreased strength and endurance limiting  tolerance for functional mobility at this time and making the patient a high risk for falls. Pt will need subacute rehab at time of discharge in order to participate in a skilled comprehensive therapy program to address deficits and improve the patient 's  functional levels.  This will allow the patien to return home requiring the least amount of assistance from others and make the patient safer and  decrease the patient' s  risk for falls. PHYSICAL THERAPY  PLAN OF CARE       Physical therapy plan of care is established based on physician order,  patient diagnosis and clinical assessment    Current Treatment Recommendations:    -Bed Mobility: Lower extremity exercises   -Sitting Balance: Incorporate reaching activities to activate trunk muscles   -Standing Balance: Perform strengthening exercises in standing to promote motor control with or without upper extremity support   -Transfers: Provide instruction on proper hand and foot position for adequate transfer of weight onto lower extremities and use of gait device if needed and Cues for hand placement, technique and safety. Provide stabilization to prevent fall   -Gait: Gait training and Standing activities to improve: base of support, weight shift, weight bearing      PT long term treatment goals are located in below grid    Patient and or family understand(s) diagnosis, prognosis, and plan of care. Frequency of treatments: Patient will be seen  daily.          Prior Level of Function: Patient ambulated with wheeled walker with assistance   Rehab Potential: good  for baseline    Past medical history:   Past Medical History:   Diagnosis Date    Atrial fibrillation ([facility]     CAD (coronary artery disease)     coronary stent 12yrs ago    COPD (chronic obstructive pulmonary disease) ([facility]     Hypertension      Past Surgical History:   Procedure Laterality Date    CARDIAC SURGERY      coronary stent    CATARACT REMOVAL WITH IMPLANT Left 04 01 2013    COLONOSCOPY      CYST REMOVAL      tailbone    EYE SURGERY         SUBJECTIVE:    Precautions: , falls, alarm and bipap , external catheter   Social history: Patient came to hospital from [place] [address] owned: [employer],      [address] [place] [address],[address] Mobility Inpatient   How much difficulty turning over in bed?: A Lot  How much difficulty sitting down on / standing up from a chair with arms?: A Lot  How much difficulty moving from lying on back to sitting on side of bed?: A Lot  How much help from another person moving to and from a bed to a chair?: A Lot  How much help from another person needed to walk in hospital room?: Total  How much help from another person for climbing 3-5 steps with a railing?: Total  AM-PAC Inpatient Mobility Raw Score : 10  AM-PAC Inpatient T-Scale Score : 32.29  Mobility Inpatient CMS 0-100% Score: 76.75  Mobility Inpatient CMS G-Code Modifier : CL    Nursing cleared patient for PT evaluation. The admitting diagnosis and active problem list as listed above have been reviewed prior to the initiation of this evaluation. OBJECTIVE;   Initial Evaluation  Date: 11/4/2021 Treatment Date:     Short Term/ Long Term   Goals   Was pt agreeable to Eval/treatment? Yes  To be met in 5 days   Pain level   0/10       Bed Mobility    Rolling: Minimal assist of 1    Supine to sit: Maximal assist of 1    Sit to supine: Maximal assist of 1    Scooting: Moderate assist of 1    Rolling: Independent    Supine to sit: Minimal assist of 1    Sit to supine: Minimal assist of 1    Scooting: Minimal assist of 1     Transfers Sit to stand: Not assessed  d/t to pt overall debility, decreased activity tolerance, balance deficits, safety and fall risk. Sit to stand:  Moderate assist of 1    Ambulation    not assessed    5 feet using  wheeled walker with Moderate assist of 1          ROM Within functional limits    Increase range of motion 10% of affected joints    Strength BUE:  refer to OT eval  RLE:  3/5  LLE:  3/5  Increase strength in affected mm groups by 1/3 grade   Balance Sitting EOB:  poor   Dynamic Standing:  not assessed   Sitting EOB:  fair   Dynamic Standing: fair -     Patient is Alert & Oriented x person, place, time and situation and follows directions    Sensation:  Patient  reports numbness/tingling right foot     Edema:  yes bilateral lower extremities   Endurance: fair  -    Vitals: 8 liters high flow nasal cannula   Blood Pressure at rest  Blood Pressure during session    Heart Rate at rest 75 Heart Rate during session    SPO2 at rest 89%  SPO2 during session %     Patient education  Patient educated on role of Physical Therapy, risks of immobility, safety and plan of care,  importance of mobility while in hospital , purse lip breathing, ankle pumps, quad set and glut set for edema control, blood clot prevention, importance and purpose of adaptive device and adjusted to proper height for the patient. and safety      Patient response to education:   Pt verbalized understanding Pt demonstrated skill Pt requires further education in this area   Yes Partial Yes      Treatment:  Patient practiced and was instructed/facilitated in the following treatment: Patient assisted to EOB. Sat edge of bed 5 minutes with Maximal assist of 1 to increase dynamic sitting balance and activity tolerance. Pt assisted back to supine, max assist x2 to [facility]. Pt performed supine exercises. Therapeutic Exercises: AAROM ankle pumps, heel raises, quad sets, glut sets, hip abduction/adduction and straight leg raise  x 10 reps. At end of session, patient in bed with alarm call light and phone within reach,  all lines and tubes intact, nursing notified. Patient would benefit from continued skilled Physical Therapy to improve functional independence and quality of life. Patient's/ family goals   home    Time in  0821  Time out  0851    Total Treatment Time  10 minutes    Evaluation time includes thorough review of current medical information, gathering information on past medical history/social history and prior level of function, completion of standardized testing/informal observation of tasks, assessment of data, and development of Plan of care and goals.      CPT codes:  Low Complexity PT evaluation (21597)  Therapeutic exercises (79583)   10 minutes  1 unit(s)    Denilson Gee, PT

## 2021-11-04 NOTE — CARE COORDINATION
SS NOTE: COVID NEGATIVE 11/3. This pt is a long term care/ private pay Resident of [facility]. Plan is for pt to return there at [facility] per pts' son Naif Galeana. Pt will need NO PRECERT to return to  - he will need a signed EMILY and current PT/OT notes. Pt is on 7 liters of O2 here and usually is on 5 liters at [facility]. PT and OT are on consult. SS to continue. Gal Merlos. 11/4/2021.11/4/2021.12:16 PM.

## 2021-11-04 NOTE — PLAN OF CARE
Problem: Falls - Risk of:  Goal: Will remain free from falls  Description: Will remain free from falls  11/3/2021 2343 by Sekou Barros RN  Outcome: Met This Shift  11/3/2021 2005 by Hanh Delgado RN  Outcome: Met This Shift  Goal: Absence of physical injury  Description: Absence of physical injury  11/3/2021 2343 by Sekou Barros RN  Outcome: Met This Shift  11/3/2021 2005 by Hanh Delgado RN  Outcome: Met This Shift     Problem: Skin Integrity:  Goal: Will show no infection signs and symptoms  Description: Will show no infection signs and symptoms  11/3/2021 2343 by Sekou Barros RN  Outcome: Met This Shift  11/3/2021 2005 by Hanh Delgado RN  Outcome: Met This Shift  Goal: Absence of new skin breakdown  Description: Absence of new skin breakdown  11/3/2021 2343 by Sekou Barros RN  Outcome: Met This Shift  11/3/2021 2005 by Hanh Delgado RN  Outcome: Met This Shift

## 2021-11-05 NOTE — PROGRESS NOTES
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 male who presents with altered mental status at the nursing home. Patient symptoms started about a week ago patient was also noted to have increased weight gain and increasing lower leg edema. Patient normally alert and oriented x4 but has become much more lethargic and interactive. Patient gained 20 pounds at the nursing home. Patient was brought into the emergency room with patient chest x-ray showed interstitial lung disease and also cardiomegaly. ABG showed a pH 7.204 with a CO2 of 85 on 6 L nasal cannula. Patient then was started on BiPAP.  BUN/creatinine 43/1.0 with proBNP 3900 and troponin initially was 120. Liver enzymes are normal.  Heart rate was 75 normal temperature and blood pressure initially at 859 systolic currently is 84/09. Patient currently on 40% FiO2 BiPAP with O2 sat of 93%. Patient states he has been wearing O2 nasal cannula for last couple weeks at the nursing home but not before that. Patient denies wearing BiPAP in the past.  He denies any problem with any chest pain, abdominal pain, nausea vomiting or dizziness. ECG reviewed with patient have atrial fibrillation with no significant ST-T changes to suggest acute myocardial injury troponin was elevated on admission but troponin I-II hours later was less than on admission. 11/4/2021  Patient seen examined on PCU. Patient still very short of breath even at rest.  Patient seems to do fairly good on the BiPAP once he is off of it he seemed to have some more respiratory distress. BUN/creatinine 46/1.0 with patient's transaminases normal TSH 4.56. WBC 5.1 with hemoglobin 11. INR is 2.2 today. Temperature 98.3 with a heart rate of 100 blood pressure 130/40. O2 sat 91% on 7 L nasal cannula.  ABGs today were improved but the patient had a pH increase 7.355 with PCO2 decreased 56.2 while the patient was on BiPAP. Patient urine output has picked up since early this morning. 11/5/2021  Patient seen examined on PCU. Case discussed with intensivist yesterday afternoon. Patient looks a little bit better today. Nursing states that the patient has been changed to a DNR CC a with no chest compressions, intubation or cardioversion. Patient denies any chest or abdominal pain. Patient still with +34 lower leg edema. BUN/creatinine 48/1.0 with the patient's Liver enzymes are normal with a WBC 3.1 with hemoglobin 11.1. Temperature 97.7 with heart rate of 70 blood pressure 123/48. O2 sat is 91% on 10 L high flow nasal cannula. Urine output seems to be adequate. Past Medical History:    Past Medical History:   Diagnosis Date    [facility]     CAD (coronary artery disease)     coronary stent 12yrs ago    COPD (chronic obstructive [facility] disease) [facility]     Hypertension      Past Surgical History:    Past Surgical History:   Procedure Laterality Date    CARDIAC SURGERY      coronary stent    CATARACT REMOVAL WITH IMPLANT Left 04 01 2013    COLONOSCOPY      [facility]      [facility]    EYE SURGERY         Medications Prior to Admission:    @  Prior to Admission medications    Medication Sig Start Date End Date Taking? Authorizing Provider   ipratropium-albuterol (DUONEB) 0.5-2.5 (3) MG/3ML SOLN nebulizer solution Inhale 3 mLs into the lungs 4 times daily 9/16/21   Melissa Bales, DO   albuterol (PROVENTIL) (2.5 MG/3ML) 0.083% nebulizer solution Take 3 mLs by nebulization every 4 hours as needed for Wheezing or Shortness of Breath 9/16/21   Melissa Bales, DO   Respiratory Therapy Supplies (FULL KIT NEBULIZER SET) MISC Use as directed with nebulized medication.  9/16/21   Lisbonopal Nicholsong, DO   warfarin (COUMADIN) 4 MG tablet 4 mg daily 6 PM 9/16/21   Melissa Bales, DO   tamsulosin History:   History reviewed. No pertinent family history. REVIEW OF SYSTEMS:   Gen: Patient initially admitted from the nursing home with altered mental status but is doing much better at this time. Patient denies any lightheadedness or dizziness. No LOC or syncope. No fevers or chills. HEENT: No earache, sore throat or nasal congestion. Resp: Denies cough, hemoptysis or sputum production. Cardiac: Denies chest pain, +SOB, no diaphoresis or palpitations. GI: No nausea, vomiting, diarrhea or constipation. No melena or hematochezia. : No urinary complaints, dysuria, hematuria or frequency. MSK: + Diffuse lower extremity edema. No extremity weakness, paralysis or paresthesias. PHYSICAL EXAM:    Vitals:  BP (!) 123/48   Pulse 70   Temp 97.7 °F (36.5 °C) (Oral)   Resp 24   Ht 5' 8\" (1.727 m)   Wt 222 lb 0.1 oz (100.7 kg)   SpO2 91%   BMI 33.76 kg/m²     General:  This is a [de-identified] y.o. yo male who is alert and oriented in mild respiratory distress  HEENT:  Head is normocephalic and atraumatic, PERRLA, EOMI, mucus membranes moist with no pharyngeal erythema or exudate. Neck:  Supple with no carotid bruits, JVD or thyromegaly.   No cervical adenopathy  CV:  Regular rate and rhythm, no murmurs  Lungs: Coarse decreased breath sounds to auscultation bilaterally with no wheezes, rales or rhonchi  Abdomen:  Soft, nontender,+ abdominal fat, nondistended, bowel sounds present  Extremities:  + 2-3 leg edema, peripheral pulses intact bilaterally  Neuro:  Cranial nerves II-XII grossly intact; motor and sensory function intact with no focal deficits  Skin:  No rashes, lesions or wounds    DATA:  CBC with Differential:    Lab Results   Component Value Date    WBC 3.1 11/05/2021    RBC 3.49 11/05/2021    HGB 11.1 11/05/2021    HCT 37.2 11/05/2021     11/05/2021    .6 11/05/2021    MCH 31.8 11/05/2021    MCHC 29.8 11/05/2021    RDW 15.2 11/05/2021    BLASTSPCT 0.0 11/04/2021    LYMPHOPCT 9.6 11/05/2021    MONOPCT 1.6 11/05/2021    BASOPCT 0.3 11/05/2021    MONOSABS 0.05 11/05/2021    LYMPHSABS 0.30 11/05/2021    EOSABS 0.00 11/05/2021    BASOSABS 0.01 11/05/2021     CMP:    Lab Results   Component Value Date     11/05/2021    K 4.7 11/05/2021    K 4.8 11/03/2021     11/05/2021    CO2 33 11/05/2021    BUN 48 11/05/2021    CREATININE 1.0 11/05/2021    GFRAA >60 11/05/2021    LABGLOM >60 11/05/2021    GLUCOSE 134 11/05/2021    PROT 6.2 11/05/2021    LABALBU 3.1 11/05/2021    CALCIUM 8.7 11/05/2021    BILITOT 0.4 11/05/2021    ALKPHOS 157 11/05/2021    AST 14 11/05/2021    ALT 10 11/05/2021     Magnesium:    Lab Results   Component Value Date    MG 2.3 11/05/2021     Phosphorus:    Lab Results   Component Value Date    PHOS 4.4 11/05/2021     PT/INR:    Lab Results   Component Value Date    PROTIME 28.2 11/05/2021    INR 2.4 11/05/2021     Troponin:  No results found for: TROPONINI  U/A:    Lab Results   Component Value Date    COLORU Yellow 11/03/2021    PROTEINU Negative 11/03/2021    PHUR 5.0 11/03/2021    WBCUA 1-3 11/03/2021    RBCUA 10-20 11/03/2021    BACTERIA RARE 11/03/2021    CLARITYU Clear 11/03/2021    SPECGRAV 1.025 11/03/2021    LEUKOCYTESUR Negative 11/03/2021    UROBILINOGEN 0.2 11/03/2021    BILIRUBINUR Negative 11/03/2021    BLOODU LARGE 11/03/2021    GLUCOSEU Negative 11/03/2021     ABG:    Lab Results   Component Value Date    PH 7.355 11/04/2021    PCO2 56.2 11/04/2021    PO2 105.6 11/04/2021    HCO3 30.7 11/04/2021    BE 4.0 11/04/2021    O2SAT 97.5 11/04/2021     HgBA1c:  No results found for: LABA1C  FLP:    Lab Results   Component Value Date    TRIG 46 11/04/2021    HDL 81 11/04/2021    LDLCALC 46 11/04/2021    LABVLDL 9 11/04/2021     TSH:    Lab Results   Component Value Date    TSH 4.560 11/04/2021     IRON:  No results found for: IRON  LIPASE:    Lab Results   Component Value Date    LIPASE 74 09/08/2021       ASSESSMENT AND PLAN:      Patient Active Problem List Diagnosis Date Noted    Acute on chronic respiratory failure with hypoxia and hypercapnia (HCC) 11/03/2021    CAD (coronary artery disease)  Acute diastolic congestive heart failure     Atrial fibrillation - chronic     COPD with exacerbation     Hypertension-patient hypotensive in the ED      History of severe pulmonary hypertension at 99 mmHg with +34 tricuspid regurg 09/10/2021     Diffuse leg edema probably combination of the severe pulmonary hypertension and tricuspid regurg along with acute diastolic CHF 49/14/6776     Plan:  Admit to [facility]/PCU  Nursing home meds reviewed BiPAP  BiPAP -continuous at this time  DuoNeb aerosols every 4 hours  Pulmicort aerosol twice daily  Brovana aerosol twice daily  Continue warfarin   Daily INRs  Lasix 40 mg IV push twice daily  Accurate I's and O's  BiPAP 12/6 at at bedtime and as needed  Warfarin daily   SCDs lower extremities    Consult pulmonology    CMP, CBC in a.mMark Gutierrez DO, D.O.  11/5/2021  12:34 PM

## 2021-11-05 NOTE — PROGRESS NOTES
[address] [facility]  [facility] Nisreen Barraza. [place]        Date:2021                                                  Patient Name: Fadi Lerma    MRN: [id number]    : 1940    Room: [address] [place] [address]      Evaluating OT: Mg Goldsmith OTR/L #[id number]     Referring Provider and Specific Provider Orders/Date:      21 1300  OT eval and treat Start: 21 1300, End: 21 1300, ONE TIME, Standing Count: 1 Occurrences, R      Keith Pearce, DO      Placement Recommendation: L[facility]       Diagnosis:   1. Respiratory acidosis    2. Elevated troponin    3. Hypercarbia    4. Hypervolemia, unspecified hypervolemia type           Surgery: None       Pertinent Medical History:       Past Medical History:   Diagnosis Date    Atrial [facility] ([facility] [address]     CAD (coronary artery disease)     coronary stent 12yrs ago    COPD (chronic obstructive pulmonary disease) ([facility] [address]     Hypertension          Past Surgical History:   Procedure Laterality Date    CARDIAC SURGERY      coronary stent    CATARACT REMOVAL WITH IMPLANT Left 2013    COLONOSCOPY      CYST REMOVAL      tailbone    EYE SURGERY          Precautions:  Fall Risk, falls and alarm, 10L high harlan O2, confusion.       Assessment of current deficits    [x] Functional mobility  [x]ADLs  [x] Strength               [x]Cognition    [x] Functional transfers   [x] IADLs         [x] Safety Awareness   [x]Endurance    [] Fine Coordination              [x] Balance      [] Vision/perception   []Sensation     []Gross Motor Coordination  [] ROM  [x] Delirium                   [] Motor Control     OT PLAN OF CARE   OT POC based on physician orders, patient diagnosis and results of clinical assessment    Frequency/Duration 1-3 days/wk for 2 weeks PRN     Specific OT Treatment Interventions to include:   * Instruction/training on adapted ADL techniques and AE recommendations to increase functional independence within precautions       * Training on energy conservation strategies, correct breathing pattern and techniques to improve independence/tolerance for self-care routine  * Functional transfer/mobility training/DME recommendations for increased independence, safety, and fall prevention  * Patient/Family education to increase follow through with safety techniques and functional independence  * Recommendation of environmental modifications for increased safety with functional transfers/mobility and ADLs  * Cognitive retraining/development of therapeutic activities to improve problem solving, judgement, memory, and attention for increased safety/participation in ADL/IADL tasks  * Therapeutic exercise to improve motor endurance, ROM, and functional strength for ADLs/functional transfers  * Therapeutic activities to facilitate/challenge dynamic balance, stand tolerance for increased safety and independence with ADLs  * Therapeutic activities to facilitate gross/fine motor skills for increased independence with ADLs  * Positioning to improve skin integrity, interaction with environment and functional independence  * Delirium prevention/treatment    Recommended Adaptive Equipment: TBD     Home Living: Pt resides at [place]. Prior Level of Function: Requires assist with ADLs , Dependent with IADLs; transfers to wheelchair with assist and use of jamar steady, per patient's report. Pain Level: Pt denied pain this date. Cognition: A&O: 3/4; Follows 1-2 step directions   Memory: impaired - pt showing some confusion through conversation. Son states patient has been more confused at night.     Sequencing: poor   Problem solving: poor   Judgement/safety: poor    [facility]   AM-PAC Daily Activity Inpatient   How much help for putting on and taking off regular lower body clothing?: Total  How much help for Bathing?: A Lot  How much help for Toileting?: Total  How much help for putting on and taking off regular upper body clothing?: A Lot  How much help for taking care of personal grooming?: A Lot  How much help for eating meals?: A Little  AM-PAC Inpatient Daily Activity Raw Score: 11  AM-PAC Inpatient ADL T-Scale Score : 29.04  ADL Inpatient CMS 0-100% Score: 70.42  ADL Inpatient CMS G-Code Modifier : CL     Functional Assessment:    Initial Eval Status  Date: 11/5/21 Treatment Status  Date: STGs = LTGs  Time frame: 10-14 days   Feeding Minimal Assist     Supervision    Grooming Moderate Assist   For hair grooming d/t reduced endurance with overhead activity. Pt washed face with set-up assist.   Supervision    UB Dressing Maximal Assist  To doff/don gown over shoulders and manage tele monitor    Minimal Assist    LB Dressing Dependent     Moderate Assist    Bathing Maximal Assist   Moderate Assist    Toileting Dependent     Moderate Assist    Bed Mobility  Supine to sit:  NT  Sit to supine:  NT  Rolling: Maximal Assist x2   Supine to sit: Moderate Assist   Sit to supine: Moderate Assist    Functional Transfers NT   Moderate Assist with use of jamar steady as needed    Balance Sitting:     Static: NT    Dynamic: NT   Standing: NT     Pt displays right trunk lean while supine with HOB elevated. Max A to correct posture/facilitate symmetry.    Sitting:     Static: fair     Dynamic: fair minus    Activity Tolerance poor   Increase sitting tolerance >3 minutes for improved engagement with functional transfers and indep in ADLs   Visual/  Perceptual Glasses: Not reported     NA      Hand Dominance: Right      AROM (PROM) Strength Additional Info:    RUE  WFL 3+/5 good  and wfl FMC/dexterity noted during ADL tasks     LUE WFL 3+/5 good  and wfl FMC/dexterity noted during ADL tasks       Hearing:  [facility] [place]   Sensation:   No c/o numbness or tingling  Tone:  WFL   Edema: LEs      Vitals:  HR at rest: 75 bpm HR with activity:  bpm HR at end of session:  bpm   SpO2 at rest: 91% SpO2 with activity: 88-91% SpO2 at end of session: %   BP at rest:  BP with activity:  BP at end of session:      Comments: RN cleared patient for OT. Upon arrival patient in supine, son at bedside. Therapist facilitated and instructed pt on adapted  techniques & compensatory strategies to improve safety and independence with basic ADLs, bed mobility to allow pt to achieve highest level of independence and safely. Pt demonstrated fair minus understanding of education & follow through. At end of session, patient was repositioned in supine with call light and phone within reach, all lines and tubes intact. Overall, patient demonstrated  decreased independence and safety during completion of ADL tasks. Pt would benefit from continued skilled OT to increase safety and independence with completion of ADL tasks and functional mobility for improved quality of life. Treatment: OT treatment provided this date includes:    Proper Positioning/Alignment; TAP system issued and worked on rolling for placement of TAPS and comfort glide. Wedge pillow placed behind left hip/lumbar area for pressure relief. Prevelon boots also issued for protection of skin integrity. · Skilled monitoring of O2 sats - see section above   · B UE there ex x10 reps through all planes to increase strength/endurance with ADLs     Rehab Potential: Good for established goals. Patient / Family Goal: Not stated       Patient and/or family were instructed on functional diagnosis, prognosis/goals and OT plan of care. Demonstrated fair understanding.      Eval Complexity: Low    Time In: 10:00 AM   Time Out: 10:40 AM    Total Treatment Time: 10       Min Units   OT Eval Low 97165  X  1    OT Eval Medium 99709      OT Eval High 44565      OT Re-Eval [id number]            ADL/Self Care 30241     Therapeutic Activities 20602 10  1   Therapeutic Ex 48885       Orthotic Management 26887       Manual 40332     Neuro Re-Ed 24274       Non-Billable Time 10       Evaluation Time additionally includes thorough review of current medical information, gathering information on past medical history/social history and prior level of function, interpretation of standardized testing/informal observation of tasks, assessment of data and development of plan of care and goals.         Evaluating OT: Herber De La Cruz OTR/L #[id number]

## 2021-11-05 NOTE — CARE COORDINATION
SS NOTE: COVID NEGATIVE 11/3. This pt is on 100% bipap. Pt keeps changing his code status. A Palliative Care consult will be very helpful. Pt becomes very anxious when he cannot breathe. This pt is a long term care/ private pay Resident of [facility]. Plan is for pt to return there at [facility] per pts' son Sam Phelan. Pt will need NO PRECERT to return to  - he will need a signed EMILY and current PT/OT notes. Pt is on 7 liters of O2 here and usually is on 5 liters at [facility]. PT and OT are on consult. SW faxed updates to Joseph Gutierrez at the [facility]- [phone]. SS to continue. David Plascencia. 11/5/2021.12:50 PM.

## 2021-11-05 NOTE — CONSULTS
Palliative Care Department  [phone]  Palliative Care Initial Consult  Provider [id number] [place] [address]  [phone]  Hospital Day: 3  Date of Initial Consult: 11/5/2021  Referring Provider: Julia Neumann DO  Palliative Medicine was consulted for assistance with: [address], code status  Chief Complaint Today:  SOB    Brief Summary of Hospital Course:   Elizabeth Molina is a [de-identified] y.o. with a medical history of COPD, AFib, HTN, CAD who was admitted on 11/3/2021 from nursing facility with a CHIEF COMPLAINT of altered mental status. ASSESSMENT/PLAN:     Recommendations:     Dyspnea:   -  Morphine 2.5mg q4hrs prn dyspnea  Anxiety:   -  Vistaril 25mg q6hrs prn anxiety    Pertinent Hospital Problems      Acute on chronic hypoxic and hypercapneic respiratory failure   Acute HFpEF/R sided heart failure   Acute COPD exacerbation   Severe pulmonary hypertension   Chronic AFib   Tricuspid valve regurgitation      Palliative Care Encounter / Counseling Regarding Goals of Care  Please see detailed goals of care discussion as below   At this time, Elizabeth Molina, Does have capacity for medical decision-making. Capacity is time limited and situation/question specific   During encounter son is available as surrogate medical decision-maker   Outcome of goals of care meeting: continue DNR-CCA/DNI   Code status Limited no to all but meds   Advanced Directives: no POA or living will in [facility]   Surrogate/Legal NOK:  o Jerald Garcia [phone]    Spiritual assessment: no spiritual distress identified  Bereavement and grief: to be determined  Referrals to: none today    SUBJECTIVE:     Details of Conversation:  Chart reviewed. Patient evaluated, on nasal cannula. Reports intermittent anxiety associated with SOB. Wants to continue with current treatment but would not want escalation to intubation, does not want CPR. States his son has paperwork that contains all of that information.   Discussed with bedside RN, will start prn vistaril for anxiety and morphine for dyspnea. History Of Present Illness:    Per H&P  \"The patient is a [de-identified] y.o. male who presents with altered mental status at the nursing home. Patient symptoms started about a week ago patient was also noted to have increased weight gain and increasing lower leg edema. Patient normally alert and oriented x4 but has become much more lethargic and interactive. Patient gained 20 pounds at the nursing home. Patient was brought into the emergency room with patient chest x-ray showed interstitial lung disease and also cardiomegaly. ABG showed a pH 7.204 with a CO2 of 85 on 6 L nasal cannula. Patient then was started on BiPAP.  BUN/creatinine 43/1.0 with proBNP 3900 and troponin initially was 120. Liver enzymes are normal.  Heart rate was 75 normal temperature and blood pressure initially at 636 systolic currently is 50/39. Patient currently on 40% FiO2 BiPAP with O2 sat of 93%. Patient states he has been wearing O2 nasal cannula for last couple weeks at the nursing home but not before that. Patient denies wearing BiPAP in the past.  He denies any problem with any chest pain, abdominal pain, nausea vomiting or dizziness. ECG reviewed with patient have atrial fibrillation with no significant ST-T changes to suggest acute myocardial injury troponin was elevated on admission but troponin I-II hours later was less than on admission. \"    Past Medical History:          Diagnosis Date    At[facility] fibrillation ([facility] [address]     CAD (coronary artery disease)     coronary stent 12yrs ago    COPD (chronic obstructive pulmonary disease) ([facility] [address]     Hypertension        Past Surgical History:          Procedure Laterality Date    CARDIAC SURGERY      coronary stent    CATARACT REMOVAL WITH IMPLANT Left 04 01 2013    COLONOSCOPY      CYST REMOVAL      tailbone    EYE SURGERY         Medications Prior to Admission:      Prior to Admission medications    Medication Sig Start Date End Date Taking? Authorizing Provider   ipratropium-albuterol (DUONEB) 0.5-2.5 (3) MG/3ML SOLN nebulizer solution Inhale 3 mLs into the lungs 4 times daily 9/16/21   China Quispe, DO   albuterol (PROVENTIL) (2.5 MG/3ML) 0.083% nebulizer solution Take 3 mLs by nebulization every 4 hours as needed for Wheezing or Shortness of Breath 9/16/21   China Quispe, DO   Respiratory Therapy Supplies (FULL KIT NEBULIZER SET) MISC Use as directed with nebulized medication. 9/16/21   China Quispe, DO   warfarin (COUMADIN) 4 MG tablet 4 mg daily 6 PM 9/16/21   China Quispe, DO   tamsulosin (FLOMAX) 0.4 MG capsule Take 0.4 mg by mouth daily    Historical Provider, MD   terazosin (HYTRIN) 5 MG capsule Take 10 mg by mouth daily. Historical Provider, MD   metoprolol (TOPROL-XL) 50 MG XL tablet Take 50 mg by mouth daily. Historical Provider, MD   finasteride (PROSCAR) 5 MG tablet Take 5 mg by mouth daily. Historical Provider, MD   aspirin 81 MG tablet Take 81 mg by mouth daily. Historical Provider, MD       Allergies:  Dye [iodides]    Social History:      The patient currently lives at nursing facility    TOBACCO:   reports that he quit smoking about 29 years ago. He has never used smokeless tobacco.  ETOH:   reports no history of alcohol use. Family History:       Reviewed in detail and positive as follows:    History reviewed. No pertinent family history. REVIEW OF SYSTEMS:   Pertinent positives as noted in the HPI. All other systems reviewed and negative.     OBJECTIVE:   Prognosis: depends upon goals and Guarded    Physical Exam:  BP (!) 123/48   Pulse 70   Temp 97.7 °F (36.5 °C) (Oral)   Resp 24   Ht 5' 8\" (1.727 m)   Wt 222 lb 0.1 oz (100.7 kg)   SpO2 91%   BMI 33.76 kg/m²     Constitutional:  Elderly, thin, NAD, awake, alert  Eyes: no scleral icterus, normal lids, no discharge  ENMT:  Normocephalic, atraumatic, mucosa moist, EOMI  Neck:  trachea midline, no JVD  Lungs: audible rhonchi, scattered wheezes noted, respirations unlabored, no retractions  Heart[de-identified]  RRR, distant heart tones, no murmur, rub, or gallop noted during exam  Abd:  Soft, non tender, non distended, bowel sounds present  :  deferred  MSK: sarcopenia present  Ext:  Moving all extremities, +3 edema, pulses present  Skin:  Warm and dry, no rashes on visible skin  Psych: non-anxious affect  Neuro:  PERRL, Alert, grossly nonfocal; following commands    Objective data reviewed: labs, images, records, medication use, vitals and chart    Labs:     Recent Labs     11/03/21  0825 11/04/21  0816 11/05/21  0530   WBC 6.1 5.1 3.1*   HGB 12.1* 11.0* 11.1*   HCT 40.3 36.2* 37.2   * 115* 112*     Recent Labs     11/03/21  0825 11/04/21  0816 11/05/21  0530    139 142   K 4.8 4.5 4.7   CL 98 99 100   CO2 32* 33* 33*   BUN 43* 46* 48*   CREATININE 1.0 1.0 1.0   CALCIUM 8.9 8.8 8.7   PHOS  --  4.2 4.4     Recent Labs     11/03/21  0825 11/04/21  0816 11/05/21  0530   AST 17 15 14   ALT 9 10 10   BILITOT 0.5 0.4 0.4   ALKPHOS 192* 156* 157*     Recent Labs     11/03/21  0825 11/04/21  0816 11/05/21  0530   INR 1.9 2.2 2.4     No results for input(s): Jayshree Gray in the last 72 hours. Urinalysis:      Lab Results   Component Value Date    NITRU Negative 11/03/2021    WBCUA 1-3 11/03/2021    BACTERIA RARE 11/03/2021    RBCUA 10-20 11/03/2021    BLOODU LARGE 11/03/2021    SPECGRAV 1.025 11/03/2021    GLUCOSEU Negative 11/03/2021         Discussed patient and the plan of care with the other IDT members: Palliative Medicine IDT Team, Floor Nurse and Patient    Time/Communication  Greater than 50% of time spent, total 50 minutes in counseling and coordination of care at the bedside regarding goals of care, symptom management, diagnosis and prognosis and see above. Thank you for allowing Palliative Medicine to participate in the care of Jorge A Dominguez.

## 2021-11-05 NOTE — PLAN OF CARE
Problem: Falls - Risk of:  Goal: Will remain free from falls  Description: Will remain free from falls  11/5/2021 1428 by Agustin Garcia RN  Outcome: Met This Shift  11/5/2021 0453 by Missy Tabor RN  Outcome: Met This Shift  Goal: Absence of physical injury  Description: Absence of physical injury  11/5/2021 1428 by Agustin Garcia RN  Outcome: Met This Shift  11/5/2021 0453 by Missy Tabor RN  Outcome: Met This Shift     Problem: Skin Integrity:  Goal: Will show no infection signs and symptoms  Description: Will show no infection signs and symptoms  11/5/2021 1428 by Agustin Garcia RN  Outcome: Met This Shift  11/5/2021 0453 by Missy Tabor RN  Outcome: Met This Shift  Goal: Absence of new skin breakdown  Description: Absence of new skin breakdown  11/5/2021 1428 by Agustin Garcia RN  Outcome: Met This Shift  11/5/2021 0453 by Missy Tabor RN  Outcome: Met This Shift

## 2021-11-05 NOTE — CARE COORDINATION
Called the [facility] and left a detailed message with all intake information.  Electronically signed by Vicky Manley on 11/5/2021 at 12:02 PM

## 2021-11-05 NOTE — PROGRESS NOTES
Physical Therapy Treatment Note/Plan of Care    Room #:  7330/8784-13  Patient Name: Aniya Miranda  YOB: 1940  MRN: [id number]    Date of Service: 11/5/2021     Tentative placement recommendation: Subacute rehab  Equipment recommendation: To be determined      Evaluating Physical Therapist: Rosa Melara #[id number]      Specific Provider Orders/Date/Referring Provider :   11/03/21 1300   PT eval and treat Start: 11/03/21 1300, End: 11/03/21 1300, ONE TIME, Standing Count: 1 Occurrences, R    Brianne Harvey DO    Admitting Diagnosis:   Respiratory acidosis [E87.2]  Hypercarbia [R06.89]  Elevated troponin [R77.8]  Acute on chronic respiratory failure with hypoxia and hypercapnia (HCC) [J96.21, J96.22]  Hypervolemia, unspecified hypervolemia type [E87.70]      Surgery: none  Visit Diagnoses       Codes    Respiratory acidosis    -  Primary E87.2    Elevated troponin     R77.8    Hypercarbia     R06.89    Hypervolemia, unspecified hypervolemia type     E87.70          Patient Active Problem List   Diagnosis    Metabolic encephalopathy    Inability to walk    Acute on chronic respiratory failure with hypoxia and hypercapnia (HCC)    CAD (coronary artery disease)    Atrial fibrillation (HCC)    COPD (chronic obstructive pulmonary disease) ([facility] [address]    Hypertension        ASSESSMENT of Current Deficits Patient exhibits decreased strength, balance and endurance impairing functional mobility, transfers and gait . Maximal assist to sit EOB for bilateral lower extremities and trunk flexion. The patient  demonstrates decreased strength and endurance limiting  tolerance for functional mobility at this time and making the patient a high risk for falls. Patient needing minimal assist for sitting balance due to left lateral/posterior lean.  Pt will need subacute rehab at time of discharge in order to participate in a skilled comprehensive therapy program to address deficits and improve the patient 's with arms?: A Lot  How much difficulty moving from lying on back to sitting on side of bed?: A Lot  How much help from another person moving to and from a bed to a chair?: A Lot  How much help from another person needed to walk in hospital room?: A Lot  How much help from another person for climbing 3-5 steps with a railing?: Total  AM-PAC Inpatient Mobility Raw Score : 11  AM-PAC Inpatient T-Scale Score : 33.86  Mobility Inpatient CMS 0-100% Score: 72.57  Mobility Inpatient CMS G-Code Modifier : CL    Nursing cleared patient for PT treatment. .   OBJECTIVE;   Initial Evaluation  Date: 11/4/2021 Treatment Date:  11/5/2021       Short Term/ Long Term   Goals   Was pt agreeable to Eval/treatment? Yes yes To be met in 5 days   Pain level   0/10   0/10    Bed Mobility    Rolling: Minimal assist of 1    Supine to sit: Maximal assist of 1    Sit to supine: Maximal assist of 1    Scooting: Moderate assist of 1   Rolling: Maximal assist of 1   Supine to sit: Maximal assist of 1   Sit to supine: Maximal assist of 1   Scooting: Maximal assist of 1    Rolling: Independent    Supine to sit: Minimal assist of 1    Sit to supine: Minimal assist of 1    Scooting: Minimal assist of 1     Transfers Sit to stand: Not assessed  d/t to pt overall debility, decreased activity tolerance, balance deficits, safety and fall risk. Sit to stand: Not assessed       Sit to stand:  Moderate assist of 1    Ambulation    not assessed  not assessed     5 feet using  wheeled walker with Moderate assist of 1          ROM Within functional limits    Increase range of motion 10% of affected joints    Strength BUE:  refer to OT eval  RLE:  3/5  LLE:  3/5  Increase strength in affected mm groups by 1/3 grade   Balance Sitting EOB:  poor   Dynamic Standing:  not assessed  Sitting EOB: fair left lateral/posterior lean intermittently throughout tx session  Dynamic Standing: not assessed    Sitting EOB:  fair   Dynamic Standing: fair -     Patient is Alert & Oriented x person, place, time and situation and follows directions    Sensation:  Patient  reports numbness/tingling right foot     Edema:  yes bilateral lower extremities   Endurance: fair  -    Vitals: 12 liters high flow nasal cannula   Blood Pressure at rest  Blood Pressure during session    Heart Rate at rest  Heart Rate during session    SPO2 at rest 93%  SPO2 during session 91-93 %     Patient education  Patient educated on role of Physical Therapy, risks of immobility, safety and plan of care,  importance of mobility while in hospital , purse lip breathing, ankle pumps, quad set and glut set for edema control, blood clot prevention, importance and purpose of adaptive device and adjusted to proper height for the patient. and safety      Patient response to education:   Pt verbalized understanding Pt demonstrated skill Pt requires further education in this area   Yes Partial Yes      Treatment:  Patient practiced and was instructed/facilitated in the following treatment: Patient assisted to EOB. Sat edge of bed 15 minutes with Minimal assist of 1 to increase dynamic sitting balance and activity tolerance. Pt worked on maintaining mid-line position due to left lateral/posterior lean when his left upper extremity got fatigued holding himself up with the bed rail. Worked on upright posture and pursed lip breathing to increase lung capacity. Pt assisted back to supine, max assist x2 to [facility]. Pt rolled multiple times due to incontinent of BM, changing bed linens, chux pads, and personal hygiene. Therapeutic Exercises:  not performed    At end of session, patient in bed with alarm call light and phone within reach,  all lines and tubes intact, nursing notified. Patient would benefit from continued skilled Physical Therapy to improve functional independence and quality of life.          Patient's/ family goals   home    Time in 2:25  Time out  2:50    Total Treatment Time  25 minutes        CPT codes:    Therapeutic activities (95121)   25 minutes  2 unit(s)   Norman Blake  [facility]  LIC # [id number]

## 2021-11-05 NOTE — PROGRESS NOTES
Went to obtain ABG off of bipap per RN patient is desaturating to 84% patient placed back on Bipap ABG's not obtained at this time

## 2021-11-05 NOTE — DISCHARGE INSTR - COC
Continuity of Care Form    Patient Name: Tino Leiva   :    MRN:  [id number]    Admit date:  11/3/2021  Discharge date:  ***    Code Status Order: Limited   Advance Directives:      Admitting Physician:  Nehal Cannon DO  PCP: Tera Phelan MD    Discharging Nurse: [facility] Unit/Room#: 9039/6138-99  Discharging Unit Phone Number: ***    Emergency Contact:   Extended Emergency Contact Information  Primary Emergency Contact: Jeanie Almaguer Phone: [phone]  Mobile Phone: [phone]  Relation: Child  Preferred language: English   needed? No    Past Surgical History:  Past Surgical History:   Procedure Laterality Date    CARDIAC SURGERY      coronary stent    CATARACT REMOVAL WITH IMPLANT Left 2013    COLONOSCOPY      CYST REMOVAL      tailbone    EYE SURGERY         Immunization History: There is no immunization history for the selected administration types on file for this patient.     Active Problems:  Patient Active Problem List   Diagnosis Code    Metabolic encephalopathy U38.56    Inability to walk R26.2    Acute on chronic respiratory failure with hypoxia and hypercapnia ([facility]) J96.21, J96.22    CAD (coronary artery disease) I25.10    Atrial fibrillation ([facility]) I48.91    COPD (chronic obstructive pulmonary disease) ([facility]) J44.9    Hypertension I10       Isolation/Infection:   Isolation            No Isolation          Patient Infection Status       Infection Onset Added Last Indicated Last Indicated By Review Planned Expiration Resolved Resolved By    None active    Resolved    COVID-19 Rule Out 21 COVID-19, Rapid (Ordered)   21 Rule-Out Test Resulted    COVID-19 09/15/21 09/15/21 09/15/21 COVID-19, Rapid   21     COVID-19 Rule Out 21 COVID-19, Rapid (Ordered)   21 Rule-Out Test Resulted            Nurse Assessment:  Last Vital Signs: BP (!) 123/48   Pulse 70   Temp 97.7 °F (36.5 °C) (Oral)   Resp 24   Ht 5' 8\" (1.727 m)   Wt 222 lb 0.1 oz (100.7 kg)   SpO2 91%   BMI 33.76 kg/m²     Last documented pain score (0-10 scale):    Last Weight:   Wt Readings from Last 1 Encounters:   21 222 lb 0.1 oz (100.7 kg)     Mental Status:  {IP PT MENTAL STATUS:}    IV Access:  { EMILY IV ACCESS:614145168}    Nursing Mobility/ADLs:  Walking   {CHP DME FMWL:833618683}  Transfer  {CHP DME SMEO:521612657}  Bathing  {CHP DME HUFO:335323831}  Dressing  {CHP DME UEUQ:796233595}  Toileting  {CHP DME VKZN:633705498}  Feeding  {CHP DME BESI:567961935}  Med Admin  {P DME KYBH:519622725}  Med Delivery   { EMILY MED Delivery:926855538}    Wound Care Documentation and Therapy:        Elimination:  Continence: Bowel: {YES / XR:75334}  Bladder: {YES / NN:69949}  Urinary Catheter: {Urinary Catheter:344161549}   Colostomy/Ileostomy/Ileal Conduit: {YES / FM:91870}       Date of Last BM: ***    Intake/Output Summary (Last 24 hours) at 2021 1256  Last data filed at 2021 1850  Gross per 24 hour   Intake 338 ml   Output 400 ml   Net -62 ml     I/O last 3 completed shifts:   In: 638 [P.O.:638]  Out: 1400 [Urine:1400]    Safety Concerns:     [employer] EMILY Safety Concerns:484908359}    Impairments/Disabilities:      [address] [employer] Impairments/Disabilities:232842166}    Nutrition Therapy:  Current Nutrition Therapy:   [employer] EMILY Diet List:915113248}    Routes of Feeding: {P DME Other Feedings:860729613}  Liquids: {Slp liquid thickness:59466}  Daily Fluid Restriction: {CHP DME Yes amt example:552767827}  Last Modified Barium Swallow with Video (Video Swallowing Test): {Done Not Done DSDW:506840502}    Treatments at the Time of Hospital Discharge:   Respiratory Treatments: ***  Oxygen Therapy:  {Therapy; copd oxygen:55432}  Ventilator:    {ERIC JONES Vent KQJB:147648567}    Rehab Therapies: {THERAPEUTIC INTERVENTION:6260075338}  Weight Bearing Status/Restrictions: {ERIC JONES Weight Bearin}  Other Medical Equipment (for information only, NOT a DME order):  {EQUIPMENT:941714590}  Other Treatments: ***    Patient's personal belongings (please select all that are sent with patient):  {CHP DME Belongings:439721728}    RN SIGNATURE:  {Esignature:308374872}    CASE MANAGEMENT/SOCIAL WORK SECTION    Inpatient Status Date: ***    Readmission Risk Assessment Score:  Readmission Risk              Risk of Unplanned Readmission:  19           Discharging to Facility/ Agency   Name: Keny[facility] Funes  [phone]  FAX: [phone]      Dialysis Facility (if applicable)   Name:  Address:  Dialysis Schedule:  Phone:  Fax:    / signature: Electronically signed by Lindsay Jackman. Antonieta Perez on 11/5/21 at 12:56 PM EDT    PHYSICIAN SECTION    Prognosis: Good    Condition at Discharge: Stable    Rehab Potential (if transferring to Rehab): Good    Recommended Labs or Other Treatments After Discharge: ***    Physician Certification: I certify the above information and transfer of Diamond Austin  is necessary for the continuing treatment of the diagnosis listed and that he requires [facility] for less 30 days.      Update Admission H&P: {CHP DME Changes in SXRUA:645183467}    PHYSICIAN SIGNATURE:  Electronically signed by Joaquín Pugh DO on 11/12/21 at 11:38 AM EST

## 2021-11-06 NOTE — PROGRESS NOTES
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 male who presents with altered mental status at the nursing home. Patient symptoms started about a week ago patient was also noted to have increased weight gain and increasing lower leg edema. Patient normally alert and oriented x4 but has become much more lethargic and interactive. Patient gained 20 pounds at the nursing home. Patient was brought into the emergency room with patient chest x-ray showed interstitial lung disease and also cardiomegaly. ABG showed a pH 7.204 with a CO2 of 85 on 6 L nasal cannula. Patient then was started on BiPAP.  BUN/creatinine 43/1.0 with proBNP 3900 and troponin initially was 120. Liver enzymes are normal.  Heart rate was 75 normal temperature and blood pressure initially at 644 systolic currently is 13/46. Patient currently on 40% FiO2 BiPAP with O2 sat of 93%. Patient states he has been wearing O2 nasal cannula for last couple weeks at the nursing home but not before that. Patient denies wearing BiPAP in the past.  He denies any problem with any chest pain, abdominal pain, nausea vomiting or dizziness. ECG reviewed with patient have atrial fibrillation with no significant ST-T changes to suggest acute myocardial injury troponin was elevated on admission but troponin I-II hours later was less than on admission. 11/4/2021  Patient seen examined on PCU. Patient still very short of breath even at rest.  Patient seems to do fairly good on the BiPAP once he is off of it he seemed to have some more respiratory distress. BUN/creatinine 46/1.0 with patient's transaminases normal TSH 4.56. WBC 5.1 with hemoglobin 11. INR is 2.2 today. Temperature 98.3 with a heart rate of 100 blood pressure 130/40. O2 sat 91% on 7 L nasal cannula.  ABGs today were improved but the patient had a pH increase 7.355 with PCO2 decreased 56.2 while the patient was on BiPAP. Patient urine output has picked up since early this morning. 11/5/2021  Patient seen examined on PCU. Case discussed with intensivist yesterday afternoon. Patient looks a little bit better today. Nursing states that the patient has been changed to a DNR CC a with no chest compressions, intubation or cardioversion. Patient denies any chest or abdominal pain. Patient still with +34 lower leg edema. BUN/creatinine 48/1.0 with the patient's Liver enzymes are normal with a WBC 3.1 with hemoglobin 11.1. Temperature 97.7 with heart rate of 70 blood pressure 123/48. O2 sat is 91% on 10 L high flow nasal cannula. Urine output seems to be adequate. 11/6/2021  Patient seen examined on PCU. Patient is alert and oriented with patient currently had BiPAP on and feeling little bit more comfortable. BUN/creatinine 51/0.9 WBCs 5.5 hemoglobin 11.8. INR 2.8 today. Temperature 97.5 heart rate 85 blood pressure 107/69. O2 sat 97% on BiPAP at 75% FiO2. Urine output is inaccurate but seems to be adequate according to the patient who states he is urinating a lot. INR is 2.3 we will decrease the warfarin 1 mg p.o. daily. Past Medical History:    Past Medical History:   Diagnosis Date    At[facility] fibrillation [facility] [address]     CAD (coronary artery disease)     coronary stent 12yrs ago    COPD (chronic obstructive pulmonary disease) ([facility] [address]     Hypertension      Past Surgical History:    Past Surgical History:   Procedure Laterality Date    CARDIAC SURGERY      coronary stent    CATARACT REMOVAL WITH IMPLANT Left 04 01 2013    COLONOSCOPY      [facility]      [facility]    EYE SURGERY         Medications Prior to Admission:    @  Prior to Admission medications    Medication Sig Start Date End Date Taking?  Authorizing Provider   ipratropium-albuterol (DUONEB) 0.5-2.5 (3) MG/3ML SOLN nebulizer solution Inhale 3 mLs into the lungs 4 times daily 21   Isabell Norris, DO   albuterol (PROVENTIL) (2.5 MG/3ML) 0.083% nebulizer solution Take 3 mLs by nebulization every 4 hours as needed for Wheezing or Shortness of Breath 21   Isabell Norris, DO   Respiratory Therapy Supplies (FULL KIT NEBULIZER SET) MISC Use as directed with nebulized medication. 21   Isabell oNrris, DO   warfarin (COUMADIN) 4 MG tablet 4 mg daily 6 PM 21   Isabell Norris, DO   tamsulosin (FLOMAX) 0.4 MG capsule Take 0.4 mg by mouth daily    Historical Provider, MD   terazosin (HYTRIN) 5 MG capsule Take 10 mg by mouth daily. Historical Provider, MD   metoprolol (TOPROL-XL) 50 MG XL tablet Take 50 mg by mouth daily. Historical Provider, MD   finasteride (PROSCAR) 5 MG tablet Take 5 mg by mouth daily. Historical Provider, MD   aspirin 81 MG tablet Take 81 mg by mouth daily. Historical Provider, MD       Allergies:  Dye [iodides]    Social History:   Social History     Socioeconomic History    Marital status:      Spouse name: Not on file    Number of children: Not on file    Years of education: Not on file    Highest education level: Not on file   Occupational History    Not on file   Tobacco Use    Smoking status: Former Smoker     Quit date: 3/27/1992     Years since quittin.6    Smokeless tobacco: Never Used   Substance and Sexual Activity    Alcohol use: No    Drug use: No    Sexual activity: Not on file   Other Topics Concern    Not on file   Social History Narrative    Not on file     Social Determinants of Health     Financial Resource Strain:     Difficulty of Paying Living Expenses: Not on file   Food Insecurity:     Worried About [employer] in the Last Year: Not on file    Jian of Food in the Last Year: Not on file   Transportation Needs:     Lack of Transportation (Medical): Not on file    Lack of Transportation (Non-Medical):  Not on file   Physical Activity:     Days of Exercise per Week: Not on file    Minutes of Exercise per Session: Not on file   Stress:     Feeling of Stress : Not on file   Social Connections:     Frequency of Communication with Friends and Family: Not on file    Frequency of Social Gatherings with Friends and Family: Not on file    Attends [employer] Services: Not on file    Active Member of [address] [place] [address] [employer] or Organizations: Not on file    Attends Club or Organization Meetings: Not on file    Marital Status: Not on file   Intimate Partner Violence:     Fear of Current or Ex-Partner: Not on file    Emotionally Abused: Not on file    Physically Abused: Not on file    Sexually Abused: Not on file   Housing Stability:     Unable to Pay for Housing in the Last Year: Not on file    Number of [place] in the Last Year: Not on file    Unstable Housing in the Last Year: Not on file       Family History:   History reviewed. No pertinent family history. REVIEW OF SYSTEMS:   Gen: Patient initially admitted from the nursing home with altered mental status but is doing much better at this time. Patient denies any lightheadedness or dizziness. No LOC or syncope. No fevers or chills. HEENT: No earache, sore throat or nasal congestion. Resp: Denies cough, hemoptysis or sputum production. Cardiac: Denies chest pain, +SOB, no diaphoresis or palpitations. GI: No nausea, vomiting, diarrhea or constipation. No melena or hematochezia. : No urinary complaints, dysuria, hematuria or frequency. MSK: + Diffuse lower extremity edema. No extremity weakness, paralysis or paresthesias.      PHYSICAL EXAM:    Vitals:  /69   Pulse 85   Temp 97.5 °F (36.4 °C) (Axillary)   Resp 20   Ht 5' 8\" (1.727 m)   Wt 222 lb 0.1 oz (100.7 kg)   SpO2 97%   BMI 33.76 kg/m²     General:  This is a [de-identified] y.o. yo male who is alert and oriented in mild respiratory distress  HEENT:  Head is normocephalic and atraumatic, PERRLA, EOMI, mucus membranes moist with no pharyngeal erythema or exudate. Neck:  Supple with no carotid bruits, JVD or thyromegaly.   No cervical adenopathy  CV:  Regular rate and rhythm, no murmurs  Lungs: Coarse decreased breath sounds to auscultation bilaterally with no wheezes, rales or rhonchi  Abdomen:  Soft, nontender,+ abdominal fat, nondistended, bowel sounds present  Extremities:  + 2-3 leg edema, peripheral pulses intact bilaterally  Neuro:  Cranial nerves II-XII grossly intact; motor and sensory function intact with no focal deficits  Skin:  No rashes, lesions or wounds    DATA:  CBC with Differential:    Lab Results   Component Value Date    WBC 5.5 11/06/2021    RBC 3.62 11/06/2021    HGB 11.8 11/06/2021    HCT 36.9 11/06/2021     11/06/2021    .9 11/06/2021    MCH 32.6 11/06/2021    MCHC 32.0 11/06/2021    RDW 14.8 11/06/2021    BLASTSPCT 0.0 11/04/2021    LYMPHOPCT 4.3 11/06/2021    MONOPCT 3.9 11/06/2021    BASOPCT 0.0 11/06/2021    MONOSABS 0.00 11/06/2021    LYMPHSABS 0.22 11/06/2021    EOSABS 0.00 11/06/2021    BASOSABS 0.00 11/06/2021     CMP:    Lab Results   Component Value Date     11/06/2021    K 4.7 11/06/2021    K 4.8 11/03/2021    CL 98 11/06/2021    CO2 33 11/06/2021    BUN 51 11/06/2021    CREATININE 0.9 11/06/2021    GFRAA >60 11/06/2021    LABGLOM >60 11/06/2021    GLUCOSE 127 11/06/2021    PROT 6.3 11/06/2021    LABALBU 3.2 11/06/2021    CALCIUM 8.7 11/06/2021    BILITOT 0.4 11/06/2021    ALKPHOS 144 11/06/2021    AST 16 11/06/2021    ALT 11 11/06/2021     Magnesium:    Lab Results   Component Value Date    MG 2.3 11/05/2021     Phosphorus:    Lab Results   Component Value Date    PHOS 4.4 11/05/2021     PT/INR:    Lab Results   Component Value Date    PROTIME 33.2 11/06/2021    INR 2.8 11/06/2021     Troponin:  No results found for: TROPONINI  U/A:    Lab Results   Component Value Date    COLORU Yellow 11/03/2021    PROTEINU Negative 11/03/2021    PHUR 5.0 11/03/2021    WBCUA 1-3 11/03/2021    RBCUA 10-20 11/03/2021    BACTERIA RARE 11/03/2021    CLARITYU Clear 11/03/2021    SPECGRAV 1.025 11/03/2021    LEUKOCYTESUR Negative 11/03/2021    UROBILINOGEN 0.2 11/03/2021    BILIRUBINUR Negative 11/03/2021    BLOODU LARGE 11/03/2021    GLUCOSEU Negative 11/03/2021     ABG:    Lab Results   Component Value Date    PH 7.355 11/04/2021    PCO2 56.2 11/04/2021    PO2 105.6 11/04/2021    HCO3 30.7 11/04/2021    BE 4.0 11/04/2021    O2SAT 97.5 11/04/2021     HgBA1c:  No results found for: LABA1C  FLP:    Lab Results   Component Value Date    TRIG 46 11/04/2021    HDL 81 11/04/2021    LDLCALC 46 11/04/2021    LABVLDL 9 11/04/2021     TSH:    Lab Results   Component Value Date    TSH 4.560 11/04/2021     IRON:  No results found for: IRON  LIPASE:    Lab Results   Component Value Date    LIPASE 74 09/08/2021       ASSESSMENT AND PLAN:      Patient Active Problem List    Diagnosis Date Noted    Acute on chronic respiratory failure with hypoxia and hypercapnia ([facility] [address] 11/03/2021    CAD (coronary artery disease)  Acute diastolic congestive heart failure     Atrial fibrillation - chronic     COPD with exacerbation     Hypertension-patient hypotensive in the ED      History of severe pulmonary hypertension at 99 mmHg with +34 tricuspid regurg 09/10/2021     Diffuse leg edema probably combination of the severe pulmonary hypertension and tricuspid regurg along with acute diastolic CHF 28/66/4040     Plan:  Admit to [facility]/PCU  Nursing home meds reviewed BiPAP  BiPAP -continuous at this time  DuoNeb aerosols every 4 hours  Pulmicort aerosol twice daily  Brovana aerosol twice daily  Continue warfarin   Daily INRs  Lasix 40 mg IV push twice daily  Accurate I's and O's  BiPAP 12/6 at at bedtime and as needed   SCDs lower extremities    Consult pulmonology    Decrease warfarin 1 mg p.o. daily  MRSA screen  Add IV Rocephin doxycycline    CMP, CBC in a.m.       Sabra Ricks DO, D.OMark  11/6/2021  10:48 AM

## 2021-11-07 NOTE — PROGRESS NOTES
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 male who presents with altered mental status at the nursing home. Patient symptoms started about a week ago patient was also noted to have increased weight gain and increasing lower leg edema. Patient normally alert and oriented x4 but has become much more lethargic and interactive. Patient gained 20 pounds at the nursing home. Patient was brought into the emergency room with patient chest x-ray showed interstitial lung disease and also cardiomegaly. ABG showed a pH 7.204 with a CO2 of 85 on 6 L nasal cannula. Patient then was started on BiPAP.  BUN/creatinine 43/1.0 with proBNP 3900 and troponin initially was 120. Liver enzymes are normal.  Heart rate was 75 normal temperature and blood pressure initially at 691 systolic currently is 14/11. Patient currently on 40% FiO2 BiPAP with O2 sat of 93%. Patient states he has been wearing O2 nasal cannula for last couple weeks at the nursing home but not before that. Patient denies wearing BiPAP in the past.  He denies any problem with any chest pain, abdominal pain, nausea vomiting or dizziness. ECG reviewed with patient have atrial fibrillation with no significant ST-T changes to suggest acute myocardial injury troponin was elevated on admission but troponin I-II hours later was less than on admission. 11/4/2021  Patient seen examined on PCU. Patient still very short of breath even at rest.  Patient seems to do fairly good on the BiPAP once he is off of it he seemed to have some more respiratory distress. BUN/creatinine 46/1.0 with patient's transaminases normal TSH 4.56. WBC 5.1 with hemoglobin 11. INR is 2.2 today. Temperature 98.3 with a heart rate of 100 blood pressure 130/40. O2 sat 91% on 7 L nasal cannula.  ABGs today were improved but the patient had a pH increase 7.355 with PCO2 decreased 56.2 while the patient was on BiPAP. Patient urine output has picked up since early this morning. 11/5/2021  Patient seen examined on PCU. Case discussed with intensivist yesterday afternoon. Patient looks a little bit better today. Nursing states that the patient has been changed to a DNR CC a with no chest compressions, intubation or cardioversion. Patient denies any chest or abdominal pain. Patient still with +34 lower leg edema. BUN/creatinine 48/1.0 with the patient's Liver enzymes are normal with a WBC 3.1 with hemoglobin 11.1. Temperature 97.7 with heart rate of 70 blood pressure 123/48. O2 sat is 91% on 10 L high flow nasal cannula. Urine output seems to be adequate. 11/6/2021  Patient seen examined on PCU. Patient is alert and oriented with patient currently had BiPAP on and feeling little bit more comfortable. BUN/creatinine 51/0.9 WBCs 5.5 hemoglobin 11.8. INR 2.8 today. Temperature 97.5 heart rate 85 blood pressure 107/69. O2 sat 97% on BiPAP at 75% FiO2. Urine output is inaccurate but seems to be adequate according to the patient who states he is urinating a lot. INR is 2.3 we will decrease the warfarin 1 mg p.o. daily. 11/7/2021  Patient seen examined on PCU. Patient seems to be doing little bit better today. Patient is wearing BiPAP almost all the time. Patient did eat all of his breakfast.  Patient denies any chest or abdominal pain. Patient states that he has not had a bowel movement yet. Patient's lower leg edema still prominent but seems to be mildly improved. BUN/creatinine 48/0.9 with a CO2 of 36. Transaminases are normal with free T4 1.1 and WBC 5.2 and hemoglobin 11.4. Platelet count 949. INR is 2.9 today. Temperature 97.7 with heart rate of 60 and blood pressure 108/869. O2 sat 98% on 12 L high flow nasal cannula alternating with BiPAP with FiO2 of 65%. .    Past Medical History: Past Medical History:   Diagnosis Date    Atrial fibrillation [facility]     CAD (coronary artery disease)     coronary stent 12yrs ago    COPD (chronic obstructive pulmonary disease) ([facility] [address]     Hypertension      Past Surgical History:    Past Surgical History:   Procedure Laterality Date    CARDIAC SURGERY      coronary stent    CATARACT REMOVAL WITH IMPLANT Left 04 01 2013    COLONOSCOPY      CYST REMOVAL      tailbone    EYE SURGERY         Medications Prior to Admission:    @  Prior to Admission medications    Medication Sig Start Date End Date Taking? Authorizing Provider   ipratropium-albuterol (DUONEB) 0.5-2.5 (3) MG/3ML SOLN nebulizer solution Inhale 3 mLs into the lungs 4 times daily 9/16/21   China Quispe, DO   albuterol (PROVENTIL) (2.5 MG/3ML) 0.083% nebulizer solution Take 3 mLs by nebulization every 4 hours as needed for Wheezing or Shortness of Breath 9/16/21   China Quispe, DO   Respiratory Therapy Supplies (FULL KIT NEBULIZER SET) MISC Use as directed with nebulized medication. 9/16/21   China Quispe, DO   warfarin (COUMADIN) 4 MG tablet 4 mg daily 6 PM 9/16/21   China Quispe, DO   tamsulosin (FLOMAX) 0.4 MG capsule Take 0.4 mg by mouth daily    Historical Provider, MD   terazosin (HYTRIN) 5 MG capsule Take 10 mg by mouth daily. Historical Provider, MD   metoprolol (TOPROL-XL) 50 MG XL tablet Take 50 mg by mouth daily. Historical Provider, MD   finasteride (PROSCAR) 5 MG tablet Take 5 mg by mouth daily. Historical Provider, MD   aspirin 81 MG tablet Take 81 mg by mouth daily.     Historical Provider, MD       Allergies:  Dye [iodides]    Social History:   Social History     Socioeconomic History    Marital status:      Spouse name: Not on file    Number of children: Not on file    Years of education: Not on file    Highest education level: Not on file   Occupational History    Not on file   Tobacco Use    Smoking status: Former Smoker     Quit date: 3/27/1992     Years since quittin.6    Smokeless tobacco: Never Used   Substance and Sexual Activity    Alcohol use: No    Drug use: No    Sexual activity: Not on file   Other Topics Concern    Not on file   Social History Narrative    Not on file     Social Determinants of Health     Financial Resource Strain:     Difficulty of Paying Living Expenses: Not on file   Food Insecurity:     Worried About Running Out of Food in the Last Year: Not on file    Jian of Food in the Last Year: Not on file   Transportation Needs:     Lack of Transportation (Medical): Not on file    Lack of Transportation (Non-Medical): Not on file   Physical Activity:     Days of Exercise per Week: Not on file    Minutes of Exercise per Session: Not on file   Stress:     Feeling of Stress : Not on file   Social Connections:     Frequency of Communication with Friends and Family: Not on file    Frequency of Social Gatherings with Friends and Family: Not on file    Attends Episcopal Services: Not on file    Active Member of [address] [place] [address] or Organizations: Not on file    Attends Club or Organization Meetings: Not on file    Marital Status: Not on file   Intimate Partner Violence:     Fear of Current or Ex-Partner: Not on file    Emotionally Abused: Not on file    Physically Abused: Not on file    Sexually Abused: Not on file   Housing Stability:     Unable to Pay for Housing in the Last Year: Not on file    Number of [place] in the Last Year: Not on file    Unstable Housing in the Last Year: Not on file       Family History:   History reviewed. No pertinent family history. REVIEW OF SYSTEMS:   Gen: Patient initially admitted from the nursing home with altered mental status but is doing much better at this time. Patient denies any lightheadedness or dizziness. No LOC or syncope. No fevers or chills. HEENT: No earache, sore throat or nasal congestion. Resp: Denies cough, hemoptysis or sputum production. LABGLOM >60 11/07/2021    GLUCOSE 131 11/07/2021    PROT 6.1 11/07/2021    LABALBU 3.3 11/07/2021    CALCIUM 8.7 11/07/2021    BILITOT 0.3 11/07/2021    ALKPHOS 139 11/07/2021    AST 20 11/07/2021    ALT 15 11/07/2021     Magnesium:    Lab Results   Component Value Date    MG 2.3 11/05/2021     Phosphorus:    Lab Results   Component Value Date    PHOS 4.4 11/05/2021     PT/INR:    Lab Results   Component Value Date    PROTIME 34.0 11/07/2021    INR 2.9 11/07/2021     Troponin:  No results found for: TROPONINI  U/A:    Lab Results   Component Value Date    COLORU Yellow 11/03/2021    PROTEINU Negative 11/03/2021    PHUR 5.0 11/03/2021    WBCUA 1-3 11/03/2021    RBCUA 10-20 11/03/2021    BACTERIA RARE 11/03/2021    CLARITYU Clear 11/03/2021    SPECGRAV 1.025 11/03/2021    LEUKOCYTESUR Negative 11/03/2021    UROBILINOGEN 0.2 11/03/2021    BILIRUBINUR Negative 11/03/2021    BLOODU LARGE 11/03/2021    GLUCOSEU Negative 11/03/2021     ABG:    Lab Results   Component Value Date    PH 7.355 11/04/2021    PCO2 56.2 11/04/2021    PO2 105.6 11/04/2021    HCO3 30.7 11/04/2021    BE 4.0 11/04/2021    O2SAT 97.5 11/04/2021     HgBA1c:  No results found for: LABA1C  FLP:    Lab Results   Component Value Date    TRIG 46 11/04/2021    HDL 81 11/04/2021    LDLCALC 46 11/04/2021    LABVLDL 9 11/04/2021     TSH:    Lab Results   Component Value Date    TSH 4.560 11/04/2021     IRON:  No results found for: IRON  LIPASE:    Lab Results   Component Value Date    LIPASE 74 09/08/2021       ASSESSMENT AND PLAN:      Patient Active Problem List    Diagnosis Date Noted    Acute on chronic respiratory failure with hypoxia and hypercapnia ([address] 11/03/2021    CAD (coronary artery disease)  Acute diastolic congestive heart failure     Atrial fibrillation - chronic     COPD with exacerbation     Hypertension-patient hypotensive in the ED      History of severe pulmonary hypertension at 99 mmHg with +34 tricuspid regurg 09/10/2021    Diffuse leg edema probably combination of the severe pulmonary hypertension and tricuspid regurg along with acute diastolic CHF 69/84/3728     Plan:  Admit to [facility]/PCU  Nursing home meds reviewed BiPAP  BiPAP -continuous at this time  DuoNeb aerosols every 4 hours  Pulmicort aerosol twice daily  Brovana aerosol twice daily  Continue warfarin   Daily INRs  Lasix 40 mg IV push twice daily  Accurate I's and O's  BiPAP 12/6 at at bedtime and as needed   SCDs lower extremities    Consult pulmonology    MRSA screen  Add IV Rocephin, doxycycline    Colace 100 mg twice daily  MiraLAX 17 mg x 1 now and daily as needed  Continue with warfarin 1 mg p.o. today    CMP, CBC in a.m.       Libia Baer DO, D.OMark  11/7/2021  10:09 AM

## 2021-11-08 NOTE — CARE COORDINATION
SS NOTE: COVID NEGATIVE 11/3- PT WILL NEED A RAPID NEGATIVE COVID TO RETURN TO THE [facility]. Pt's O2 SAT 98% on 12 liters high flow NC alternating with bipap FIO2of 65%. Code status-limited, meds only. Pt is on IV Rocephin and Doxy. A Palliative Care is signing off. This pt is a long term care/ private pay Resident of [facility]. Plan is for pt to return there at [facility] per pts' son Manny Esposito. Pt will need NO PRECERT to return to  - he will need a signed EMILY and current PT/OT notes. PT and OT are on consult. SW faxed updates to [employer] at the [place]- [phone]. SS to continue. Fadi Portillo. 11/7/2021.12:59 PM.

## 2021-11-08 NOTE — PROGRESS NOTES
[facility] PROGRESS NOTE                                                                       2021          SUBJECTIVE:  Pt seen with terrance pickard np  Attempts to pace stewart with bedside cystoscopy and dilatation with good win  Soundswas unsucessful  Pt has a severe bladder neck contracture which I could not dilate  Will need cysto and internal urethrotomy with sedation    OBJECTIVE:    /61   Pulse 76   Temp 98.1 °F (36.7 °C) (Oral)   Resp 20   Ht 5' 8\" (1.727 m)   Wt 222 lb 0.1 oz (100.7 kg)   SpO2 94%   BMI 33.76 kg/m²     PHYSICAL EXAMINATION:  Skin: dry, without rashes  Respirations: non-labored, intact  Abdomen: soft, non-tender, non-distended      Lab Results   Component Value Date    WBC 5.1 2021    HGB 11.7 (L) 2021    HCT 38.4 2021    .2 (H) 2021     (L) 2021       Lab Results   Component Value Date    CREATININE 0.8 2021       No results found for: PSA    REVIEW OF SYSTEMS:    CONSTITUTIONAL: negative  HEENT: negative  HEMATOLOGIC: negative  ENDOCRINE: negative  RESPIRATORY: negative  CV: negative  GI: negative  NEURO: negative  ORTHOPEDICS: negative  PSYCHIATRIC: negative  : as above    PAST FAMILY HISTORY:  History reviewed. No pertinent family history.   PAST SOCIAL HISTORY:    Social History     Socioeconomic History    Marital status:      Spouse name: None    Number of children: None    Years of education: None    Highest education level: None   Occupational History    None   Tobacco Use    Smoking status: Former Smoker     Quit date: 3/27/1992     Years since quittin.6    Smokeless tobacco: Never Used   Substance and Sexual Activity    Alcohol use: No    Drug use: No    Sexual activity: None   Other Topics Concern    None   Social History Narrative    None     Social Determinants of Health Financial Resource Strain:     Difficulty of Paying Living Expenses: Not on file   Food Insecurity:     Worried About Running Out of Food in the Last Year: Not on file    Jian of Food in the Last Year: Not on file   Transportation Needs:     Lack of Transportation (Medical): Not on file    Lack of Transportation (Non-Medical):  Not on file   Physical Activity:     Days of Exercise per Week: Not on file    Minutes of Exercise per Session: Not on file   Stress:     Feeling of Stress : Not on file   Social Connections:     Frequency of Communication with Friends and Family: Not on file    Frequency of Social Gatherings with Friends and Family: Not on file    Attends Episcopal Services: Not on file    Active Member of Clubs or Organizations: Not on file    Attends Club or Organization Meetings: Not on file    Marital Status: Not on file   Intimate Partner Violence:     Fear of Current or Ex-Partner: Not on file    Emotionally Abused: Not on file    Physically Abused: Not on file    Sexually Abused: Not on file   Housing Stability:     Unable to Pay for Housing in the Last Year: Not on file    Number of Places Lived in the Last Year: Not on file    Unstable Housing in the Last Year: Not on file       Scheduled Meds:   warfarin  2 mg Oral Daily    docusate sodium  100 mg Oral BID    Vitamin D  2,000 Units Oral Daily    cefTRIAXone (ROCEPHIN) IV  1,000 mg IntraVENous Q24H    doxycycline hyclate  100 mg Oral 2 times per day    methylPREDNISolone  60 mg IntraVENous Q8H    pantoprazole  40 mg IntraVENous Daily    And    sodium chloride (PF)  10 mL IntraVENous Daily    nitroglycerin  0.5 inch Topical Once    doxazosin  4 mg Oral Daily    metoprolol succinate  50 mg Oral Daily    aspirin  81 mg Oral Daily    tamsulosin  0.4 mg Oral Dinner    ipratropium-albuterol  3 mL Inhalation 4x Daily    furosemide  40 mg IntraVENous BID    Arformoterol Tartrate  15 mcg Nebulization BID    finasteride  5 mg Oral Daily    influenza virus vaccine  0.5 mL IntraMUSCular Prior to discharge     Continuous Infusions:  PRN Meds:. HYDROmorphone, HYDROmorphone, hydrOXYzine, albuterol, acetaminophen, trimethobenzamide, polyethylene glycol    ASSESSMENT:    Patient Active Problem List   Diagnosis    Metabolic encephalopathy    Inability to walk    Acute on chronic respiratory failure with hypoxia and hypercapnia (HCC)    CAD (coronary artery disease)    Atrial fibrillation (HCC)    [facility]    Hypertension       PLAN:  See above present illness     Shannan Thao MD, M.D.  11/8/2021  4:46 PM

## 2021-11-08 NOTE — PROGRESS NOTES
Palliative Care Department  [phone]  Palliative Care Progress Note  Provider Crissy Scales, [address]  [id number]  Hospital Day: 6  Date of Initial Consult: 11/5/2021  Referring Provider: Chip Vasques DO  Palliative Medicine was consulted for assistance with: Meena Dennis, code status  Chief Complaint Today:  SOB    Brief Summary of Hospital Course:   Tino Leiva is a [age] [facility] y.o. with a medical history of COPD, AFib, HTN, CAD who was admitted on 11/3/2021 from nursing facility with a CHIEF COMPLAINT of altered mental status. ASSESSMENT/PLAN:     Recommendations:     Dyspnea:   -  Morphine 2.5mg q4hrs prn dyspnea  Anxiety:   -  Vistaril 25mg q6hrs prn anxiety    Pertinent Hospital Problems      Acute on chronic hypoxic and hypercapneic respiratory failure   Acute HFpEF/R sided heart failure   Acute COPD exacerbation   Severe pulmonary hypertension   Chronic AFib   Tricuspid valve regurgitation      Palliative Care Encounter / Counseling Regarding Goals of Care  Please see detailed goals of care discussion as below   At this time, Tino Leiva, Does have capacity for medical decision-making. Capacity is time limited and situation/question specific   During encounter son is available as surrogate medical decision-maker   Outcome of goals of care meeting: continue DNR-CCA/DNI   Code status Limited no to all but meds   Advanced Directives: no POA or living will in epic   Surrogate/Legal NOK:  o Jerald Estrella [phone]    Spiritual assessment: no spiritual distress identified  Bereavement and grief: to be determined  Referrals to: none today    SUBJECTIVE:     Details of Conversation:  Chart reviewed. Patient evaluated, on nasal cannula. Reports feeling better, slept well overnight until he was woken up at 5am.  Denies any needs at this time. GOC is continued treatment and LOBITO, code status established. Will sign off at this time.     REVIEW OF SYSTEMS:   Pertinent positives as noted in the HPI. All other systems reviewed and negative. OBJECTIVE:   Prognosis: depends upon goals and Guarded    Physical Exam:  /61   Pulse 76   Temp 98.1 °F (36.7 °C) (Oral)   Resp 20   Ht 5' 8\" (1.727 m)   Wt 222 lb 0.1 oz (100.7 kg)   SpO2 94%   BMI 33.76 kg/m²     Constitutional:  Elderly, thin, NAD, awake, alert, on nasal cannula  Eyes: no scleral icterus, normal lids, no discharge  ENMT:  Normocephalic, atraumatic, mucosa moist, EOMI  Neck:  trachea midline, no JVD  Lungs:  respirations unlabored, no retractions, mild tachypnea, no wheezing  Heart[de-identified]  RRR, distant heart tones, no murmur, rub, or gallop noted during exam  Abd:  Soft, non tender, non distended, bowel sounds present  :  deferred  MSK: sarcopenia present  Ext:  Moving all extremities, +3 edema, pulses present  Skin:  Warm and dry, bruising bilateral forearms  Psych: non-anxious affect  Neuro:  PERRL, Alert, grossly nonfocal; following commands    Objective data reviewed: labs, images, records, medication use, vitals and chart    Discussed patient and the plan of care with the other IDT members: Palliative Medicine IDT Team, Floor Nurse and Patient    Time/Communication  Greater than 50% of time spent, total 15 minutes in counseling and coordination of care at the bedside regarding goals of care, symptom management, diagnosis and prognosis and see above. Thank you for allowing Palliative Medicine to participate in the care of Katharine Haji.

## 2021-11-08 NOTE — CONSULTS
11/8/2021 1:54 PM  Service: Urology  Group: [facility] (Doni/Cyndie/Rika)    Vijay Ovalle  [id number]     Chief Complaint:    Urinary retention     History of Present Illness: The patient is a [de-identified] y.o. male patient who initially presented with AMS from a NH. He is a poor historian and not able to give a full history. He is incontinent of urine  He says he urinates fine   He has been short of breath and on BiPAP. He was found today to have urinary retention. He is on Flomax, Hytrin  and Cardura. He had a TURP several years ago by Dr. Pardeep Prado. He is on Coumadin. Past Medical History:   Diagnosis Date    Atrial fibrillation ([facility] [address]     CAD (coronary artery disease)     coronary stent 12yrs ago    COPD (chronic obstructive pulmonary disease) ([facility] [address]     Hypertension          Past Surgical History:   Procedure Laterality Date    CARDIAC SURGERY      coronary stent    CATARACT REMOVAL WITH IMPLANT Left 04 01 2013    COLONOSCOPY      CYST REMOVAL      tailbone    EYE SURGERY         Medications Prior to Admission:    Medications Prior to Admission: ipratropium-albuterol (DUONEB) 0.5-2.5 (3) MG/3ML SOLN nebulizer solution, Inhale 3 mLs into the lungs 4 times daily  albuterol (PROVENTIL) (2.5 MG/3ML) 0.083% nebulizer solution, Take 3 mLs by nebulization every 4 hours as needed for Wheezing or Shortness of Breath  Respiratory Therapy Supplies (FULL KIT NEBULIZER SET) MISC, Use as directed with nebulized medication. warfarin (COUMADIN) 4 MG tablet, 4 mg daily 6 PM  tamsulosin (FLOMAX) 0.4 MG capsule, Take 0.4 mg by mouth daily  terazosin (HYTRIN) 5 MG capsule, Take 10 mg by mouth daily. metoprolol (TOPROL-XL) 50 MG XL tablet, Take 50 mg by mouth daily. finasteride (PROSCAR) 5 MG tablet, Take 5 mg by mouth daily. aspirin 81 MG tablet, Take 81 mg by mouth daily. Allergies:    Dye [iodides]    Social History:    reports that he quit smoking about 29 years ago.  He has never used smokeless tobacco. He reports that he does not drink alcohol and does not use drugs. Family History:   Non-contributory to this Urological problem  family history is not on file. Review of Systems:  Constitutional: No chills or sweats  Respiratory: positive shortness of breath  Cardiovascular: negative for chest pain and dyspnea  Gastrointestinal: negative for abdominal pain, diarrhea, nausea and vomiting   Derm: negative for rash and skin lesion(s)  Neurological: negative for seizures and tremors  Musculoskeletal: weakness, bilateral lower extremity pain  Endocrine: negative for diabetic symptoms including polydipsia and polyuria  Psychiatric: negative  : As above in the HPI, otherwise negative  All other reviews are negative    Physical Exam:     Vitals:  /61   Pulse 76   Temp 98.1 °F (36.7 °C) (Oral)   Resp 20   Ht 5' 8\" (1.727 m)   Wt 222 lb 0.1 oz (100.7 kg)   SpO2 94%   BMI 33.76 kg/m²     General:  Awake, alert, oriented X 3. Well developed, well nourished, well groomed. No apparent distress. HEENT:  Normocephalic, atraumatic. Pupils equal, round. No scleral icterus. No conjunctival injection. Normal lips, teeth, and gums. No nasal discharge. Neck:  Supple, no masses. Heart:  RRR  Lungs:  No audible wheezing. Respirations symmetric and non-labored. Abdomen:  soft, nontender,morbidly obese  Extremities:  No clubbing, cyanosis, or edema  Skin:  Warm and dry, no open lesions or rashes  Neuro: Alert and conversing. Follows commands  Rectal: deferred  Genitalia:  Partially buried penis.  +++ scrotal edema    Labs:     Recent Labs     11/06/21  0908 11/07/21  0812 11/08/21  0859   WBC 5.5 5.2 5.1   RBC 3.62* 3.55* 3.65*   HGB 11.8* 11.4* 11.7*   HCT 36.9* 37.7 38.4   .9* 106.2* 105.2*   MCH 32.6 32.1 32.1   MCHC 32.0 30.2* 30.5*   RDW 14.8 14.7 14.6   * 121* 116*   MPV 11.3 11.1 11.3         Recent Labs     11/06/21  0908 11/07/21  0812 11/08/21  0859   CREATININE 0.9 0.9 0.8 Protime 9.3 - 12.4 sec 26.3 High     INR  2.2            Results for Petra Rivas (MRN [id number]) as of 11/8/2021 13:39   Ref. Range 11/3/2021 08:25   Color, UA Latest Ref Range: Straw/Yellow  Yellow   Clarity, UA Latest Ref Range: Clear  Clear   Glucose, UA Latest Ref Range: Negative mg/dL Negative   Bilirubin, Urine Latest Ref Range: Negative  Negative   Ketones, Urine Latest Ref Range: Negative mg/dL Negative   Specific Gravity, UA Latest Ref Range: 1.005 - 1.030  1.025   Blood, Urine Latest Ref Range: Negative  LARGE (A)   pH, UA Latest Ref Range: 5.0 - 9.0  5.0   Protein, UA Latest Ref Range: Negative mg/dL Negative   Urobilinogen, Urine Latest Ref Range: <2.0 E.U./dL 0.2   Nitrite, Urine Latest Ref Range: Negative  Negative   Leukocyte Esterase, Urine Latest Ref Range: Negative  Negative   WBC, UA Latest Ref Range: 0 - 5 /HPF 1-3   RBC, UA Latest Ref Range: 0 - 2 /HPF 10-20 (A)   Bacteria, UA Latest Ref Range: None Seen /HPF RARE (A)             9/15/21  EXAMINATION:   CT OF THE ABDOMEN AND PELVIS WITHOUT CONTRAST 9/15/2021 4:22 pm       TECHNIQUE:   CT of the abdomen and pelvis was performed without the administration of   intravenous contrast. Multiplanar reformatted images are provided for review.    Dose modulation, iterative reconstruction, and/or weight based adjustment of   the mA/kV was utilized to reduce the radiation dose to as low as reasonably   achievable.       COMPARISON:   None.       HISTORY:   ORDERING SYSTEM PROVIDED HISTORY: Questionable cystic mass in the pelvis per   MRI of lumbar spine   TECHNOLOGIST PROVIDED HISTORY:   Additional Contrast?->Oral   Reason for exam:->Questionable cystic mass in the pelvis per MRI of lumbar   spine       FINDINGS:   Lower Chest: Again seen are some chronic appearing interstitial changes at   the lung bases.  There is trace left pleural fluid.  There is an unchanged   moderate pericardial effusion.  There are coronary artery calcifications.     Organs: Liver margins are irregular which is suggestive of cirrhosis.  Within   limitations of a noncontrast exam, the liver, spleen, right adrenal gland and   kidneys demonstrate no significant abnormality.  The left adrenal gland is   calcified.       GI/Bowel: There are no findings of intestinal obstruction.  The appendix is   normal.       Pelvis:  Bladder is very distended but appears otherwise unremarkable. Lindy English   Stewart catheter balloon is inflated within the bulbous segment of the urethra. Prostate gland is not visualized.  There is no abnormal pelvic mass or fluid   collection seen. Cottie Sayres is a small fat containing left inguinal hernia.       Peritoneum/Retroperitoneum: There are aortoiliac atherosclerotic   calcification.  There is no abdominal aortic aneurysm.  There is no free   intraperitoneal air.  There is no abnormal fluid collection.       Bones/Soft Tissues: No acute abnormality identified.           Impression   1. No abnormal cystic mass in the pelvis. 2. Very distended bladder.  Prostate gland not visualized. 3. Stewart catheter balloon is inflated in the urethra, posterior bulbar   segment. 4. Probable cirrhosis   5. Moderate pericardial effusion is unchanged. 6. Unchanged trace left pleural effusion and interstitial lung disease at the   lung bases.                           Procedure  After prepping and draping pt attempted 18 fr coude catheter under sterile technique and was unable to place past the bladder neck  I then attempted a sensor wire and this was placed into the bladder with no difficulty. I attempted an 18 fr Kashia tip catheter over the wire and was unable to place over the wire, I then attempted a 16 fr catheter over the wire but was unable to pass the stricture. I was able to empty about 100 cc of urine. At that time Dr. Yodit Aguirre attempted stewart insertion with a catheter guide which was unsuccessful.  A cystoscope was used to place a wire and Kashia tip catheter was attempted without success. Evans sounds were used to dilate to at least a 24 fr and still catheter attempts over a wire were unsuccessful. Pt tolerated the entire process well. Assessment/plan:    Urinary retention   Pt with no urge to void   He has incontinence   Bladder neck obstruction   BPH s/p TURP by Dr. Lacie feldman 2-3 years ago   Difficult stewart insertion ultimately unsuccessful   Scrotal edema  Plan cystoscopy, possible excision of bladder neck obstruction, insertion of stewart catheter in OR tomorrow. Will need to hold coumadin until after procedure. NPO after midnight   Continue Cardura, Hytrin and Flomax as at facility   Pt seen with Dr. Naima Mendez with son Matha Gilford and updated him. He is ok with procedure tomorrow.      Thank you     Olimpia Garcia NP-C  [facility]            Electronically signed by LILIANA Crump CNP on 11/8/2021 at 1:54 PM   Agree with above assessment and plan  Pt will need cysto and revision of bladder neck under sedation  zachary hernández

## 2021-11-08 NOTE — PROGRESS NOTES
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 male who presents with altered mental status at the nursing home. Patient symptoms started about a week ago patient was also noted to have increased weight gain and increasing lower leg edema. Patient normally alert and oriented x4 but has become much more lethargic and interactive. Patient gained 20 pounds at the nursing home. Patient was brought into the emergency room with patient chest x-ray showed interstitial lung disease and also cardiomegaly. ABG showed a pH 7.204 with a CO2 of 85 on 6 L nasal cannula. Patient then was started on BiPAP.  BUN/creatinine 43/1.0 with proBNP 3900 and troponin initially was 120. Liver enzymes are normal.  Heart rate was 75 normal temperature and blood pressure initially at 028 systolic currently is 97/71. Patient currently on 40% FiO2 BiPAP with O2 sat of 93%. Patient states he has been wearing O2 nasal cannula for last couple weeks at the nursing home but not before that. Patient denies wearing BiPAP in the past.  He denies any problem with any chest pain, abdominal pain, nausea vomiting or dizziness. ECG reviewed with patient have atrial fibrillation with no significant ST-T changes to suggest acute myocardial injury troponin was elevated on admission but troponin I-II hours later was less than on admission. 11/4/2021  Patient seen examined on PCU. Patient still very short of breath even at rest.  Patient seems to do fairly good on the BiPAP once he is off of it he seemed to have some more respiratory distress. BUN/creatinine 46/1.0 with patient's transaminases normal TSH 4.56. WBC 5.1 with hemoglobin 11. INR is 2.2 today. Temperature 98.3 with a heart rate of 100 blood pressure 130/40. O2 sat 91% on 7 L nasal cannula.  ABGs today were improved but the patient had a pH increase 7.355 with PCO2 decreased 56.2 while the patient was on BiPAP. Patient urine output has picked up since early this morning. 11/5/2021  Patient seen examined on PCU. Case discussed with intensivist yesterday afternoon. Patient looks a little bit better today. Nursing states that the patient has been changed to a DNR CC a with no chest compressions, intubation or cardioversion. Patient denies any chest or abdominal pain. Patient still with +34 lower leg edema. BUN/creatinine 48/1.0 with the patient's Liver enzymes are normal with a WBC 3.1 with hemoglobin 11.1. Temperature 97.7 with heart rate of 70 blood pressure 123/48. O2 sat is 91% on 10 L high flow nasal cannula. Urine output seems to be adequate. 11/6/2021  Patient seen examined on PCU. Patient is alert and oriented with patient currently had BiPAP on and feeling little bit more comfortable. BUN/creatinine 51/0.9 WBCs 5.5 hemoglobin 11.8. INR 2.8 today. Temperature 97.5 heart rate 85 blood pressure 107/69. O2 sat 97% on BiPAP at 75% FiO2. Urine output is inaccurate but seems to be adequate according to the patient who states he is urinating a lot. INR is 2.3 we will decrease the warfarin 1 mg p.o. daily. 11/7/2021  Patient seen examined on PCU. Patient seems to be doing little bit better today. Patient is wearing BiPAP almost all the time. Patient did eat all of his breakfast.  Patient denies any chest or abdominal pain. Patient states that he has not had a bowel movement yet. Patient's lower leg edema still prominent but seems to be mildly improved. BUN/creatinine 48/0.9 with a CO2 of 36. Transaminases are normal with free T4 1.1 and WBC 5.2 and hemoglobin 11.4. Platelet count 618. INR is 2.9 today. Temperature 97.7 with heart rate of 60 and blood pressure 108/869. O2 sat 98% on 12 L high flow nasal cannula alternating with BiPAP with FiO2 of 65%. .    11/8/2021  Patient seen and examined in PCU. Patient seems little bit more lethargic/confused this morning off of the BiPAP.  BUN/creatinine 48/0.8 with CO2 electrolytes at 38. Normal transaminases with INR down to 2.2 today. WBC 5.1 with hemoglobin 11.7. Temperature is 98.1 with heart rate 76 blood pressure 103/61. O2 sat 94% on 12 L high flow nasal cannula. Urinary output is   in accurate. Past Medical History:    Past Medical History:   Diagnosis Date    [facility] [address]     CAD (coronary artery disease)     coronary stent 12yrs ago    COPD (chronic obstructive pulmonary disease) ([facility] [address]     Hypertension      Past Surgical History:    Past Surgical History:   Procedure Laterality Date    CARDIAC SURGERY      coronary stent    CATARACT REMOVAL WITH IMPLANT Left 04 01 2013    COLONOSCOPY      CYST REMOVAL      tailbone    EYE SURGERY         Medications Prior to Admission:    @  Prior to Admission medications    Medication Sig Start Date End Date Taking? Authorizing Provider   ipratropium-albuterol (DUONEB) 0.5-2.5 (3) MG/3ML SOLN nebulizer solution Inhale 3 mLs into the lungs 4 times daily 9/16/21   Serena Madrid DO   albuterol (PROVENTIL) (2.5 MG/3ML) 0.083% nebulizer solution Take 3 mLs by nebulization every 4 hours as needed for Wheezing or Shortness of Breath 9/16/21   Serena Madrid DO   Respiratory Therapy Supplies (FULL KIT NEBULIZER SET) MISC Use as directed with nebulized medication. 9/16/21   Serena Madrid DO   warfarin (COUMADIN) 4 MG tablet 4 mg daily 6 PM 9/16/21   Serena Madrid DO   tamsulosin (FLOMAX) 0.4 MG capsule Take 0.4 mg by mouth daily    Historical Provider, MD   terazosin (HYTRIN) 5 MG capsule Take 10 mg by mouth daily. Historical Provider, MD   metoprolol (TOPROL-XL) 50 MG XL tablet Take 50 mg by mouth daily. Historical Provider, MD   finasteride (PROSCAR) 5 MG tablet Take 5 mg by mouth daily.     Historical Provider, MD   aspirin 81 MG tablet Take 81 mg by mouth daily.    Historical Provider, MD       Allergies:  Dye [iodides]    Social History:   Social History     Socioeconomic History    Marital status:      Spouse name: Not on file    Number of children: Not on file    Years of education: Not on file    Highest education level: Not on file   Occupational History    Not on file   Tobacco Use    Smoking status: Former Smoker     Quit date: 3/27/1992     Years since quittin.6    Smokeless tobacco: Never Used   Substance and Sexual Activity    Alcohol use: No    Drug use: No    Sexual activity: Not on file   Other Topics Concern    Not on file   Social History Narrative    Not on file     Social Determinants of Health     Financial Resource Strain:     Difficulty of Paying Living Expenses: Not on file   Food Insecurity:     Worried About [address] in the Last Year: Not on file    Jian of Food in the Last Year: Not on file   Transportation Needs:     Lack of Transportation (Medical): Not on file    Lack of Transportation (Non-Medical):  Not on file   Physical Activity:     Days of Exercise per Week: Not on file    Minutes of Exercise per Session: Not on file   Stress:     Feeling of Stress : Not on file   Social Connections:     Frequency of Communication with Friends and Family: Not on file    Frequency of Social Gatherings with Friends and Family: Not on file    Attends Protestant Services: Not on file    Active Member of [address] [place] [address] or Organizations: Not on file    Attends Club or Organization Meetings: Not on file    Marital Status: Not on file   Intimate Partner Violence:     Fear of Current or Ex-Partner: Not on file    Emotionally Abused: Not on file    Physically Abused: Not on file    Sexually Abused: Not on file   Housing Stability:     Unable to Pay for Housing in the Last Year: Not on file    Number of [place] in the Last Year: Not on file    Unstable Housing in the Last Year: Not on file       Family History:   History reviewed. No pertinent family history. REVIEW OF SYSTEMS:   Gen: Patient initially admitted from the nursing home with altered mental status but is doing much better at this time. Patient denies any lightheadedness or dizziness. No LOC or syncope. No fevers or chills. HEENT: No earache, sore throat or nasal congestion. Resp: Denies cough, hemoptysis or sputum production. Cardiac: Denies chest pain, +SOB, no diaphoresis or palpitations. GI: No nausea, vomiting, diarrhea or constipation. No melena or hematochezia. : No urinary complaints, dysuria, hematuria or frequency. MSK: + Diffuse lower extremity edema. No extremity weakness, paralysis or paresthesias. PHYSICAL EXAM:    Vitals:  /61   Pulse 76   Temp 98.1 °F (36.7 °C) (Oral)   Resp 20   Ht 5' 8\" (1.727 m)   Wt 222 lb 0.1 oz (100.7 kg)   SpO2 94%   BMI 33.76 kg/m²     General:  This is a [de-identified] y.o. yo male who is alert and oriented in mild respiratory distress  HEENT:  Head is normocephalic and atraumatic, PERRLA, EOMI, mucus membranes moist with no pharyngeal erythema or exudate. Neck:  Supple with no carotid bruits, JVD or thyromegaly.   No cervical adenopathy  CV:  Regular rate and rhythm, no murmurs  Lungs: Coarse decreased breath sounds to auscultation bilaterally with no wheezes, rales or rhonchi  Abdomen:  Soft, nontender,+ abdominal fat, nondistended, bowel sounds present  Extremities:  + 2-3 leg edema, peripheral pulses intact bilaterally  Neuro:  Cranial nerves II-XII grossly intact; motor and sensory function intact with no focal deficits  Skin:  No rashes, lesions or wounds    DATA:  CBC with Differential:    Lab Results   Component Value Date    WBC 5.1 11/08/2021    RBC 3.65 11/08/2021    HGB 11.7 11/08/2021    HCT 38.4 11/08/2021     11/08/2021    .2 11/08/2021    MCH 32.1 11/08/2021    MCHC 30.5 11/08/2021    RDW 14.6 11/08/2021    BLASTSPCT 0.0 11/04/2021    LYMPHOPCT 2.5 11/08/2021    MONOPCT 2.5 11/08/2021    BASOPCT 0.0 11/08/2021    MONOSABS 0.15 11/08/2021    LYMPHSABS 0.15 11/08/2021    EOSABS 0.00 11/08/2021    BASOSABS 0.00 11/08/2021     CMP:    Lab Results   Component Value Date     11/08/2021    K 4.6 11/08/2021    K 4.8 11/03/2021    CL 98 11/08/2021    CO2 38 11/08/2021    BUN 48 11/08/2021    CREATININE 0.8 11/08/2021    GFRAA >60 11/08/2021    LABGLOM >60 11/08/2021    GLUCOSE 123 11/08/2021    PROT 6.1 11/08/2021    LABALBU 3.5 11/08/2021    CALCIUM 8.5 11/08/2021    BILITOT 0.4 11/08/2021    ALKPHOS 146 11/08/2021    AST 22 11/08/2021    ALT 19 11/08/2021     Magnesium:    Lab Results   Component Value Date    MG 2.3 11/05/2021     Phosphorus:    Lab Results   Component Value Date    PHOS 4.4 11/05/2021     PT/INR:    Lab Results   Component Value Date    PROTIME 26.3 11/08/2021    INR 2.2 11/08/2021     Troponin:  No results found for: TROPONINI  U/A:    Lab Results   Component Value Date    COLORU Yellow 11/03/2021    PROTEINU Negative 11/03/2021    PHUR 5.0 11/03/2021    WBCUA 1-3 11/03/2021    RBCUA 10-20 11/03/2021    BACTERIA RARE 11/03/2021    CLARITYU Clear 11/03/2021    SPECGRAV 1.025 11/03/2021    LEUKOCYTESUR Negative 11/03/2021    UROBILINOGEN 0.2 11/03/2021    BILIRUBINUR Negative 11/03/2021    BLOODU LARGE 11/03/2021    GLUCOSEU Negative 11/03/2021     ABG:    Lab Results   Component Value Date    PH 7.355 11/04/2021    PCO2 56.2 11/04/2021    PO2 105.6 11/04/2021    HCO3 30.7 11/04/2021    BE 4.0 11/04/2021    O2SAT 97.5 11/04/2021     HgBA1c:  No results found for: LABA1C  FLP:    Lab Results   Component Value Date    TRIG 46 11/04/2021    HDL 81 11/04/2021    LDLCALC 46 11/04/2021    LABVLDL 9 11/04/2021     TSH:    Lab Results   Component Value Date    TSH 4.560 11/04/2021     IRON:  No results found for: IRON  LIPASE:    Lab Results   Component Value Date    LIPASE 74 09/08/2021       ASSESSMENT AND PLAN:      Patient Active Problem List Diagnosis Date Noted    Acute on chronic respiratory failure with hypoxia and hypercapnia (HCC) 11/03/2021    CAD (coronary artery disease)  Acute diastolic congestive heart failure     Atrial fibrillation - chronic     COPD with exacerbation     Hypertension-patient hypotensive in the ED      History of severe pulmonary hypertension at 99 mmHg with +34 tricuspid regurg 09/10/2021     Diffuse leg edema probably combination of the severe pulmonary hypertension and tricuspid regurg along with acute diastolic CHF 65/28/2217     Plan:  Admit to [facility]/PCU  Nursing home meds reviewed BiPAP  BiPAP -continuous at this time  DuoNeb aerosols every 4 hours  Pulmicort aerosol twice daily  Brovana aerosol twice daily  Continue warfarin   Daily INRs  Lasix 40 mg IV push twice daily  Accurate I's and O's  BiPAP 16/8 at at bedtime and as needed   SCDs lower extremities    Consult pulmonology    MRSA screen  Add IV Rocephin, doxycycline    Colace 100 mg twice daily  MiraLAX 17 mg x 1 now and daily as needed    Increase warfarin 1 mg p.o. today    Bladder scan today  ABGs now    CMP, CBC in honey Hansen DO, D.OMark  11/8/2021  11:29 AM

## 2021-11-09 NOTE — BRIEF OP NOTE
Brief Postoperative Note    Jose Tellez  YOB: 1940  [id number]    Pre-operative Diagnosis: bladder neck contracture    Post-operative Diagnosis: Same    Procedure: cysto laser incision of bladder neck contracture placement of stewart catheter    Anesthesia: MAC    Surgeons/Assistants: Reuben Penn MD      Estimated Blood Loss: less than 50     Complications: None    Specimens: Was Not Obtained    Findings:     Electronically signed by Reuben Penn MD on 11/9/2021 at 3:18 PM  Brief Postoperative Note      Patient: Jose Tellez  YOB: 1940  MRN: [id number]    Date of Procedure: 11/9/2021    Pre-Op Diagnosis: bladder neck contracture    Post-Op Diagnosis: Same       Procedure(s):  CYSTOSCOPY URETHRAL DILATATION  STEWART CATHETER INSERTION    Surgeon(s):  Reuben Penn MD    Assistant:      Anesthesia: General    Estimated Blood Loss (mL): less than 50     Complications: None    Specimens:   ID Type Source Tests Collected by Time Destination   1 : BLADDER URINE FOR CULTURE Urine Urine, Cystoscopic CULTURE, URINE Reuben Penn MD 11/9/2021 1452        Implants:      Drains:   External Urinary Catheter (Active)   Urine Color Yellow 11/08/21 0755   Urine Appearance Clear 11/08/21 0755   Output (mL) 150 mL 11/08/21 0537   Suction 40 mmgHg continuous 11/08/21 0755   Placement Replaced 11/08/21 0755   Skin Assessment No Injury 11/08/21 0788           Electronically signed by Reuben Penn MD on 11/9/2021 at 3:18 PM

## 2021-11-09 NOTE — OP NOTE
[address] [place]                                OPERATIVE REPORT    PATIENT NAME: Cisco Fleischer                     :        1940  MED REC NO:   [id number]                            ROOM:       9495  ACCOUNT NO:   [de-identified]                           ADMIT DATE: 2021  PROVIDER:     Nadia Kearney MD    DATE OF PROCEDURE:  2021    PREOPERATIVE DIAGNOSIS:  Bladder neck contracture. POSTOPERATIVE DIAGNOSIS:  Bladder neck contracture. OPERATION PERFORMED:  Cystopanendoscopy, laser ablation of bladder neck  contracture, placement of a Melchor catheter. SURGEON:  Nadia Kearney MD    ANESTHESIA:  LMAC. ESTIMATED BLOOD LOSS:  Less than 20. OPERATION REPORT:  With the patient in the lithotomy position under  satisfactory sedation, genitalia were prepped and draped in a sterile  manner. A 21-[place] panendoscope passed through the pendulous urethra. The prostatic fossa appeared to have total scarring. There was a small  aperture at the 12 o'clock location on the bladder neck. I was able to  place a wire through this and x-ray confirmed that the wire was indeed  in the bladder. I was then able to use a 1000 micron laser fiber and  ablated the scar tissue until the bladder neck was widely patent. I was  able to put the panendoscope directly into the bladder. The bladder was  terribly trabeculated with cellule and diverticular formation  throughout. Once the bladder neck had been widely opened; a 22, 10 mL  two-way Melchor was fashioned into a [place]-tip catheter, and I was able  to pass this over the wire into the bladder. The catheter irrigated  well. The patient was sent to the recovery room in satisfactory  condition.         Belkys Burkett MD    D: 2021 15:34:22       T: 2021 15:37:22     [id number]  Job#: [id number]     Doc#: [id number]    CC:

## 2021-11-09 NOTE — PROGRESS NOTES
11/9/2021 8:08 AM  Service: Urology  Group: [facility] (Doni/Cyndie/Rika)    Diamond Austin  [id number]    Subjective:  Awake and alert  No complaints of pain  Incontinent a few times last night  Afebrile       Review of Systems  Constitutional: No chills or sweats   Respiratory: On BiPAP, no distress   Cardiovascular: negative for chest pain  Gastrointestinal: negative for abdominal pain, nausea and vomiting  Dermatologic:no pruritus   Hematologic/lymphatic: negative for bleeding  Musculoskeletal:positive for muscle weakness  Neurological: negative for headaches and seizures  Endocrine: negative for diabetic symptoms including none  Psychiatric: negative  : see above    Scheduled Meds:   warfarin  2 mg Oral Daily    docusate sodium  100 mg Oral BID    Vitamin D  2,000 Units Oral Daily    cefTRIAXone (ROCEPHIN) IV  1,000 mg IntraVENous Q24H    doxycycline hyclate  100 mg Oral 2 times per day    methylPREDNISolone  60 mg IntraVENous Q8H    pantoprazole  40 mg IntraVENous Daily    And    sodium chloride (PF)  10 mL IntraVENous Daily    nitroglycerin  0.5 inch Topical Once    doxazosin  4 mg Oral Daily    metoprolol succinate  50 mg Oral Daily    aspirin  81 mg Oral Daily    tamsulosin  0.4 mg Oral Dinner    ipratropium-albuterol  3 mL Inhalation 4x Daily    furosemide  40 mg IntraVENous BID    Arformoterol Tartrate  15 mcg Nebulization BID    finasteride  5 mg Oral Daily    influenza virus vaccine  0.5 mL IntraMUSCular Prior to discharge       Objective:  Vitals:    11/09/21 0130   BP: 122/62   Pulse: 64   Resp: 20   Temp: 97.1 °F (36.2 °C)   SpO2: 97%         Allergies: Dye [iodides]    General Appearance: alert and conversing. No acute distress.   Skin: no rash or erythema  Head: normocephalic and atraumatic  Pulmonary/Chest:On BiPAP, respirations unlabored  Abdomen: soft, non-tender, non-distended, obese  Genitalia: +++ scrotal edema   Extremities: bilateral lower extremity edema and ecchymosis     Labs:     Recent Labs     11/09/21  0515      K 4.2   CL 96*   CO2 39*   BUN 54*   CREATININE 1.0   GLUCOSE 122*   CALCIUM 8.4*       Lab Results   Component Value Date    HGB 12.1 11/09/2021    HCT 39.6 11/09/2021         Assessment/Plan:  Urinary retention  Urinary incontinence  BPH with LERMA  S/p TURP by Dr. Jc Madden aprox 2-3 years ago  Difficult stewart insertion, ultimately unsuccessful  Scrotal edema  Hx Afib  COPD  CAD  Respiratory failure with hypoxia and hypercapnia   Hypertension  Non ambulatory     Plan cystoscopy and internal urethrotomy with sedation in am  Coumadin on hold for procedure   Pt on Rocephin    Updated son yesterday        Sonja Dolan, APRN - CNP   [facility]

## 2021-11-09 NOTE — CARE COORDINATION
SS NOTE: COVID NEGATIVE 11/3- PT WILL NEED A RAPID NEGATIVE COVID TO RETURN TO THE NF. Pt's O2 SAT 98% on 12 liters high flow NC alternating with bipap FIO2of 65%. Code status-limited, meds only. Pt is on IV Rocephin and Doxy. Pt has urinary retention and he will be going to surgery for a Cysto and stewart insertion. This pt is a long term care/ private pay Resident of [facility]. Plan is for pt to return there at [facility] per pts' son Lyle Howard. Pt will need NO PRECERT to return to NF - he will need a signed EMILY and current PT/OT notes. PT and OT are on consult. SW faxed updates to Kishore at the [place]- [phone]. SS to continue. Mahesh Ramsey Floor PM.

## 2021-11-09 NOTE — PROGRESS NOTES
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 male who presents with altered mental status at the nursing home. Patient symptoms started about a week ago patient was also noted to have increased weight gain and increasing lower leg edema. Patient normally alert and oriented x4 but has become much more lethargic and interactive. Patient gained 20 pounds at the nursing home. Patient was brought into the emergency room with patient chest x-ray showed interstitial lung disease and also cardiomegaly. ABG showed a pH 7.204 with a CO2 of 85 on 6 L nasal cannula. Patient then was started on BiPAP.  BUN/creatinine 43/1.0 with proBNP 3900 and troponin initially was 120. Liver enzymes are normal.  Heart rate was 75 normal temperature and blood pressure initially at 879 systolic currently is 49/18. Patient currently on 40% FiO2 BiPAP with O2 sat of 93%. Patient states he has been wearing O2 nasal cannula for last couple weeks at the nursing home but not before that. Patient denies wearing BiPAP in the past.  He denies any problem with any chest pain, abdominal pain, nausea vomiting or dizziness. ECG reviewed with patient have atrial fibrillation with no significant ST-T changes to suggest acute myocardial injury troponin was elevated on admission but troponin I-II hours later was less than on admission. 11/4/2021  Patient seen examined on PCU. Patient still very short of breath even at rest.  Patient seems to do fairly good on the BiPAP once he is off of it he seemed to have some more respiratory distress. BUN/creatinine 46/1.0 with patient's transaminases normal TSH 4.56. WBC 5.1 with hemoglobin 11. INR is 2.2 today. Temperature 98.3 with a heart rate of 100 blood pressure 130/40. O2 sat 91% on 7 L nasal cannula.  ABGs today were improved but the patient had a pH increase 7.355 with PCO2 decreased 56.2 while the patient was on BiPAP. Patient urine output has picked up since early this morning. 11/5/2021  Patient seen examined on PCU. Case discussed with intensivist yesterday afternoon. Patient looks a little bit better today. Nursing states that the patient has been changed to a DNR CC a with no chest compressions, intubation or cardioversion. Patient denies any chest or abdominal pain. Patient still with +34 lower leg edema. BUN/creatinine 48/1.0 with the patient's Liver enzymes are normal with a WBC 3.1 with hemoglobin 11.1. Temperature 97.7 with heart rate of 70 blood pressure 123/48. O2 sat is 91% on 10 L high flow nasal cannula. Urine output seems to be adequate. 11/6/2021  Patient seen examined on PCU. Patient is alert and oriented with patient currently had BiPAP on and feeling little bit more comfortable. BUN/creatinine 51/0.9 WBCs 5.5 hemoglobin 11.8. INR 2.8 today. Temperature 97.5 heart rate 85 blood pressure 107/69. O2 sat 97% on BiPAP at 75% FiO2. Urine output is inaccurate but seems to be adequate according to the patient who states he is urinating a lot. INR is 2.3 we will decrease the warfarin 1 mg p.o. daily. 11/7/2021  Patient seen examined on PCU. Patient seems to be doing little bit better today. Patient is wearing BiPAP almost all the time. Patient did eat all of his breakfast.  Patient denies any chest or abdominal pain. Patient states that he has not had a bowel movement yet. Patient's lower leg edema still prominent but seems to be mildly improved. BUN/creatinine 48/0.9 with a CO2 of 36. Transaminases are normal with free T4 1.1 and WBC 5.2 and hemoglobin 11.4. Platelet count 031. INR is 2.9 today. Temperature 97.7 with heart rate of 60 and blood pressure 108/869. O2 sat 98% on 12 L high flow nasal cannula alternating with BiPAP with FiO2 of 65%. .    11/8/2021  Patient seen and examined in PCU. Patient seems little bit more lethargic/confused this morning off of the BiPAP.  BUN/creatinine 48/0.8 with CO2 electrolytes at 38. Normal transaminases with INR down to 2.2 today. WBC 5.1 with hemoglobin 11.7. Temperature is 98.1 with heart rate 76 blood pressure 103/61. O2 sat 94% on 12 L high flow nasal cannula. Urinary output is   in accurate. 11/9/2021   Patient seen examined in PCU. Patient is again alert and oriented but somewhat short of breath with any activity and on the BiPAP most of the day. Patient denies any chest pain or abdominal pain. Patient supposed to go to surgery for cystoscopy and probable urethral dilation today with insertion of Melchor catheter. BUN/creatinine 54/1.0 today with liver enzymes normal WBC 11.1 hemoglobin 12.1. INR is 2.2 today. Temperature is 97.8 with heart rate of 90 and blood pressure 110/66. O2 sat is 95% with the patient on BiPAP 65% FiO2. Urine output is very inaccurate. Urology note reviewed with patient having severe bladder neck contracture and urology not able to dilate yesterday afternoon. Past Medical History:    Past Medical History:   Diagnosis Date    Atrial fibrillation ([address]     CAD (coronary artery disease)     coronary stent 12yrs ago    COPD (chronic obstructive pulmonary disease) ([address]     Hypertension      Past Surgical History:    Past Surgical History:   Procedure Laterality Date    CARDIAC SURGERY      coronary stent    CATARACT REMOVAL WITH IMPLANT Left 04 01 2013    COLONOSCOPY      CYST REMOVAL      tailbone    EYE SURGERY         Medications Prior to Admission:    @  Prior to Admission medications    Medication Sig Start Date End Date Taking?  Authorizing Provider   ipratropium-albuterol (DUONEB) 0.5-2.5 (3) MG/3ML SOLN nebulizer solution Inhale 3 mLs into the lungs 4 times daily 9/16/21   Norma Iglesias DO   albuterol (PROVENTIL) (2.5 MG/3ML) 0.083% nebulizer solution Take 3 mLs by nebulization every 4 hours as needed for Wheezing or Shortness of Breath 21   China Quispe, DO   Respiratory Therapy Supplies (FULL KIT NEBULIZER SET) MISC Use as directed with nebulized medication. 21   China Quispe, DO   warfarin (COUMADIN) 4 MG tablet 4 mg daily 6 PM 21   China Quispe, DO   tamsulosin (FLOMAX) 0.4 MG capsule Take 0.4 mg by mouth daily    Historical Provider, MD   terazosin (HYTRIN) 5 MG capsule Take 10 mg by mouth daily. Historical Provider, MD   metoprolol (TOPROL-XL) 50 MG XL tablet Take 50 mg by mouth daily. Historical Provider, MD   finasteride (PROSCAR) 5 MG tablet Take 5 mg by mouth daily. Historical Provider, MD   aspirin 81 MG tablet Take 81 mg by mouth daily. Historical Provider, MD       Allergies:  Dye [iodides]    Social History:   Social History     Socioeconomic History    Marital status:      Spouse name: Not on file    Number of children: Not on file    Years of education: Not on file    Highest education level: Not on file   Occupational History    Not on file   Tobacco Use    Smoking status: Former Smoker     Quit date: 3/27/1992     Years since quittin.6    Smokeless tobacco: Never Used   Substance and Sexual Activity    Alcohol use: No    Drug use: No    Sexual activity: Not on file   Other Topics Concern    Not on file   Social History Narrative    Not on file     Social Determinants of Health     Financial Resource Strain:     Difficulty of Paying Living Expenses: Not on file   Food Insecurity:     Worried About [id number] in the Last Year: Not on file    Jian of Food in the Last Year: Not on file   Transportation Needs:     Lack of Transportation (Medical): Not on file    Lack of Transportation (Non-Medical):  Not on file   Physical Activity:     Days of Exercise per Week: Not on file    Minutes of Exercise per Session: Not on file   Stress:     Feeling of Stress : Not on file   Social Connections:     Frequency of Communication with Friends and Family: Not on file    Frequency of Social Gatherings with Friends and Family: Not on file    Attends Sikh Services: Not on file    Active Member of Clubs or Organizations: Not on file    Attends Club or Organization Meetings: Not on file    Marital Status: Not on file   Intimate Partner Violence:     Fear of Current or Ex-Partner: Not on file    Emotionally Abused: Not on file    Physically Abused: Not on file    Sexually Abused: Not on file   Housing Stability:     Unable to Pay for Housing in the Last Year: Not on file    Number of [place] in the Last Year: Not on file    Unstable Housing in the Last Year: Not on file       Family History:   History reviewed. No pertinent family history. REVIEW OF SYSTEMS:   Gen: Patient initially admitted from the nursing home with altered mental status but is doing much better at this time. Patient denies any lightheadedness or dizziness. No LOC or syncope. No fevers or chills. HEENT: No earache, sore throat or nasal congestion. Resp: Denies cough, hemoptysis or sputum production. Cardiac: Denies chest pain, +SOB, no diaphoresis or palpitations. GI: No nausea, vomiting, diarrhea or constipation. No melena or hematochezia. : No urinary complaints, dysuria, hematuria or frequency. MSK: + Diffuse lower extremity edema. No extremity weakness, paralysis or paresthesias. PHYSICAL EXAM:    Vitals:  /66   Pulse 90   Temp 97.8 °F (36.6 °C) (Axillary)   Resp 23   Ht 5' 8\" (1.727 m)   Wt 222 lb 0.1 oz (100.7 kg)   SpO2 95%   BMI 33.76 kg/m²     General:  This is a [de-identified] y.o. yo male who is alert and oriented in mild respiratory distress  HEENT:  Head is normocephalic and atraumatic, PERRLA, EOMI, mucus membranes moist with no pharyngeal erythema or exudate. Neck:  Supple with no carotid bruits, JVD or thyromegaly.   No cervical adenopathy  CV:  Regular rate and rhythm, no murmurs  Lungs: Coarse decreased breath sounds to auscultation bilaterally with no wheezes, rales or rhonchi  Abdomen:  Soft, nontender,+ abdominal fat, nondistended, bowel sounds present  Extremities:  + 2-3 leg edema, peripheral pulses intact bilaterally  Neuro:  Cranial nerves II-XII grossly intact; motor and sensory function intact with no focal deficits  Skin:  No rashes, lesions or wounds    DATA:  CBC with Differential:    Lab Results   Component Value Date    WBC 11.1 11/09/2021    RBC 3.84 11/09/2021    HGB 12.1 11/09/2021    HCT 39.6 11/09/2021     11/09/2021    .1 11/09/2021    MCH 31.5 11/09/2021    MCHC 30.6 11/09/2021    RDW 14.4 11/09/2021    BLASTSPCT 0.0 11/04/2021    LYMPHOPCT 1.4 11/09/2021    MONOPCT 2.5 11/09/2021    BASOPCT 0.1 11/09/2021    MONOSABS 0.28 11/09/2021    LYMPHSABS 0.16 11/09/2021    EOSABS 0.00 11/09/2021    BASOSABS 0.01 11/09/2021     CMP:    Lab Results   Component Value Date     11/09/2021    K 4.2 11/09/2021    K 4.8 11/03/2021    CL 96 11/09/2021    CO2 39 11/09/2021    BUN 54 11/09/2021    CREATININE 1.0 11/09/2021    GFRAA >60 11/09/2021    LABGLOM >60 11/09/2021    GLUCOSE 122 11/09/2021    PROT 5.8 11/09/2021    LABALBU 3.2 11/09/2021    CALCIUM 8.4 11/09/2021    BILITOT 0.5 11/09/2021    ALKPHOS 118 11/09/2021    AST 21 11/09/2021    ALT 20 11/09/2021     Magnesium:    Lab Results   Component Value Date    MG 2.3 11/05/2021     Phosphorus:    Lab Results   Component Value Date    PHOS 4.4 11/05/2021     PT/INR:    Lab Results   Component Value Date    PROTIME 25.4 11/09/2021    INR 2.2 11/09/2021     Troponin:  No results found for: TROPONINI  U/A:    Lab Results   Component Value Date    COLORU Yellow 11/03/2021    PROTEINU Negative 11/03/2021    PHUR 5.0 11/03/2021    WBCUA 1-3 11/03/2021    RBCUA 10-20 11/03/2021    BACTERIA RARE 11/03/2021    CLARITYU Clear 11/03/2021    SPECGRAV 1.025 11/03/2021    LEUKOCYTESUR Negative 11/03/2021 UROBILINOGEN 0.2 11/03/2021    BILIRUBINUR Negative 11/03/2021    BLOODU LARGE 11/03/2021    GLUCOSEU Negative 11/03/2021     ABG:    Lab Results   Component Value Date    PH 7.355 11/04/2021    PCO2 56.2 11/04/2021    PO2 105.6 11/04/2021    HCO3 30.7 11/04/2021    BE 4.0 11/04/2021    O2SAT 97.5 11/04/2021     HgBA1c:  No results found for: LABA1C  FLP:    Lab Results   Component Value Date    TRIG 46 11/04/2021    HDL 81 11/04/2021    LDLCALC 46 11/04/2021    LABVLDL 9 11/04/2021     TSH:    Lab Results   Component Value Date    TSH 4.560 11/04/2021     IRON:  No results found for: IRON  LIPASE:    Lab Results   Component Value Date    LIPASE 74 09/08/2021       ASSESSMENT AND PLAN:      Patient Active Problem List    Diagnosis Date Noted    Acute on chronic respiratory failure with hypoxia and hypercapnia ([address] 11/03/2021    CAD (coronary artery disease)  Acute diastolic congestive heart failure     Atrial fibrillation - chronic     COPD with exacerbation     Hypertension-patient hypotensive in the ED      History of severe pulmonary hypertension at 99 mmHg with +34 tricuspid regurg 09/10/2021     Diffuse leg edema probably combination of the severe pulmonary hypertension and tricuspid regurg along with acute diastolic CHF 72/72/6165     Plan:  Admit to [facility]/PCU  Nursing home meds reviewed BiPAP  BiPAP -continuous at this time  DuoNeb aerosols every 4 hours  Pulmicort aerosol twice daily  Brovana aerosol twice daily  Continue warfarin   Daily INRs  Lasix 40 mg IV push twice daily  Accurate I's and O's  BiPAP 16/8 at at bedtime and as needed   SCDs lower extremities    Consult pulmonology    MRSA screen  Add IV Rocephin, doxycycline    Colace 100 mg twice daily  MiraLAX 17 mg x 1 now and daily as needed    Increase warfarin 1 mg p.o. today    Bladder scan > 100 cc  Urology consult-unable to dilate and insert Melchor catheter 11/8 PM  Patient scheduled for cystoscopy and ureteral dilation 11/9 p.m.    CMP, CBC in a..       Светлана Correa DO, D.O.  11/9/2021  11:22 AM

## 2021-11-09 NOTE — PROGRESS NOTES
Pulmonary Progress Note    SUMMARY:  Mel Carrel is a [de-identified] y.o. male who presented with recurrent respiratory failure  High BiPAP need / use    ISSUES:    Acute on chronic respiratory failure with hypercapnia and hypoxia  Coronary disease with diastolic heart failure severe  Chronic atrial fibrillation  Severe pulmonary hypertension with plus for tricuspid regurg  Protein caloric malnutrition    Going for urologic surgery    Reviewed with Dr. Brennan Ferris and bedside RN  To be maintained on BiPAP post-op  At risk for decompensation  ABG as needed      ______________________________________________    OBJECTIVESydell Tom for urologic surgery  Post-operative guidance requested        MEDICATIONS:   warfarin  2 mg Oral Daily    docusate sodium  100 mg Oral BID    Vitamin D  2,000 Units Oral Daily    cefTRIAXone (ROCEPHIN) IV  1,000 mg IntraVENous Q24H    doxycycline hyclate  100 mg Oral 2 times per day    methylPREDNISolone  60 mg IntraVENous Q8H    pantoprazole  40 mg IntraVENous Daily    And    sodium chloride (PF)  10 mL IntraVENous Daily    nitroglycerin  0.5 inch Topical Once    doxazosin  4 mg Oral Daily    metoprolol succinate  50 mg Oral Daily    aspirin  81 mg Oral Daily    tamsulosin  0.4 mg Oral Dinner    ipratropium-albuterol  3 mL Inhalation 4x Daily    furosemide  40 mg IntraVENous BID    Arformoterol Tartrate  15 mcg Nebulization BID    finasteride  5 mg Oral Daily    influenza virus vaccine  0.5 mL IntraMUSCular Prior to discharge       medicated lip ointment, HYDROmorphone, HYDROmorphone, hydrOXYzine, albuterol, acetaminophen, trimethobenzamide, polyethylene glycol          Vitals:    11/09/21 0930   BP:    Pulse: 90   Resp:    Temp:    SpO2:      FiO2 : 65 %  O2 Flow Rate (L/min): 12 L/min  O2 Device:  (bipap)      Vent Information  Skin Assessment: Clean, dry, & intact  FiO2 : 65 %  SpO2: 95 %  SpO2/FiO2 ratio: 149.23  I Time/ I Time %: 1.25 s  Mask Type: Full face mask  Mask Size: Large    Additional Respiratory  Assessments  Pulse: 90  Resp: 23  SpO2: 95 %  Oral Care: Patient refused     URINARY CATHETER OUTPUT (Melchor):    External Urinary Catheter-Output (mL): 150 mL    LABS:    WBC   Date Value Ref Range Status   11/09/2021 11.1 4.5 - 11.5 E9/L Final   11/08/2021 5.1 4.5 - 11.5 E9/L Final   11/07/2021 5.2 4.5 - 11.5 E9/L Final     Hemoglobin   Date Value Ref Range Status   11/09/2021 12.1 (L) 12.5 - 16.5 g/dL Final   11/08/2021 11.7 (L) 12.5 - 16.5 g/dL Final   11/07/2021 11.4 (L) 12.5 - 16.5 g/dL Final     Hematocrit   Date Value Ref Range Status   11/09/2021 39.6 37.0 - 54.0 % Final   11/08/2021 38.4 37.0 - 54.0 % Final   11/07/2021 37.7 37.0 - 54.0 % Final     MCV   Date Value Ref Range Status   11/09/2021 103.1 (H) 80.0 - 99.9 fL Final   11/08/2021 105.2 (H) 80.0 - 99.9 fL Final   11/07/2021 106.2 (H) 80.0 - 99.9 fL Final     Platelets   Date Value Ref Range Status   11/09/2021 109 (L) 130 - 450 E9/L Final   11/08/2021 116 (L) 130 - 450 E9/L Final   11/07/2021 121 (L) 130 - 450 E9/L Final     Sodium   Date Value Ref Range Status   11/09/2021 141 132 - 146 mmol/L Final   11/08/2021 142 132 - 146 mmol/L Final   11/07/2021 142 132 - 146 mmol/L Final     Potassium   Date Value Ref Range Status   11/09/2021 4.2 3.5 - 5.0 mmol/L Final   11/08/2021 4.6 3.5 - 5.0 mmol/L Final   11/07/2021 4.5 3.5 - 5.0 mmol/L Final     Potassium reflex Magnesium   Date Value Ref Range Status   11/03/2021 4.8 3.5 - 5.0 mmol/L Final   09/08/2021 6.5 (H) 3.5 - 5.0 mmol/L Final     Comment:     Specimen is moderately Hemolyzed. Result may be artificially increased.      Chloride   Date Value Ref Range Status   11/09/2021 96 (L) 98 - 107 mmol/L Final   11/08/2021 98 98 - 107 mmol/L Final   11/07/2021 98 98 - 107 mmol/L Final     CO2   Date Value Ref Range Status   11/09/2021 39 (H) 22 - 29 mmol/L Final   11/08/2021 38 (H) 22 - 29 mmol/L Final   11/07/2021 36 (H) 22 - 29 mmol/L Final     BUN   Date Value Ref Range Status   11/09/2021 54 (H) 6 - 23 mg/dL Final   11/08/2021 48 (H) 6 - 23 mg/dL Final   11/07/2021 48 (H) 6 - 23 mg/dL Final     CREATININE   Date Value Ref Range Status   11/09/2021 1.0 0.7 - 1.2 mg/dL Final   11/08/2021 0.8 0.7 - 1.2 mg/dL Final   11/07/2021 0.9 0.7 - 1.2 mg/dL Final     Glucose   Date Value Ref Range Status   11/09/2021 122 (H) 74 - 99 mg/dL Final   11/08/2021 123 (H) 74 - 99 mg/dL Final   11/07/2021 131 (H) 74 - 99 mg/dL Final     Calcium   Date Value Ref Range Status   11/09/2021 8.4 (L) 8.6 - 10.2 mg/dL Final   11/08/2021 8.5 (L) 8.6 - 10.2 mg/dL Final   11/07/2021 8.7 8.6 - 10.2 mg/dL Final     Total Protein   Date Value Ref Range Status   11/09/2021 5.8 (L) 6.4 - 8.3 g/dL Final   11/08/2021 6.1 (L) 6.4 - 8.3 g/dL Final   11/07/2021 6.1 (L) 6.4 - 8.3 g/dL Final     Albumin   Date Value Ref Range Status   11/09/2021 3.2 (L) 3.5 - 5.2 g/dL Final   11/08/2021 3.5 3.5 - 5.2 g/dL Final   11/07/2021 3.3 (L) 3.5 - 5.2 g/dL Final     Total Bilirubin   Date Value Ref Range Status   11/09/2021 0.5 0.0 - 1.2 mg/dL Final   11/08/2021 0.4 0.0 - 1.2 mg/dL Final   11/07/2021 0.3 0.0 - 1.2 mg/dL Final     Alkaline Phosphatase   Date Value Ref Range Status   11/09/2021 118 40 - 129 U/L Final   11/08/2021 146 (H) 40 - 129 U/L Final   11/07/2021 139 (H) 40 - 129 U/L Final     AST   Date Value Ref Range Status   11/09/2021 21 0 - 39 U/L Final   11/08/2021 22 0 - 39 U/L Final   11/07/2021 20 0 - 39 U/L Final     ALT   Date Value Ref Range Status   11/09/2021 20 0 - 40 U/L Final   11/08/2021 19 0 - 40 U/L Final   11/07/2021 15 0 - 40 U/L Final     GFR Non-   Date Value Ref Range Status   11/09/2021 >60 >=60 mL/min/1.73 Final     Comment:     Chronic Kidney Disease: less than 60 ml/min/1.73 sq.m. Kidney Failure: less than 15 ml/min/1.73 sq.m. Results valid for patients 18 years and older.      11/08/2021 >60 >=60 mL/min/1.73 Final     Comment:     Chronic Kidney Disease: less than 60 ml/min/1.73 sq.m. Kidney Failure: less than 15 ml/min/1.73 sq.m. Results valid for patients 18 years and older. 11/07/2021 >60 >=60 mL/min/1.73 Final     Comment:     Chronic Kidney Disease: less than 60 ml/min/1.73 sq.m. Kidney Failure: less than 15 ml/min/1.73 sq.m. Results valid for patients 18 years and older. GFR    Date Value Ref Range Status   11/09/2021 >60  Final   11/08/2021 >60  Final   11/07/2021 >60  Final     Magnesium   Date Value Ref Range Status   11/05/2021 2.3 1.6 - 2.6 mg/dL Final   11/04/2021 2.2 1.6 - 2.6 mg/dL Final     Phosphorus   Date Value Ref Range Status   11/05/2021 4.4 2.5 - 4.5 mg/dL Final   11/04/2021 4.2 2.5 - 4.5 mg/dL Final     No results for input(s): PH, PO2, PCO2, HCO3, BE, O2SAT in the last 72 hours. No results for input(s): CULTRESP in the last 72 hours. Lab Results   Component Value Date    PH 7.355 11/04/2021    PH 7.204 11/03/2021    PH 7.443 09/13/2021    PO2 105.6 11/04/2021    PO2 97.4 11/03/2021    PO2 85.1 09/13/2021    PCO2 56.2 11/04/2021    PCO2 85.0 11/03/2021    PCO2 35.9 09/13/2021    HCO3 30.7 11/04/2021    HCO3 32.8 11/03/2021    HCO3 24.0 09/13/2021    O2SAT 97.5 11/04/2021    O2SAT 96.2 11/03/2021    O2SAT 96.3 09/13/2021       RADIOLOGY:  XR CHEST PORTABLE   Final Result   Slight worsening of CHF with new infiltrate and effusion at the right base         XR CHEST PORTABLE   Final Result   Interstitial lung disease which may relate to chronic fibrosis. The   appearance may be somewhat exacerbated by suboptimal inspiration. Cardiomegaly.          XR UROGRAM W OR WO KUB W OR WO TOMOGRAM    (Results Pending)     (Independently reviewed by me)        Brigit Parra M.D  Pulmonary & Critical Care  11/9/2021 2:26 PM

## 2021-11-09 NOTE — PROGRESS NOTES
Physical Therapy Treatment Note/Plan of Care    Room #:  2840/3912-56  Patient Name: Tino Leiva  YOB: 1940  MRN: [id number]    Date of Service: 11/9/2021     Tentative placement recommendation: Subacute rehab  Equipment recommendation: To be determined      Evaluating Physical Therapist: Rosa Mckeon #[id number]      Specific Provider Orders/Date/Referring Provider :   11/03/21 1300   PT eval and treat Start: 11/03/21 1300, End: 11/03/21 1300, ONE TIME, Standing Count: 1 Occurrences, R    Nehal Knife, DO    Admitting Diagnosis:   Respiratory acidosis [E87.2]  Hypercarbia [R06.89]  Elevated troponin [R77.8]  Acute on chronic respiratory failure with hypoxia and hypercapnia (HCC) [J96.21, J96.22]  Hypervolemia, unspecified hypervolemia type [E87.70]      Surgery: none  Visit Diagnoses       Codes    Respiratory acidosis    -  Primary E87.2    Elevated troponin     R77.8    Hypercarbia     R06.89    Hypervolemia, unspecified hypervolemia type     E87.70    Stricture of male urethra, unspecified stricture type     N35.919          Patient Active Problem List   Diagnosis    Metabolic encephalopathy    Inability to walk    Acute on chronic respiratory failure with hypoxia and hypercapnia (HCC)    CAD (coronary artery disease)    Atrial fibrillation (HCC)    COPD [facility]chronic obstructive pulmonary disease) ([facility] [address]    Hypertension        ASSESSMENT of Current Deficits Patient exhibits decreased strength, balance and endurance impairing functional mobility, transfers and gait . Patient needing maximal assist to sit EOB for bilateral lower extremities and trunk flexion. The patient  demonstrates decreased strength and endurance limiting  tolerance for functional mobility at this time and making the patient a high risk for falls. Patient with PROM/AAROM with all exercises due to impaired strength.  Not able to get off of the bed with 2 attempts for sit to stand due to impaired strength with bilateral lower extremities. Pt will need subacute rehab at time of discharge in order to participate in a skilled comprehensive therapy program to address deficits and improve the patient 's  functional levels. PHYSICAL THERAPY  PLAN OF CARE       Physical therapy plan of care is established based on physician order,  patient diagnosis and clinical assessment    Current Treatment Recommendations:    -Bed Mobility: Lower extremity exercises   -Sitting Balance: Incorporate reaching activities to activate trunk muscles   -Standing Balance: Perform strengthening exercises in standing to promote motor control with or without upper extremity support   -Transfers: Provide instruction on proper hand and foot position for adequate transfer of weight onto lower extremities and use of gait device if needed and Cues for hand placement, technique and safety. Provide stabilization to prevent fall   -Gait: Gait training and Standing activities to improve: base of support, weight shift, weight bearing      PT long term treatment goals are located in below grid    Patient and or family understand(s) diagnosis, prognosis, and plan of care. Frequency of treatments: Patient will be seen  daily.          Prior Level of Function: Patient ambulated with wheeled walker with assistance   Rehab Potential: good  for baseline    Past medical history:   Past Medical History:   Diagnosis Date    Atrial fibrillation ([facility] [address]     CAD (coronary artery disease)     coronary stent 12yrs ago    COPD (chronic obstructive pulmonary disease) ([facility] [address]     Hypertension      Past Surgical History:   Procedure Laterality Date    CARDIAC SURGERY      coronary stent    CATARACT REMOVAL WITH IMPLANT Left 04 01 2013    COLONOSCOPY      CYST REMOVAL      tailbone    EYE SURGERY         SUBJECTIVE:    Precautions: , falls, alarm and bipap , external catheter   Social history: Patient came to hospital from [facility] owned: [employer],      [facility] Basic Mobility       AM-PAC Mobility Inpatient   How much difficulty turning over in bed?: A Lot  How much difficulty sitting down on / standing up from a chair with arms?: A Lot  How much difficulty moving from lying on back to sitting on side of bed?: A Lot  How much help from another person moving to and from a bed to a chair?: A Lot  How much help from another person needed to walk in hospital room?: A Lot  How much help from another person for climbing 3-5 steps with a railing?: Total  AM-PAC Inpatient Mobility Raw Score : 11  AM-PAC Inpatient T-Scale Score : 33.86  Mobility Inpatient CMS 0-100% Score: 72.57  Mobility Inpatient CMS G-Code Modifier : CL    Nursing cleared patient for PT treatment. .   OBJECTIVE;   Initial Evaluation  Date: 11/4/2021 Treatment Date:  11/9/2021       Short Term/ Long Term   Goals   Was pt agreeable to Eval/treatment? Yes yes To be met in 5 days   Pain level   0/10   0/10    Bed Mobility    Rolling: Minimal assist of 1    Supine to sit: Maximal assist of 1    Sit to supine: Maximal assist of 1    Scooting: Moderate assist of 1   Rolling: Maximal assist of 1   Supine to sit: Maximal assist of 1   Sit to supine: Maximal assist of 1   Scooting: Maximal assist of 1    Rolling: Independent    Supine to sit: Minimal assist of 1    Sit to supine: Minimal assist of 1    Scooting: Minimal assist of 1     Transfers Sit to stand: Not assessed  d/t to pt overall debility, decreased activity tolerance, balance deficits, safety and fall risk. Sit to stand: Not assessed  Attempted twice but unsuccessful     Sit to stand:  Moderate assist of 1    Ambulation    not assessed  not assessed     5 feet using  wheeled walker with Moderate assist of 1          ROM Within functional limits    Increase range of motion 10% of affected joints    Strength BUE:  refer to OT eval  RLE:  3/5  LLE:  3/5  Increase strength in affected mm groups by 1/3 grade   Balance Sitting EOB:  poor Dynamic Standing:  not assessed  Sitting EOB: good  Dynamic Standing: not assessed    Sitting EOB:  fair   Dynamic Standing: fair -     Patient is Alert & Oriented x person, place, time and situation and follows directions    Sensation:  Patient  reports numbness/tingling right foot     Edema:  yes bilateral lower extremities   Endurance: fair  -    Vitals: Bipap   Blood Pressure at rest  Blood Pressure during session    Heart Rate at rest  Heart Rate during session    SPO2 at rest 98%  SPO2 during session 96-98 %     Patient education  Patient educated on role of Physical Therapy, risks of immobility, safety and plan of care,  importance of mobility while in hospital , purse lip breathing, ankle pumps, quad set and glut set for edema control, blood clot prevention, importance and purpose of adaptive device and adjusted to proper height for the patient. and safety      Patient response to education:   Pt verbalized understanding Pt demonstrated skill Pt requires further education in this area   Yes Partial Yes      Treatment:  Patient practiced and was instructed/facilitated in the following treatment: Patient assisted to EOB. Sat edge of bed 20 minutes with Supervision  to increase dynamic sitting balance and activity tolerance. Pt able to use his left upper extremity to hold himself up with the bed rail. Worked on upright posture and pursed lip breathing to increase lung capacity. Pt performed seated exercises. Attempted 2 sit to stand transfers but not able to get off of the bed with either attempt. Therapeutic Exercises:  ankle pumps, hip abduction/adduction, long arc quad and seated marching, x 20 reps. At end of session, patient in bed with alarm call light and phone within reach,  all lines and tubes intact, nursing notified. Patient would benefit from continued skilled Physical Therapy to improve functional independence and quality of life.          Patient's/ family goals   home    Time in 8:43  Time out  9:08    Total Treatment Time  25 minutes        CPT codes:    Therapeutic activities (24946)   16 minutes  1 unit(s)  Therapeutic exercises (90986)   9 minutes  1 unit(s)   Reji Blake  [facility]  LIC # [id number]

## 2021-11-09 NOTE — ANESTHESIA PRE PROCEDURE
Department of Anesthesiology  Preprocedure Note       Name:  Lexy Stevenson   Age:  [de-identified] y.o.  :  1940                                          MRN:  [id number]         Date:  2021      Surgeon: Ally Burns):  Moshe Corral MD    Procedure: Procedure(s):  CYSTOSCOPY URETHRAL DILATATION  PONCE CATHETER INSERTION    Medications prior to admission:   Prior to Admission medications    Medication Sig Start Date End Date Taking? Authorizing Provider   ipratropium-albuterol (DUONEB) 0.5-2.5 (3) MG/3ML SOLN nebulizer solution Inhale 3 mLs into the lungs 4 times daily 21   Nghia Santiago DO   albuterol (PROVENTIL) (2.5 MG/3ML) 0.083% nebulizer solution Take 3 mLs by nebulization every 4 hours as needed for Wheezing or Shortness of Breath 21   Nghia Santiago DO   Respiratory Therapy Supplies (FULL KIT NEBULIZER SET) MISC Use as directed with nebulized medication. 21   Nghia Santiago DO   warfarin (COUMADIN) 4 MG tablet 4 mg daily 6 PM 21   Nghia Santiago DO   tamsulosin (FLOMAX) 0.4 MG capsule Take 0.4 mg by mouth daily    Historical Provider, MD   terazosin (HYTRIN) 5 MG capsule Take 10 mg by mouth daily. Historical Provider, MD   metoprolol (TOPROL-XL) 50 MG XL tablet Take 50 mg by mouth daily. Historical Provider, MD   finasteride (PROSCAR) 5 MG tablet Take 5 mg by mouth daily. Historical Provider, MD   aspirin 81 MG tablet Take 81 mg by mouth daily.     Historical Provider, MD       Current medications:    Current Facility-Administered Medications   Medication Dose Route Frequency Provider Last Rate Last Admin    medicated lip ointment (BLISTEX)   Topical PRN Nima Age, APRN - CNP        warfarin (COUMADIN) tablet 2 mg  2 mg Oral Daily Nghia Santiago DO        docusate sodium (COLACE) capsule 100 mg  100 mg Oral BID Nghia Pamela, DO   100 mg at 21 1230    HYDROmorphone HCl PF (DILAUDID) injection 0.5 mg  0.5 mg IntraVENous Q4H PRN Murphy Fruits MD Antonette        HYDROmorphone HCl PF (DILAUDID) injection 1 mg  1 mg IntraVENous Q4H PRN Abhishek Knight MD        vitamin D (CHOLECALCIFEROL) tablet 2,000 Units  2,000 Units Oral Daily Donzetta Abide, DO   2,000 Units at 11/09/21 0934    cefTRIAXone (ROCEPHIN) 1,000 mg in sterile water 10 mL IV syringe  1,000 mg IntraVENous Q24H Donzetta Abide, DO   1,000 mg at 11/09/21 1230    doxycycline hyclate (VIBRAMYCIN) capsule 100 mg  100 mg Oral 2 times per day Donzetta Abide, DO   100 mg at 11/09/21 0934    hydrOXYzine (VISTARIL) capsule 25 mg  25 mg Oral Q6H PRN Nhung Arzate DO        methylPREDNISolone sodium (SOLU-MEDROL) injection 60 mg  60 mg IntraVENous Q8H Donzetta Abide, DO   60 mg at 11/09/21 0413    pantoprazole (PROTONIX) injection 40 mg  40 mg IntraVENous Daily [facility] Aydlett, DO   40 mg at 11/09/21 0934    And    sodium chloride (PF) 0.9 % injection 10 mL  10 mL IntraVENous Daily [facility] Aydlett, DO   10 mL at 11/09/21 0942    nitroglycerin (NITRO-BID) 2 % ointment 0.5 inch  0.5 inch Topical Once Van Marvin, DO        doxazosin (CARDURA) tablet 4 mg  4 mg Oral Daily Donzetta Abide, DO   4 mg at 11/09/21 0934    metoprolol succinate (TOPROL XL) extended release tablet 50 mg  50 mg Oral Daily Donzetta Abide, DO   50 mg at 11/09/21 0934    aspirin EC tablet 81 mg  81 mg Oral Daily Donzetta Abide, DO   81 mg at 11/08/21 0848    tamsulosin (FLOMAX) capsule 0.4 mg  0.4 mg Oral Dinner Donzetta Abide, DO   0.4 mg at 11/08/21 1802    ipratropium-albuterol (DUONEB) nebulizer solution 3 mL  3 mL Inhalation 4x Daily Donzetta Abide, DO   3 mL at 11/09/21 0928    albuterol (PROVENTIL) nebulizer solution 2.5 mg  2.5 mg Nebulization Q4H PRN Donzetta Abide, DO        acetaminophen (TYLENOL) tablet 500 mg  500 mg Oral Q6H PRN Donzeestelaa Kirstiede, DO   500 mg at 11/08/21 1801    trimethobenzamide (TIGAN) injection 200 mg  200 mg IntraMUSCular Q6H PRN Ricoa Magdaleno, DO        polyethylene glycol (GLYCOLAX) packet 17 g  17 g Oral Daily PRN Leigh Stokes DO        furosemide (LASIX) injection 40 mg  40 mg IntraVENous BID Leigh Stokes DO   40 mg at 21 0934    Arformoterol Tartrate (BROVANA) nebulizer solution 15 mcg  15 mcg Nebulization BID Leigh Stokes DO   15 mcg at 21 6683    finasteride (PROSCAR) tablet 5 mg  5 mg Oral Daily Van Wong, DO   5 mg at 21 0934    influenza A&B vaccine (FLUAD QUADRIVALENT) injection 0.5 mL  0.5 mL IntraMUSCular Prior to discharge Leigh Stokes DO           Allergies: Allergies   Allergen Reactions    Dye [Iodides] Hives       Problem List:    Patient Active Problem List   Diagnosis Code    Metabolic encephalopathy M23.21    Inability to walk R26.2    Acute on chronic respiratory failure with hypoxia and hypercapnia ([facility]) J96.21, J96.22    CAD (coronary artery disease) I25.10    Atrial fibrillation ([facility]) I48.91    COPD (chronic obstructive pulmonary disease) ([facility]) J44.9    Hypertension I10       Past Medical History:        Diagnosis Date    [facility]     CAD (coronary artery disease)     coronary stent 12yrs ago    COPD (chronic obstructive pulmonary disease) ([facility] [facility]     Hypertension        Past Surgical History:        Procedure Laterality Date    CARDIAC SURGERY      coronary stent    CATARACT REMOVAL WITH IMPLANT Left 2013    COLONOSCOPY      CYST REMOVAL      tailbone    EYE SURGERY         Social History:    Social History     Tobacco Use    Smoking status: Former Smoker     Quit date: 3/27/1992     Years since quittin.6    Smokeless tobacco: Never Used   Substance Use Topics    Alcohol use:  No                                Counseling given: Not Answered      Vital Signs (Current):   Vitals:    21 2031 21 0130 21 0814 21 0930   BP:  122/62 110/66    Pulse:  64 90 90   Resp:  20 23    Temp:  97.1 °F (36.2 °C) 97.8 °F (36.6 °C) TempSrc:  Axillary Axillary    SpO2: 93% 97% 95%    Weight:       Height:                                                  BP Readings from Last 3 Encounters:   11/09/21 110/66   09/16/21 125/83   04/30/21 139/80       NPO Status:                                                                                 BMI:   Wt Readings from Last 3 Encounters:   11/04/21 222 lb 0.1 oz (100.7 kg)   09/12/21 177 lb 4.8 oz (80.4 kg)   04/30/21 220 lb (99.8 kg)     Body mass index is 33.76 kg/m². CBC:   Lab Results   Component Value Date    WBC 11.1 11/09/2021    RBC 3.84 11/09/2021    HGB 12.1 11/09/2021    HCT 39.6 11/09/2021    .1 11/09/2021    RDW 14.4 11/09/2021     11/09/2021       CMP:   Lab Results   Component Value Date     11/09/2021    K 4.2 11/09/2021    K 4.8 11/03/2021    CL 96 11/09/2021    CO2 39 11/09/2021    BUN 54 11/09/2021    CREATININE 1.0 11/09/2021    GFRAA >60 11/09/2021    LABGLOM >60 11/09/2021    GLUCOSE 122 11/09/2021    PROT 5.8 11/09/2021    CALCIUM 8.4 11/09/2021    BILITOT 0.5 11/09/2021    ALKPHOS 118 11/09/2021    AST 21 11/09/2021    ALT 20 11/09/2021       POC Tests: No results for input(s): POCGLU, POCNA, POCK, POCCL, POCBUN, POCHEMO, POCHCT in the last 72 hours.     Coags:   Lab Results   Component Value Date    PROTIME 25.4 11/09/2021    INR 2.2 11/09/2021       HCG (If Applicable): No results found for: PREGTESTUR, PREGSERUM, HCG, HCGQUANT     ABGs: No results found for: PHART, PO2ART, HZL1MUA, OUV0LEN, BEART, R2MPMLVN     Type & Screen (If Applicable):  No results found for: LABABO, LABRH    Drug/Infectious Status (If Applicable):  No results found for: HIV, HEPCAB    COVID-19 Screening (If Applicable):   Lab Results   Component Value Date    COVID19 Not Detected 11/03/2021           Anesthesia Evaluation  Patient summary reviewed no history of anesthetic complications:   Airway: Mallampati: II  TM distance: >3 FB   Neck ROM: full  Mouth opening: > = 3 FB Dental: normal exam         Pulmonary:   (+) COPD:  decreased breath sounds,      (-) not a current smoker                          ROS comment: respiratory distress on BiPap vs. High liter nasal cannula oxygen   Cardiovascular:    (+) hypertension:, CAD:, CABG/stent (s/p angioplasty with stent):, dysrhythmias: atrial fibrillation,         Rhythm: regular  Rate: normal           Beta Blocker:  Dose within 24 Hrs         Neuro/Psych:                ROS comment: Metabolic encephalopathy on admission on 11/4/2021 GI/Hepatic/Renal:        (-) no morbid obesity       Endo/Other:    (+) blood dyscrasia: anticoagulation therapy:., .                 Abdominal:   (+) obese,           Vascular: negative vascular ROS. Other Findings:           Anesthesia Plan      MAC     ASA 4       Induction: intravenous. Anesthetic plan and risks discussed with patient and healthcare power of . Plan discussed with CRNA. DOS STAFF ADDENDUM:    Pt seen and examined, chart reviewed (including anesthesia, drug and allergy history). Anesthetic plan, risks, benefits, alternatives, and personnel involved discussed with patient. Patient verbalized an understanding and agrees to proceed. Plan discussed with care team members and agreed upon.     Sofia Mittal MD  Staff Anesthesiologist  2:32 PM    Sofia Mittal MD   11/9/2021

## 2021-11-10 NOTE — PROGRESS NOTES
OT BEDSIDE TREATMENT NOTE      Date:11/10/2021  Patient Name: Jorge A Dominguez  MRN: [id number]  : 1940  Room: [address] [place] [address]         Evaluating OT: Rosalva Stewartboubacar OTR/L #[id number]      Referring Provider and Specific Provider Orders/Date:      21 1300   OT eval and treat Start: 21 1300, End: 21 1300, ONE TIME, Standing Count: 1 Occurrences, R      Maritza Dominguez, DO       Placement Recommendation: LOBITO        Diagnosis:   1. Respiratory acidosis    2. Elevated troponin    3. Hypercarbia    4. Hypervolemia, unspecified hypervolemia type            Surgery: None        Pertinent Medical History:       Past Medical History        Past Medical History:   Diagnosis Date    Atrial fibrillation ([address]      CAD (coronary artery disease)       coronary stent 12yrs ago    COPD (chronic obstructive pulmonary disease) ([facility])      Hypertension              Past Surgical History         Past Surgical History:   Procedure Laterality Date    CARDIAC SURGERY         coronary stent    CATARACT REMOVAL WITH IMPLANT Left 2013    COLONOSCOPY        CYST REMOVAL         tailbone    EYE SURGERY                 Precautions:  Fall Risk, falls and alarm, 15L O2      Assessment of current deficits    [x]? Functional mobility            [x]?ADLs           [x]? Strength                   [x]? Cognition    [x]? Functional transfers          [x]? IADLs         [x]? Safety Awareness   [x]? Endurance    []? Fine Coordination              [x]? Balance      []? Vision/perception    []? Sensation      []? Gross Motor Coordination  []? ROM           [x]?  Delirium                   []? Motor Control      OT PLAN OF CARE   OT POC based on physician orders, patient diagnosis and results of clinical assessment     Frequency/Duration 1-3 days/wk for 2 weeks PRN      Specific OT Treatment Interventions to include:   * Instruction/training on adapted ADL techniques and AE recommendations to increase functional independence within precautions       * Training on energy conservation strategies, correct breathing pattern and techniques to improve independence/tolerance for self-care routine  * Functional transfer/mobility training/DME recommendations for increased independence, safety, and fall prevention  * Patient/Family education to increase follow through with safety techniques and functional independence  * Recommendation of environmental modifications for increased safety with functional transfers/mobility and ADLs  * Cognitive retraining/development of therapeutic activities to improve problem solving, judgement, memory, and attention for increased safety/participation in ADL/IADL tasks  * Therapeutic exercise to improve motor endurance, ROM, and functional strength for ADLs/functional transfers  * Therapeutic activities to facilitate/challenge dynamic balance, stand tolerance for increased safety and independence with ADLs  * Therapeutic activities to facilitate gross/fine motor skills for increased independence with ADLs  * Positioning to improve skin integrity, interaction with environment and functional independence  * Delirium prevention/treatment     Recommended Adaptive Equipment: TBD      Home Living: Pt resides at [facility].          Prior Level of Function: Requires assist with ADLs , Dependent with IADLs; transfers to wheelchair with assist and use of jamar steady, per patient's report.      Pain Level: Pt denied pain this date.                  Cognition: A&O: 3/4; Follows 1-2 step directions              Memory: impaired              Sequencing: poor              Problem solving: poor              Judgement/safety: poor     [facility]   AM-PAC Daily Activity Inpatient   How much help for putting on and taking off regular lower body clothing?: Total  How much help for Bathing?: A Lot  How much help for Toileting?: Total  How much help for putting on and taking off regular upper body clothing?: A Lot  How much help for taking care of personal grooming?: A Lot  How much help for eating meals?: A Little  AM-[facility] Inpatient Daily Activity Raw Score: 11  AM-PAC Inpatient ADL T-Scale Score : 29.04  ADL Inpatient CMS 0-100% Score: 70.42  ADL Inpatient CMS G-Code Modifier : CL                Functional Assessment:     Initial Eval Status  Date: 11/5/21 Treatment Status  Date:11/10/2021 STGs = LTGs  Time frame: 10-14 days   Feeding Minimal Assist      Pt able to bring cup to mouth to rinse and take a drink after oral hygiene Supervision    Grooming Moderate Assist   For hair grooming d/t reduced endurance with overhead activity. Pt washed face with set-up assist.   Mod A to don shampoo cap; pt able to bring B hands to head to wash hair; pt able to comb hair with mod A for thoroughness; assist to wash beard, as well; pt able to manage containers and complete oral hygiene when seated in bed with HOB elevated; pt able to don deodorant when seated in bed Supervision    UB Dressing Maximal Assist  To doff/don gown over shoulders and manage tele monitor   Mod A to change gowns with cuing for pt to thread B UE's through gown; assist to thread monitor lines through gown, as well as to tie/untie back of gown  Minimal Assist    LB Dressing Dependent     Dep to don/doff prevo boots  Moderate Assist    Bathing Maximal Assist  Mod A; pt able to wash UB and complete pericare with verbal cuing for follow through; pt required assist to wash LE's and feet Moderate Assist    Toileting Dependent     N/T  Moderate Assist    Bed Mobility  Supine to sit:  NT  Sit to supine:  NT  Rolling: Maximal Assist x2  N/T; pt declined at this time Supine to sit: Moderate Assist   Sit to supine: Moderate Assist    Functional Transfers NT  N/T  Moderate Assist with use of jamar steady as needed    Balance Sitting:     Static: NT    Dynamic: NT   Standing: NT      Pt displays right trunk lean while supine with HOB elevated. Max A to correct posture/facilitate symmetry. Sitting:     Static: fair in bed with HOB elevated    Dynamic: NT   Standing: NT     Sitting:     Static: fair     Dynamic: fair minus    Activity Tolerance poor  fair  Increase sitting tolerance >3 minutes for improved engagement with functional transfers and indep in ADLs   Visual/  Perceptual Glasses: Not reported      NA       Hand Dominance: Right        AROM (PROM) Strength Additional Info:    RUE  WFL 3+/5 good  and wfl FMC/dexterity noted during ADL tasks      LUE WFL 3+/5 good  and wfl FMC/dexterity noted during ADL tasks         Hearing:  WFL   Sensation:   No c/o numbness or tingling  Tone:  WFL   Edema: LEs; nsg aware and prevo boots donned; L foot 4th medial digit has red marking ; nsg notified      Comments: Patient cleared by nursing staff. Upon arrival pt seated in bed. Pt agreeable to OT tx session. Pt educated with regards to  hand placement, safety awareness, static sitting balance,   ADL retraining,  grooming tasks, UE/LE bathing, LE/UE dressing,  ECT's. At end of session pt seated in bed with HOB elevated  with all lines and tubes intact, call light within reach. Overall, pt demonstrated decreased independence and safety during completion of ADL/functional transfers/mobility tasks. Pt would benefit from continued skilled OT to increase safety and independence with completion of ADL/IADL tasks for functional independence and quality of life. Pt required cues and education as noted above for safe facilitation and completion of tasks. Therapist provided skilled monitoring of patient's response during treatment session. Prior to and at the end of session, environmental modifications/line management completed for patients safety and efficiency of treatment session. Overall, patient demonstrates moderate difficulties with completion of BADLs and IADLs. Factors contributing to these difficulties include decreased endurance, and generalized weakness.  As noted above, patient likely to benefit from further OT intervention to increase independence, safety, and overall quality of life. Treatment:     ? ADL completion: Self-care retraining for the above-mentioned ADLs; training on proper hand placement, safety technique, sequencing, and energy conservation techniques. ? Postural Balance: Sitting balance retraining to improve righting reactions with postural changes during ADLs. ? Skilled positioning: Proper positioning to improve interaction with environment, overall functioning and decrease/prevent edema and contractures. · Pt has made fair progress towards set goals   · OT 1-3x/week for 5-7 days during hospitalization       Treatment Time also includes thorough review of current medical information, gathering information on past medical history/social history and prior level of function, informal observation of tasks, assessment of data and education on plan of care and goals.     Treatment Time In: 9:44 AM     Treatment Time Out: 10:09 AM            Treatment Charges: Mins Units   ADL/Home Mgt     09757 25 2   Thera Activities     25141     Ther Ex                 69489     Manual Therapy    73582     Neuro Re-ed         63328     Orthotic manage/training                               79491     Non Billable Time     Total Timed Treatment 25 5616 LORRAINE Taylor/L #[id number]

## 2021-11-10 NOTE — ANESTHESIA POSTPROCEDURE EVALUATION
Department of Anesthesiology  Postprocedure Note    Patient: Aviva Nicole  MRN: [id number]  YOB: 1940  Date of evaluation: 11/9/2021  Time:  9:49 PM     Procedure Summary     Date: 11/09/21 Room / Location: [facility]  [address] [facility]    Anesthesia Start: 8517 Anesthesia Stop: 3315    Procedure: CYSTOSCOPY, LASER ABLATION OF BLADDER NECK STRICTURE,  PONCE CATHETER INSERTION (N/A Urethra) Diagnosis: (DYSURIA)    Surgeons: Drew Temple MD Responsible Provider: Verena Owen MD    Anesthesia Type: MAC ASA Status: 4          Anesthesia Type: MAC    Roberth Phase I: Roberth Score: 9    Roberth Phase II:      Last vitals: Reviewed and per EMR flowsheets.        Anesthesia Post Evaluation    Patient location during evaluation: PACU  Patient participation: complete - patient participated  Level of consciousness: awake  Airway patency: patent  Nausea & Vomiting: no nausea and no vomiting  Complications: no  Cardiovascular status: hemodynamically stable  Respiratory status: acceptable  Hydration status: euvolemic

## 2021-11-10 NOTE — PROGRESS NOTES
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 male who presents with altered mental status at the nursing home. Patient symptoms started about a week ago patient was also noted to have increased weight gain and increasing lower leg edema. Patient normally alert and oriented x4 but has become much more lethargic and interactive. Patient gained 20 pounds at the nursing home. Patient was brought into the emergency room with patient chest x-ray showed interstitial lung disease and also cardiomegaly. ABG showed a pH 7.204 with a CO2 of 85 on 6 L nasal cannula. Patient then was started on BiPAP.  BUN/creatinine 43/1.0 with proBNP 3900 and troponin initially was 120. Liver enzymes are normal.  Heart rate was 75 normal temperature and blood pressure initially at 082 systolic currently is 65/80. Patient currently on 40% FiO2 BiPAP with O2 sat of 93%. Patient states he has been wearing O2 nasal cannula for last couple weeks at the nursing home but not before that. Patient denies wearing BiPAP in the past.  He denies any problem with any chest pain, abdominal pain, nausea vomiting or dizziness. ECG reviewed with patient have atrial fibrillation with no significant ST-T changes to suggest acute myocardial injury troponin was elevated on admission but troponin I-II hours later was less than on admission. 11/4/2021  Patient seen examined on PCU. Patient still very short of breath even at rest.  Patient seems to do fairly good on the BiPAP once he is off of it he seemed to have some more respiratory distress. BUN/creatinine 46/1.0 with patient's transaminases normal TSH 4.56. WBC 5.1 with hemoglobin 11. INR is 2.2 today. Temperature 98.3 with a heart rate of 100 blood pressure 130/40. O2 sat 91% on 7 L nasal cannula.  ABGs today were improved but the patient had a pH increase 7.355 with PCO2 decreased 56.2 while the patient was on BiPAP. Patient urine output has picked up since early this morning. 11/5/2021  Patient seen examined on PCU. Case discussed with intensivist yesterday afternoon. Patient looks a little bit better today. Nursing states that the patient has been changed to a DNR CC a with no chest compressions, intubation or cardioversion. Patient denies any chest or abdominal pain. Patient still with +34 lower leg edema. BUN/creatinine 48/1.0 with the patient's Liver enzymes are normal with a WBC 3.1 with hemoglobin 11.1. Temperature 97.7 with heart rate of 70 blood pressure 123/48. O2 sat is 91% on 10 L high flow nasal cannula. Urine output seems to be adequate. 11/6/2021  Patient seen examined on PCU. Patient is alert and oriented with patient currently had BiPAP on and feeling little bit more comfortable. BUN/creatinine 51/0.9 WBCs 5.5 hemoglobin 11.8. INR 2.8 today. Temperature 97.5 heart rate 85 blood pressure 107/69. O2 sat 97% on BiPAP at 75% FiO2. Urine output is inaccurate but seems to be adequate according to the patient who states he is urinating a lot. INR is 2.3 we will decrease the warfarin 1 mg p.o. daily. 11/7/2021  Patient seen examined on PCU. Patient seems to be doing little bit better today. Patient is wearing BiPAP almost all the time. Patient did eat all of his breakfast.  Patient denies any chest or abdominal pain. Patient states that he has not had a bowel movement yet. Patient's lower leg edema still prominent but seems to be mildly improved. BUN/creatinine 48/0.9 with a CO2 of 36. Transaminases are normal with free T4 1.1 and WBC 5.2 and hemoglobin 11.4. Platelet count 387. INR is 2.9 today. Temperature 97.7 with heart rate of 60 and blood pressure 108/869. O2 sat 98% on 12 L high flow nasal cannula alternating with BiPAP with FiO2 of 65%. .    11/8/2021  Patient seen and examined in PCU. Patient seems little bit more lethargic/confused this morning off of the BiPAP.  BUN/creatinine 48/0.8 with CO2 electrolytes at 38. Normal transaminases with INR down to 2.2 today. WBC 5.1 with hemoglobin 11.7. Temperature is 98.1 with heart rate 76 blood pressure 103/61. O2 sat 94% on 12 L high flow nasal cannula. Urinary output is   in accurate. 11/9/2021   Patient seen examined in PCU. Patient is again alert and oriented but somewhat short of breath with any activity and on the BiPAP most of the day. Patient denies any chest pain or abdominal pain. Patient supposed to go to surgery for cystoscopy and probable urethral dilation today with insertion of Melchor catheter. BUN/creatinine 54/1.0 today with liver enzymes normal WBC 11.1 hemoglobin 12.1. INR is 2.2 today. Temperature is 97.8 with heart rate of 90 and blood pressure 110/66. O2 sat is 95% with the patient on BiPAP 65% FiO2. Urine output is very inaccurate. Urology note reviewed with patient having severe bladder neck contracture and urology not able to dilate yesterday afternoon. 11/10/2021  Patient seen and examined in PCU. Patient looks better today. Patient is more alert and oriented. Patient denies any chest or abdominal pain. Patient urine output is improving. Patient still with marked lower leg edema. Urology note reviewed with the patient having a laser ablation of bladder neck contracture and placement of Melchor catheter yesterday afternoon. BUN/creatinine is 47/0.9 with normal liver enzymes. WBC 7.6 hemoglobin 11.8. INR is 1.9 today. ABGs performed showed pH 7.471 with PCO2 54.9 PO2 of 69. urinary output has improved with placement of Melchor catheter by 500 cc a shift. Stop IV fluids postop and continue the IV Lasix and will start SCDs lower extremities.         Past Medical History:    Past Medical History:   Diagnosis Date    Atrial fibrillation ([address]     CAD (coronary artery disease) coronary stent 12yrs ago    [facility] [address] [address]     Hypertension      Past Surgical History:    Past Surgical History:   Procedure Laterality Date    CARDIAC SURGERY      coronary stent    CATARACT REMOVAL WITH IMPLANT Left 2013    COLONOSCOPY      CYST REMOVAL      tailbone    EYE SURGERY         Medications Prior to Admission:    @  Prior to Admission medications    Medication Sig Start Date End Date Taking? Authorizing Provider   ipratropium-albuterol (DUONEB) 0.5-2.5 (3) MG/3ML SOLN nebulizer solution Inhale 3 mLs into the lungs 4 times daily 21   Sabra Ricks DO   albuterol (PROVENTIL) (2.5 MG/3ML) 0.083% nebulizer solution Take 3 mLs by nebulization every 4 hours as needed for Wheezing or Shortness of Breath 21   Sabra Ricks DO   Respiratory Therapy Supplies (FULL KIT NEBULIZER SET) MISC Use as directed with nebulized medication. 21   Sabra Ricks DO   warfarin (COUMADIN) 4 MG tablet 4 mg daily 6 PM 21   Sabra Ricks DO   tamsulosin (FLOMAX) 0.4 MG capsule Take 0.4 mg by mouth daily    Historical Provider, MD   terazosin (HYTRIN) 5 MG capsule Take 10 mg by mouth daily. Historical Provider, MD   metoprolol (TOPROL-XL) 50 MG XL tablet Take 50 mg by mouth daily. Historical Provider, MD   finasteride (PROSCAR) 5 MG tablet Take 5 mg by mouth daily. Historical Provider, MD   aspirin 81 MG tablet Take 81 mg by mouth daily.     Historical Provider, MD       Allergies:  Dye [iodides]    Social History:   Social History     Socioeconomic History    Marital status:      Spouse name: Not on file    Number of children: Not on file    Years of education: Not on file    Highest education level: Not on file   Occupational History    Not on file   Tobacco Use    Smoking status: Former Smoker     Quit date: 3/27/1992     Years since quittin.6    Smokeless tobacco: Never Used   Substance and Sexual Activity    Alcohol constipation. No melena or hematochezia. : No urinary complaints, dysuria, hematuria or frequency. MSK: + Diffuse lower extremity edema. No extremity weakness, paralysis or paresthesias. PHYSICAL EXAM:    Vitals:  /66   Pulse 72   Temp 98.9 °F (37.2 °C) (Axillary)   Resp 18   Ht 5' 8\" (1.727 m)   Wt 222 lb 0.1 oz (100.7 kg)   SpO2 100%   BMI 33.76 kg/m²     General:  This is a [de-identified] y.o. yo male who is alert and oriented in mild respiratory distress  HEENT:  Head is normocephalic and atraumatic, PERRLA, EOMI, mucus membranes moist with no pharyngeal erythema or exudate. Neck:  Supple with no carotid bruits, JVD or thyromegaly.   No cervical adenopathy  CV:  Regular rate and rhythm, no murmurs  Lungs: Coarse decreased breath sounds to auscultation bilaterally with no wheezes, rales or rhonchi  Abdomen:  Soft, nontender,+ abdominal fat, nondistended, bowel sounds present  Extremities:  + 2-3 leg edema, peripheral pulses intact bilaterally  Neuro:  Cranial nerves II-XII grossly intact; motor and sensory function intact with no focal deficits  Skin:  No rashes, lesions or wounds    DATA:  CBC with Differential:    Lab Results   Component Value Date    WBC 7.6 11/10/2021    RBC 3.67 11/10/2021    HGB 11.8 11/10/2021    HCT 38.7 11/10/2021     11/10/2021    .4 11/10/2021    MCH 32.2 11/10/2021    MCHC 30.5 11/10/2021    RDW 14.2 11/10/2021    BLASTSPCT 0.0 11/04/2021    LYMPHOPCT 0.9 11/10/2021    MONOPCT 5.2 11/10/2021    BASOPCT 0.1 11/10/2021    MONOSABS 0.38 11/10/2021    LYMPHSABS 0.08 11/10/2021    EOSABS 0.00 11/10/2021    BASOSABS 0.00 11/10/2021     CMP:    Lab Results   Component Value Date     11/10/2021    K 4.2 11/10/2021    K 4.8 11/03/2021    CL 98 11/10/2021    CO2 37 11/10/2021    BUN 47 11/10/2021    CREATININE 0.9 11/10/2021    GFRAA >60 11/10/2021    LABGLOM >60 11/10/2021    GLUCOSE 122 11/10/2021    PROT 5.5 11/10/2021    LABALBU 2.9 11/10/2021    CALCIUM 8.4 11/10/2021    BILITOT 0.4 11/10/2021    ALKPHOS 108 11/10/2021    AST 18 11/10/2021    ALT 18 11/10/2021     Magnesium:    Lab Results   Component Value Date    MG 2.3 11/05/2021     Phosphorus:    Lab Results   Component Value Date    PHOS 4.4 11/05/2021     PT/INR:    Lab Results   Component Value Date    PROTIME 22.1 11/10/2021    INR 1.9 11/10/2021     Troponin:  No results found for: TROPONINI  U/A:    Lab Results   Component Value Date    COLORU Yellow 11/03/2021    PROTEINU Negative 11/03/2021    PHUR 5.0 11/03/2021    WBCUA 1-3 11/03/2021    RBCUA 10-20 11/03/2021    BACTERIA RARE 11/03/2021    CLARITYU Clear 11/03/2021    SPECGRAV 1.025 11/03/2021    LEUKOCYTESUR Negative 11/03/2021    UROBILINOGEN 0.2 11/03/2021    BILIRUBINUR Negative 11/03/2021    BLOODU LARGE 11/03/2021    GLUCOSEU Negative 11/03/2021     ABG:    Lab Results   Component Value Date    PH 7.471 11/10/2021    PCO2 54.9 11/10/2021    PO2 69.4 11/10/2021    HCO3 39.1 11/10/2021    BE 13.2 11/10/2021    O2SAT 94.6 11/10/2021     HgBA1c:  No results found for: LABA1C  FLP:    Lab Results   Component Value Date    TRIG 46 11/04/2021    HDL 81 11/04/2021    LDLCALC 46 11/04/2021    LABVLDL 9 11/04/2021     TSH:    Lab Results   Component Value Date    TSH 4.560 11/04/2021     IRON:  No results found for: IRON  LIPASE:    Lab Results   Component Value Date    LIPASE 74 09/08/2021       ASSESSMENT AND PLAN:      Patient Active Problem List    Diagnosis Date Noted    Acute on chronic respiratory failure with hypoxia and hypercapnia ([facility] [address] 11/03/2021    CAD (coronary artery disease)  Acute diastolic congestive heart failure     Atrial fibrillation - chronic     COPD with exacerbation     Hypertension-patient hypotensive in the ED      History of severe pulmonary hypertension at 99 mmHg with +34 tricuspid regurg 09/10/2021     Diffuse leg edema probably combination of the severe pulmonary hypertension and tricuspid regurg along with acute diastolic CHF 98/71/1357     Plan:  Admit to IMC/PCU  Nursing home meds reviewed BiPAP  BiPAP -continuous at this time  DuoNeb aerosols every 4 hours  Pulmicort aerosol twice daily  Brovana aerosol twice daily  Continue warfarin   Daily INRs  Lasix 40 mg IV push twice daily  Accurate I's and O's  BiPAP 16/8 at at bedtime and as needed   SCDs lower extremities    Consult pulmonology    MRSA screen  Add IV Rocephin, doxycycline    Colace 100 mg twice daily  MiraLAX 17 mg x 1 now and daily as needed    Increase warfarin 1 mg p.o. today    Bladder scan > 100 cc  Urology consult-unable to dilate and insert Melchor catheter 11/8 PM  Cystoscopy and ureteral dilation 11/9 p.m. SCDs lower extremities    CMP, CBC in a.m.       Maritza Dominguez DO, D.OMark  11/10/2021  10:03 AM

## 2021-11-10 NOTE — PROGRESS NOTES
Date: 11/9/2021    Time: 10:57 PM    Patient Placed On BIPAP/CPAP/ Non-Invasive Ventilation? Yes    If no must comment. Facial area red/color change? No           If YES are Blister/Lesion present? No   If yes must notify nursing staff  BIPAP/CPAP skin barrier?   Yes    Skin barrier type:mepilexlite       Comments:       11/09/21 7991   NIV Type   Skin Protection for O2 Device Yes   Orientation Middle   Location Nose   NIV Started/Stopped On   Equipment Type v60   Mode Bilevel   Mask Type Full face mask   Mask Size Large   Settings/Measurements   IPAP 16 cmH20   CPAP/EPAP 8 cmH2O   Rate Ordered 12   Insp Rise Time (%) 2 %   FiO2  65 %   I Time/ I Time % 0.9 s   Vt Exhaled 523 mL   Minute Volume 7.8 Liters   Mask Leak (lpm) 71 lpm   Comfort Level Good   Using Accessory Muscles No           Yael Graff RCP

## 2021-11-10 NOTE — PROGRESS NOTES
Physical Therapy Treatment Note/Plan of Care    Room #:  5517/2116-17  Patient Name: Nereyda Rosario  YOB: 1940  MRN: [id number]    Date of Service: 11/10/2021     Tentative placement recommendation: Subacute rehab  Equipment recommendation: To be determined      Evaluating Physical Therapist: Rosa Boateng #[id number]      Specific Provider Orders/Date/Referring Provider :   11/03/21 1300   PT eval and treat Start: 11/03/21 1300, End: 11/03/21 1300, ONE TIME, Standing Count: 1 Occurrences, R    Norma Iglesias,     Admitting Diagnosis:   Respiratory acidosis [E87.2]  Hypercarbia [R06.89]  Elevated troponin [R77.8]  Acute on chronic respiratory failure with hypoxia and hypercapnia (HCC) [J96.21, J96.22]  Hypervolemia, unspecified hypervolemia type [E87.70]      Surgery: none  Visit Diagnoses       Codes    Respiratory acidosis    -  Primary E87.2    Elevated troponin     R77.8    Hypercarbia     R06.89    Hypervolemia, unspecified hypervolemia type     E87.70    Stricture of male urethra, unspecified stricture type     N35.919          Patient Active Problem List   Diagnosis    Metabolic encephalopathy    Inability to walk    Acute on chronic respiratory failure with hypoxia and hypercapnia (HCC)    CAD (coronary artery disease)    Atrial fibrillation (HCC)    [facility] obstructive [facility] disease) [facility] [address]    Hypertension        ASSESSMENT of Current Deficits Patient exhibits decreased strength, balance and endurance impairing functional mobility, transfers and gait . Patient needing maximal assist to sit EOB for bilateral lower extremities and trunk flexion. The patient  demonstrates decreased strength and endurance limiting  tolerance for functional mobility at this time and making the patient a high risk for falls. Patient with PROM/AAROM with all exercises due to impaired strength.  Not able to get off of the bed  for sit to stand due to impaired strength with bilateral lower extremities. Pt will need subacute rehab at time of discharge in order to participate in a skilled comprehensive therapy program to address deficits and improve the patient 's  functional levels. PHYSICAL THERAPY  PLAN OF CARE       Physical therapy plan of care is established based on physician order,  patient diagnosis and clinical assessment    Current Treatment Recommendations:    -Bed Mobility: Lower extremity exercises   -Sitting Balance: Incorporate reaching activities to activate trunk muscles   -Standing Balance: Perform strengthening exercises in standing to promote motor control with or without upper extremity support   -Transfers: Provide instruction on proper hand and foot position for adequate transfer of weight onto lower extremities and use of gait device if needed and Cues for hand placement, technique and safety. Provide stabilization to prevent fall   -Gait: Gait training and Standing activities to improve: base of support, weight shift, weight bearing      PT long term treatment goals are located in below grid    Patient and or family understand(s) diagnosis, prognosis, and plan of care. Frequency of treatments: Patient will be seen  daily.          Prior Level of Function: Patient ambulated with wheeled walker with assistance   Rehab Potential: good  for baseline    Past medical history:   Past Medical History:   Diagnosis Date    [facility] [address]     CAD (coronary artery disease)     coronary stent 12yrs ago    COPD (chronic obstructive pulmonary disease) ([facility] [address]     Hypertension      Past Surgical History:   Procedure Laterality Date    CARDIAC SURGERY      coronary stent    CATARACT REMOVAL WITH IMPLANT Left 04 01 2013    COLONOSCOPY      CYST REMOVAL      tailbone    EYE SURGERY         SUBJECTIVE:    Precautions: , falls, alarm and bipap , external catheter   Social history: Patient came to hospital from [facility] owned: Tyler Fernandez and Yam[employer] Quintana,      [facility] Basic Mobility       AM-PAC Mobility Inpatient   How much difficulty turning over in bed?: A Lot  How much difficulty sitting down on / standing up from a chair with arms?: A Lot  How much difficulty moving from lying on back to sitting on side of bed?: A Lot  How much help from another person moving to and from a bed to a chair?: A Lot  How much help from another person needed to walk in hospital room?: Total  How much help from another person for climbing 3-5 steps with a railing?: Total  AM-PAC Inpatient Mobility Raw Score : 10  AM-PAC Inpatient T-Scale Score : 32.29  Mobility Inpatient CMS 0-100% Score: 76.75  Mobility Inpatient CMS G-Code Modifier : CL    Nursing cleared patient for PT treatment. .   OBJECTIVE;   Initial Evaluation  Date: 11/4/2021 Treatment Date:  11/10/2021       Short Term/ Long Term   Goals   Was pt agreeable to Eval/treatment? Yes yes To be met in 5 days   Pain level   0/10   0/10    Bed Mobility    Rolling: Minimal assist of 1    Supine to sit: Maximal assist of 1    Sit to supine: Maximal assist of 1    Scooting: Moderate assist of 1   Rolling: Maximal assist of 1   Supine to sit: Maximal assist of 1   Sit to supine: Moderate assist of  2   Scooting: Maximal assist of 1    Rolling: Independent    Supine to sit: Minimal assist of 1    Sit to supine: Minimal assist of 1    Scooting: Minimal assist of 1     Transfers Sit to stand: Not assessed  d/t to pt overall debility, decreased activity tolerance, balance deficits, safety and fall risk. Sit to stand: Not assessed  Attempted but unsuccessful     Sit to stand:  Moderate assist of 1    Ambulation    not assessed  not assessed     5 feet using  wheeled walker with Moderate assist of 1          ROM Within functional limits    Increase range of motion 10% of affected joints    Strength BUE:  refer to OT eval  RLE:  3/5  LLE:  3/5  Increase strength in affected mm groups by 1/3 grade   Balance Sitting EOB:  poor   Dynamic Standing:  not assessed  Sitting EOB: good  Dynamic Standing: not assessed    Sitting EOB:  fair   Dynamic Standing: fair -     Patient is Alert & Oriented x person, place, time and situation and follows directions    Sensation:  Patient  reports numbness/tingling right foot     Edema:  yes bilateral lower extremities   Endurance: fair  -    Vitals: Bipap   Blood Pressure at rest  Blood Pressure during session    Heart Rate at rest  Heart Rate during session    SPO2 at rest 98%  SPO2 during session 95-98 %     Patient education  Patient educated on role of Physical Therapy, risks of immobility, safety and plan of care,  importance of mobility while in hospital , purse lip breathing, ankle pumps, quad set and glut set for edema control, blood clot prevention, importance and purpose of adaptive device and adjusted to proper height for the patient. and safety      Patient response to education:   Pt verbalized understanding Pt demonstrated skill Pt requires further education in this area   Yes Partial Yes      Treatment:  Patient practiced and was instructed/facilitated in the following treatment: Patient performed supine exercises. Pt assisted to EOB. Sat edge of bed 15 minutes with Minimal assist of 1 to increase dynamic sitting balance and activity tolerance. progressing to supervision. Pt able to use his left upper extremity to hold himself up with the bed rail. Worked on upright posture and pursed lip breathing to increase lung capacity. Attempted  sit to stand transfers but not able to get off of the bed. Therapeutic Exercises:  ankle pumps, heel slide, hip abduction/adduction and straight leg raise x 20 reps. At end of session, patient in bed with alarm call light and phone within reach,  all lines and tubes intact, nursing notified. Patient would benefit from continued skilled Physical Therapy to improve functional independence and quality of life.          Patient's/ family goals   home    Time in 1041  Time out  1110    Total Treatment Time  29 minutes    CPT codes:  Therapeutic activities (42475)   18 minutes  1 unit(s)  Therapeutic exercises (88285)   11 minutes  1 unit(s)   Rosa Alejandre #[id number]

## 2021-11-10 NOTE — CARE COORDINATION
SS NOTE: COVID NEGATIVE 11/3- PT WILL NEED A RAPID NEGATIVE COVID TO RETURN TO THE [facility]. Pt had a Cystoscopy yesterday with urethral dilation with insertion of a stewart. Pt's urine output is improving. Pt is on 12 liters O2 and continuous bipap FIO2 100%. Code status-limited, meds only. Pt is on IV Rocephin and Lasix. Pt has urinary retention and he will be going to surgery for a Cysto and stewart insertion. PT/OT are on and suggest SNF. This pt is a long term care/ private pay Resident of [facility]. Plan is for pt to return there at [facility] per pts' son Alfreda Munroe. Pt will need NO PRECERT to return to  - he will need a signed EMILY and current PT/OT notes. PT and OT are on consult. ARABELLA faxed updates to Shaq Hicks at the [facility]- [phone]. SS to continue. CHRIS Nicole.11/10/2021.11:47AM.

## 2021-11-10 NOTE — PROGRESS NOTES
Comprehensive Nutrition Assessment    Type and Reason for Visit:  Initial, RD Nutrition Re-Screen/LOS    Nutrition Recommendations/Plan: Continue with diet and start ONS BID Ensure HP    Nutrition Assessment:  Pt admitted w/ Altered [place] status PMH of COPD. Pt tolerates diet,  p.o. intakes ~60%. Will start ONS BID and monitor    Malnutrition Assessment:  Malnutrition Status: At risk for malnutrition (Comment)    Context:  Chronic Illness     Findings of the 6 clinical characteristics of malnutrition:  Energy Intake:  Mild decrease in energy intake (Comment)  Weight Loss:  No significant weight loss     Body Fat Loss:  No significant body fat loss     Muscle Mass Loss:  No significant muscle mass loss    Fluid Accumulation:  No significant fluid accumulation     Strength:  Not Performed    Estimated Daily Nutrient Needs:  Energy (kcal):  8929-4167; Weight Used for Energy Requirements:  Current     Protein (g):   (x1.3-1.5gm/kg); Weight Used for Protein Requirements:  Ideal          Nutrition Related Findings:  A/ox4, abd WDL, +BS, BUE non-pitting edema, BLE +2 pitting edema, -I/O -3. 2. L      Wounds:  None       Current Nutrition Therapies:    ADULT DIET; Regular  ADULT ORAL NUTRITION SUPPLEMENT; Breakfast, Dinner; Low Calorie/High Protein Oral Supplement    Anthropometric Measures:  · Height: 5' 8\" (172.7 cm)  · Current Body Weight: 222 lb (100.7 kg) (11/9 bed scale)   · Ideal Body Weight: 154 lbs; % Ideal Body Weight 144.2 %   · BMI: 33.8  · Adjusted Body Weight:  ; No Adjustment   · BMI Categories: Obese Class 1 (BMI 30.0-34. 9)       Nutrition Diagnosis:   · Inadequate oral intake related to impaired respiratory function as evidenced by intake 51-75%      Nutrition Interventions:   Food and/or Nutrient Delivery:  Continue Current Diet, Start Oral Nutrition Supplement  Nutrition Education/Counseling:  Education not indicated   Coordination of Nutrition Care:  Continue to monitor while inpatient    Goals:  Pt to consume >75% meals/ONS       Nutrition Monitoring and Evaluation:   Behavioral-Environmental Outcomes:  None Identified   Food/Nutrient Intake Outcomes:  Food and Nutrient Intake, Supplement Intake  Physical Signs/Symptoms Outcomes:  Biochemical Data, Fluid Status or Edema, Nutrition Focused Physical Findings, Skin, Weight     Discharge Planning:     Too soon to determine     Electronically signed by Samia Schreiber RD, LD on 11/10/21 at 2:52 PM EST    Contact: [phone]

## 2021-11-10 NOTE — PROGRESS NOTES
11/10/2021 8:32 AM  Service: Urology  Group: [facility] (Doni/Cyndie/Rika)    Armando Madrid  [id number]    Subjective:  Doing ok  Upset that they can't get his blood  Melchor in place and draining   Yellow urine with some brown sediment  Irrigated easily   Scrotal edema improved    Review of Systems  Constitutional: No chills or sweat  Respiratory: negative for wheezing, + cough  Cardiovascular: negative for chest pain  Gastrointestinal: negative for abdominal pain, nausea and vomiting  Dermatologic: no pruritis   Hematologic/lymphatic: positive for easy bruising  Musculoskeletal:positive for muscle weakness  Neurological: negative for headaches  Endocrine: negative  Psychiatric: negative    Scheduled Meds:   warfarin  2 mg Oral Daily    docusate sodium  100 mg Oral BID    Vitamin D  2,000 Units Oral Daily    cefTRIAXone (ROCEPHIN) IV  1,000 mg IntraVENous Q24H    doxycycline hyclate  100 mg Oral 2 times per day    methylPREDNISolone  60 mg IntraVENous Q8H    pantoprazole  40 mg IntraVENous Daily    And    sodium chloride (PF)  10 mL IntraVENous Daily    nitroglycerin  0.5 inch Topical Once    doxazosin  4 mg Oral Daily    metoprolol succinate  50 mg Oral Daily    aspirin  81 mg Oral Daily    tamsulosin  0.4 mg Oral Dinner    ipratropium-albuterol  3 mL Inhalation 4x Daily    furosemide  40 mg IntraVENous BID    Arformoterol Tartrate  15 mcg Nebulization BID    finasteride  5 mg Oral Daily    influenza virus vaccine  0.5 mL IntraMUSCular Prior to discharge       Objective:  Vitals:    11/10/21 0330   BP: (!) 93/53   Pulse: 68   Resp: 16   Temp: 98.6 °F (37 °C)   SpO2: 97%         Allergies: Dye [iodides]    General Appearance: alert and oriented to person, place and time and in no acute distress  Skin: no rash or erythema  Head: normocephalic and atraumatic  Pulmonary/Chest: normal air movement, no respiratory distress and no chest wall tenderness  Abdomen: soft, non-tender,

## 2021-11-10 NOTE — PROGRESS NOTES
Pulmonary Progress Note    SUMMARY:  Noah Marks is a [de-identified] y.o. male who presented with recurrent respiratory failure  High BiPAP need / use    ISSUES:    Acute on chronic respiratory failure with hypercapnia and hypoxia  Coronary disease with diastolic heart failure severe  Chronic atrial fibrillation  Severe pulmonary hypertension with plus for tricuspid regurg  Protein caloric malnutrition    Went for urologic surgery    ABG with well compensated Chronic Hypercapneic Respiratory Failure   Using BiPAP at night  Would encourage    Call with questions       ______________________________________________    SUBJECTIVE  Denies SOB  Used BiPAP  On O2 during day    OBJECTIVE:  Post-operative guidance requested        MEDICATIONS:   warfarin  2 mg Oral Daily    docusate sodium  100 mg Oral BID    Vitamin D  2,000 Units Oral Daily    cefTRIAXone (ROCEPHIN) IV  1,000 mg IntraVENous Q24H    doxycycline hyclate  100 mg Oral 2 times per day    methylPREDNISolone  60 mg IntraVENous Q8H    pantoprazole  40 mg IntraVENous Daily    And    sodium chloride (PF)  10 mL IntraVENous Daily    nitroglycerin  0.5 inch Topical Once    doxazosin  4 mg Oral Daily    metoprolol succinate  50 mg Oral Daily    aspirin  81 mg Oral Daily    tamsulosin  0.4 mg Oral Dinner    ipratropium-albuterol  3 mL Inhalation 4x Daily    furosemide  40 mg IntraVENous BID    Arformoterol Tartrate  15 mcg Nebulization BID    finasteride  5 mg Oral Daily    influenza virus vaccine  0.5 mL IntraMUSCular Prior to discharge       medicated lip ointment, HYDROmorphone, HYDROmorphone, hydrOXYzine, albuterol, acetaminophen, trimethobenzamide, polyethylene glycol    O/e  NAD  Diminished breath sounds bilateral  No wheeze or crackles      Vitals:    11/10/21 1121   BP:    Pulse:    Resp:    Temp:    SpO2: 100%     FiO2 : 65 %  O2 Flow Rate (L/min): 12 L/min  O2 Device: High flow nasal cannula      Vent Information  Skin Assessment: Clean, dry, & intact  FiO2 : 65 %  SpO2: 100 %  SpO2/FiO2 ratio: 150.77  I Time/ I Time %: 0.9 s  Mask Type: Full face mask  Mask Size: Large    Additional Respiratory  Assessments  Pulse: 72  Resp: 18  SpO2: 100 %  Oral Care: Patient refused     URINARY CATHETER OUTPUT (Melchor):    [REMOVED] External Urinary Catheter-Output (mL): 150 mL  Urethral Catheter Double-lumen; Latex 22 fr-Output (mL): 325 mL    LABS:    WBC   Date Value Ref Range Status   11/10/2021 7.6 4.5 - 11.5 E9/L Final   11/09/2021 11.1 4.5 - 11.5 E9/L Final   11/08/2021 5.1 4.5 - 11.5 E9/L Final     Hemoglobin   Date Value Ref Range Status   11/10/2021 11.8 (L) 12.5 - 16.5 g/dL Final   11/09/2021 12.1 (L) 12.5 - 16.5 g/dL Final   11/08/2021 11.7 (L) 12.5 - 16.5 g/dL Final     Hematocrit   Date Value Ref Range Status   11/10/2021 38.7 37.0 - 54.0 % Final   11/09/2021 39.6 37.0 - 54.0 % Final   11/08/2021 38.4 37.0 - 54.0 % Final     MCV   Date Value Ref Range Status   11/10/2021 105.4 (H) 80.0 - 99.9 fL Final   11/09/2021 103.1 (H) 80.0 - 99.9 fL Final   11/08/2021 105.2 (H) 80.0 - 99.9 fL Final     Platelets   Date Value Ref Range Status   11/10/2021 106 (L) 130 - 450 E9/L Final   11/09/2021 109 (L) 130 - 450 E9/L Final   11/08/2021 116 (L) 130 - 450 E9/L Final     Sodium   Date Value Ref Range Status   11/10/2021 143 132 - 146 mmol/L Final   11/09/2021 141 132 - 146 mmol/L Final   11/08/2021 142 132 - 146 mmol/L Final     Potassium   Date Value Ref Range Status   11/10/2021 4.2 3.5 - 5.0 mmol/L Final   11/09/2021 4.2 3.5 - 5.0 mmol/L Final   11/08/2021 4.6 3.5 - 5.0 mmol/L Final     Potassium reflex Magnesium   Date Value Ref Range Status   11/03/2021 4.8 3.5 - 5.0 mmol/L Final   09/08/2021 6.5 (H) 3.5 - 5.0 mmol/L Final     Comment:     Specimen is moderately Hemolyzed. Result may be artificially increased.      Chloride   Date Value Ref Range Status   11/10/2021 98 98 - 107 mmol/L Final   11/09/2021 96 (L) 98 - 107 mmol/L Final   11/08/2021 98 98 - 107 mmol/L Final     CO2   Date Value Ref Range Status   11/10/2021 37 (H) 22 - 29 mmol/L Final   11/09/2021 39 (H) 22 - 29 mmol/L Final   11/08/2021 38 (H) 22 - 29 mmol/L Final     BUN   Date Value Ref Range Status   11/10/2021 47 (H) 6 - 23 mg/dL Final   11/09/2021 54 (H) 6 - 23 mg/dL Final   11/08/2021 48 (H) 6 - 23 mg/dL Final     CREATININE   Date Value Ref Range Status   11/10/2021 0.9 0.7 - 1.2 mg/dL Final   11/09/2021 1.0 0.7 - 1.2 mg/dL Final   11/08/2021 0.8 0.7 - 1.2 mg/dL Final     Glucose   Date Value Ref Range Status   11/10/2021 122 (H) 74 - 99 mg/dL Final   11/09/2021 122 (H) 74 - 99 mg/dL Final   11/08/2021 123 (H) 74 - 99 mg/dL Final     Calcium   Date Value Ref Range Status   11/10/2021 8.4 (L) 8.6 - 10.2 mg/dL Final   11/09/2021 8.4 (L) 8.6 - 10.2 mg/dL Final   11/08/2021 8.5 (L) 8.6 - 10.2 mg/dL Final     Total Protein   Date Value Ref Range Status   11/10/2021 5.5 (L) 6.4 - 8.3 g/dL Final   11/09/2021 5.8 (L) 6.4 - 8.3 g/dL Final   11/08/2021 6.1 (L) 6.4 - 8.3 g/dL Final     Albumin   Date Value Ref Range Status   11/10/2021 2.9 (L) 3.5 - 5.2 g/dL Final   11/09/2021 3.2 (L) 3.5 - 5.2 g/dL Final   11/08/2021 3.5 3.5 - 5.2 g/dL Final     Total Bilirubin   Date Value Ref Range Status   11/10/2021 0.4 0.0 - 1.2 mg/dL Final   11/09/2021 0.5 0.0 - 1.2 mg/dL Final   11/08/2021 0.4 0.0 - 1.2 mg/dL Final     Alkaline Phosphatase   Date Value Ref Range Status   11/10/2021 108 40 - 129 U/L Final   11/09/2021 118 40 - 129 U/L Final   11/08/2021 146 (H) 40 - 129 U/L Final     AST   Date Value Ref Range Status   11/10/2021 18 0 - 39 U/L Final   11/09/2021 21 0 - 39 U/L Final   11/08/2021 22 0 - 39 U/L Final     ALT   Date Value Ref Range Status   11/10/2021 18 0 - 40 U/L Final   11/09/2021 20 0 - 40 U/L Final   11/08/2021 19 0 - 40 U/L Final     GFR Non-   Date Value Ref Range Status   11/10/2021 >60 >=60 mL/min/1.73 Final     Comment:     Chronic Kidney Disease: less than 60 ml/min/1.73 sq.m. Kidney Failure: less than 15 ml/min/1.73 sq.m. Results valid for patients 18 years and older. 11/09/2021 >60 >=60 mL/min/1.73 Final     Comment:     Chronic Kidney Disease: less than 60 ml/min/1.73 sq.m. Kidney Failure: less than 15 ml/min/1.73 sq.m. Results valid for patients 18 years and older. 11/08/2021 >60 >=60 mL/min/1.73 Final     Comment:     Chronic Kidney Disease: less than 60 ml/min/1.73 sq.m. Kidney Failure: less than 15 ml/min/1.73 sq.m. Results valid for patients 18 years and older. GFR    Date Value Ref Range Status   11/10/2021 >60  Final   11/09/2021 >60  Final   11/08/2021 >60  Final     Magnesium   Date Value Ref Range Status   11/05/2021 2.3 1.6 - 2.6 mg/dL Final   11/04/2021 2.2 1.6 - 2.6 mg/dL Final     Phosphorus   Date Value Ref Range Status   11/05/2021 4.4 2.5 - 4.5 mg/dL Final   11/04/2021 4.2 2.5 - 4.5 mg/dL Final     Recent Labs     11/10/21  0916   PH 7.471*   PO2 69.4*   PCO2 54.9*   HCO3 39.1*   BE 13.2*   O2SAT 94.6     No results for input(s): CULTRESP in the last 72 hours. Lab Results   Component Value Date    PH 7.471 11/10/2021    PH 7.355 11/04/2021    PH 7.204 11/03/2021    PH 7.443 09/13/2021    PO2 69.4 11/10/2021    PO2 105.6 11/04/2021    PO2 97.4 11/03/2021    PO2 85.1 09/13/2021    PCO2 54.9 11/10/2021    PCO2 56.2 11/04/2021    PCO2 85.0 11/03/2021    PCO2 35.9 09/13/2021    HCO3 39.1 11/10/2021    HCO3 30.7 11/04/2021    HCO3 32.8 11/03/2021    HCO3 24.0 09/13/2021    O2SAT 94.6 11/10/2021    O2SAT 97.5 11/04/2021    O2SAT 96.2 11/03/2021    O2SAT 96.3 09/13/2021       RADIOLOGY:  XR CHEST PORTABLE   Final Result   Significant regression of findings of volume overload/decompensated   congestive heart failure since the previous study of November 5. XR UROGRAM W OR WO KUB W OR WO TOMOGRAM   Final Result   Fluoroscopic support provided to subspecialty service.   Please see   subspecialty report for full details and interpretation of real time imaging. XR CHEST PORTABLE   Final Result   Slight worsening of CHF with new infiltrate and effusion at the right base         XR CHEST PORTABLE   Final Result   Interstitial lung disease which may relate to chronic fibrosis. The   appearance may be somewhat exacerbated by suboptimal inspiration. Cardiomegaly.            (Independently reviewed by me)        Shelby Hendrix M.D  Pulmonary & Critical Care  11/10/2021 4:52 PM

## 2021-11-11 NOTE — PROGRESS NOTES
Date: 11/10/2021    Time: 10:40 PM    Patient Placed On BIPAP/CPAP/ Non-Invasive Ventilation? No, on from previous shift     If no must comment. Facial area red/color change? no          BIPAP/CPAP skin barrier?      Skin barrier type:      Comments:        Gelacio Carrera

## 2021-11-11 NOTE — PROGRESS NOTES
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 male who presents with altered mental status at the nursing home. Patient symptoms started about a week ago patient was also noted to have increased weight gain and increasing lower leg edema. Patient normally alert and oriented x4 but has become much more lethargic and interactive. Patient gained 20 pounds at the nursing home. Patient was brought into the emergency room with patient chest x-ray showed interstitial lung disease and also cardiomegaly. ABG showed a pH 7.204 with a CO2 of 85 on 6 L nasal cannula. Patient then was started on BiPAP.  BUN/creatinine 43/1.0 with proBNP 3900 and troponin initially was 120. Liver enzymes are normal.  Heart rate was 75 normal temperature and blood pressure initially at 314 systolic currently is 06/45. Patient currently on 40% FiO2 BiPAP with O2 sat of 93%. Patient states he has been wearing O2 nasal cannula for last couple weeks at the nursing home but not before that. Patient denies wearing BiPAP in the past.  He denies any problem with any chest pain, abdominal pain, nausea vomiting or dizziness. ECG reviewed with patient have atrial fibrillation with no significant ST-T changes to suggest acute myocardial injury troponin was elevated on admission but troponin I-II hours later was less than on admission. 11/4/2021  Patient seen examined on PCU. Patient still very short of breath even at rest.  Patient seems to do fairly good on the BiPAP once he is off of it he seemed to have some more respiratory distress. BUN/creatinine 46/1.0 with patient's transaminases normal TSH 4.56. WBC 5.1 with hemoglobin 11. INR is 2.2 today. Temperature 98.3 with a heart rate of 100 blood pressure 130/40. O2 sat 91% on 7 L nasal cannula.  ABGs today were improved but the patient had a pH increase 7.355 with PCO2 decreased 56.2 while the patient was on BiPAP. Patient urine output has picked up since early this morning. 11/5/2021  Patient seen examined on PCU. Case discussed with intensivist yesterday afternoon. Patient looks a little bit better today. Nursing states that the patient has been changed to a DNR CC a with no chest compressions, intubation or cardioversion. Patient denies any chest or abdominal pain. Patient still with +34 lower leg edema. BUN/creatinine 48/1.0 with the patient's Liver enzymes are normal with a WBC 3.1 with hemoglobin 11.1. Temperature 97.7 with heart rate of 70 blood pressure 123/48. O2 sat is 91% on 10 L high flow nasal cannula. Urine output seems to be adequate. 11/6/2021  Patient seen examined on PCU. Patient is alert and oriented with patient currently had BiPAP on and feeling little bit more comfortable. BUN/creatinine 51/0.9 WBCs 5.5 hemoglobin 11.8. INR 2.8 today. Temperature 97.5 heart rate 85 blood pressure 107/69. O2 sat 97% on BiPAP at 75% FiO2. Urine output is inaccurate but seems to be adequate according to the patient who states he is urinating a lot. INR is 2.3 we will decrease the warfarin 1 mg p.o. daily. 11/7/2021  Patient seen examined on PCU. Patient seems to be doing little bit better today. Patient is wearing BiPAP almost all the time. Patient did eat all of his breakfast.  Patient denies any chest or abdominal pain. Patient states that he has not had a bowel movement yet. Patient's lower leg edema still prominent but seems to be mildly improved. BUN/creatinine 48/0.9 with a CO2 of 36. Transaminases are normal with free T4 1.1 and WBC 5.2 and hemoglobin 11.4. Platelet count 668. INR is 2.9 today. Temperature 97.7 with heart rate of 60 and blood pressure 108/869. O2 sat 98% on 12 L high flow nasal cannula alternating with BiPAP with FiO2 of 65%. .    11/8/2021  Patient seen and examined in PCU. Patient seems little bit more lethargic/confused this morning off of the BiPAP.  BUN/creatinine 48/0.8 with CO2 electrolytes at 38. Normal transaminases with INR down to 2.2 today. WBC 5.1 with hemoglobin 11.7. Temperature is 98.1 with heart rate 76 blood pressure 103/61. O2 sat 94% on 12 L high flow nasal cannula. Urinary output is   in accurate. 11/9/2021   Patient seen examined in PCU. Patient is again alert and oriented but somewhat short of breath with any activity and on the BiPAP most of the day. Patient denies any chest pain or abdominal pain. Patient supposed to go to surgery for cystoscopy and probable urethral dilation today with insertion of Melchor catheter. BUN/creatinine 54/1.0 today with liver enzymes normal WBC 11.1 hemoglobin 12.1. INR is 2.2 today. Temperature is 97.8 with heart rate of 90 and blood pressure 110/66. O2 sat is 95% with the patient on BiPAP 65% FiO2. Urine output is very inaccurate. Urology note reviewed with patient having severe bladder neck contracture and urology not able to dilate yesterday afternoon. 11/10/2021  Patient seen and examined in PCU. Patient looks better today. Patient is more alert and oriented. Patient denies any chest or abdominal pain. Patient urine output is improving. Patient still with marked lower leg edema. Urology note reviewed with the patient having a laser ablation of bladder neck contracture and placement of Melchor catheter yesterday afternoon. BUN/creatinine is 47/0.9 with normal liver enzymes. WBC 7.6 hemoglobin 11.8. INR is 1.9 today. ABGs performed showed pH 7.471 with PCO2 54.9 PO2 of 69. urinary output has improved with placement of Melchor catheter by 500 cc a shift. Stop IV fluids postop and continue the IV Lasix and will start SCDs lower extremities. 11/11/2021  Patient seen and examined in PCU. Patient sons at the bedside and case discussed. Patient looks and feels better.   Patient lower leg edema is moderately improved. Patient denies any problem with chest or abdominal pain. There is no nausea vomiting or dizziness. BUN/creatinine 49/0.8 with fasting blood sugar 135. Temperature 98.9 with heart rate in 96 and blood pressure ranges 90/50 6109/66. O2 sats 95% with the patient on 65% FiO2 BiPAP. Urinary output ranges 500-2900 cc a shift. Past Medical History:    Past Medical History:   Diagnosis Date    At[facility] fibrillation [facility] [address]     CAD (coronary artery disease)     coronary stent 12yrs ago    COPD (chronic obstructive pulmonary disease) ([facility] [address]     Hypertension      Past Surgical History:    Past Surgical History:   Procedure Laterality Date    BLADDER SURGERY N/A 11/9/2021    CYSTOSCOPY, LASER ABLATION OF BLADDER NECK STRICTURE,  PONCE CATHETER INSERTION performed by Gayla Loyola MD at [address] [place] [address]      coronary stent    CATARACT REMOVAL WITH IMPLANT Left 04 01 2013    COLONOSCOPY      [facility]      [facility]    EYE SURGERY         Medications Prior to Admission:    @  Prior to Admission medications    Medication Sig Start Date End Date Taking? Authorizing Provider   ipratropium-albuterol (DUONEB) 0.5-2.5 (3) MG/3ML SOLN nebulizer solution Inhale 3 mLs into the lungs 4 times daily 9/16/21   [facility], DO   albuterol (PROVENTIL) (2.5 MG/3ML) 0.083% nebulizer solution Take 3 mLs by nebulization every 4 hours as needed for Wheezing or Shortness of Breath 9/16/21   [facility], DO   Respiratory Therapy Supplies (FULL KIT NEBULIZER SET) MISC Use as directed with nebulized medication. 9/16/21   Favio Shell, DO   warfarin (COUMADIN) 4 MG tablet 4 mg daily 6 PM 9/16/21   [facility], DO   tamsulosin (FLOMAX) 0.4 MG capsule Take 0.4 mg by mouth daily    Historical Provider, MD   terazosin (HYTRIN) 5 MG capsule Take 10 mg by mouth daily. Historical Provider, MD   metoprolol (TOPROL-XL) 50 MG XL tablet Take 50 mg by mouth daily. Historical Provider, MD   finasteride (PROSCAR) 5 MG tablet Take 5 mg by mouth daily. Historical Provider, MD   aspirin 81 MG tablet Take 81 mg by mouth daily. Historical Provider, MD       Allergies:  Dye [iodides]    Social History:   Social History     Socioeconomic History    Marital status:      Spouse name: Not on file    Number of children: Not on file    Years of education: Not on file    Highest education level: Not on file   Occupational History    Not on file   Tobacco Use    Smoking status: Former Smoker     Quit date: 3/27/1992     Years since quittin.6    Smokeless tobacco: Never Used   Substance and Sexual Activity    Alcohol use: No    Drug use: No    Sexual activity: Not on file   Other Topics Concern    Not on file   Social History Narrative    Not on file     Social Determinants of Health     Financial Resource Strain:     Difficulty of Paying Living Expenses: Not on file   Food Insecurity:     Worried About [address] Funding in the Last Year: Not on file    Jian of Food in the Last Year: Not on file   Transportation Needs:     Lack of Transportation (Medical): Not on file    Lack of Transportation (Non-Medical):  Not on file   Physical Activity:     Days of Exercise per Week: Not on file    Minutes of Exercise per Session: Not on file   Stress:     Feeling of Stress : Not on file   Social Connections:     Frequency of Communication with Friends and Family: Not on file    Frequency of Social Gatherings with Friends and Family: Not on file    Attends Voodoo Services: Not on file    Active Member of Clubs or Organizations: Not on file    Attends Club or Organization Meetings: Not on file    Marital Status: Not on file   Intimate Partner Violence:     Fear of Current or Ex-Partner: Not on file    Emotionally Abused: Not on file    Physically Abused: Not on file    Sexually Abused: Not on file   Housing Stability:     Unable to Pay for Housing in the Last Year: Not on file    Number of Places Lived in the Last Year: Not on file    Unstable Housing in the Last Year: Not on file       Family History:   History reviewed. No pertinent family history. REVIEW OF SYSTEMS:   Gen: Patient initially admitted from the nursing home with altered mental status but is doing much better at this time. Patient denies any lightheadedness or dizziness. No LOC or syncope. No fevers or chills. HEENT: No earache, sore throat or nasal congestion. Resp: Denies cough, hemoptysis or sputum production. Cardiac: Denies chest pain, +SOB, no diaphoresis or palpitations. GI: No nausea, vomiting, diarrhea or constipation. No melena or hematochezia. : No urinary complaints, dysuria, hematuria or frequency. MSK: + Diffuse lower extremity edema. No extremity weakness, paralysis or paresthesias. PHYSICAL EXAM:    Vitals:  BP (!) 90/56   Pulse 96   Temp 98.9 °F (37.2 °C) (Axillary)   Resp 22   Ht 5' 8\" (1.727 m)   Wt 222 lb 0.1 oz (100.7 kg)   SpO2 95%   BMI 33.76 kg/m²     General:  This is a [de-identified] y.o. yo male who is alert and oriented in mild respiratory distress  HEENT:  Head is normocephalic and atraumatic, PERRLA, EOMI, mucus membranes moist with no pharyngeal erythema or exudate. Neck:  Supple with no carotid bruits, JVD or thyromegaly.   No cervical adenopathy  CV:  Regular rate and rhythm, no murmurs  Lungs: Coarse decreased breath sounds to auscultation bilaterally with no wheezes, rales or rhonchi  Abdomen:  Soft, nontender,+ abdominal fat, nondistended, bowel sounds present  Extremities:  + 2-3 leg edema, peripheral pulses intact bilaterally  Neuro:  Cranial nerves II-XII grossly intact; motor and sensory function intact with no focal deficits  Skin:  No rashes, lesions or wounds    DATA:  CBC with Differential:    Lab Results   Component Value Date    WBC 7.6 11/10/2021    RBC 3.67 11/10/2021    HGB 11.8 11/10/2021    HCT 38.7 11/10/2021    PLT 106 11/10/2021    .4 11/10/2021    MCH 32.2 11/10/2021    MCHC 30.5 11/10/2021    RDW 14.2 11/10/2021    BLASTSPCT 0.0 11/04/2021    LYMPHOPCT 0.9 11/10/2021    MONOPCT 5.2 11/10/2021    BASOPCT 0.1 11/10/2021    MONOSABS 0.38 11/10/2021    LYMPHSABS 0.08 11/10/2021    EOSABS 0.00 11/10/2021    BASOSABS 0.00 11/10/2021     CMP:    Lab Results   Component Value Date     11/11/2021    K 4.7 11/11/2021    K 4.8 11/03/2021    CL 96 11/11/2021    CO2 37 11/11/2021    BUN 49 11/11/2021    CREATININE 0.8 11/11/2021    GFRAA >60 11/11/2021    LABGLOM >60 11/11/2021    GLUCOSE 135 11/11/2021    PROT 5.5 11/10/2021    LABALBU 2.9 11/10/2021    CALCIUM 8.5 11/11/2021    BILITOT 0.4 11/10/2021    ALKPHOS 108 11/10/2021    AST 18 11/10/2021    ALT 18 11/10/2021     Magnesium:    Lab Results   Component Value Date    MG 2.3 11/05/2021     Phosphorus:    Lab Results   Component Value Date    PHOS 4.4 11/05/2021     PT/INR:    Lab Results   Component Value Date    PROTIME 18.9 11/11/2021    INR 1.6 11/11/2021     Troponin:  No results found for: TROPONINI  U/A:    Lab Results   Component Value Date    COLORU Yellow 11/03/2021    PROTEINU Negative 11/03/2021    PHUR 5.0 11/03/2021    WBCUA 1-3 11/03/2021    RBCUA 10-20 11/03/2021    BACTERIA RARE 11/03/2021    CLARITYU Clear 11/03/2021    SPECGRAV 1.025 11/03/2021    LEUKOCYTESUR Negative 11/03/2021    UROBILINOGEN 0.2 11/03/2021    BILIRUBINUR Negative 11/03/2021    BLOODU LARGE 11/03/2021    GLUCOSEU Negative 11/03/2021     ABG:    Lab Results   Component Value Date    PH 7.471 11/10/2021    PCO2 54.9 11/10/2021    PO2 69.4 11/10/2021    HCO3 39.1 11/10/2021    BE 13.2 11/10/2021    O2SAT 94.6 11/10/2021     HgBA1c:  No results found for: LABA1C  FLP:    Lab Results   Component Value Date    TRIG 46 11/04/2021    HDL 81 11/04/2021    LDLCALC 46 11/04/2021    LABVLDL 9 11/04/2021     TSH:    Lab Results   Component Value Date    TSH 4.560 11/04/2021     IRON:  No results found for: IRON  LIPASE:    Lab Results   Component Value Date    LIPASE 74 09/08/2021       ASSESSMENT AND PLAN:      Patient Active Problem List    Diagnosis Date Noted    Acute on chronic respiratory failure with hypoxia and hypercapnia ([address] 11/03/2021    CAD (coronary artery disease)  Acute diastolic congestive heart failure     Atrial fibrillation - chronic     COPD with exacerbation     Hypertension-patient hypotensive in the ED      History of severe pulmonary hypertension at 99 mmHg with +34 tricuspid regurg 09/10/2021     Diffuse leg edema probably combination of the severe pulmonary hypertension and tricuspid regurg along with acute diastolic CHF 92/27/7773     Plan:  Admit to IMC/PCU  Nursing home meds reviewed BiPAP  BiPAP -continuous at this time  DuoNeb aerosols every 4 hours  Pulmicort aerosol twice daily  Brovana aerosol twice daily  Continue warfarin   Daily INRs  Lasix 40 mg IV push twice daily  Accurate I's and O's  BiPAP 16/8 at at bedtime and as needed   SCDs lower extremities    Consult pulmonology    MRSA screen  Add IV Rocephin, doxycycline    Colace 100 mg twice daily  MiraLAX 17 mg x 1 now and daily as needed    Increase warfarin 1 mg p.o. today    Bladder scan > 100 cc  Urology consult-unable to dilate and insert Melchor catheter 11/8 PM  Cystoscopy and ureteral dilation 11/9 p.m. SCDs lower extremities    CMP, CBC in a.m.       Ria Dolan DO, D.O.  11/11/2021  12:10 PM

## 2021-11-11 NOTE — PROGRESS NOTES
OT BEDSIDE TREATMENT NOTE      Date:2021  Patient Name: Vijay Ovalle  MRN: [id number]  : 1940  Room: [address] [place] [address]         Evaluating OT: Nasir Kwan OTR/L #[id number]      Referring Provider and Specific Provider Orders/Date:      21 1300   OT eval and treat Start: 21 1300, End: 21 1300, ONE TIME, Standing Count: 1 Occurrences, R      Carnella Gitelman,        Placement Recommendation: LOBITO        Diagnosis:   1. Respiratory acidosis    2. Elevated troponin    3. Hypercarbia    4. Hypervolemia, unspecified hypervolemia type            Surgery: None        Pertinent Medical History:       Past Medical History        Past Medical History:   Diagnosis Date    Atrial fibrillation ([facility] [address]      CAD (coronary artery disease)       coronary stent 12yrs ago    COPD (chronic obstructive pulmonary disease) (HCC)      Hypertension              Past Surgical History         Past Surgical History:   Procedure Laterality Date    CARDIAC SURGERY         coronary stent    CATARACT REMOVAL WITH IMPLANT Left 2013    COLONOSCOPY        CYST REMOVAL         tailbone    EYE SURGERY                 Precautions:  Fall Risk, falls and alarm, 15L O2      Assessment of current deficits    [x]? Functional mobility            [x]?ADLs           [x]? Strength                   [x]? Cognition    [x]? Functional transfers          [x]? IADLs         [x]? Safety Awareness   [x]? Endurance    []? Fine Coordination              [x]? Balance      []? Vision/perception    []? Sensation      []? Gross Motor Coordination  []? ROM           [x]?  Delirium                   []? Motor Control      OT PLAN OF CARE   OT POC based on physician orders, patient diagnosis and results of clinical assessment     Frequency/Duration 1-3 days/wk for 2 weeks PRN      Specific OT Treatment Interventions to include:   * Instruction/training on adapted ADL techniques and AE recommendations to increase functional independence within precautions       * Training on energy conservation strategies, correct breathing pattern and techniques to improve independence/tolerance for self-care routine  * Functional transfer/mobility training/DME recommendations for increased independence, safety, and fall prevention  * Patient/Family education to increase follow through with safety techniques and functional independence  * Recommendation of environmental modifications for increased safety with functional transfers/mobility and ADLs  * Cognitive retraining/development of therapeutic activities to improve problem solving, judgement, memory, and attention for increased safety/participation in ADL/IADL tasks  * Therapeutic exercise to improve motor endurance, ROM, and functional strength for ADLs/functional transfers  * Therapeutic activities to facilitate/challenge dynamic balance, stand tolerance for increased safety and independence with ADLs  * Therapeutic activities to facilitate gross/fine motor skills for increased independence with ADLs  * Positioning to improve skin integrity, interaction with environment and functional independence  * Delirium prevention/treatment     Recommended Adaptive Equipment: TBD      Home Living: Pt resides at [facility].          Prior Level of Function: Requires assist with ADLs , Dependent with IADLs; transfers to wheelchair with assist and use of jamar steady, per patient's report.      Pain Level: Pt denied pain this date.                  Cognition: A&O: 3/4; Follows 1-2 step directions              Memory: impaired              Sequencing: poor              Problem solving: poor              Judgement/safety: poor     [facility]   AM-PAC Daily Activity Inpatient   How much help for putting on and taking off regular lower body clothing?: Total  How much help for Bathing?: A Lot  How much help for Toileting?: Total  How much help for putting on and taking off regular upper body clothing?: A Lot  How much help for taking care of personal grooming?: A Lot  How much help for eating meals?: A Little  AM-PAC Inpatient Daily Activity Raw Score: 11  AM-PAC Inpatient ADL T-Scale Score : 29.04  ADL Inpatient CMS 0-100% Score: 70.42  ADL Inpatient CMS G-Code Modifier : CL                Functional Assessment:     Initial Eval Status  Date: 11/5/21 Treatment Status  Date:11/11/2021 STGs = LTGs  Time frame: 10-14 days   Feeding Minimal Assist      Pt able to bring cup to mouth to rinse and take a drink after oral hygiene Supervision    Grooming Moderate Assist   For hair grooming d/t reduced endurance with overhead activity. Pt washed face with set-up assist.  MAX A  Supervision    UB Dressing Maximal Assist  To doff/don gown over shoulders and manage tele monitor    MAX A pt seated EOB desaturating with activity  Minimal Assist    LB Dressing Dependent     Dep to don/doff prevo boots  Moderate Assist    Bathing Maximal Assist  MAX A simulated due to pt desaturating with activity at EOB  Moderate Assist    Toileting Dependent     N/T  Moderate Assist    Bed Mobility  Supine to sit:  NT  Sit to supine:  NT  Rolling: Maximal Assist x2  MAX A supine<>sit pt requiring assist to bring B LE in/out of bed v/c's for hand placement  Supine to sit: Moderate Assist   Sit to supine: Moderate Assist    Functional Transfers NT  N/T  Moderate Assist with use of jamar steady as needed    Balance Sitting:     Static: NT    Dynamic: NT   Standing: NT      Pt displays right trunk lean while supine with HOB elevated. Max A to correct posture/facilitate symmetry.   Sitting:     Static: fair in bed with HOB elevated    Dynamic: NT   Standing: NT     Sitting:     Static: fair     Dynamic: fair minus    Activity Tolerance poor  Poor pt on 15L O2; currently on bipap with pt at 98% SPO2 at rest piror to activity pt  desaturating to 80% down to  60% upon sitting EOB quickly,   Pt at rest SPO2 98% recovered quickly upon returning to supine in bed  Increase sitting tolerance >3 minutes for improved engagement with functional transfers and indep in ADLs   Visual/  Perceptual Glasses: Not reported      NA       Hand Dominance: Right        AROM (PROM) Strength Additional Info:    RUE  WFL 3+/5 good  and wfl FMC/dexterity noted during ADL tasks      LUE WFL 3+/5 good  and wfl FMC/dexterity noted during ADL tasks         Hearing:  WFL   Sensation:   No c/o numbness or tingling  Tone:  WFL   Edema: LEs; nsg aware and prevo boots donned; L foot 4th medial digit has red marking ; nsg notified      Comments: Patient cleared by nursing staff. Upon arrival pt seated in bed. Pt agreeable to OT tx session. Pt educated with regards to bed mobility,  hand placement, safety awareness, static sitting balance,   ADL retraining,  grooming tasks, UE/LE bathing, LE/UE dressing, pursed lip breathing, ECT's. At end of session pt seated in bed with HOB elevated  with all lines and tubes intact, call light within reach. Overall, pt demonstrated decreased independence and safety during completion of ADL/functional transfers/mobility tasks. Pt would benefit from continued skilled OT to increase safety and independence with completion of ADL/IADL tasks for functional independence and quality of life. Pt required cues and education as noted above for safe facilitation and completion of tasks. Therapist provided skilled monitoring of patient's response during treatment session. Prior to and at the end of session, environmental modifications/line management completed for patients safety and efficiency of treatment session. Overall, patient demonstrates moderate difficulties with completion of BADLs and IADLs. Factors contributing to these difficulties include decreased endurance, and generalized weakness. As noted above, patient likely to benefit from further OT intervention to increase independence, safety, and overall quality of life. Treatment:     ?  ADL completion:

## 2021-11-11 NOTE — CARE COORDINATION
SS NOTE: COVID NEGATIVE 11/3- PT WILL NEED A RAPID NEGATIVE COVID TO RETURN TO THE [facility]. Pt is on the continuous bipap -FIO2 65%. Pt's urine output is improving. Pt is on IV Rocephin and Vibramycin. PT/OT are on and suggest SNF. This pt is a long term care/ private pay Resident of [facility]. Plan is for pt to return there at [facility] per pts' son Marcial Jimenes. Pt will need NO PRECERT to return to  - he will need a signed EMILY and current PT/OT notes. PT and OT are on consult. SW faxed updates to Kishore at the [place]- [phone]. SS to continue. CHRIS Smith.11/11/2021.4:06PM

## 2021-11-11 NOTE — PLAN OF CARE
Problem: Falls - Risk of:  Goal: Will remain free from falls  Description: Will remain free from falls  11/11/2021 0458 by Fuad Orellana RN  Outcome: Met This Shift     Problem: Falls - Risk of:  Goal: Absence of physical injury  Description: Absence of physical injury  11/11/2021 0458 by Fuad Orellana RN  Outcome: Met This Shift     Problem: Skin Integrity:  Goal: Will show no infection signs and symptoms  Description: Will show no infection signs and symptoms  11/11/2021 0458 by Fuad Orellana RN  Outcome: Met This Shift     Problem: Skin Integrity:  Goal: Absence of new skin breakdown  Description: Absence of new skin breakdown  11/11/2021 0458 by Fuad Orellana RN  Outcome: Met This Shift

## 2021-11-11 NOTE — PROGRESS NOTES
Exposure to roommate noted CoVID-19+ today. Moved to  and isolated with Droplet/Contact precautions. Dr. Anna Batista notified, as well as infection control RN. N.O. CoVID-19 PCR in 4 days. Must isolate for 14 days. [id number] Kandis Roy for son Malinda Rosario to see d/t increased depressive s/s concerns, but only for short periods of time. Son was notified of exposure and appreciative of communication. N.O. consult Dr. Silva Brewster for depression.

## 2021-11-12 NOTE — PROGRESS NOTES
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 male who presents with altered mental status at the nursing home. Patient symptoms started about a week ago patient was also noted to have increased weight gain and increasing lower leg edema. Patient normally alert and oriented x4 but has become much more lethargic and interactive. Patient gained 20 pounds at the nursing home. Patient was brought into the emergency room with patient chest x-ray showed interstitial lung disease and also cardiomegaly. ABG showed a pH 7.204 with a CO2 of 85 on 6 L nasal cannula. Patient then was started on BiPAP.  BUN/creatinine 43/1.0 with proBNP 3900 and troponin initially was 120. Liver enzymes are normal.  Heart rate was 75 normal temperature and blood pressure initially at 510 systolic currently is 20/86. Patient currently on 40% FiO2 BiPAP with O2 sat of 93%. Patient states he has been wearing O2 nasal cannula for last couple weeks at the nursing home but not before that. Patient denies wearing BiPAP in the past.  He denies any problem with any chest pain, abdominal pain, nausea vomiting or dizziness. ECG reviewed with patient have atrial fibrillation with no significant ST-T changes to suggest acute myocardial injury troponin was elevated on admission but troponin I-II hours later was less than on admission. 11/4/2021  Patient seen examined on PCU. Patient still very short of breath even at rest.  Patient seems to do fairly good on the BiPAP once he is off of it he seemed to have some more respiratory distress. BUN/creatinine 46/1.0 with patient's transaminases normal TSH 4.56. WBC 5.1 with hemoglobin 11. INR is 2.2 today. Temperature 98.3 with a heart rate of 100 blood pressure 130/40. O2 sat 91% on 7 L nasal cannula.  ABGs today were improved but the patient had a pH increase 7.355 with PCO2 decreased 56.2 while the patient was on BiPAP. Patient urine output has picked up since early this morning. 11/5/2021  Patient seen examined on PCU. Case discussed with intensivist yesterday afternoon. Patient looks a little bit better today. Nursing states that the patient has been changed to a DNR CC a with no chest compressions, intubation or cardioversion. Patient denies any chest or abdominal pain. Patient still with +34 lower leg edema. BUN/creatinine 48/1.0 with the patient's Liver enzymes are normal with a WBC 3.1 with hemoglobin 11.1. Temperature 97.7 with heart rate of 70 blood pressure 123/48. O2 sat is 91% on 10 L high flow nasal cannula. Urine output seems to be adequate. 11/6/2021  Patient seen examined on PCU. Patient is alert and oriented with patient currently had BiPAP on and feeling little bit more comfortable. BUN/creatinine 51/0.9 WBCs 5.5 hemoglobin 11.8. INR 2.8 today. Temperature 97.5 heart rate 85 blood pressure 107/69. O2 sat 97% on BiPAP at 75% FiO2. Urine output is inaccurate but seems to be adequate according to the patient who states he is urinating a lot. INR is 2.3 we will decrease the warfarin 1 mg p.o. daily. 11/7/2021  Patient seen examined on PCU. Patient seems to be doing little bit better today. Patient is wearing BiPAP almost all the time. Patient did eat all of his breakfast.  Patient denies any chest or abdominal pain. Patient states that he has not had a bowel movement yet. Patient's lower leg edema still prominent but seems to be mildly improved. BUN/creatinine 48/0.9 with a CO2 of 36. Transaminases are normal with free T4 1.1 and WBC 5.2 and hemoglobin 11.4. Platelet count 328. INR is 2.9 today. Temperature 97.7 with heart rate of 60 and blood pressure 108/869. O2 sat 98% on 12 L high flow nasal cannula alternating with BiPAP with FiO2 of 65%. .    11/8/2021  Patient seen and examined in PCU. Patient seems little bit more lethargic/confused this morning off of the BiPAP.  BUN/creatinine 48/0.8 with CO2 electrolytes at 38. Normal transaminases with INR down to 2.2 today. WBC 5.1 with hemoglobin 11.7. Temperature is 98.1 with heart rate 76 blood pressure 103/61. O2 sat 94% on 12 L high flow nasal cannula. Urinary output is   in accurate. 11/9/2021   Patient seen examined in PCU. Patient is again alert and oriented but somewhat short of breath with any activity and on the BiPAP most of the day. Patient denies any chest pain or abdominal pain. Patient supposed to go to surgery for cystoscopy and probable urethral dilation today with insertion of Melchor catheter. BUN/creatinine 54/1.0 today with liver enzymes normal WBC 11.1 hemoglobin 12.1. INR is 2.2 today. Temperature is 97.8 with heart rate of 90 and blood pressure 110/66. O2 sat is 95% with the patient on BiPAP 65% FiO2. Urine output is very inaccurate. Urology note reviewed with patient having severe bladder neck contracture and urology not able to dilate yesterday afternoon. 11/10/2021  Patient seen and examined in PCU. Patient looks better today. Patient is more alert and oriented. Patient denies any chest or abdominal pain. Patient urine output is improving. Patient still with marked lower leg edema. Urology note reviewed with the patient having a laser ablation of bladder neck contracture and placement of Melchor catheter yesterday afternoon. BUN/creatinine is 47/0.9 with normal liver enzymes. WBC 7.6 hemoglobin 11.8. INR is 1.9 today. ABGs performed showed pH 7.471 with PCO2 54.9 PO2 of 69. urinary output has improved with placement of Melchor catheter by 500 cc a shift. Stop IV fluids postop and continue the IV Lasix and will start SCDs lower extremities. 11/11/2021  Patient seen and examined in PCU. Patient sons at the bedside and case discussed. Patient looks and feels better.   Patient lower leg edema is moderately improved. Patient denies any problem with chest or abdominal pain. There is no nausea vomiting or dizziness. BUN/creatinine 49/0.8 with fasting blood sugar 135. Temperature 98.9 with heart rate in 96 and blood pressure ranges 90/50 6109/66. O2 sats 95% with the patient on 65% FiO2 BiPAP. Urinary output ranges 500-2900 cc a shift. 11/12/2021  Patient seen examined on PCU. Patient feels fairly good today. Case discussed with patient's nurse at bedside. Patient still has fairly good urinary output with the Ponce catheter in place. His lower leg edema is moderately improved but still has significant leg edema left greater than right. Temperature is 97.5 with a heart rate of 75 blood pressure 105/60. O2 sat 99% on heated high flow nasal cannula with FiO2 65%. Past Medical History:    Past Medical History:   Diagnosis Date    Atrial fibrillation ([address]     CAD (coronary artery disease)     coronary stent 12yrs ago    [facility]     Hypertension      Past Surgical History:    Past Surgical History:   Procedure Laterality Date    BLADDER SURGERY N/A 11/9/2021    CYSTOSCOPY, LASER ABLATION OF BLADDER NECK STRICTURE,  PONCE CATHETER INSERTION performed by Maria Esther Paredes MD at [place] [address]      coronary stent    CATARACT REMOVAL WITH IMPLANT Left 04 01 2013    COLONOSCOPY      [facility]      [facility]    EYE SURGERY         Medications Prior to Admission:    @  Prior to Admission medications    Medication Sig Start Date End Date Taking?  Authorizing Provider   ipratropium-albuterol (DUONEB) 0.5-2.5 (3) MG/3ML SOLN nebulizer solution Inhale 3 mLs into the lungs 4 times daily 9/16/21   Serena Madrid DO   albuterol (PROVENTIL) (2.5 MG/3ML) 0.083% nebulizer solution Take 3 mLs by nebulization every 4 hours as needed for Wheezing or Shortness of Breath 9/16/21   Serena Madrid,    Respiratory Therapy Supplies (FULL KIT NEBULIZER SET) MISC Use as directed with nebulized medication. 21   Pretty Leak, DO   warfarin (COUMADIN) 4 MG tablet 4 mg daily 6 PM 21   Pretty Leak, DO   tamsulosin (FLOMAX) 0.4 MG capsule Take 0.4 mg by mouth daily    Historical Provider, MD   terazosin (HYTRIN) 5 MG capsule Take 10 mg by mouth daily. Historical Provider, MD   metoprolol (TOPROL-XL) 50 MG XL tablet Take 50 mg by mouth daily. Historical Provider, MD   finasteride (PROSCAR) 5 MG tablet Take 5 mg by mouth daily. Historical Provider, MD   aspirin 81 MG tablet Take 81 mg by mouth daily. Historical Provider, MD       Allergies:  Dye [iodides]    Social History:   Social History     Socioeconomic History    Marital status:      Spouse name: Not on file    Number of children: Not on file    Years of education: Not on file    Highest education level: Not on file   Occupational History    Not on file   Tobacco Use    Smoking status: Former Smoker     Quit date: 3/27/1992     Years since quittin.6    Smokeless tobacco: Never Used   Substance and Sexual Activity    Alcohol use: No    Drug use: No    Sexual activity: Not on file   Other Topics Concern    Not on file   Social History Narrative    Not on file     Social Determinants of Health     Financial Resource Strain:     Difficulty of Paying Living Expenses: Not on file   Food Insecurity:     Worried About [address] in the Last Year: Not on file    Jian of Food in the Last Year: Not on file   Transportation Needs:     Lack of Transportation (Medical): Not on file    Lack of Transportation (Non-Medical):  Not on file   Physical Activity:     Days of Exercise per Week: Not on file    Minutes of Exercise per Session: Not on file   Stress:     Feeling of Stress : Not on file   Social Connections:     Frequency of Communication with Friends and Family: Not on file    Frequency of Social Gatherings with Friends and Family: Not on abdominal fat, nondistended, bowel sounds present  Extremities:  + 2-3 leg edema, peripheral pulses intact bilaterally  Neuro:  Cranial nerves II-XII grossly intact; motor and sensory function intact with no focal deficits  Skin:  No rashes, lesions or wounds    DATA:  CBC with Differential:    Lab Results   Component Value Date    WBC 7.6 11/10/2021    RBC 3.67 11/10/2021    HGB 11.8 11/10/2021    HCT 38.7 11/10/2021     11/10/2021    .4 11/10/2021    MCH 32.2 11/10/2021    MCHC 30.5 11/10/2021    RDW 14.2 11/10/2021    BLASTSPCT 0.0 11/04/2021    LYMPHOPCT 0.9 11/10/2021    MONOPCT 5.2 11/10/2021    BASOPCT 0.1 11/10/2021    MONOSABS 0.38 11/10/2021    LYMPHSABS 0.08 11/10/2021    EOSABS 0.00 11/10/2021    BASOSABS 0.00 11/10/2021     CMP:    Lab Results   Component Value Date     11/11/2021    K 4.7 11/11/2021    K 4.8 11/03/2021    CL 96 11/11/2021    CO2 37 11/11/2021    BUN 49 11/11/2021    CREATININE 0.8 11/11/2021    GFRAA >60 11/11/2021    LABGLOM >60 11/11/2021    GLUCOSE 135 11/11/2021    PROT 5.5 11/10/2021    LABALBU 2.9 11/10/2021    CALCIUM 8.5 11/11/2021    BILITOT 0.4 11/10/2021    ALKPHOS 108 11/10/2021    AST 18 11/10/2021    ALT 18 11/10/2021     Magnesium:    Lab Results   Component Value Date    MG 2.3 11/05/2021     Phosphorus:    Lab Results   Component Value Date    PHOS 4.4 11/05/2021     PT/INR:    Lab Results   Component Value Date    PROTIME 18.9 11/11/2021    INR 1.6 11/11/2021     Troponin:  No results found for: TROPONINI  U/A:    Lab Results   Component Value Date    COLORU Yellow 11/03/2021    PROTEINU Negative 11/03/2021    PHUR 5.0 11/03/2021    WBCUA 1-3 11/03/2021    RBCUA 10-20 11/03/2021    BACTERIA RARE 11/03/2021    CLARITYU Clear 11/03/2021    SPECGRAV 1.025 11/03/2021    LEUKOCYTESUR Negative 11/03/2021    UROBILINOGEN 0.2 11/03/2021    BILIRUBINUR Negative 11/03/2021    BLOODU LARGE 11/03/2021    GLUCOSEU Negative 11/03/2021     ABG:    Lab Results Component Value Date    PH 7.471 11/10/2021    PCO2 54.9 11/10/2021    PO2 69.4 11/10/2021    HCO3 39.1 11/10/2021    BE 13.2 11/10/2021    O2SAT 94.6 11/10/2021     HgBA1c:  No results found for: LABA1C  FLP:    Lab Results   Component Value Date    TRIG 46 11/04/2021    HDL 81 11/04/2021    LDLCALC 46 11/04/2021    LABVLDL 9 11/04/2021     TSH:    Lab Results   Component Value Date    TSH 4.560 11/04/2021     IRON:  No results found for: IRON  LIPASE:    Lab Results   Component Value Date    LIPASE 74 09/08/2021       ASSESSMENT AND PLAN:      Patient Active Problem List    Diagnosis Date Noted    Acute on chronic respiratory failure with hypoxia and hypercapnia ([address] 11/03/2021    CAD (coronary artery disease)  Acute diastolic congestive heart failure     Atrial fibrillation - chronic     COPD with exacerbation     Hypertension-patient hypotensive in the ED      History of severe pulmonary hypertension at 99 mmHg with +34 tricuspid regurg 09/10/2021     Diffuse leg edema probably combination of the severe pulmonary hypertension and tricuspid regurg along with acute diastolic CHF 75/99/3316     Plan:  Admit to IMC/PCU  Nursing home meds reviewed BiPAP  BiPAP -continuous at this time  DuoNeb aerosols every 4 hours  Pulmicort aerosol twice daily  Brovana aerosol twice daily  Continue warfarin   Daily INRs  Accurate I's and O's  BiPAP 16/8 at at bedtime and as needed   SCDs lower extremities    Consult pulmonology    MRSA screen  Add IV Rocephin, doxycycline    Colace 100 mg twice daily  MiraLAX 17 mg  daily as needed    Bladder scan > 900 cc  Urology consult-unable to dilate and insert Melchor catheter 11/8 PM  Cystoscopy and ureteral dilation 11/9 p.m. SCDs lower extremities    Warfarin increased 3 mg daily  Daily INRs  Lasix 40 mg IV push twice daily    BMP, CBC in a.m.       Tahira Egan DO, D.SHAYLEE  11/12/2021  11:37 AM

## 2021-11-12 NOTE — CARE COORDINATION
SS NOTE: COVID NEGATIVE 11/3- PT WILL NEED A RAPID NEGATIVE COVID TO RETURN TO THE NF. Pt is on 12 liters of O2. He was exposed to a COVID pt- he is in [place] precautions and  will have a PCR on Monday- 11/15 for COVID status. Pt is on IV Rocephin and Vibramycin. PT/OT are on and suggest SNF. This pt is a long term care/ private pay Resident of [facility]. Plan is for pt to return there at [facility] per pts' son Edi Carbone. Pt will need NO PRECERT to return to  - he will need a signed EMILY and current PT/OT notes. PT and OT are on consult. SW faxed updates to Danay Simon at the [facility]- [phone]. SS to continue. JohnCHRIS Mauricio.11/12/2021.12:23PM.

## 2021-11-12 NOTE — CONSULTS
PSYCHIATRY ATTENDING CONSULT    REASON FOR CONSULT:  New on-set depression    REQUESTING PHYSICIAN:  Dr. Kaylee Goldsmith: \"I'm  from my wife. \"    HISTORY OF PRESENT ILLNESS:  Aviva Nicole  is a [de-identified] y.o. male who was admitted on 11/9/2021 due to altered mental status, lower leg edema, and hypercapnic respiratory failure. Patient is now Covid positive and is in isolation. Chart reviewed. No home psychotropics. Spoke with patient via telephone for consultation which he consents to. Patient located at [place] in [facility]. Provider located [facility] in [facility]. Genny Ratliff is alert oriented and understands why he is in the hospital.  He states he has been  for 60 years and is now  from his wife and this brings him great sadness. He also understands his health is failing and this is making him depressed as well. Patient reports that he has never had any psychiatric issues before and this is a new onset depression for him. Patient is future oriented in conversation and states that this too will pass and get better. He states and understands that this is a situational depression. He denies suicide or homicide ideation, intentions or plans. He is not manic or psychotic. PAST PSYCHIATRIC HISTORY: Adamantly denies any past psychiatric history.     PAST MEDICAL HISTORY:       Diagnosis Date    Atrial fibrillation ([address]     CAD (coronary artery disease)     coronary stent 12yrs ago    [facility] (ch[facility] disease) [facility]     Hypertension      PAST SURGICAL HISTORY:       Procedure Laterality Date    BLADDER SURGERY N/A 11/9/2021    CYSTOSCOPY, LASER ABLATION OF BLADDER NECK STRICTURE,  PONCE CATHETER INSERTION performed by Drew Temple MD at [address] [place] [address]      coronary stent    CATARACT REMOVAL WITH IMPLANT Left 04 01 2013    COLONOSCOPY      [facility]      [facility]    EYE SURGERY         MEDICATIONS: Current Facility-Administered Medications: medicated lip ointment (BLISTEX), , Topical, PRN  warfarin (COUMADIN) tablet 2 mg, 2 mg, Oral, Daily  docusate sodium (COLACE) capsule 100 mg, 100 mg, Oral, BID  HYDROmorphone HCl PF (DILAUDID) injection 0.5 mg, 0.5 mg, IntraVENous, Q4H PRN  HYDROmorphone HCl PF (DILAUDID) injection 1 mg, 1 mg, IntraVENous, Q4H PRN  vitamin D (CHOLECALCIFEROL) tablet 2,000 Units, 2,000 Units, Oral, Daily  cefTRIAXone (ROCEPHIN) 1,000 mg in sterile water 10 mL IV syringe, 1,000 mg, IntraVENous, Q24H  doxycycline hyclate (VIBRAMYCIN) capsule 100 mg, 100 mg, Oral, 2 times per day  hydrOXYzine (VISTARIL) capsule 25 mg, 25 mg, Oral, Q6H PRN  methylPREDNISolone sodium (SOLU-MEDROL) injection 60 mg, 60 mg, IntraVENous, Q8H  pantoprazole (PROTONIX) injection 40 mg, 40 mg, IntraVENous, Daily **AND** sodium chloride (PF) 0.9 % injection 10 mL, 10 mL, IntraVENous, Daily  nitroglycerin (NITRO-BID) 2 % ointment 0.5 inch, 0.5 inch, Topical, Once  doxazosin (CARDURA) tablet 4 mg, 4 mg, Oral, Daily  metoprolol succinate (TOPROL XL) extended release tablet 50 mg, 50 mg, Oral, Daily  aspirin EC tablet 81 mg, 81 mg, Oral, Daily  tamsulosin (FLOMAX) capsule 0.4 mg, 0.4 mg, Oral, Dinner  ipratropium-albuterol (DUONEB) nebulizer solution 3 mL, 3 mL, Inhalation, 4x Daily  albuterol (PROVENTIL) nebulizer solution 2.5 mg, 2.5 mg, Nebulization, Q4H PRN  acetaminophen (TYLENOL) tablet 500 mg, 500 mg, Oral, Q6H PRN  trimethobenzamide (TIGAN) injection 200 mg, 200 mg, IntraMUSCular, Q6H PRN  polyethylene glycol (GLYCOLAX) packet 17 g, 17 g, Oral, Daily PRN  furosemide (LASIX) injection 40 mg, 40 mg, IntraVENous, BID  Arformoterol Tartrate (BROVANA) nebulizer solution 15 mcg, 15 mcg, Nebulization, BID  finasteride (PROSCAR) tablet 5 mg, 5 mg, Oral, Daily  influenza A&B vaccine (FLUAD QUADRIVALENT) injection 0.5 mL, 0.5 mL, IntraMUSCular, Prior to discharge    ALLERGIES:  Dye [iodides]    FAMILY PSYCHIATRIC HISTORY: Noncontributory. SOCIAL HISTORY: Patient states he has been  for 60 years. When he was young he spent 4 years in the [employer] [facility] [employer]. Then he got a job at [employer] and retired from there after 34 years. 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 He states that he and his wife has 3 sons and all 3 live locally. SUBSTANCE ABUSE HISTORY:  reports that he quit smoking about 29 years ago. He has never used smokeless tobacco. He reports that he does not drink alcohol and does not use drugs. VITALS:   Vitals:    11/12/21 0903   BP: 105/60   Pulse: 75   Resp: 17   Temp: 97.5 °F (36.4 °C)   SpO2: 93%       LABS:   No results displayed because visit has over 200 results. MENTAL STATUS EXAMINATION  Male. Pleasant, cooperative, forthcoming. Mood sad. Speech having difficulty getting words out. Thoughts organized. Content Future oriented. No suicidal or homicidal ideations, paranoia, delusions, hallucinations. Orientation, concentration, recent and remote memory grossly intact. Fund of knowledge fair. Language use fair. Insight and judgment fair. ASSESSMENT:  Adjustment disorder with depressed mood      PLAN & RECOMMENDATIONS: Currently patient is calm, pleasant and appropriate. Patient adamantly denies any intention of harming himself and has no history of suicidal behavior or psychiatric history. Patient  does admit to being sad about his health and being away from his wife. We discussed medication to help boost his mood and he was in agreement of trying some medication. I will start Zoloft 50 mg daily. This is a well-tolerated medication for depression. Patient does not need inpatient psychiatric admission and can be discharged when medically stable from my standpoint. Psychiatry will sign off. Please re-consult if any acute psychiatric needs such as suicide, homicide, gigi or psychosis. Thank you for the consult. Total time spent 15 minutes.       Riddhi Helton Pauline Marin, APRN - CNP 11/12/2021 9:17 AM

## 2021-11-12 NOTE — PROGRESS NOTES
Pulmonary Progress Note    SUMMARY:  Ruy Beyer is a [de-identified] y.o. male who presented with recurrent respiratory failure  High BiPAP need / use    ISSUES:    Acute on chronic respiratory failure with hypercapnia and hypoxia  Coronary disease with diastolic heart failure severe  Aortic Stenosis  Chronic atrial fibrillation  Severe pulmonary hypertension with plus for tricuspid regurg  Protein caloric malnutrition    ABG with well compensated Chronic Hypercapneic Respiratory Failure   Using BiPAP intermittently with HFNC - on 65% FiO2  Try to lower FiO2 as much as possible - tolerating O2 sat > 90%  Negative fluid balance daily  OK to stop antibiotics tomorrow   Wean off steroids    ______________________________________________      OBJECTIVE:  Hypoxemia        MEDICATIONS:   sertraline  50 mg Oral Daily    warfarin  3 mg Oral Daily    docusate sodium  100 mg Oral BID    Vitamin D  2,000 Units Oral Daily    cefTRIAXone (ROCEPHIN) IV  1,000 mg IntraVENous Q24H    doxycycline hyclate  100 mg Oral 2 times per day    methylPREDNISolone  60 mg IntraVENous Q8H    pantoprazole  40 mg IntraVENous Daily    And    sodium chloride (PF)  10 mL IntraVENous Daily    nitroglycerin  0.5 inch Topical Once    doxazosin  4 mg Oral Daily    metoprolol succinate  50 mg Oral Daily    aspirin  81 mg Oral Daily    tamsulosin  0.4 mg Oral Dinner    ipratropium-albuterol  3 mL Inhalation 4x Daily    furosemide  40 mg IntraVENous BID    Arformoterol Tartrate  15 mcg Nebulization BID    finasteride  5 mg Oral Daily    influenza virus vaccine  0.5 mL IntraMUSCular Prior to discharge       medicated lip ointment, HYDROmorphone, HYDROmorphone, hydrOXYzine, albuterol, acetaminophen, trimethobenzamide, polyethylene glycol    O/e  NAD  Diminished breath sounds bilateral  No wheeze or crackles      Vitals:    11/12/21 1102   BP:    Pulse:    Resp:    Temp:    SpO2: 99%     FiO2 : 65 %  O2 Flow Rate (L/min): 10 L/min  O2 Device: High flow nasal cannula      Vent Information  Skin Assessment: Clean, dry, & intact  FiO2 : 65 %  SpO2: 99 %  SpO2/FiO2 ratio: 146.15  I Time/ I Time %: 0.9 s  Mask Type: Full face mask  Mask Size: Large    Additional Respiratory  Assessments  Pulse: 75  Resp: 17  SpO2: 99 %  Oral Care: Patient refused     URINARY CATHETER OUTPUT (Melchor):    [REMOVED] External Urinary Catheter-Output (mL): 150 mL  Urethral Catheter Double-lumen; Latex 22 fr-Output (mL): 700 mL    LABS:    WBC   Date Value Ref Range Status   11/10/2021 7.6 4.5 - 11.5 E9/L Final   11/09/2021 11.1 4.5 - 11.5 E9/L Final   11/08/2021 5.1 4.5 - 11.5 E9/L Final     Hemoglobin   Date Value Ref Range Status   11/10/2021 11.8 (L) 12.5 - 16.5 g/dL Final   11/09/2021 12.1 (L) 12.5 - 16.5 g/dL Final   11/08/2021 11.7 (L) 12.5 - 16.5 g/dL Final     Hematocrit   Date Value Ref Range Status   11/10/2021 38.7 37.0 - 54.0 % Final   11/09/2021 39.6 37.0 - 54.0 % Final   11/08/2021 38.4 37.0 - 54.0 % Final     MCV   Date Value Ref Range Status   11/10/2021 105.4 (H) 80.0 - 99.9 fL Final   11/09/2021 103.1 (H) 80.0 - 99.9 fL Final   11/08/2021 105.2 (H) 80.0 - 99.9 fL Final     Platelets   Date Value Ref Range Status   11/10/2021 106 (L) 130 - 450 E9/L Final   11/09/2021 109 (L) 130 - 450 E9/L Final   11/08/2021 116 (L) 130 - 450 E9/L Final     Sodium   Date Value Ref Range Status   11/11/2021 141 132 - 146 mmol/L Final   11/10/2021 143 132 - 146 mmol/L Final   11/09/2021 141 132 - 146 mmol/L Final     Potassium   Date Value Ref Range Status   11/11/2021 4.7 3.5 - 5.0 mmol/L Final   11/10/2021 4.2 3.5 - 5.0 mmol/L Final   11/09/2021 4.2 3.5 - 5.0 mmol/L Final     Potassium reflex Magnesium   Date Value Ref Range Status   11/03/2021 4.8 3.5 - 5.0 mmol/L Final   09/08/2021 6.5 (H) 3.5 - 5.0 mmol/L Final     Comment:     Specimen is moderately Hemolyzed. Result may be artificially increased.      Chloride   Date Value Ref Range Status   11/11/2021 96 (L) 98 - 107 mmol/L Final 11/10/2021 98 98 - 107 mmol/L Final   11/09/2021 96 (L) 98 - 107 mmol/L Final     CO2   Date Value Ref Range Status   11/11/2021 37 (H) 22 - 29 mmol/L Final   11/10/2021 37 (H) 22 - 29 mmol/L Final   11/09/2021 39 (H) 22 - 29 mmol/L Final     BUN   Date Value Ref Range Status   11/11/2021 49 (H) 6 - 23 mg/dL Final   11/10/2021 47 (H) 6 - 23 mg/dL Final   11/09/2021 54 (H) 6 - 23 mg/dL Final     CREATININE   Date Value Ref Range Status   11/11/2021 0.8 0.7 - 1.2 mg/dL Final   11/10/2021 0.9 0.7 - 1.2 mg/dL Final   11/09/2021 1.0 0.7 - 1.2 mg/dL Final     Glucose   Date Value Ref Range Status   11/11/2021 135 (H) 74 - 99 mg/dL Final   11/10/2021 122 (H) 74 - 99 mg/dL Final   11/09/2021 122 (H) 74 - 99 mg/dL Final     Calcium   Date Value Ref Range Status   11/11/2021 8.5 (L) 8.6 - 10.2 mg/dL Final   11/10/2021 8.4 (L) 8.6 - 10.2 mg/dL Final   11/09/2021 8.4 (L) 8.6 - 10.2 mg/dL Final     Total Protein   Date Value Ref Range Status   11/10/2021 5.5 (L) 6.4 - 8.3 g/dL Final   11/09/2021 5.8 (L) 6.4 - 8.3 g/dL Final   11/08/2021 6.1 (L) 6.4 - 8.3 g/dL Final     Albumin   Date Value Ref Range Status   11/10/2021 2.9 (L) 3.5 - 5.2 g/dL Final   11/09/2021 3.2 (L) 3.5 - 5.2 g/dL Final   11/08/2021 3.5 3.5 - 5.2 g/dL Final     Total Bilirubin   Date Value Ref Range Status   11/10/2021 0.4 0.0 - 1.2 mg/dL Final   11/09/2021 0.5 0.0 - 1.2 mg/dL Final   11/08/2021 0.4 0.0 - 1.2 mg/dL Final     Alkaline Phosphatase   Date Value Ref Range Status   11/10/2021 108 40 - 129 U/L Final   11/09/2021 118 40 - 129 U/L Final   11/08/2021 146 (H) 40 - 129 U/L Final     AST   Date Value Ref Range Status   11/10/2021 18 0 - 39 U/L Final   11/09/2021 21 0 - 39 U/L Final   11/08/2021 22 0 - 39 U/L Final     ALT   Date Value Ref Range Status   11/10/2021 18 0 - 40 U/L Final   11/09/2021 20 0 - 40 U/L Final   11/08/2021 19 0 - 40 U/L Final     GFR Non-   Date Value Ref Range Status   11/11/2021 >60 >=60 mL/min/1.73 Final Comment:     Chronic Kidney Disease: less than 60 ml/min/1.73 sq.m. Kidney Failure: less than 15 ml/min/1.73 sq.m. Results valid for patients 18 years and older. 11/10/2021 >60 >=60 mL/min/1.73 Final     Comment:     Chronic Kidney Disease: less than 60 ml/min/1.73 sq.m. Kidney Failure: less than 15 ml/min/1.73 sq.m. Results valid for patients 18 years and older. 11/09/2021 >60 >=60 mL/min/1.73 Final     Comment:     Chronic Kidney Disease: less than 60 ml/min/1.73 sq.m. Kidney Failure: less than 15 ml/min/1.73 sq.m. Results valid for patients 18 years and older. GFR    Date Value Ref Range Status   11/11/2021 >60  Final   11/10/2021 >60  Final   11/09/2021 >60  Final     Magnesium   Date Value Ref Range Status   11/05/2021 2.3 1.6 - 2.6 mg/dL Final   11/04/2021 2.2 1.6 - 2.6 mg/dL Final     Phosphorus   Date Value Ref Range Status   11/05/2021 4.4 2.5 - 4.5 mg/dL Final   11/04/2021 4.2 2.5 - 4.5 mg/dL Final     Recent Labs     11/10/21  0916   PH 7.471*   PO2 69.4*   PCO2 54.9*   HCO3 39.1*   BE 13.2*   O2SAT 94.6     No results for input(s): CULTRESP in the last 72 hours. Lab Results   Component Value Date    PH 7.471 11/10/2021    PH 7.355 11/04/2021    PH 7.204 11/03/2021    PH 7.443 09/13/2021    PO2 69.4 11/10/2021    PO2 105.6 11/04/2021    PO2 97.4 11/03/2021    PO2 85.1 09/13/2021    PCO2 54.9 11/10/2021    PCO2 56.2 11/04/2021    PCO2 85.0 11/03/2021    PCO2 35.9 09/13/2021    HCO3 39.1 11/10/2021    HCO3 30.7 11/04/2021    HCO3 32.8 11/03/2021    HCO3 24.0 09/13/2021    O2SAT 94.6 11/10/2021    O2SAT 97.5 11/04/2021    O2SAT 96.2 11/03/2021    O2SAT 96.3 09/13/2021       RADIOLOGY:  XR CHEST PORTABLE   Final Result   Significant regression of findings of volume overload/decompensated   congestive heart failure since the previous study of November 5.          XR UROGRAM W OR WO KUB W OR WO TOMOGRAM   Final Result   Fluoroscopic support provided to subspecialty service. Please see   subspecialty report for full details and interpretation of real time imaging. XR CHEST PORTABLE   Final Result   Slight worsening of CHF with new infiltrate and effusion at the right base         XR CHEST PORTABLE   Final Result   Interstitial lung disease which may relate to chronic fibrosis. The   appearance may be somewhat exacerbated by suboptimal inspiration. Cardiomegaly. Echo 9/11/21  IMPRESSION:  1. The left and right atria are both mildly to moderately dilated. The  remaining cardiac chamber sizes including aortic root size are within  normal limits. 2.  The left ventricle shows moderate concentric left ventricular  hypertrophy at 1.6 cm. Systolic function is hyperdynamic with ejection  fraction of 72%. No definite focal wall motion abnormality is seen. Diastolic filling is indeterminate. 3.  The mitral valve shows moderate to severe mitral annular  calcification. Maximal gradient across the mitral valve 7.3 mmHg. Mitral valve area by pressure half time 4.9 cm2. There is no  significant mitral stenosis. There is 1+ mitral regurgitation. 4.  Aortic valve is thickened and restricted. Maximal gradient 28 mmHg. Mean gradient 11.3. Aortic valve area estimated at 1.2 cm2. On  visualization, there is mild to at most moderate aortic stenosis with  trace aortic insufficiency only. 5.  There is no pulmonic stenosis nor insufficiency. There is 3+  moderately severe tricuspid regurgitation with severe pulmonary  hypertension at 89 mmHg. 6.  There is a small to medium circumferential pericardial effusion. There is no convincing evidence of tamponade physiology. There is no  definite intracavitary mass or thrombus or shunt identified on this  study.     (Independently reviewed by me)        Anabel Sanders M.D  Pulmonary & Critical Care  11/12/2021 11:43 AM

## 2021-11-13 NOTE — PROGRESS NOTES
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 male who presents with altered mental status at the nursing home. Patient symptoms started about a week ago patient was also noted to have increased weight gain and increasing lower leg edema. Patient normally alert and oriented x4 but has become much more lethargic and interactive. Patient gained 20 pounds at the nursing home. Patient was brought into the emergency room with patient chest x-ray showed interstitial lung disease and also cardiomegaly. ABG showed a pH 7.204 with a CO2 of 85 on 6 L nasal cannula. Patient then was started on BiPAP.  BUN/creatinine 43/1.0 with proBNP 3900 and troponin initially was 120. Liver enzymes are normal.  Heart rate was 75 normal temperature and blood pressure initially at 083 systolic currently is 96/54. Patient currently on 40% FiO2 BiPAP with O2 sat of 93%. Patient states he has been wearing O2 nasal cannula for last couple weeks at the nursing home but not before that. Patient denies wearing BiPAP in the past.  He denies any problem with any chest pain, abdominal pain, nausea vomiting or dizziness. ECG reviewed with patient have atrial fibrillation with no significant ST-T changes to suggest acute myocardial injury troponin was elevated on admission but troponin I-II hours later was less than on admission. 11/4/2021  Patient seen examined on PCU. Patient still very short of breath even at rest.  Patient seems to do fairly good on the BiPAP once he is off of it he seemed to have some more respiratory distress. BUN/creatinine 46/1.0 with patient's transaminases normal TSH 4.56. WBC 5.1 with hemoglobin 11. INR is 2.2 today. Temperature 98.3 with a heart rate of 100 blood pressure 130/40. O2 sat 91% on 7 L nasal cannula.  ABGs today were improved but the patient had a pH increase 7.355 with PCO2 decreased 56.2 while the patient was on BiPAP. Patient urine output has picked up since early this morning. 11/5/2021  Patient seen examined on PCU. Case discussed with intensivist yesterday afternoon. Patient looks a little bit better today. Nursing states that the patient has been changed to a DNR CC a with no chest compressions, intubation or cardioversion. Patient denies any chest or abdominal pain. Patient still with +34 lower leg edema. BUN/creatinine 48/1.0 with the patient's Liver enzymes are normal with a WBC 3.1 with hemoglobin 11.1. Temperature 97.7 with heart rate of 70 blood pressure 123/48. O2 sat is 91% on 10 L high flow nasal cannula. Urine output seems to be adequate. 11/6/2021  Patient seen examined on PCU. Patient is alert and oriented with patient currently had BiPAP on and feeling little bit more comfortable. BUN/creatinine 51/0.9 WBCs 5.5 hemoglobin 11.8. INR 2.8 today. Temperature 97.5 heart rate 85 blood pressure 107/69. O2 sat 97% on BiPAP at 75% FiO2. Urine output is inaccurate but seems to be adequate according to the patient who states he is urinating a lot. INR is 2.3 we will decrease the warfarin 1 mg p.o. daily. 11/7/2021  Patient seen examined on PCU. Patient seems to be doing little bit better today. Patient is wearing BiPAP almost all the time. Patient did eat all of his breakfast.  Patient denies any chest or abdominal pain. Patient states that he has not had a bowel movement yet. Patient's lower leg edema still prominent but seems to be mildly improved. BUN/creatinine 48/0.9 with a CO2 of 36. Transaminases are normal with free T4 1.1 and WBC 5.2 and hemoglobin 11.4. Platelet count 878. INR is 2.9 today. Temperature 97.7 with heart rate of 60 and blood pressure 108/869. O2 sat 98% on 12 L high flow nasal cannula alternating with BiPAP with FiO2 of 65%. .    11/8/2021  Patient seen and examined in PCU. Patient seems little bit more lethargic/confused this morning off of the BiPAP.  BUN/creatinine 48/0.8 with CO2 electrolytes at 38. Normal transaminases with INR down to 2.2 today. WBC 5.1 with hemoglobin 11.7. Temperature is 98.1 with heart rate 76 blood pressure 103/61. O2 sat 94% on 12 L high flow nasal cannula. Urinary output is   in accurate. 11/9/2021   Patient seen examined in PCU. Patient is again alert and oriented but somewhat short of breath with any activity and on the BiPAP most of the day. Patient denies any chest pain or abdominal pain. Patient supposed to go to surgery for cystoscopy and probable urethral dilation today with insertion of Melchor catheter. BUN/creatinine 54/1.0 today with liver enzymes normal WBC 11.1 hemoglobin 12.1. INR is 2.2 today. Temperature is 97.8 with heart rate of 90 and blood pressure 110/66. O2 sat is 95% with the patient on BiPAP 65% FiO2. Urine output is very inaccurate. Urology note reviewed with patient having severe bladder neck contracture and urology not able to dilate yesterday afternoon. 11/10/2021  Patient seen and examined in PCU. Patient looks better today. Patient is more alert and oriented. Patient denies any chest or abdominal pain. Patient urine output is improving. Patient still with marked lower leg edema. Urology note reviewed with the patient having a laser ablation of bladder neck contracture and placement of Melchor catheter yesterday afternoon. BUN/creatinine is 47/0.9 with normal liver enzymes. WBC 7.6 hemoglobin 11.8. INR is 1.9 today. ABGs performed showed pH 7.471 with PCO2 54.9 PO2 of 69. urinary output has improved with placement of Melchor catheter by 500 cc a shift. Stop IV fluids postop and continue the IV Lasix and will start SCDs lower extremities. 11/11/2021  Patient seen and examined in PCU. Patient sons at the bedside and case discussed. Patient looks and feels better.   Patient lower leg edema is moderately improved. Patient denies any problem with chest or abdominal pain. There is no nausea vomiting or dizziness. BUN/creatinine 49/0.8 with fasting blood sugar 135. Temperature 98.9 with heart rate in 96 and blood pressure ranges 90/50 6109/66. O2 sats 95% with the patient on 65% FiO2 BiPAP. Urinary output ranges 500-2900 cc a shift. 11/12/2021  Patient seen examined on PCU. Patient feels fairly good today. Case discussed with patient's nurse at bedside. Patient still has fairly good urinary output with the Ponce catheter in place. His lower leg edema is moderately improved but still has significant leg edema left greater than right. Temperature is 97.5 with a heart rate of 75 blood pressure 105/60. O2 sat 99% on heated high flow nasal cannula with FiO2 65%. 11/13/2021  Patient seen examined on PCU. Patient feels like he is breathing little better but still on BiPAP. Patient denies any chest or abdominal pain. There is no nausea or vomiting. BUN/creatinine 47/0.7. WBC is 9.7 hemoglobin 12.1. INR is 1.4 today. Urinary output ranges 500-700 cc a shift. Temperature 98.2 with heart rate 91 blood pressure currently 96/56. O2 sat 94% on 65% FiO2 BiPAP.     Past Medical History:    Past Medical History:   Diagnosis Date    Atrial fibrillation ([address]     CAD (coronary artery disease)     coronary stent 12yrs ago    COPD (chronic obstructive pulmonary disease) ([address]     Hypertension      Past Surgical History:    Past Surgical History:   Procedure Laterality Date    BLADDER SURGERY N/A 11/9/2021    CYSTOSCOPY, LASER ABLATION OF BLADDER NECK STRICTURE,  PONCE CATHETER INSERTION performed by Natalie Fan MD at [address] [place] [address]      coronary stent    CATARACT REMOVAL WITH IMPLANT Left 04 01 2013    COLONOSCOPY      CYST REMOVAL      tailbone    EYE SURGERY         Medications Prior to Admission:    @  Prior to Admission medications    Medication Sig Start Date End Date Taking? Authorizing Provider   ipratropium-albuterol (DUONEB) 0.5-2.5 (3) MG/3ML SOLN nebulizer solution Inhale 3 mLs into the lungs 4 times daily 21   Ellan Kanner, DO   albuterol (PROVENTIL) (2.5 MG/3ML) 0.083% nebulizer solution Take 3 mLs by nebulization every 4 hours as needed for Wheezing or Shortness of Breath 21   Ellan Kanner, DO   Respiratory Therapy Supplies (FULL KIT NEBULIZER SET) MISC Use as directed with nebulized medication. 21   Ellan Kanner, DO   warfarin (COUMADIN) 4 MG tablet 4 mg daily 6 PM 21   Ellan Kanner, DO   tamsulosin (FLOMAX) 0.4 MG capsule Take 0.4 mg by mouth daily    Historical Provider, MD   terazosin (HYTRIN) 5 MG capsule Take 10 mg by mouth daily. Historical Provider, MD   metoprolol (TOPROL-XL) 50 MG XL tablet Take 50 mg by mouth daily. Historical Provider, MD   finasteride (PROSCAR) 5 MG tablet Take 5 mg by mouth daily. Historical Provider, MD   aspirin 81 MG tablet Take 81 mg by mouth daily.     Historical Provider, MD       Allergies:  Dye [iodides]    Social History:   Social History     Socioeconomic History    Marital status:      Spouse name: Not on file    Number of children: Not on file    Years of education: Not on file    Highest education level: Not on file   Occupational History    Not on file   Tobacco Use    Smoking status: Former Smoker     Quit date: 3/27/1992     Years since quittin.6    Smokeless tobacco: Never Used   Substance and Sexual Activity    Alcohol use: No    Drug use: No    Sexual activity: Not on file   Other Topics Concern    Not on file   Social History Narrative    Not on file     Social Determinants of Health     Financial Resource Strain:     Difficulty of Paying Living Expenses: Not on file   Food Insecurity:     Worried About Running Out of Food in the Last Year: Not on file    Jian of Food in the Last Year: Not on file   Transportation Needs:     Lack of Transportation (Medical): Not on file    Lack of Transportation (Non-Medical): Not on file   Physical Activity:     Days of Exercise per Week: Not on file    Minutes of Exercise per Session: Not on file   Stress:     Feeling of Stress : Not on file   Social Connections:     Frequency of Communication with Friends and Family: Not on file    Frequency of Social Gatherings with Friends and Family: Not on file    Attends [employer] Services: Not on file    Active Member of [address] [place] [address] or Organizations: Not on file    Attends Club or Organization Meetings: Not on file    Marital Status: Not on file   Intimate Partner Violence:     Fear of Current or Ex-Partner: Not on file    Emotionally Abused: Not on file    Physically Abused: Not on file    Sexually Abused: Not on file   Housing Stability:     Unable to Pay for Housing in the Last Year: Not on file    Number of [place] in the Last Year: Not on file    Unstable Housing in the Last Year: Not on file       Family History:   History reviewed. No pertinent family history. REVIEW OF SYSTEMS:   Gen: Patient initially admitted from the nursing home with altered mental status but is doing much better at this time. Patient denies any lightheadedness or dizziness. No LOC or syncope. No fevers or chills. HEENT: No earache, sore throat or nasal congestion. Resp: Denies cough, hemoptysis or sputum production. Cardiac: Denies chest pain, +SOB, no diaphoresis or palpitations. GI: No nausea, vomiting, diarrhea or constipation. No melena or hematochezia. : No urinary complaints, dysuria, hematuria or frequency. MSK: + Diffuse lower extremity edema. No extremity weakness, paralysis or paresthesias.      PHYSICAL EXAM:    Vitals:  BP (!) 96/56   Pulse 91   Temp 98.2 °F (36.8 °C) (Axillary)   Resp 22   Ht 5' 8\" (1.727 m)   Wt 222 lb 0.1 oz (100.7 kg)   SpO2 94%   BMI 33.76 kg/m²     General:  This is a [de-identified] y.o. yo male who is alert and oriented in mild respiratory distress  HEENT:  Head is normocephalic and atraumatic, PERRLA, EOMI, mucus membranes moist with no pharyngeal erythema or exudate. Neck:  Supple with no carotid bruits, JVD or thyromegaly.   No cervical adenopathy  CV:  Regular rate and rhythm, no murmurs  Lungs: Coarse decreased breath sounds to auscultation bilaterally with no wheezes, rales or rhonchi  Abdomen:  Soft, nontender,+ abdominal fat, nondistended, bowel sounds present  Extremities:  + 2-3 leg edema, peripheral pulses intact bilaterally  Neuro:  Cranial nerves II-XII grossly intact; motor and sensory function intact with no focal deficits  Skin:  No rashes, lesions or wounds    DATA:  CBC with Differential:    Lab Results   Component Value Date    WBC 9.7 11/13/2021    RBC 3.80 11/13/2021    HGB 12.1 11/13/2021    HCT 39.4 11/13/2021     11/13/2021    .7 11/13/2021    MCH 31.8 11/13/2021    MCHC 30.7 11/13/2021    RDW 14.1 11/13/2021    BLASTSPCT 0.0 11/04/2021    LYMPHOPCT 6.4 11/13/2021    MONOPCT 8.7 11/13/2021    BASOPCT 0.1 11/13/2021    MONOSABS 0.85 11/13/2021    LYMPHSABS 0.62 11/13/2021    EOSABS 0.05 11/13/2021    BASOSABS 0.01 11/13/2021     CMP:    Lab Results   Component Value Date     11/13/2021    K 3.9 11/13/2021    K 4.8 11/03/2021    CL 94 11/13/2021    CO2 37 11/13/2021    BUN 47 11/13/2021    CREATININE 0.7 11/13/2021    GFRAA >60 11/13/2021    LABGLOM >60 11/13/2021    GLUCOSE 97 11/13/2021    PROT 5.5 11/10/2021    LABALBU 2.9 11/10/2021    CALCIUM 8.5 11/13/2021    BILITOT 0.4 11/10/2021    ALKPHOS 108 11/10/2021    AST 18 11/10/2021    ALT 18 11/10/2021     Magnesium:    Lab Results   Component Value Date    MG 2.3 11/05/2021     Phosphorus:    Lab Results   Component Value Date    PHOS 4.4 11/05/2021     PT/INR:    Lab Results   Component Value Date    PROTIME 16.3 11/13/2021    INR 1.4 11/13/2021     Troponin:  No results found for: TROPONINI  U/A:    Lab Results   Component Value Date    COLORU Yellow 11/03/2021    PROTEINU Negative 11/03/2021    PHUR 5.0 11/03/2021    WBCUA 1-3 11/03/2021    RBCUA 10-20 11/03/2021    BACTERIA RARE 11/03/2021    CLARITYU Clear 11/03/2021    SPECGRAV 1.025 11/03/2021    LEUKOCYTESUR Negative 11/03/2021    UROBILINOGEN 0.2 11/03/2021    BILIRUBINUR Negative 11/03/2021    BLOODU LARGE 11/03/2021    GLUCOSEU Negative 11/03/2021     ABG:    Lab Results   Component Value Date    PH 7.471 11/10/2021    PCO2 54.9 11/10/2021    PO2 69.4 11/10/2021    HCO3 39.1 11/10/2021    BE 13.2 11/10/2021    O2SAT 94.6 11/10/2021     HgBA1c:  No results found for: LABA1C  FLP:    Lab Results   Component Value Date    TRIG 46 11/04/2021    HDL 81 11/04/2021    LDLCALC 46 11/04/2021    LABVLDL 9 11/04/2021     TSH:    Lab Results   Component Value Date    TSH 4.560 11/04/2021     IRON:  No results found for: IRON  LIPASE:    Lab Results   Component Value Date    LIPASE 74 09/08/2021       ASSESSMENT AND PLAN:      Patient Active Problem List    Diagnosis Date Noted    Acute on chronic respiratory failure with hypoxia and hypercapnia ([facility] 11/03/2021    CAD (coronary artery disease)  Acute diastolic congestive heart failure     Atrial fibrillation - chronic     COPD with exacerbation     Hypertension-patient hypotensive in the ED      History of severe pulmonary hypertension at 99 mmHg with +34 tricuspid regurg 09/10/2021     Diffuse leg edema probably combination of the severe pulmonary hypertension and tricuspid regurg along with acute diastolic CHF 80/67/5361     Plan:  Admit to [facility]MC/PCU  Nursing home meds reviewed BiPAP  BiPAP -continuous at this time  DuoNeb aerosols every 4 hours  Pulmicort aerosol twice daily  Brovana aerosol twice daily  Continue warfarin   Daily INRs  Accurate I's and O's  BiPAP 16/8 at at bedtime and as needed   SCDs lower extremities    Consult pulmonology    MRSA screen  Add IV Rocephin, doxycycline    Colace 100 mg twice daily  MiraLAX 17 mg  daily as needed    Bladder scan > 900 cc  Urology consult-unable to dilate and insert Melchor catheter 11/8 PM  Cystoscopy and ureteral dilation 11/9 p.m. SCDs lower extremities    Warfarin increased 4 mg daily  Daily INRs  Lasix 40 mg IV push twice daily    BMP, CBC in a.m.       Keith Pearce DO, MAI.OMark  11/13/2021  12:46 PM

## 2021-11-14 NOTE — PROGRESS NOTES
OT BEDSIDE TREATMENT NOTE      Date:2021  Patient Name: Sussy Chavez  MRN: [id number]  : 1940  Room: [address] [place] [address]         Evaluating OT: Herber De La Cruz OTR/L #[id number]      Referring Provider and Specific Provider Orders/Date:      21 1300   OT eval and treat Start: 21 1300, End: 21 1300, ONE TIME, Standing Count: 1 Occurrences, R      Leigh Stokes,        Placement Recommendation: LOBITO        Diagnosis:   1. Respiratory acidosis    2. Elevated troponin    3. Hypercarbia    4. Hypervolemia, unspecified hypervolemia type            Surgery: None        Pertinent Medical History:       Past Medical History        Past Medical History:   Diagnosis Date    Atrial fibrillation ([address]      CAD (coronary artery disease)       coronary stent 12yrs ago    COPD (chronic obstructive pulmonary disease) (HCC)      Hypertension              Past Surgical History         Past Surgical History:   Procedure Laterality Date    CARDIAC SURGERY         coronary stent    CATARACT REMOVAL WITH IMPLANT Left 2013    COLONOSCOPY        CYST REMOVAL         tailbone    EYE SURGERY                 Precautions:  Fall Risk, falls and alarm, 11L O2, contact and droplet isolation (covid R/O)      Assessment of current deficits    [x]? Functional mobility            [x]?ADLs           [x]? Strength                   [x]? Cognition    [x]? Functional transfers          [x]? IADLs         [x]? Safety Awareness   [x]? Endurance    []? Fine Coordination              [x]? Balance      []? Vision/perception    []? Sensation      []? Gross Motor Coordination  []? ROM           [x]?  Delirium                   []? Motor Control      OT PLAN OF CARE   OT POC based on physician orders, patient diagnosis and results of clinical assessment     Frequency/Duration 1-3 days/wk for 2 weeks PRN      Specific OT Treatment Interventions to include:   * Instruction/training on adapted ADL techniques and AE Lot  How much help for Toileting?: Total  How much help for putting on and taking off regular upper body clothing?: A Lot  How much help for taking care of personal grooming?: A Little  How much help for eating meals?: A Little  AM-PAC Inpatient Daily Activity Raw Score: 12                Functional Assessment:     Initial Eval Status  Date: 11/5/21 Treatment Status  Date:11/14/2021 STGs = LTGs  Time frame: 10-14 days   Feeding Minimal Assist     N/T Supervision    Grooming Moderate Assist   For hair grooming d/t reduced endurance with overhead activity. Pt washed face with set-up assist.   Mod A to don shampoo cap; pt able to bring B hands to head to wash hair; pt able to comb hair with supervision; assist to wash beard, as well; pt able to wash face when seated in bed; pt able to don deodorant when seated in bed Supervision    UB Dressing Maximal Assist  To doff/don gown over shoulders and manage tele monitor   Mod A to change gowns with cuing for pt to thread B UE's through gown; assist to thread monitor lines through gown, as well as to tie/untie back of gown  Minimal Assist    LB Dressing Dependent     Dep to don/doff prevo boots  Moderate Assist    Bathing Maximal Assist  Mod A; pt able to wash UB when seated in bed; assist for pericare and to wash LE's; verbal cuing for follow through Moderate Assist    Toileting Dependent     N/T - Pt has stewart catheter Moderate Assist    Bed Mobility  Supine to sit:  NT  Sit to supine:  NT  Rolling: Maximal Assist x2  Max A for side rolling for assist to wash back and buttocks Supine to sit: Moderate Assist   Sit to supine: Moderate Assist    Functional Transfers NT  N/T  Moderate Assist with use of jamar steady as needed    Balance Sitting:     Static: NT    Dynamic: NT   Standing: NT      Pt displays right trunk lean while supine with HOB elevated. Max A to correct posture/facilitate symmetry.   Sitting:     Static: fair in bed with HOB elevated    Dynamic: NT   Standing: NT     Sitting:     Static: fair     Dynamic: fair minus    Activity Tolerance poor  fair /fair minus; pt coughing throughout session Increase sitting tolerance >3 minutes for improved engagement with functional transfers and indep in ADLs   Visual/  Perceptual Glasses: Not reported      NA       Hand Dominance: Right        AROM (PROM) Strength Additional Info:    RUE  WFL 3+/5 good  and wfl FMC/dexterity noted during ADL tasks      LUE WFL 3+/5 good  and wfl FMC/dexterity noted during ADL tasks         Hearing:  WFL   Sensation:   No c/o numbness or tingling  Tone:  WFL   Edema: LEs; nsg aware and prevo boots donned     Comments: Patient cleared by nursing staff. Upon arrival pt seated in bed. Pt agreeable to OT tx session, however c/o fatigue. Pt educated with regards to  hand placement, safety awareness, static sitting balance,   ADL retraining,  grooming tasks, UE/LE bathing, LE/UE dressing,  ECT's, positioning. At end of session pt seated in bed with HOB elevated  with all lines and tubes intact, call light within reach. Overall, pt demonstrated decreased independence and safety during completion of ADL/functional transfers/mobility tasks. Pt would benefit from continued skilled OT to increase safety and independence with completion of ADL/IADL tasks for functional independence and quality of life. Pt required cues and education as noted above for safe facilitation and completion of tasks. Therapist provided skilled monitoring of patient's response during treatment session. Prior to and at the end of session, environmental modifications/line management completed for patients safety and efficiency of treatment session. Overall, patient demonstrates moderate difficulties with completion of BADLs and IADLs. Factors contributing to these difficulties include scrotal edema, decreased endurance, and generalized weakness.  As noted above, patient likely to benefit from further OT intervention to increase independence, safety, and overall quality of life. Treatment:     ? ADL completion: Self-care retraining for the above-mentioned ADLs; training on proper hand placement, safety technique, sequencing, and energy conservation techniques. ? Postural Balance: Sitting balance retraining to improve righting reactions with postural changes during ADLs. ? Skilled positioning: Proper positioning to improve interaction with environment, overall functioning and decrease/prevent edema and contractures. · Pt has made fair progress towards set goals   · OT 1-3x/week for 5-7 days during hospitalization       Treatment Time also includes thorough review of current medical information, gathering information on past medical history/social history and prior level of function, informal observation of tasks, assessment of data and education on plan of care and goals.     Treatment Time In: 10:20 AM     Treatment Time Out: 10:54 AM            Treatment Charges: Mins Units   ADL/Home [address]     [id number]     Ther Ex                 [id number]     Manual Therapy    11067     Neuro Re-ed         [id number]     Orthotic manage/training                               [id number]     Non Billable Time     Total Timed Treatment 34 [address] [facility], PETERS/L #[id number]

## 2021-11-14 NOTE — PROGRESS NOTES
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 male who presents with altered mental status at the nursing home. Patient symptoms started about a week ago patient was also noted to have increased weight gain and increasing lower leg edema. Patient normally alert and oriented x4 but has become much more lethargic and interactive. Patient gained 20 pounds at the nursing home. Patient was brought into the emergency room with patient chest x-ray showed interstitial lung disease and also cardiomegaly. ABG showed a pH 7.204 with a CO2 of 85 on 6 L nasal cannula. Patient then was started on BiPAP.  BUN/creatinine 43/1.0 with proBNP 3900 and troponin initially was 120. Liver enzymes are normal.  Heart rate was 75 normal temperature and blood pressure initially at 893 systolic currently is 38/64. Patient currently on 40% FiO2 BiPAP with O2 sat of 93%. Patient states he has been wearing O2 nasal cannula for last couple weeks at the nursing home but not before that. Patient denies wearing BiPAP in the past.  He denies any problem with any chest pain, abdominal pain, nausea vomiting or dizziness. ECG reviewed with patient have atrial fibrillation with no significant ST-T changes to suggest acute myocardial injury troponin was elevated on admission but troponin I-II hours later was less than on admission. 11/4/2021  Patient seen examined on PCU. Patient still very short of breath even at rest.  Patient seems to do fairly good on the BiPAP once he is off of it he seemed to have some more respiratory distress. BUN/creatinine 46/1.0 with patient's transaminases normal TSH 4.56. WBC 5.1 with hemoglobin 11. INR is 2.2 today. Temperature 98.3 with a heart rate of 100 blood pressure 130/40. O2 sat 91% on 7 L nasal cannula.  ABGs today were improved but the patient had a pH increase 7.355 with PCO2 decreased 56.2 while the patient was on BiPAP. Patient urine output has picked up since early this morning. 11/5/2021  Patient seen examined on PCU. Case discussed with intensivist yesterday afternoon. Patient looks a little bit better today. Nursing states that the patient has been changed to a DNR CC a with no chest compressions, intubation or cardioversion. Patient denies any chest or abdominal pain. Patient still with +34 lower leg edema. BUN/creatinine 48/1.0 with the patient's Liver enzymes are normal with a WBC 3.1 with hemoglobin 11.1. Temperature 97.7 with heart rate of 70 blood pressure 123/48. O2 sat is 91% on 10 L high flow nasal cannula. Urine output seems to be adequate. 11/6/2021  Patient seen examined on PCU. Patient is alert and oriented with patient currently had BiPAP on and feeling little bit more comfortable. BUN/creatinine 51/0.9 WBCs 5.5 hemoglobin 11.8. INR 2.8 today. Temperature 97.5 heart rate 85 blood pressure 107/69. O2 sat 97% on BiPAP at 75% FiO2. Urine output is inaccurate but seems to be adequate according to the patient who states he is urinating a lot. INR is 2.3 we will decrease the warfarin 1 mg p.o. daily. 11/7/2021  Patient seen examined on PCU. Patient seems to be doing little bit better today. Patient is wearing BiPAP almost all the time. Patient did eat all of his breakfast.  Patient denies any chest or abdominal pain. Patient states that he has not had a bowel movement yet. Patient's lower leg edema still prominent but seems to be mildly improved. BUN/creatinine 48/0.9 with a CO2 of 36. Transaminases are normal with free T4 1.1 and WBC 5.2 and hemoglobin 11.4. Platelet count 188. INR is 2.9 today. Temperature 97.7 with heart rate of 60 and blood pressure 108/869. O2 sat 98% on 12 L high flow nasal cannula alternating with BiPAP with FiO2 of 65%. .    11/8/2021  Patient seen and examined in PCU. Patient seems little bit more lethargic/confused this morning off of the BiPAP.  BUN/creatinine 48/0.8 with CO2 electrolytes at 38. Normal transaminases with INR down to 2.2 today. WBC 5.1 with hemoglobin 11.7. Temperature is 98.1 with heart rate 76 blood pressure 103/61. O2 sat 94% on 12 L high flow nasal cannula. Urinary output is   in accurate. 11/9/2021   Patient seen examined in PCU. Patient is again alert and oriented but somewhat short of breath with any activity and on the BiPAP most of the day. Patient denies any chest pain or abdominal pain. Patient supposed to go to surgery for cystoscopy and probable urethral dilation today with insertion of Melchor catheter. BUN/creatinine 54/1.0 today with liver enzymes normal WBC 11.1 hemoglobin 12.1. INR is 2.2 today. Temperature is 97.8 with heart rate of 90 and blood pressure 110/66. O2 sat is 95% with the patient on BiPAP 65% FiO2. Urine output is very inaccurate. Urology note reviewed with patient having severe bladder neck contracture and urology not able to dilate yesterday afternoon. 11/10/2021  Patient seen and examined in PCU. Patient looks better today. Patient is more alert and oriented. Patient denies any chest or abdominal pain. Patient urine output is improving. Patient still with marked lower leg edema. Urology note reviewed with the patient having a laser ablation of bladder neck contracture and placement of Melchor catheter yesterday afternoon. BUN/creatinine is 47/0.9 with normal liver enzymes. WBC 7.6 hemoglobin 11.8. INR is 1.9 today. ABGs performed showed pH 7.471 with PCO2 54.9 PO2 of 69. urinary output has improved with placement of Melchor catheter by 500 cc a shift. Stop IV fluids postop and continue the IV Lasix and will start SCDs lower extremities. 11/11/2021  Patient seen and examined in PCU. Patient sons at the bedside and case discussed. Patient looks and feels better.   Patient lower leg edema is moderately improved. Patient denies any problem with chest or abdominal pain. There is no nausea vomiting or dizziness. BUN/creatinine 49/0.8 with fasting blood sugar 135. Temperature 98.9 with heart rate in 96 and blood pressure ranges 90/50 6109/66. O2 sats 95% with the patient on 65% FiO2 BiPAP. Urinary output ranges 500-2900 cc a shift. 11/12/2021  Patient seen examined on PCU. Patient feels fairly good today. Case discussed with patient's nurse at bedside. Patient still has fairly good urinary output with the Ponce catheter in place. His lower leg edema is moderately improved but still has significant leg edema left greater than right. Temperature is 97.5 with a heart rate of 75 blood pressure 105/60. O2 sat 99% on heated high flow nasal cannula with FiO2 65%. 11/13/2021  Patient seen examined on PCU. Patient feels like he is breathing little better but still on BiPAP. Patient denies any chest or abdominal pain. There is no nausea or vomiting. BUN/creatinine 47/0.7. WBC is 9.7 hemoglobin 12.1. INR is 1.4 today. Urinary output ranges 500-700 cc a shift. Temperature 98.2 with heart rate 91 blood pressure currently 96/56. O2 sat 94% on 65% FiO2 BiPAP. 11/14/2021  Patient seen examined on PCU. BUN/creatinine 42/0.6 with INR 1.4 today. Temperature 98.7 with heart rate 88 blood pressure ranges 92/50102/60. O2 sat 93% on room 11 L high flow heated nasal cannula. Urine output ranges 700-950 cc a shift.     Past Medical History:    Past Medical History:   Diagnosis Date    Atrial fibrillation ([facility] [address]     CAD (coronary artery disease)     coronary stent 12yrs ago    COPD (chronic obstructive pulmonary disease) ([facility] [address]     Hypertension      Past Surgical History:    Past Surgical History:   Procedure Laterality Date    BLADDER SURGERY N/A 11/9/2021    CYSTOSCOPY, LASER ABLATION OF BLADDER NECK STRICTURE,  PONCE CATHETER INSERTION performed by Shoaib Connelly MD [facility]    CARDIAC SURGERY      coronary stent    CATARACT REMOVAL WITH IMPLANT Left 2013    COLONOSCOPY      CYST REMOVAL      tailbone    EYE SURGERY         Medications Prior to Admission:    @  Prior to Admission medications    Medication Sig Start Date End Date Taking? Authorizing Provider   ipratropium-albuterol (DUONEB) 0.5-2.5 (3) MG/3ML SOLN nebulizer solution Inhale 3 mLs into the lungs 4 times daily 21   Ray Goldberg, DO   albuterol (PROVENTIL) (2.5 MG/3ML) 0.083% nebulizer solution Take 3 mLs by nebulization every 4 hours as needed for Wheezing or Shortness of Breath 21   Ray Goldberg, DO   Respiratory Therapy Supplies (FULL KIT NEBULIZER SET) MISC Use as directed with nebulized medication. 21   Ray Goldberg, DO   warfarin (COUMADIN) 4 MG tablet 4 mg daily 6 PM 21   Ray Goldberg, DO   tamsulosin (FLOMAX) 0.4 MG capsule Take 0.4 mg by mouth daily    Historical Provider, MD   terazosin (HYTRIN) 5 MG capsule Take 10 mg by mouth daily. Historical Provider, MD   metoprolol (TOPROL-XL) 50 MG XL tablet Take 50 mg by mouth daily. Historical Provider, MD   finasteride (PROSCAR) 5 MG tablet Take 5 mg by mouth daily. Historical Provider, MD   aspirin 81 MG tablet Take 81 mg by mouth daily.     Historical Provider, MD       Allergies:  Dye [iodides]    Social History:   Social History     Socioeconomic History    Marital status:      Spouse name: Not on file    Number of children: Not on file    Years of education: Not on file    Highest education level: Not on file   Occupational History    Not on file   Tobacco Use    Smoking status: Former Smoker     Quit date: 3/27/1992     Years since quittin.6    Smokeless tobacco: Never Used   Substance and Sexual Activity    Alcohol use: No    Drug use: No    Sexual activity: Not on file   Other Topics Concern    Not on file   Social History Narrative    Not on file     Social Determinants of Health Financial Resource Strain:     Difficulty of Paying Living Expenses: Not on file   Food Insecurity:     Worried About Running Out of Food in the Last Year: Not on file    Jian of Food in the Last Year: Not on file   Transportation Needs:     Lack of Transportation (Medical): Not on file    Lack of Transportation (Non-Medical): Not on file   Physical Activity:     Days of Exercise per Week: Not on file    Minutes of Exercise per Session: Not on file   Stress:     Feeling of Stress : Not on file   Social Connections:     Frequency of Communication with Friends and Family: Not on file    Frequency of Social Gatherings with Friends and Family: Not on file    Attends Restorationist Services: Not on file    Active Member of [employer] Group or Organizations: Not on file    Attends Club or Organization Meetings: Not on file    Marital Status: Not on file   Intimate Partner Violence:     Fear of Current or Ex-Partner: Not on file    Emotionally Abused: Not on file    Physically Abused: Not on file    Sexually Abused: Not on file   Housing Stability:     Unable to Pay for Housing in the Last Year: Not on file    Number of [place] in the Last Year: Not on file    Unstable Housing in the Last Year: Not on file       Family History:   History reviewed. No pertinent family history. REVIEW OF SYSTEMS:   Gen: Patient initially admitted from the nursing home with altered mental status but is doing much better at this time. Patient denies any lightheadedness or dizziness. No LOC or syncope. No fevers or chills. HEENT: No earache, sore throat or nasal congestion. Resp: Denies cough, hemoptysis or sputum production. Cardiac: Denies chest pain, +SOB, no diaphoresis or palpitations. GI: No nausea, vomiting, diarrhea or constipation. No melena or hematochezia. : No urinary complaints, dysuria, hematuria or frequency. MSK: + Diffuse lower extremity edema.   No extremity weakness, paralysis or paresthesias. PHYSICAL EXAM:    Vitals:  /60 Comment: manual  Pulse 88   Temp 98.7 °F (37.1 °C) (Oral)   Resp 20   Ht 5' 8\" (1.727 m)   Wt 222 lb 0.1 oz (100.7 kg)   SpO2 93%   BMI 33.76 kg/m²     General:  This is a [de-identified] y.o. yo male who is alert and oriented in mild respiratory distress  HEENT:  Head is normocephalic and atraumatic, PERRLA, EOMI, mucus membranes moist with no pharyngeal erythema or exudate. Neck:  Supple with no carotid bruits, JVD or thyromegaly.   No cervical adenopathy  CV:  Regular rate and rhythm, no murmurs  Lungs: Coarse decreased breath sounds to auscultation bilaterally with no wheezes, rales or rhonchi  Abdomen:  Soft, nontender,+ abdominal fat, nondistended, bowel sounds present  Extremities:  + 2-3 leg edema, peripheral pulses intact bilaterally  Neuro:  Cranial nerves II-XII grossly intact; motor and sensory function intact with no focal deficits  Skin:  No rashes, lesions or wounds    DATA:  CBC with Differential:    Lab Results   Component Value Date    WBC 9.7 11/13/2021    RBC 3.80 11/13/2021    HGB 12.1 11/13/2021    HCT 39.4 11/13/2021     11/13/2021    .7 11/13/2021    MCH 31.8 11/13/2021    MCHC 30.7 11/13/2021    RDW 14.1 11/13/2021    BLASTSPCT 0.0 11/04/2021    LYMPHOPCT 6.4 11/13/2021    MONOPCT 8.7 11/13/2021    BASOPCT 0.1 11/13/2021    MONOSABS 0.85 11/13/2021    LYMPHSABS 0.62 11/13/2021    EOSABS 0.05 11/13/2021    BASOSABS 0.01 11/13/2021     CMP:    Lab Results   Component Value Date     11/14/2021    K 4.1 11/14/2021    K 4.8 11/03/2021    CL 96 11/14/2021    CO2 38 11/14/2021    BUN 42 11/14/2021    CREATININE 0.6 11/14/2021    GFRAA >60 11/14/2021    LABGLOM >60 11/14/2021    GLUCOSE 96 11/14/2021    PROT 5.5 11/10/2021    LABALBU 2.9 11/10/2021    CALCIUM 8.1 11/14/2021    BILITOT 0.4 11/10/2021    ALKPHOS 108 11/10/2021    AST 18 11/10/2021    ALT 18 11/10/2021     Magnesium:    Lab Results   Component Value Date    MG 2.3 11/05/2021     Phosphorus:    Lab Results   Component Value Date    PHOS 4.4 11/05/2021     PT/INR:    Lab Results   Component Value Date    PROTIME 16.3 11/14/2021    INR 1.4 11/14/2021     Troponin:  No results found for: TROPONINI  U/A:    Lab Results   Component Value Date    COLORU Yellow 11/03/2021    PROTEINU Negative 11/03/2021    PHUR 5.0 11/03/2021    WBCUA 1-3 11/03/2021    RBCUA 10-20 11/03/2021    BACTERIA RARE 11/03/2021    CLARITYU Clear 11/03/2021    SPECGRAV 1.025 11/03/2021    LEUKOCYTESUR Negative 11/03/2021    UROBILINOGEN 0.2 11/03/2021    BILIRUBINUR Negative 11/03/2021    BLOODU LARGE 11/03/2021    GLUCOSEU Negative 11/03/2021     ABG:    Lab Results   Component Value Date    PH 7.471 11/10/2021    PCO2 54.9 11/10/2021    PO2 69.4 11/10/2021    HCO3 39.1 11/10/2021    BE 13.2 11/10/2021    O2SAT 94.6 11/10/2021     HgBA1c:  No results found for: LABA1C  FLP:    Lab Results   Component Value Date    TRIG 46 11/04/2021    HDL 81 11/04/2021    LDLCALC 46 11/04/2021    LABVLDL 9 11/04/2021     TSH:    Lab Results   Component Value Date    TSH 4.560 11/04/2021     IRON:  No results found for: IRON  LIPASE:    Lab Results   Component Value Date    LIPASE 74 09/08/2021       ASSESSMENT AND PLAN:      Patient Active Problem List    Diagnosis Date Noted    Acute on chronic respiratory failure with hypoxia and hypercapnia ([address] 11/03/2021    CAD (coronary artery disease)  Acute diastolic congestive heart failure     Atrial fibrillation - chronic     COPD with exacerbation     Hypertension-patient hypotensive in the ED      History of severe pulmonary hypertension at 99 mmHg with +34 tricuspid regurg 09/10/2021     Diffuse leg edema probably combination of the severe pulmonary hypertension and tricuspid regurg along with acute diastolic CHF 31/18/8860     Plan:  Admit to [facility]/PCU  Nursing home meds reviewed BiPAP  BiPAP -continuous at this time  DuoNeb aerosols every 4 hours  Pulmicort aerosol twice daily  Brovana aerosol twice daily  Continue warfarin   Daily INRs  Accurate I's and O's  BiPAP 16/8 at at bedtime and as needed   SCDs lower extremities    Consult pulmonology    MRSA screen  Add IV Rocephin, doxycycline    Colace 100 mg twice daily  MiraLAX 17 mg  daily as needed    Bladder scan > 900 cc  Urology consult-unable to dilate and insert Melchor catheter 11/8 PM  Cystoscopy and ureteral dilation 11/9 p.m. SCDs lower extremities    Warfarin increased 4 mg daily  Daily INRs  Lasix 40 mg IV push twice daily  Stop antibioticssee pulmonology note    BMP, CBC in a.m.       Ellan Kanner, DO, D.OMark  11/14/2021  12:21 PM

## 2021-11-14 NOTE — PROGRESS NOTES
Patient turned in bed for use of bedpan and saturation dropped to 82% SPO2 at 11 L NC High Flow, sat patient back up and performed deep breathing exercises and oxygen saturation returned to 92% SPO2 on 11 L NC High Flow. Continuous pulse oximeter on patient's finger, will continue to monitor.

## 2021-11-15 NOTE — PROGRESS NOTES
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 male who presents with altered mental status at the nursing home. Patient symptoms started about a week ago patient was also noted to have increased weight gain and increasing lower leg edema. Patient normally alert and oriented x4 but has become much more lethargic and interactive. Patient gained 20 pounds at the nursing home. Patient was brought into the emergency room with patient chest x-ray showed interstitial lung disease and also cardiomegaly. ABG showed a pH 7.204 with a CO2 of 85 on 6 L nasal cannula. Patient then was started on BiPAP.  BUN/creatinine 43/1.0 with proBNP 3900 and troponin initially was 120. Liver enzymes are normal.  Heart rate was 75 normal temperature and blood pressure initially at 015 systolic currently is 54/57. Patient currently on 40% FiO2 BiPAP with O2 sat of 93%. Patient states he has been wearing O2 nasal cannula for last couple weeks at the nursing home but not before that. Patient denies wearing BiPAP in the past.  He denies any problem with any chest pain, abdominal pain, nausea vomiting or dizziness. ECG reviewed with patient have atrial fibrillation with no significant ST-T changes to suggest acute myocardial injury troponin was elevated on admission but troponin I-II hours later was less than on admission. 11/4/2021  Patient seen examined on PCU. Patient still very short of breath even at rest.  Patient seems to do fairly good on the BiPAP once he is off of it he seemed to have some more respiratory distress. BUN/creatinine 46/1.0 with patient's transaminases normal TSH 4.56. WBC 5.1 with hemoglobin 11. INR is 2.2 today. Temperature 98.3 with a heart rate of 100 blood pressure 130/40. O2 sat 91% on 7 L nasal cannula.  ABGs today were improved but the patient had a pH increase 7.355 with PCO2 decreased 56.2 while the patient was on BiPAP. Patient urine output has picked up since early this morning. 11/5/2021  Patient seen examined on PCU. Case discussed with intensivist yesterday afternoon. Patient looks a little bit better today. Nursing states that the patient has been changed to a DNR CC a with no chest compressions, intubation or cardioversion. Patient denies any chest or abdominal pain. Patient still with +34 lower leg edema. BUN/creatinine 48/1.0 with the patient's Liver enzymes are normal with a WBC 3.1 with hemoglobin 11.1. Temperature 97.7 with heart rate of 70 blood pressure 123/48. O2 sat is 91% on 10 L high flow nasal cannula. Urine output seems to be adequate. 11/6/2021  Patient seen examined on PCU. Patient is alert and oriented with patient currently had BiPAP on and feeling little bit more comfortable. BUN/creatinine 51/0.9 WBCs 5.5 hemoglobin 11.8. INR 2.8 today. Temperature 97.5 heart rate 85 blood pressure 107/69. O2 sat 97% on BiPAP at 75% FiO2. Urine output is inaccurate but seems to be adequate according to the patient who states he is urinating a lot. INR is 2.3 we will decrease the warfarin 1 mg p.o. daily. 11/7/2021  Patient seen examined on PCU. Patient seems to be doing little bit better today. Patient is wearing BiPAP almost all the time. Patient did eat all of his breakfast.  Patient denies any chest or abdominal pain. Patient states that he has not had a bowel movement yet. Patient's lower leg edema still prominent but seems to be mildly improved. BUN/creatinine 48/0.9 with a CO2 of 36. Transaminases are normal with free T4 1.1 and WBC 5.2 and hemoglobin 11.4. Platelet count 380. INR is 2.9 today. Temperature 97.7 with heart rate of 60 and blood pressure 108/869. O2 sat 98% on 12 L high flow nasal cannula alternating with BiPAP with FiO2 of 65%. .    11/8/2021  Patient seen and examined in PCU. Patient seems little bit more lethargic/confused this morning off of the BiPAP.  BUN/creatinine 48/0.8 with CO2 electrolytes at 38. Normal transaminases with INR down to 2.2 today. WBC 5.1 with hemoglobin 11.7. Temperature is 98.1 with heart rate 76 blood pressure 103/61. O2 sat 94% on 12 L high flow nasal cannula. Urinary output is   in accurate. 11/9/2021   Patient seen examined in PCU. Patient is again alert and oriented but somewhat short of breath with any activity and on the BiPAP most of the day. Patient denies any chest pain or abdominal pain. Patient supposed to go to surgery for cystoscopy and probable urethral dilation today with insertion of Melchor catheter. BUN/creatinine 54/1.0 today with liver enzymes normal WBC 11.1 hemoglobin 12.1. INR is 2.2 today. Temperature is 97.8 with heart rate of 90 and blood pressure 110/66. O2 sat is 95% with the patient on BiPAP 65% FiO2. Urine output is very inaccurate. Urology note reviewed with patient having severe bladder neck contracture and urology not able to dilate yesterday afternoon. 11/10/2021  Patient seen and examined in PCU. Patient looks better today. Patient is more alert and oriented. Patient denies any chest or abdominal pain. Patient urine output is improving. Patient still with marked lower leg edema. Urology note reviewed with the patient having a laser ablation of bladder neck contracture and placement of Melchor catheter yesterday afternoon. BUN/creatinine is 47/0.9 with normal liver enzymes. WBC 7.6 hemoglobin 11.8. INR is 1.9 today. ABGs performed showed pH 7.471 with PCO2 54.9 PO2 of 69. urinary output has improved with placement of Melchor catheter by 500 cc a shift. Stop IV fluids postop and continue the IV Lasix and will start SCDs lower extremities. 11/11/2021  Patient seen and examined in PCU. Patient sons at the bedside and case discussed. Patient looks and feels better.   Patient lower leg edema is moderately improved. Patient denies any problem with chest or abdominal pain. There is no nausea vomiting or dizziness. BUN/creatinine 49/0.8 with fasting blood sugar 135. Temperature 98.9 with heart rate in 96 and blood pressure ranges 90/50 6109/66. O2 sats 95% with the patient on 65% FiO2 BiPAP. Urinary output ranges 500-2900 cc a shift. 11/12/2021  Patient seen examined on PCU. Patient feels fairly good today. Case discussed with patient's nurse at bedside. Patient still has fairly good urinary output with the Melchor catheter in place. His lower leg edema is moderately improved but still has significant leg edema left greater than right. Temperature is 97.5 with a heart rate of 75 blood pressure 105/60. O2 sat 99% on heated high flow nasal cannula with FiO2 65%. 11/13/2021  Patient seen examined on PCU. Patient feels like he is breathing little better but still on BiPAP. Patient denies any chest or abdominal pain. There is no nausea or vomiting. BUN/creatinine 47/0.7. WBC is 9.7 hemoglobin 12.1. INR is 1.4 today. Urinary output ranges 500-700 cc a shift. Temperature 98.2 with heart rate 91 blood pressure currently 96/56. O2 sat 94% on 65% FiO2 BiPAP. 11/14/2021  Patient seen examined on PCU. BUN/creatinine 42/0.6 with INR 1.4 today. Temperature 98.7 with heart rate 88 blood pressure ranges 92/50102/60. O2 sat 93% on room 11 L high flow heated nasal cannula. Urine output ranges 700-950 cc a shift. 11/15/2021  Patient seen examined on PCU. Patient looks little bit better today. Patient denies any chest or abdominal pain. Patient lower leg edema again is moderately improved. Patient though still very very weak. BUN/creatinine 42/0.7. Hemoglobin 11.7. INR 1.5. Temperature 90.1 with heart rate 83 and blood pressure 121/62. O2 sat currently is 96% with the patient on heated high flow nasal cannula with FiO2 65%.   Urine output ranges 725-1550 cc a shift. Past Medical History:    Past Medical History:   Diagnosis Date    Atrial fibrillation ([facility]     CAD (coronary artery disease)     coronary stent 12yrs ago    COPD (chronic obstructive pulmonary disease) ([facility]     Hypertension      Past Surgical History:    Past Surgical History:   Procedure Laterality Date    BLADDER SURGERY N/A 11/9/2021    CYSTOSCOPY, LASER ABLATION OF BLADDER NECK STRICTURE,  PONCE CATHETER INSERTION performed by Bertha Merida MD at [address] [place] [address]      coronary stent    CATARACT REMOVAL WITH IMPLANT Left 04 01 2013    COLONOSCOPY      CYST REMOVAL      tailbone    EYE SURGERY         Medications Prior to Admission:    @  Prior to Admission medications    Medication Sig Start Date End Date Taking? Authorizing Provider   ipratropium-albuterol (DUONEB) 0.5-2.5 (3) MG/3ML SOLN nebulizer solution Inhale 3 mLs into the lungs 4 times daily 9/16/21   Brianne Harvey DO   albuterol (PROVENTIL) (2.5 MG/3ML) 0.083% nebulizer solution Take 3 mLs by nebulization every 4 hours as needed for Wheezing or Shortness of Breath 9/16/21   Brianne Harvey DO   Respiratory Therapy Supplies (FULL KIT NEBULIZER SET) MISC Use as directed with nebulized medication. 9/16/21   Brianne Harvey DO   warfarin (COUMADIN) 4 MG tablet 4 mg daily 6 PM 9/16/21   Brianne Harvey DO   tamsulosin (FLOMAX) 0.4 MG capsule Take 0.4 mg by mouth daily    Historical Provider, MD   terazosin (HYTRIN) 5 MG capsule Take 10 mg by mouth daily. Historical Provider, MD   metoprolol (TOPROL-XL) 50 MG XL tablet Take 50 mg by mouth daily. Historical Provider, MD   finasteride (PROSCAR) 5 MG tablet Take 5 mg by mouth daily. Historical Provider, MD   aspirin 81 MG tablet Take 81 mg by mouth daily.     Historical Provider, MD       Allergies:  Dye [iodides]    Social History:   Social History     Socioeconomic History    Marital status:      Spouse name: Not on file    Number of children: Not on file    Years of education: Not on file    Highest education level: Not on file   Occupational History    Not on file   Tobacco Use    Smoking status: Former Smoker     Quit date: 3/27/1992     Years since quittin.6    Smokeless tobacco: Never Used   Substance and Sexual Activity    Alcohol use: No    Drug use: No    Sexual activity: Not on file   Other Topics Concern    Not on file   Social History Narrative    Not on file     Social Determinants of Health     Financial Resource Strain:     Difficulty of Paying Living Expenses: Not on file   Food Insecurity:     Worried About Running Out of Food in the Last Year: Not on file    Jian of Food in the Last Year: Not on file   Transportation Needs:     Lack of Transportation (Medical): Not on file    Lack of Transportation (Non-Medical): Not on file   Physical Activity:     Days of Exercise per Week: Not on file    Minutes of Exercise per Session: Not on file   Stress:     Feeling of Stress : Not on file   Social Connections:     Frequency of Communication with Friends and Family: Not on file    Frequency of Social Gatherings with Friends and Family: Not on file    Attends Shinto Services: Not on file    Active Member of [address] [place] [address] Extended Systems or Organizations: Not on file    Attends Club or Organization Meetings: Not on file    Marital Status: Not on file   Intimate Partner Violence:     Fear of Current or Ex-Partner: Not on file    Emotionally Abused: Not on file    Physically Abused: Not on file    Sexually Abused: Not on file   Housing Stability:     Unable to Pay for Housing in the Last Year: Not on file    Number of [place] in the Last Year: Not on file    Unstable Housing in the Last Year: Not on file       Family History:   History reviewed. No pertinent family history. REVIEW OF SYSTEMS:   Gen: Patient initially admitted from the nursing home with altered mental status but is doing much better at this time.   Patient denies any lightheadedness or dizziness. No LOC or syncope. No fevers or chills. HEENT: No earache, sore throat or nasal congestion. Resp: Denies cough, hemoptysis or sputum production. Cardiac: Denies chest pain, +SOB, no diaphoresis or palpitations. GI: No nausea, vomiting, diarrhea or constipation. No melena or hematochezia. : No urinary complaints, dysuria, hematuria or frequency. MSK: + Diffuse lower extremity edema. No extremity weakness, paralysis or paresthesias. PHYSICAL EXAM:    Vitals:  /62   Pulse 83   Temp 98.1 °F (36.7 °C) (Oral)   Resp 18   Ht 5' 8\" (1.727 m)   Wt 222 lb 0.1 oz (100.7 kg)   SpO2 96%   BMI 33.76 kg/m²     General:  This is a [de-identified] y.o. yo male who is alert and oriented in mild respiratory distress  HEENT:  Head is normocephalic and atraumatic, PERRLA, EOMI, mucus membranes moist with no pharyngeal erythema or exudate. Neck:  Supple with no carotid bruits, JVD or thyromegaly.   No cervical adenopathy  CV:  Regular rate and rhythm, no murmurs  Lungs: Coarse decreased breath sounds to auscultation bilaterally with no wheezes, rales or rhonchi  Abdomen:  Soft, nontender,+ abdominal fat, nondistended, bowel sounds present  Extremities:  + 2-3 leg edema, peripheral pulses intact bilaterally  Neuro:  Cranial nerves II-XII grossly intact; motor and sensory function intact with no focal deficits  Skin:  No rashes, lesions or wounds    DATA:  CBC with Differential:    Lab Results   Component Value Date    WBC 9.7 11/13/2021    RBC 3.80 11/13/2021    HGB 11.7 11/15/2021    HCT 37.8 11/15/2021     11/13/2021    .7 11/13/2021    MCH 31.8 11/13/2021    MCHC 30.7 11/13/2021    RDW 14.1 11/13/2021    BLASTSPCT 0.0 11/04/2021    LYMPHOPCT 6.4 11/13/2021    MONOPCT 8.7 11/13/2021    BASOPCT 0.1 11/13/2021    MONOSABS 0.85 11/13/2021    LYMPHSABS 0.62 11/13/2021    EOSABS 0.05 11/13/2021    BASOSABS 0.01 11/13/2021     CMP:    Lab Results   Component Value Date     11/15/2021    K 4.1 11/15/2021    K 4.8 11/03/2021    CL 94 11/15/2021    CO2 40 11/15/2021    BUN 42 11/15/2021    CREATININE 0.7 11/15/2021    GFRAA >60 11/15/2021    LABGLOM >60 11/15/2021    GLUCOSE 89 11/15/2021    PROT 5.5 11/10/2021    LABALBU 2.9 11/10/2021    CALCIUM 8.4 11/15/2021    BILITOT 0.4 11/10/2021    ALKPHOS 108 11/10/2021    AST 18 11/10/2021    ALT 18 11/10/2021     Magnesium:    Lab Results   Component Value Date    MG 2.3 11/05/2021     Phosphorus:    Lab Results   Component Value Date    PHOS 4.4 11/05/2021     PT/INR:    Lab Results   Component Value Date    PROTIME 17.4 11/15/2021    INR 1.5 11/15/2021     Troponin:  No results found for: TROPONINI  U/A:    Lab Results   Component Value Date    COLORU Yellow 11/03/2021    PROTEINU Negative 11/03/2021    PHUR 5.0 11/03/2021    WBCUA 1-3 11/03/2021    RBCUA 10-20 11/03/2021    BACTERIA RARE 11/03/2021    CLARITYU Clear 11/03/2021    SPECGRAV 1.025 11/03/2021    LEUKOCYTESUR Negative 11/03/2021    UROBILINOGEN 0.2 11/03/2021    BILIRUBINUR Negative 11/03/2021    BLOODU LARGE 11/03/2021    GLUCOSEU Negative 11/03/2021     ABG:    Lab Results   Component Value Date    PH 7.471 11/10/2021    PCO2 54.9 11/10/2021    PO2 69.4 11/10/2021    HCO3 39.1 11/10/2021    BE 13.2 11/10/2021    O2SAT 94.6 11/10/2021     HgBA1c:  No results found for: LABA1C  FLP:    Lab Results   Component Value Date    TRIG 46 11/04/2021    HDL 81 11/04/2021    LDLCALC 46 11/04/2021    LABVLDL 9 11/04/2021     TSH:    Lab Results   Component Value Date    TSH 4.560 11/04/2021     IRON:  No results found for: IRON  LIPASE:    Lab Results   Component Value Date    LIPASE 74 09/08/2021       ASSESSMENT AND PLAN:      Patient Active Problem List    Diagnosis Date Noted    Acute on chronic respiratory failure with hypoxia and hypercapnia ([facility] [facility] 11/03/2021    CAD (coronary artery disease)  Acute diastolic congestive heart failure     Atrial fibrillation - chronic     COPD with exacerbation     Hypertension-patient hypotensive in the ED      History of severe pulmonary hypertension at 99 mmHg with +34 tricuspid regurg 09/10/2021     Diffuse leg edema probably combination of the severe pulmonary hypertension and tricuspid regurg along with acute diastolic CHF 36/14/8446     Plan:  Admit to [facility]/PCU  Nursing home meds reviewed BiPAP  BiPAP -continuous at this time  DuoNeb aerosols every 4 hours  Pulmicort aerosol twice daily  Brovana aerosol twice daily  Continue warfarin   Daily INRs  Accurate I's and O's  BiPAP 16/8 at at bedtime and as needed   SCDs lower extremities    Consult pulmonology    MRSA screen  Add IV Rocephin, doxycycline    Colace 100 mg twice daily  MiraLAX 17 mg  daily as needed    Bladder scan > 900 cc  Urology consult-unable to dilate and insert Melchor catheter 11/8 PM  Cystoscopy and ureteral dilation 11/9 p.m. SCDs lower extremities    Warfarin increased 4 mg daily  Daily INRs  Lasix 40 mg IV push twice daily  Stop antibioticssee pulmonology note    BMP, CBC in a.m.       Vinayak Negron DO, D.O.  11/15/2021  3:03 PM

## 2021-11-15 NOTE — CARE COORDINATION
SS NOTE: COVID NEGATIVE 11/3- PT WILL NEED A RAPID NEGATIVE COVID TO RETURN TO THE [facility]. Pt is on 11 liters of O2. He was exposed to a COVID pt- he is in [place] precautions and  will have a PCR today for COVID status- awaiting result. Pt is on IV Rocephin and Vibramycin. PT/OT are on. This pt is a long term care/ private pay Resident of [facility]. Plan is for pt to return there at [facility] per pts' son Blu Cuevas. Pt will need NO PRECERT to return to  - he will need a signed EMILY and current PT/OT notes. PT and OT are on consult. SW faxed updates to Kishore at the [place]- [phone]. SS to continue. CHRIS Lazar.11/15/2021.4:25PM.

## 2021-11-15 NOTE — PROGRESS NOTES
Physical Therapy Treatment Note/Plan of Care    Room #:  4777/2960-96  Patient Name: Sussy Chavez  YOB: 1940  MRN: [id number]    Date of Service: 11/15/2021     Tentative placement recommendation: Subacute rehab  Equipment recommendation: To be determined      Evaluating Physical Therapist: Rosa Watkins #[id number]      Specific Provider Orders/Date/Referring Provider :   11/03/21 1300   PT eval and treat Start: 11/03/21 1300, End: 11/03/21 1300, ONE TIME, Standing Count: 1 Occurrences, R    Leigh Stokes DO    Admitting Diagnosis:   Respiratory acidosis [E87.2]  Hypercarbia [R06.89]  Elevated troponin [R77.8]  Acute on chronic respiratory failure with hypoxia and hypercapnia (HCC) [J96.21, J96.22]  Hypervolemia, unspecified hypervolemia type [E87.70]      Surgery: none  Visit Diagnoses       Codes    Respiratory acidosis    -  Primary E87.2    Elevated troponin     R77.8    Hypercarbia     R06.89    Hypervolemia, unspecified hypervolemia type     E87.70    Stricture of male urethra, unspecified stricture type     N35.919          Patient Active Problem List   Diagnosis    Metabolic encephalopathy    Inability to walk    Acute on chronic respiratory failure with hypoxia and hypercapnia (HCC)    CAD (coronary artery disease)    Atrial fibrillation (HCC)    COPD [facility] obstructive pulmonary disease) ([facility] [address]    Hypertension    Adjustment disorder with depressed mood        ASSESSMENT of Current Deficits Patient exhibits decreased strength, balance and endurance impairing functional mobility, transfers and gait . Patient needing maximal assist to sit EOB for bilateral lower extremities and trunk flexion. The patient  demonstrates decreased strength and endurance limiting  tolerance for functional mobility at this time and making the patient a high risk for falls. Patient with AROM with ankle pumps;  PROM/AAROM with all exercises due to impaired strength.   Pt will need subacute rehab at time of discharge in order to participate in a skilled comprehensive therapy program to address deficits and improve the patient 's  functional levels. PHYSICAL THERAPY  PLAN OF CARE       Physical therapy plan of care is established based on physician order,  patient diagnosis and clinical assessment    Current Treatment Recommendations:    -Bed Mobility: Lower extremity exercises   -Sitting Balance: Incorporate reaching activities to activate trunk muscles   -Standing Balance: Perform strengthening exercises in standing to promote motor control with or without upper extremity support   -Transfers: Provide instruction on proper hand and foot position for adequate transfer of weight onto lower extremities and use of gait device if needed and Cues for hand placement, technique and safety. Provide stabilization to prevent fall   -Gait: Gait training and Standing activities to improve: base of support, weight shift, weight bearing      PT long term treatment goals are located in below grid    Patient and or family understand(s) diagnosis, prognosis, and plan of care. Frequency of treatments: Patient will be seen  daily.          Prior Level of Function: Patient ambulated with wheeled walker with assistance   Rehab Potential: good  for baseline    Past medical history:   Past Medical History:   Diagnosis Date    Atrial fibrillation ([facility] [address]     CAD (coronary artery disease)     coronary stent 12yrs ago    COPD (chronic obstructive pulmonary disease) ([facility] [address]     Hypertension      Past Surgical History:   Procedure Laterality Date    BLADDER SURGERY N/A 11/9/2021    CYSTOSCOPY, LASER ABLATION OF BLADDER NECK STRICTURE,  STEWART CATHETER INSERTION performed by Maria Esther Paredes MD at [address] [place] [address]      coronary stent    CATARACT REMOVAL WITH IMPLANT Left 04 01 2013    COLONOSCOPY      CYST REMOVAL      tailbone    EYE SURGERY         SUBJECTIVE:    Precautions: , falls, alarm and bipap , stewart catheter   Social history: Patient came to hospital from [place] [id number] owned: Keith Gonsalves and Marciael Santillan,      [address] [facility]   How much difficulty turning over in bed?: A Lot  How much difficulty sitting down on / standing up from a chair with arms?: A Lot  How much difficulty moving from lying on back to sitting on side of bed?: A Lot  How much help from another person moving to and from a bed to a chair?: A Lot  How much help from another person needed to walk in hospital room?: A Lot  How much help from another person for climbing 3-5 steps with a railing?: Total  AM-PAC Inpatient Mobility Raw Score : 11  AM-PAC Inpatient T-Scale Score : 33.86  Mobility Inpatient CMS 0-100% Score: 72.57  Mobility Inpatient CMS G-Code Modifier : CL    Nursing cleared patient for PT treatment. .   OBJECTIVE;   Initial Evaluation  Date: 11/4/2021 Treatment Date:  11/15/2021       Short Term/ Long Term   Goals   Was pt agreeable to Eval/treatment? Yes yes To be met in 5 days   Pain level   0/10   No number given  Pain on medial side of left thigh    Bed Mobility    Rolling: Minimal assist of 1    Supine to sit: Maximal assist of 1    Sit to supine: Maximal assist of 1    Scooting: Moderate assist of 1   Rolling: Maximal assist of 1   Supine to sit: Maximal assist of 1   Sit to supine: Maximal assist of 1   Scooting: Maximal assist of 1    Rolling: Independent    Supine to sit: Minimal assist of 1    Sit to supine: Minimal assist of 1    Scooting: Minimal assist of 1     Transfers Sit to stand: Not assessed  d/t to pt overall debility, decreased activity tolerance, balance deficits, safety and fall risk. Sit to stand: Not assessed       Sit to stand:  Moderate assist of 1    Ambulation    not assessed  not assessed     5 feet using  wheeled walker with Moderate assist of 1          ROM Within functional limits    Increase range of motion 10% of affected joints    Strength BUE: refer to OT eval  RLE:  3/5  LLE:  3/5  Increase strength in affected mm groups by 1/3 grade   Balance Sitting EOB:  poor   Dynamic Standing:  not assessed  Sitting EOB: good  Dynamic Standing: not assessed    Sitting EOB:  fair   Dynamic Standing: fair -     Patient is Alert & Oriented x person, place, time and situation and follows directions    Sensation:  Patient  reports numbness/tingling right foot     Edema:  yes bilateral lower extremities   Endurance: fair  -    Vitals: Bipap   Blood Pressure at rest  Blood Pressure during session    Heart Rate at rest  Heart Rate during session    SPO2 at rest 92%  SPO2 during session 92-96 %     Patient education  Patient educated on role of Physical Therapy, risks of immobility, safety and plan of care,  importance of mobility while in hospital , purse lip breathing, ankle pumps, quad set and glut set for edema control, blood clot prevention, importance and purpose of adaptive device and adjusted to proper height for the patient. and safety      Patient response to education:   Pt verbalized understanding Pt demonstrated skill Pt requires further education in this area   Yes Partial Yes      Treatment:  Patient practiced and was instructed/facilitated in the following treatment: Patient performed supine exercises. Pt  assisted to EOB. Sat edge of bed 10 minutes with Supervision  to increase dynamic sitting balance and activity tolerance. Pt able to use his left upper extremity to hold himself up with the bed rail. Worked on upright posture and pursed lip breathing to increase lung capacity. Pt returned to supine and rolled multiple times to change bed pads and straightened out bed linens. Therapeutic Exercises:  ankle pumps, heel slide, hip abduction/adduction, straight leg raise and SAQ, x 15 - 20 reps. At end of session, patient in bed with alarm call light and phone within reach,  all lines and tubes intact, nursing notified.       Patient would benefit from continued skilled Physical Therapy to improve functional independence and quality of life. Patient's/ family goals   home    Time in 8:35  Time out  9:02    Total Treatment Time  27 minutes        CPT codes:    Therapeutic activities (76171)   17 minutes  1 unit(s)  Therapeutic exercises (78555)   10 minutes  1 unit(s)   Reji Blake  [facility]  LIC # [id number]

## 2021-11-16 NOTE — PROGRESS NOTES
Pulmonary Progress Note    SUMMARY:  Yazmin Delong is a [de-identified] y.o. male who presented with recurrent respiratory failure  High BiPAP need / use    ISSUES:    Acute on chronic respiratory failure with hypercapnia and hypoxia  Coronary disease with diastolic heart failure severe  Aortic Stenosis  Chronic atrial fibrillation  Severe pulmonary hypertension with plus for tricuspid regurg  Protein caloric malnutrition    ABG with well compensated Chronic Hypercapneic Respiratory Failure   Using BiPAP intermittently with HFNC - on 50% FiO2  Try to lower FiO2 as much as possible - tolerating O2 sat > 90%  Negative fluid balance daily    Wean off steroids - placed on Pred 20 daily - wean off over next few days    Will need intermittent BiPAP if goes to SNF    Reviewed with Dr. Oakley Organ    ______________________________________________      OBJECTIVE:  Hypoxemia        MEDICATIONS:   warfarin  5 mg Oral Daily    sertraline  50 mg Oral Daily    methylPREDNISolone  40 mg IntraVENous Daily    docusate sodium  100 mg Oral BID    Vitamin D  2,000 Units Oral Daily    pantoprazole  40 mg IntraVENous Daily    And    sodium chloride (PF)  10 mL IntraVENous Daily    doxazosin  4 mg Oral Daily    metoprolol succinate  50 mg Oral Daily    aspirin  81 mg Oral Daily    tamsulosin  0.4 mg Oral Dinner    ipratropium-albuterol  3 mL Inhalation 4x Daily    furosemide  40 mg IntraVENous BID    Arformoterol Tartrate  15 mcg Nebulization BID    finasteride  5 mg Oral Daily    influenza virus vaccine  0.5 mL IntraMUSCular Prior to discharge       HYDROcodone 5 mg - acetaminophen, medicated lip ointment, hydrOXYzine, albuterol, acetaminophen, trimethobenzamide, polyethylene glycol    O/e  NAD  Diminished breath sounds bilateral  No wheeze or crackles      Vitals:    11/16/21 1448   BP:    Pulse:    Resp: 23   Temp:    SpO2:      FiO2 : 50 %  O2 Flow Rate (L/min): 11 L/min  O2 Device: PAP (positive airway pressure)      Vent Information  Skin Assessment: Clean, dry, & intact  FiO2 : 50 %  SpO2: 95 %  SpO2/FiO2 ratio: 144.62  I Time/ I Time %: 0.9 s  Mask Type: Full face mask  Mask Size: Medium    Additional Respiratory  Assessments  Pulse: 78  Resp: 23  SpO2: 95 %  Oral Care: Patient refused     URINARY CATHETER OUTPUT (Melchor):    [REMOVED] External Urinary Catheter-Output (mL): 150 mL  Urethral Catheter Double-lumen; Latex 22 fr-Output (mL): 550 mL    LABS:    WBC   Date Value Ref Range Status   11/13/2021 9.7 4.5 - 11.5 E9/L Final   11/10/2021 7.6 4.5 - 11.5 E9/L Final   11/09/2021 11.1 4.5 - 11.5 E9/L Final     Hemoglobin   Date Value Ref Range Status   11/15/2021 11.7 (L) 12.5 - 16.5 g/dL Final   11/13/2021 12.1 (L) 12.5 - 16.5 g/dL Final   11/12/2021 13.4 12.5 - 16.5 g/dL Final     Hematocrit   Date Value Ref Range Status   11/15/2021 37.8 37.0 - 54.0 % Final   11/13/2021 39.4 37.0 - 54.0 % Final   11/12/2021 42.5 37.0 - 54.0 % Final     MCV   Date Value Ref Range Status   11/13/2021 103.7 (H) 80.0 - 99.9 fL Final   11/10/2021 105.4 (H) 80.0 - 99.9 fL Final   11/09/2021 103.1 (H) 80.0 - 99.9 fL Final     Platelets   Date Value Ref Range Status   11/13/2021 115 (L) 130 - 450 E9/L Final   11/10/2021 106 (L) 130 - 450 E9/L Final   11/09/2021 109 (L) 130 - 450 E9/L Final     Sodium   Date Value Ref Range Status   11/16/2021 142 132 - 146 mmol/L Final   11/15/2021 139 132 - 146 mmol/L Final   11/14/2021 141 132 - 146 mmol/L Final     Potassium   Date Value Ref Range Status   11/16/2021 4.1 3.5 - 5.0 mmol/L Final   11/15/2021 4.1 3.5 - 5.0 mmol/L Final   11/14/2021 4.1 3.5 - 5.0 mmol/L Final     Potassium reflex Magnesium   Date Value Ref Range Status   11/03/2021 4.8 3.5 - 5.0 mmol/L Final   09/08/2021 6.5 (H) 3.5 - 5.0 mmol/L Final     Comment:     Specimen is moderately Hemolyzed. Result may be artificially increased.      Chloride   Date Value Ref Range Status   11/16/2021 97 (L) 98 - 107 mmol/L Final   11/15/2021 94 (L) 98 - 107 mmol/L Final   11/14/2021 96 (L) 98 - 107 mmol/L Final     CO2   Date Value Ref Range Status   11/16/2021 37 (H) 22 - 29 mmol/L Final   11/15/2021 40 (H) 22 - 29 mmol/L Final   11/14/2021 38 (H) 22 - 29 mmol/L Final     BUN   Date Value Ref Range Status   11/16/2021 38 (H) 6 - 23 mg/dL Final   11/15/2021 42 (H) 6 - 23 mg/dL Final   11/14/2021 42 (H) 6 - 23 mg/dL Final     CREATININE   Date Value Ref Range Status   11/16/2021 0.6 (L) 0.7 - 1.2 mg/dL Final   11/15/2021 0.7 0.7 - 1.2 mg/dL Final   11/14/2021 0.6 (L) 0.7 - 1.2 mg/dL Final     Glucose   Date Value Ref Range Status   11/16/2021 92 74 - 99 mg/dL Final   11/15/2021 89 74 - 99 mg/dL Final   11/14/2021 96 74 - 99 mg/dL Final     Calcium   Date Value Ref Range Status   11/16/2021 8.2 (L) 8.6 - 10.2 mg/dL Final   11/15/2021 8.4 (L) 8.6 - 10.2 mg/dL Final   11/14/2021 8.1 (L) 8.6 - 10.2 mg/dL Final     Total Protein   Date Value Ref Range Status   11/10/2021 5.5 (L) 6.4 - 8.3 g/dL Final   11/09/2021 5.8 (L) 6.4 - 8.3 g/dL Final   11/08/2021 6.1 (L) 6.4 - 8.3 g/dL Final     Albumin   Date Value Ref Range Status   11/10/2021 2.9 (L) 3.5 - 5.2 g/dL Final   11/09/2021 3.2 (L) 3.5 - 5.2 g/dL Final   11/08/2021 3.5 3.5 - 5.2 g/dL Final     Total Bilirubin   Date Value Ref Range Status   11/10/2021 0.4 0.0 - 1.2 mg/dL Final   11/09/2021 0.5 0.0 - 1.2 mg/dL Final   11/08/2021 0.4 0.0 - 1.2 mg/dL Final     Alkaline Phosphatase   Date Value Ref Range Status   11/10/2021 108 40 - 129 U/L Final   11/09/2021 118 40 - 129 U/L Final   11/08/2021 146 (H) 40 - 129 U/L Final     AST   Date Value Ref Range Status   11/10/2021 18 0 - 39 U/L Final   11/09/2021 21 0 - 39 U/L Final   11/08/2021 22 0 - 39 U/L Final     ALT   Date Value Ref Range Status   11/10/2021 18 0 - 40 U/L Final   11/09/2021 20 0 - 40 U/L Final   11/08/2021 19 0 - 40 U/L Final     GFR Non-   Date Value Ref Range Status   11/16/2021 >60 >=60 mL/min/1.73 Final     Comment:     Chronic Kidney for full details and interpretation of real time imaging. XR CHEST PORTABLE   Final Result   Slight worsening of CHF with new infiltrate and effusion at the right base         XR CHEST PORTABLE   Final Result   Interstitial lung disease which may relate to chronic fibrosis. The   appearance may be somewhat exacerbated by suboptimal inspiration. Cardiomegaly. Echo 9/11/21  IMPRESSION:  1. The left and right atria are both mildly to moderately dilated. The  remaining cardiac chamber sizes including aortic root size are within  normal limits. 2.  The left ventricle shows moderate concentric left ventricular  hypertrophy at 1.6 cm. Systolic function is hyperdynamic with ejection  fraction of 72%. No definite focal wall motion abnormality is seen. Diastolic filling is indeterminate. 3.  The mitral valve shows moderate to severe mitral annular  calcification. Maximal gradient across the mitral valve 7.3 mmHg. Mitral valve area by pressure half time 4.9 cm2. There is no  significant mitral stenosis. There is 1+ mitral regurgitation. 4.  Aortic valve is thickened and restricted. Maximal gradient 28 mmHg. Mean gradient 11.3. Aortic valve area estimated at 1.2 cm2. On  visualization, there is mild to at most moderate aortic stenosis with  trace aortic insufficiency only. 5.  There is no pulmonic stenosis nor insufficiency. There is 3+  moderately severe tricuspid regurgitation with severe pulmonary  hypertension at 89 mmHg. 6.  There is a small to medium circumferential pericardial effusion. There is no convincing evidence of tamponade physiology. There is no  definite intracavitary mass or thrombus or shunt identified on this  study.     (Independently reviewed by me)        Jose Collins M.D  Pulmonary & Critical Care  11/16/2021 3:04 PM

## 2021-11-16 NOTE — PROGRESS NOTES
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 male who presents with altered mental status at the nursing home. Patient symptoms started about a week ago patient was also noted to have increased weight gain and increasing lower leg edema. Patient normally alert and oriented x4 but has become much more lethargic and interactive. Patient gained 20 pounds at the nursing home. Patient was brought into the emergency room with patient chest x-ray showed interstitial lung disease and also cardiomegaly. ABG showed a pH 7.204 with a CO2 of 85 on 6 L nasal cannula. Patient then was started on BiPAP.  BUN/creatinine 43/1.0 with proBNP 3900 and troponin initially was 120. Liver enzymes are normal.  Heart rate was 75 normal temperature and blood pressure initially at 776 systolic currently is 33/51. Patient currently on 40% FiO2 BiPAP with O2 sat of 93%. Patient states he has been wearing O2 nasal cannula for last couple weeks at the nursing home but not before that. Patient denies wearing BiPAP in the past.  He denies any problem with any chest pain, abdominal pain, nausea vomiting or dizziness. ECG reviewed with patient have atrial fibrillation with no significant ST-T changes to suggest acute myocardial injury troponin was elevated on admission but troponin I-II hours later was less than on admission. 11/4/2021  Patient seen examined on PCU. Patient still very short of breath even at rest.  Patient seems to do fairly good on the BiPAP once he is off of it he seemed to have some more respiratory distress. BUN/creatinine 46/1.0 with patient's transaminases normal TSH 4.56. WBC 5.1 with hemoglobin 11. INR is 2.2 today. Temperature 98.3 with a heart rate of 100 blood pressure 130/40. O2 sat 91% on 7 L nasal cannula.  ABGs today were improved but the patient had a pH increase 7.355 with PCO2 decreased 56.2 while the patient was on BiPAP. Patient urine output has picked up since early this morning. 11/5/2021  Patient seen examined on PCU. Case discussed with intensivist yesterday afternoon. Patient looks a little bit better today. Nursing states that the patient has been changed to a DNR CC a with no chest compressions, intubation or cardioversion. Patient denies any chest or abdominal pain. Patient still with +34 lower leg edema. BUN/creatinine 48/1.0 with the patient's Liver enzymes are normal with a WBC 3.1 with hemoglobin 11.1. Temperature 97.7 with heart rate of 70 blood pressure 123/48. O2 sat is 91% on 10 L high flow nasal cannula. Urine output seems to be adequate. 11/6/2021  Patient seen examined on PCU. Patient is alert and oriented with patient currently had BiPAP on and feeling little bit more comfortable. BUN/creatinine 51/0.9 WBCs 5.5 hemoglobin 11.8. INR 2.8 today. Temperature 97.5 heart rate 85 blood pressure 107/69. O2 sat 97% on BiPAP at 75% FiO2. Urine output is inaccurate but seems to be adequate according to the patient who states he is urinating a lot. INR is 2.3 we will decrease the warfarin 1 mg p.o. daily. 11/7/2021  Patient seen examined on PCU. Patient seems to be doing little bit better today. Patient is wearing BiPAP almost all the time. Patient did eat all of his breakfast.  Patient denies any chest or abdominal pain. Patient states that he has not had a bowel movement yet. Patient's lower leg edema still prominent but seems to be mildly improved. BUN/creatinine 48/0.9 with a CO2 of 36. Transaminases are normal with free T4 1.1 and WBC 5.2 and hemoglobin 11.4. Platelet count 106. INR is 2.9 today. Temperature 97.7 with heart rate of 60 and blood pressure 108/869. O2 sat 98% on 12 L high flow nasal cannula alternating with BiPAP with FiO2 of 65%. .    11/8/2021  Patient seen and examined in PCU. Patient seems little bit more lethargic/confused this morning off of the BiPAP.  BUN/creatinine 48/0.8 with CO2 electrolytes at 38. Normal transaminases with INR down to 2.2 today. WBC 5.1 with hemoglobin 11.7. Temperature is 98.1 with heart rate 76 blood pressure 103/61. O2 sat 94% on 12 L high flow nasal cannula. Urinary output is   in accurate. 11/9/2021   Patient seen examined in PCU. Patient is again alert and oriented but somewhat short of breath with any activity and on the BiPAP most of the day. Patient denies any chest pain or abdominal pain. Patient supposed to go to surgery for cystoscopy and probable urethral dilation today with insertion of Melchor catheter. BUN/creatinine 54/1.0 today with liver enzymes normal WBC 11.1 hemoglobin 12.1. INR is 2.2 today. Temperature is 97.8 with heart rate of 90 and blood pressure 110/66. O2 sat is 95% with the patient on BiPAP 65% FiO2. Urine output is very inaccurate. Urology note reviewed with patient having severe bladder neck contracture and urology not able to dilate yesterday afternoon. 11/10/2021  Patient seen and examined in PCU. Patient looks better today. Patient is more alert and oriented. Patient denies any chest or abdominal pain. Patient urine output is improving. Patient still with marked lower leg edema. Urology note reviewed with the patient having a laser ablation of bladder neck contracture and placement of Melchor catheter yesterday afternoon. BUN/creatinine is 47/0.9 with normal liver enzymes. WBC 7.6 hemoglobin 11.8. INR is 1.9 today. ABGs performed showed pH 7.471 with PCO2 54.9 PO2 of 69. urinary output has improved with placement of Melchor catheter by 500 cc a shift. Stop IV fluids postop and continue the IV Lasix and will start SCDs lower extremities. 11/11/2021  Patient seen and examined in PCU. Patient sons at the bedside and case discussed. Patient looks and feels better.   Patient lower leg edema is moderately improved. Patient denies any problem with chest or abdominal pain. There is no nausea vomiting or dizziness. BUN/creatinine 49/0.8 with fasting blood sugar 135. Temperature 98.9 with heart rate in 96 and blood pressure ranges 90/50 6109/66. O2 sats 95% with the patient on 65% FiO2 BiPAP. Urinary output ranges 500-2900 cc a shift. 11/12/2021  Patient seen examined on PCU. Patient feels fairly good today. Case discussed with patient's nurse at bedside. Patient still has fairly good urinary output with the Melchor catheter in place. His lower leg edema is moderately improved but still has significant leg edema left greater than right. Temperature is 97.5 with a heart rate of 75 blood pressure 105/60. O2 sat 99% on heated high flow nasal cannula with FiO2 65%. 11/13/2021  Patient seen examined on PCU. Patient feels like he is breathing little better but still on BiPAP. Patient denies any chest or abdominal pain. There is no nausea or vomiting. BUN/creatinine 47/0.7. WBC is 9.7 hemoglobin 12.1. INR is 1.4 today. Urinary output ranges 500-700 cc a shift. Temperature 98.2 with heart rate 91 blood pressure currently 96/56. O2 sat 94% on 65% FiO2 BiPAP. 11/14/2021  Patient seen examined on PCU. BUN/creatinine 42/0.6 with INR 1.4 today. Temperature 98.7 with heart rate 88 blood pressure ranges 92/50102/60. O2 sat 93% on room 11 L high flow heated nasal cannula. Urine output ranges 700-950 cc a shift. 11/15/2021  Patient seen examined on PCU. Patient looks little bit better today. Patient denies any chest or abdominal pain. Patient lower leg edema again is moderately improved. Patient though still very very weak. BUN/creatinine 42/0.7. Hemoglobin 11.7. INR 1.5. Temperature 98.1 with heart rate 83 and blood pressure 121/62. O2 sat currently is 96% with the patient on heated high flow nasal cannula with FiO2 65%.   Urine output ranges 725-1550 cc a shift.    11/16/2021  Patient seen examined on PCU. Patient looks little bit more tired today. Patient is currently on the BiPAP.  BUN/creatinine 38/0.6 with CO2 electrolytes 37. INR 1.6 today. Temperature 97.6 with a heart rate of 78 and blood pressure 108/63. O2 sat is currently 90% on BiPAP. Patient is alternating with heated high flow nasal cannula FiO2 of 50%. Case discussed with pulmonology today. Past Medical History:    Past Medical History:   Diagnosis Date    Atrial fibrillation ([address]     CAD (coronary artery disease)     coronary stent 12yrs ago    [facility]     Hypertension      Past Surgical History:    Past Surgical History:   Procedure Laterality Date    BLADDER SURGERY N/A 11/9/2021    CYSTOSCOPY, LASER ABLATION OF BLADDER NECK STRICTURE,  PONCE CATHETER INSERTION performed by Moshe Corral MD at [address] [place] [address]      coronary stent    CATARACT REMOVAL WITH IMPLANT Left 04 01 2013    COLONOSCOPY      [facility]      [facility]    EYE SURGERY         Medications Prior to Admission:    @  Prior to Admission medications    Medication Sig Start Date End Date Taking? Authorizing Provider   ipratropium-albuterol (DUONEB) 0.5-2.5 (3) MG/3ML SOLN nebulizer solution Inhale 3 mLs into the lungs 4 times daily 9/16/21   Nghia Santiago DO   albuterol (PROVENTIL) (2.5 MG/3ML) 0.083% nebulizer solution Take 3 mLs by nebulization every 4 hours as needed for Wheezing or Shortness of Breath 9/16/21   Nghia Santiago DO   Respiratory Therapy Supplies (FULL KIT NEBULIZER SET) MISC Use as directed with nebulized medication. 9/16/21   Nghia Santiago DO   warfarin (COUMADIN) 4 MG tablet 4 mg daily 6 PM 9/16/21   Nghia Santiago DO   tamsulosin (FLOMAX) 0.4 MG capsule Take 0.4 mg by mouth daily    Historical Provider, MD   terazosin (HYTRIN) 5 MG capsule Take 10 mg by mouth daily.     Historical Provider, MD   metoprolol (TOPROL-XL) 50 MG XL tablet Take 50 mg by mouth daily. Historical Provider, MD   finasteride (PROSCAR) 5 MG tablet Take 5 mg by mouth daily. Historical Provider, MD   aspirin 81 MG tablet Take 81 mg by mouth daily. Historical Provider, MD       Allergies:  Dye [iodides]    Social History:   Social History     Socioeconomic History    Marital status:      Spouse name: Not on file    Number of children: Not on file    Years of education: Not on file    Highest education level: Not on file   Occupational History    Not on file   Tobacco Use    Smoking status: Former Smoker     Quit date: 3/27/1992     Years since quittin.6    Smokeless tobacco: Never Used   Substance and Sexual Activity    Alcohol use: No    Drug use: No    Sexual activity: Not on file   Other Topics Concern    Not on file   Social History Narrative    Not on file     Social Determinants of Health     Financial Resource Strain:     Difficulty of Paying Living Expenses: Not on file   Food Insecurity:     Worried About [address] in the Last Year: Not on file    Jian of Food in the Last Year: Not on file   Transportation Needs:     Lack of Transportation (Medical): Not on file    Lack of Transportation (Non-Medical):  Not on file   Physical Activity:     Days of Exercise per Week: Not on file    Minutes of Exercise per Session: Not on file   Stress:     Feeling of Stress : Not on file   Social Connections:     Frequency of Communication with Friends and Family: Not on file    Frequency of Social Gatherings with Friends and Family: Not on file    Attends Christianity Services: Not on file    Active Member of Clubs or Organizations: Not on file    Attends Club or Organization Meetings: Not on file    Marital Status: Not on file   Intimate Partner Violence:     Fear of Current or Ex-Partner: Not on file    Emotionally Abused: Not on file    Physically Abused: Not on file    Sexually Abused: Not on file   Housing Stability:     Unable to Pay for Housing in the Last Year: Not on file    Number of Places Lived in the Last Year: Not on file    Unstable Housing in the Last Year: Not on file       Family History:   History reviewed. No pertinent family history. REVIEW OF SYSTEMS:   Gen: Patient initially admitted from the nursing home with altered mental status but is doing much better at this time. Patient denies any lightheadedness or dizziness. No LOC or syncope. No fevers or chills. HEENT: No earache, sore throat or nasal congestion. Resp: Denies cough, hemoptysis or sputum production. Cardiac: Denies chest pain, +SOB, no diaphoresis or palpitations. GI: No nausea, vomiting, diarrhea or constipation. No melena or hematochezia. : No urinary complaints, dysuria, hematuria or frequency. MSK: + Diffuse lower extremity edema. No extremity weakness, paralysis or paresthesias. PHYSICAL EXAM:    Vitals:  /63   Pulse 78   Temp 97.6 °F (36.4 °C) (Temporal)   Resp 20   Ht 5' 8\" (1.727 m)   Wt 222 lb 0.1 oz (100.7 kg)   SpO2 95%   BMI 33.76 kg/m²     General:  This is a [de-identified] y.o. yo male who is alert and oriented in mild respiratory distress  HEENT:  Head is normocephalic and atraumatic, PERRLA, EOMI, mucus membranes moist with no pharyngeal erythema or exudate. Neck:  Supple with no carotid bruits, JVD or thyromegaly.   No cervical adenopathy  CV:  Regular rate and rhythm, no murmurs  Lungs: Coarse decreased breath sounds to auscultation bilaterally with no wheezes, rales or rhonchi  Abdomen:  Soft, nontender,+ abdominal fat, nondistended, bowel sounds present  Extremities:  + 2-3 leg edema, peripheral pulses intact bilaterally  Neuro:  Cranial nerves II-XII grossly intact; motor and sensory function intact with no focal deficits  Skin:  No rashes, lesions or wounds    DATA:  CBC with Differential:    Lab Results   Component Value Date    WBC 9.7 11/13/2021    RBC 3.80 11/13/2021    HGB 11.7 TSH 4.560 11/04/2021     IRON:  No results found for: IRON  LIPASE:    Lab Results   Component Value Date    LIPASE 74 09/08/2021       ASSESSMENT AND PLAN:      Patient Active Problem List    Diagnosis Date Noted    Acute on chronic respiratory failure with hypoxia and hypercapnia ([address] 11/03/2021    CAD (coronary artery disease)  Acute diastolic congestive heart failure     Atrial fibrillation - chronic     COPD with exacerbation     Hypertension-patient hypotensive in the ED      History of severe pulmonary hypertension at 99 mmHg with +34 tricuspid regurg 09/10/2021     Diffuse leg edema probably combination of the severe pulmonary hypertension and tricuspid regurg along with acute diastolic CHF 62/87/4249     Plan:  Admit to [facility]/PCU  Nursing home meds reviewed BiPAP  BiPAP -continuous at this time  DuoNeb aerosols every 4 hours  Pulmicort aerosol twice daily  Brovana aerosol twice daily  Continue warfarin   Daily INRs  Accurate I's and O's  BiPAP 16/8 at at bedtime and as needed   SCDs lower extremities    Consult pulmonology    MRSA screen  Add IV Rocephin, doxycycline    Colace 100 mg twice daily  MiraLAX 17 mg  daily as needed    Bladder scan > 900 cc  Urology consult-unable to dilate and insert Melchor catheter 11/8 PM  Cystoscopy and ureteral dilation 11/9 p.m. SCDs lower extremities    Warfarin increased 4 mg daily  Daily INRs  Lasix 40 mg IV push twice daily  Stop antibioticssee pulmonology note    BMP, CBC in a.m.       Vinayak Negron DO, D.OMark  11/16/2021  1:22 PM

## 2021-11-17 NOTE — PROGRESS NOTES
Comprehensive Nutrition Assessment    Type and Reason for Visit:  Reassess    Nutrition Recommendations/Plan: Continue with diet and ONS BID and monitor    Nutrition Assessment:  Pt admitted w/ Altered [place] status PMH of COPD. Pt tolerates diet,  p.o. intakes have been sporadic. Will continue with ONS BID and monitor    Malnutrition Assessment:  Malnutrition Status: At risk for malnutrition (Comment)    Context:  Chronic Illness     Findings of the 6 clinical characteristics of malnutrition:  Energy Intake:  Mild decrease in energy intake (Comment)  Weight Loss:  No significant weight loss     Body Fat Loss:  No significant body fat loss     Muscle Mass Loss:  No significant muscle mass loss    Fluid Accumulation:  No significant fluid accumulation     Strength:  Not Performed    Estimated Daily Nutrient Needs:  Energy (kcal):  1179-5609; Weight Used for Energy Requirements:  Current     Protein (g):   (x1.3-1.5gm/kg); Weight Used for Protein Requirements:  Ideal          Nutrition Related Findings:  A/ox4, abd WDL, +BS, -I/O -14.0L, BLE +2 edema, BUE +1 edema      Wounds:  None       Current Nutrition Therapies:    ADULT DIET; Regular  ADULT ORAL NUTRITION SUPPLEMENT; Breakfast, Dinner; Low Calorie/High Protein Oral Supplement    Anthropometric Measures:  · Height: 5' 8\" (172.7 cm)  · Current Body Weight: 222 lb (100.7 kg) (11/9 bed scale)   · Ideal Body Weight: 154 lbs; % Ideal Body Weight 144.2 %   · BMI: 33.8  · Adjusted Body Weight:  ; No Adjustment     · BMI Categories: Obese Class 1 (BMI 30.0-34. 9)       Nutrition Diagnosis:   · Inadequate oral intake related to impaired respiratory function as evidenced by intake 26-50%      Nutrition Interventions:   Food and/or Nutrient Delivery:  Continue Current Diet, Continue Oral Nutrition Supplement  Nutrition Education/Counseling:  Education not indicated   Coordination of Nutrition Care:  Continue to monitor while inpatient    Goals:  Pt to consume >50-75% meals/ONS       Nutrition Monitoring and Evaluation:   Behavioral-Environmental Outcomes:  None Identified   Food/Nutrient Intake Outcomes:  Food and Nutrient Intake, Supplement Intake  Physical Signs/Symptoms Outcomes:  Biochemical Data, Fluid Status or Edema, Nutrition Focused Physical Findings, Skin, Weight     Discharge Planning:     Too soon to determine     Electronically signed by Jaquelin Seo RD, LD on 11/17/21 at 2:31 PM EST    Contact: [phone]

## 2021-11-17 NOTE — PROGRESS NOTES
Pulmonary Progress Note    SUMMARY:  Raj Zazueta is a [de-identified] y.o. male who presented with recurrent respiratory failure  High BiPAP need / use    ISSUES:    Acute on chronic respiratory failure with hypercapnia and hypoxia  Coronary disease with diastolic heart failure severe  Aortic Stenosis  Chronic atrial fibrillation  Severe pulmonary hypertension with plus for tricuspid regurg  Protein caloric malnutrition    ABG with well compensated Chronic Hypercapneic Respiratory Failure   Using BiPAP intermittently with HFNC - on 50% FiO2  Try to lower FiO2 as much as possible - tolerating O2 sat > 90%  Negative fluid balance daily    Wean off steroids - placed on Pred 20 daily - wean off over next few days    Will need intermittent BiPAP if goes to SNF  Needs to have O2 supplementation lower to match SNF criteria    Reviewed with Dr. Chey Badillo    ______________________________________________      OBJECTIVE:  Hypoxemia        MEDICATIONS:   predniSONE  20 mg Oral Daily    warfarin  5 mg Oral Daily    sertraline  50 mg Oral Daily    docusate sodium  100 mg Oral BID    Vitamin D  2,000 Units Oral Daily    pantoprazole  40 mg IntraVENous Daily    And    sodium chloride (PF)  10 mL IntraVENous Daily    doxazosin  4 mg Oral Daily    metoprolol succinate  50 mg Oral Daily    aspirin  81 mg Oral Daily    tamsulosin  0.4 mg Oral Dinner    ipratropium-albuterol  3 mL Inhalation 4x Daily    furosemide  40 mg IntraVENous BID    Arformoterol Tartrate  15 mcg Nebulization BID    finasteride  5 mg Oral Daily    influenza virus vaccine  0.5 mL IntraMUSCular Prior to discharge        HYDROcodone 5 mg - acetaminophen, medicated lip ointment, hydrOXYzine, albuterol, acetaminophen, trimethobenzamide, polyethylene glycol    O/e  NAD  Diminished breath sounds bilateral  No wheeze or crackles      Vitals:    11/17/21 0815   BP: 103/65   Pulse: 72   Resp: 20   Temp: 97.6 °F (36.4 °C)   SpO2:      FiO2 : 50 %  O2 Flow Rate (L/min): 15 L/min  O2 Device: Nasal cannula      Vent Information  Skin Assessment: Clean, dry, & intact  FiO2 : 50 %  SpO2: 98 %  SpO2/FiO2 ratio: 188  I Time/ I Time %: 0.9 s  Mask Type: Full face mask  Mask Size: Medium    Additional Respiratory  Assessments  Pulse: 72  Resp: 20  SpO2: 98 %  Oral Care: Patient refused     URINARY CATHETER OUTPUT (Melchor):    [REMOVED] External Urinary Catheter-Output (mL): 150 mL  Urethral Catheter Double-lumen; Latex 22 fr-Output (mL): 700 mL    LABS:    WBC   Date Value Ref Range Status   11/13/2021 9.7 4.5 - 11.5 E9/L Final   11/10/2021 7.6 4.5 - 11.5 E9/L Final   11/09/2021 11.1 4.5 - 11.5 E9/L Final     Hemoglobin   Date Value Ref Range Status   11/15/2021 11.7 (L) 12.5 - 16.5 g/dL Final   11/13/2021 12.1 (L) 12.5 - 16.5 g/dL Final   11/12/2021 13.4 12.5 - 16.5 g/dL Final     Hematocrit   Date Value Ref Range Status   11/15/2021 37.8 37.0 - 54.0 % Final   11/13/2021 39.4 37.0 - 54.0 % Final   11/12/2021 42.5 37.0 - 54.0 % Final     MCV   Date Value Ref Range Status   11/13/2021 103.7 (H) 80.0 - 99.9 fL Final   11/10/2021 105.4 (H) 80.0 - 99.9 fL Final   11/09/2021 103.1 (H) 80.0 - 99.9 fL Final     Platelets   Date Value Ref Range Status   11/13/2021 115 (L) 130 - 450 E9/L Final   11/10/2021 106 (L) 130 - 450 E9/L Final   11/09/2021 109 (L) 130 - 450 E9/L Final     Sodium   Date Value Ref Range Status   11/16/2021 142 132 - 146 mmol/L Final   11/15/2021 139 132 - 146 mmol/L Final   11/14/2021 141 132 - 146 mmol/L Final     Potassium   Date Value Ref Range Status   11/16/2021 4.1 3.5 - 5.0 mmol/L Final   11/15/2021 4.1 3.5 - 5.0 mmol/L Final   11/14/2021 4.1 3.5 - 5.0 mmol/L Final     Potassium reflex Magnesium   Date Value Ref Range Status   11/03/2021 4.8 3.5 - 5.0 mmol/L Final   09/08/2021 6.5 (H) 3.5 - 5.0 mmol/L Final     Comment:     Specimen is moderately Hemolyzed. Result may be artificially increased.      Chloride   Date Value Ref Range Status   11/16/2021 97 (L) 98 - 107 mmol/L Final 11/15/2021 94 (L) 98 - 107 mmol/L Final   11/14/2021 96 (L) 98 - 107 mmol/L Final     CO2   Date Value Ref Range Status   11/16/2021 37 (H) 22 - 29 mmol/L Final   11/15/2021 40 (H) 22 - 29 mmol/L Final   11/14/2021 38 (H) 22 - 29 mmol/L Final     BUN   Date Value Ref Range Status   11/16/2021 38 (H) 6 - 23 mg/dL Final   11/15/2021 42 (H) 6 - 23 mg/dL Final   11/14/2021 42 (H) 6 - 23 mg/dL Final     CREATININE   Date Value Ref Range Status   11/16/2021 0.6 (L) 0.7 - 1.2 mg/dL Final   11/15/2021 0.7 0.7 - 1.2 mg/dL Final   11/14/2021 0.6 (L) 0.7 - 1.2 mg/dL Final     Glucose   Date Value Ref Range Status   11/16/2021 92 74 - 99 mg/dL Final   11/15/2021 89 74 - 99 mg/dL Final   11/14/2021 96 74 - 99 mg/dL Final     Calcium   Date Value Ref Range Status   11/16/2021 8.2 (L) 8.6 - 10.2 mg/dL Final   11/15/2021 8.4 (L) 8.6 - 10.2 mg/dL Final   11/14/2021 8.1 (L) 8.6 - 10.2 mg/dL Final     Total Protein   Date Value Ref Range Status   11/10/2021 5.5 (L) 6.4 - 8.3 g/dL Final   11/09/2021 5.8 (L) 6.4 - 8.3 g/dL Final   11/08/2021 6.1 (L) 6.4 - 8.3 g/dL Final     Albumin   Date Value Ref Range Status   11/10/2021 2.9 (L) 3.5 - 5.2 g/dL Final   11/09/2021 3.2 (L) 3.5 - 5.2 g/dL Final   11/08/2021 3.5 3.5 - 5.2 g/dL Final     Total Bilirubin   Date Value Ref Range Status   11/10/2021 0.4 0.0 - 1.2 mg/dL Final   11/09/2021 0.5 0.0 - 1.2 mg/dL Final   11/08/2021 0.4 0.0 - 1.2 mg/dL Final     Alkaline Phosphatase   Date Value Ref Range Status   11/10/2021 108 40 - 129 U/L Final   11/09/2021 118 40 - 129 U/L Final   11/08/2021 146 (H) 40 - 129 U/L Final     AST   Date Value Ref Range Status   11/10/2021 18 0 - 39 U/L Final   11/09/2021 21 0 - 39 U/L Final   11/08/2021 22 0 - 39 U/L Final     ALT   Date Value Ref Range Status   11/10/2021 18 0 - 40 U/L Final   11/09/2021 20 0 - 40 U/L Final   11/08/2021 19 0 - 40 U/L Final     GFR Non-   Date Value Ref Range Status   11/16/2021 >60 >=60 mL/min/1.73 Final Comment:     Chronic Kidney Disease: less than 60 ml/min/1.73 sq.m. Kidney Failure: less than 15 ml/min/1.73 sq.m. Results valid for patients 18 years and older. 11/15/2021 >60 >=60 mL/min/1.73 Final     Comment:     Chronic Kidney Disease: less than 60 ml/min/1.73 sq.m. Kidney Failure: less than 15 ml/min/1.73 sq.m. Results valid for patients 18 years and older. 11/14/2021 >60 >=60 mL/min/1.73 Final     Comment:     Chronic Kidney Disease: less than 60 ml/min/1.73 sq.m. Kidney Failure: less than 15 ml/min/1.73 sq.m. Results valid for patients 18 years and older. GFR    Date Value Ref Range Status   11/16/2021 >60  Final   11/15/2021 >60  Final   11/14/2021 >60  Final     Magnesium   Date Value Ref Range Status   11/05/2021 2.3 1.6 - 2.6 mg/dL Final   11/04/2021 2.2 1.6 - 2.6 mg/dL Final     Phosphorus   Date Value Ref Range Status   11/05/2021 4.4 2.5 - 4.5 mg/dL Final   11/04/2021 4.2 2.5 - 4.5 mg/dL Final     No results for input(s): PH, PO2, PCO2, HCO3, BE, O2SAT in the last 72 hours. No results for input(s): CULTRESP in the last 72 hours. Lab Results   Component Value Date    PH 7.471 11/10/2021    PH 7.355 11/04/2021    PH 7.204 11/03/2021    PH 7.443 09/13/2021    PO2 69.4 11/10/2021    PO2 105.6 11/04/2021    PO2 97.4 11/03/2021    PO2 85.1 09/13/2021    PCO2 54.9 11/10/2021    PCO2 56.2 11/04/2021    PCO2 85.0 11/03/2021    PCO2 35.9 09/13/2021    HCO3 39.1 11/10/2021    HCO3 30.7 11/04/2021    HCO3 32.8 11/03/2021    HCO3 24.0 09/13/2021    O2SAT 94.6 11/10/2021    O2SAT 97.5 11/04/2021    O2SAT 96.2 11/03/2021    O2SAT 96.3 09/13/2021       RADIOLOGY:  XR CHEST PORTABLE   Final Result   Significant regression of findings of volume overload/decompensated   congestive heart failure since the previous study of November 5. XR UROGRAM W OR WO KUB W OR WO TOMOGRAM   Final Result   Fluoroscopic support provided to subspecialty service.

## 2021-11-17 NOTE — PROGRESS NOTES
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 male who presents with altered mental status at the nursing home. Patient symptoms started about a week ago patient was also noted to have increased weight gain and increasing lower leg edema. Patient normally alert and oriented x4 but has become much more lethargic and interactive. Patient gained 20 pounds at the nursing home. Patient was brought into the emergency room with patient chest x-ray showed interstitial lung disease and also cardiomegaly. ABG showed a pH 7.204 with a CO2 of 85 on 6 L nasal cannula. Patient then was started on BiPAP.  BUN/creatinine 43/1.0 with proBNP 3900 and troponin initially was 120. Liver enzymes are normal.  Heart rate was 75 normal temperature and blood pressure initially at 825 systolic currently is 43/65. Patient currently on 40% FiO2 BiPAP with O2 sat of 93%. Patient states he has been wearing O2 nasal cannula for last couple weeks at the nursing home but not before that. Patient denies wearing BiPAP in the past.  He denies any problem with any chest pain, abdominal pain, nausea vomiting or dizziness. ECG reviewed with patient have atrial fibrillation with no significant ST-T changes to suggest acute myocardial injury troponin was elevated on admission but troponin I-II hours later was less than on admission. 11/4/2021  Patient seen examined on PCU. Patient still very short of breath even at rest.  Patient seems to do fairly good on the BiPAP once he is off of it he seemed to have some more respiratory distress. BUN/creatinine 46/1.0 with patient's transaminases normal TSH 4.56. WBC 5.1 with hemoglobin 11. INR is 2.2 today. Temperature 98.3 with a heart rate of 100 blood pressure 130/40. O2 sat 91% on 7 L nasal cannula.  ABGs today were improved but the patient had a pH increase 7.355 with PCO2 decreased 56.2 while the patient was on BiPAP. Patient urine output has picked up since early this morning. 11/5/2021  Patient seen examined on PCU. Case discussed with intensivist yesterday afternoon. Patient looks a little bit better today. Nursing states that the patient has been changed to a DNR CC a with no chest compressions, intubation or cardioversion. Patient denies any chest or abdominal pain. Patient still with +34 lower leg edema. BUN/creatinine 48/1.0 with the patient's Liver enzymes are normal with a WBC 3.1 with hemoglobin 11.1. Temperature 97.7 with heart rate of 70 blood pressure 123/48. O2 sat is 91% on 10 L high flow nasal cannula. Urine output seems to be adequate. 11/6/2021  Patient seen examined on PCU. Patient is alert and oriented with patient currently had BiPAP on and feeling little bit more comfortable. BUN/creatinine 51/0.9 WBCs 5.5 hemoglobin 11.8. INR 2.8 today. Temperature 97.5 heart rate 85 blood pressure 107/69. O2 sat 97% on BiPAP at 75% FiO2. Urine output is inaccurate but seems to be adequate according to the patient who states he is urinating a lot. INR is 2.3 we will decrease the warfarin 1 mg p.o. daily. 11/7/2021  Patient seen examined on PCU. Patient seems to be doing little bit better today. Patient is wearing BiPAP almost all the time. Patient did eat all of his breakfast.  Patient denies any chest or abdominal pain. Patient states that he has not had a bowel movement yet. Patient's lower leg edema still prominent but seems to be mildly improved. BUN/creatinine 48/0.9 with a CO2 of 36. Transaminases are normal with free T4 1.1 and WBC 5.2 and hemoglobin 11.4. Platelet count 028. INR is 2.9 today. Temperature 97.7 with heart rate of 60 and blood pressure 108/869. O2 sat 98% on 12 L high flow nasal cannula alternating with BiPAP with FiO2 of 65%. .    11/8/2021  Patient seen and examined in PCU. Patient seems little bit more lethargic/confused this morning off of the BiPAP.  BUN/creatinine 48/0.8 with CO2 electrolytes at 38. Normal transaminases with INR down to 2.2 today. WBC 5.1 with hemoglobin 11.7. Temperature is 98.1 with heart rate 76 blood pressure 103/61. O2 sat 94% on 12 L high flow nasal cannula. Urinary output is   in accurate. 11/9/2021   Patient seen examined in PCU. Patient is again alert and oriented but somewhat short of breath with any activity and on the BiPAP most of the day. Patient denies any chest pain or abdominal pain. Patient supposed to go to surgery for cystoscopy and probable urethral dilation today with insertion of Melchor catheter. BUN/creatinine 54/1.0 today with liver enzymes normal WBC 11.1 hemoglobin 12.1. INR is 2.2 today. Temperature is 97.8 with heart rate of 90 and blood pressure 110/66. O2 sat is 95% with the patient on BiPAP 65% FiO2. Urine output is very inaccurate. Urology note reviewed with patient having severe bladder neck contracture and urology not able to dilate yesterday afternoon. 11/10/2021  Patient seen and examined in PCU. Patient looks better today. Patient is more alert and oriented. Patient denies any chest or abdominal pain. Patient urine output is improving. Patient still with marked lower leg edema. Urology note reviewed with the patient having a laser ablation of bladder neck contracture and placement of Melchor catheter yesterday afternoon. BUN/creatinine is 47/0.9 with normal liver enzymes. WBC 7.6 hemoglobin 11.8. INR is 1.9 today. ABGs performed showed pH 7.471 with PCO2 54.9 PO2 of 69. urinary output has improved with placement of Melchor catheter by 500 cc a shift. Stop IV fluids postop and continue the IV Lasix and will start SCDs lower extremities. 11/11/2021  Patient seen and examined in PCU. Patient sons at the bedside and case discussed. Patient looks and feels better.   Patient lower leg edema is moderately improved. Patient denies any problem with chest or abdominal pain. There is no nausea vomiting or dizziness. BUN/creatinine 49/0.8 with fasting blood sugar 135. Temperature 98.9 with heart rate in 96 and blood pressure ranges 90/50 6109/66. O2 sats 95% with the patient on 65% FiO2 BiPAP. Urinary output ranges 500-2900 cc a shift. 11/12/2021  Patient seen examined on PCU. Patient feels fairly good today. Case discussed with patient's nurse at bedside. Patient still has fairly good urinary output with the Melchor catheter in place. His lower leg edema is moderately improved but still has significant leg edema left greater than right. Temperature is 97.5 with a heart rate of 75 blood pressure 105/60. O2 sat 99% on heated high flow nasal cannula with FiO2 65%. 11/13/2021  Patient seen examined on PCU. Patient feels like he is breathing little better but still on BiPAP. Patient denies any chest or abdominal pain. There is no nausea or vomiting. BUN/creatinine 47/0.7. WBC is 9.7 hemoglobin 12.1. INR is 1.4 today. Urinary output ranges 500-700 cc a shift. Temperature 98.2 with heart rate 91 blood pressure currently 96/56. O2 sat 94% on 65% FiO2 BiPAP. 11/14/2021  Patient seen examined on PCU. BUN/creatinine 42/0.6 with INR 1.4 today. Temperature 98.7 with heart rate 88 blood pressure ranges 92/50102/60. O2 sat 93% on room 11 L high flow heated nasal cannula. Urine output ranges 700-950 cc a shift. 11/15/2021  Patient seen examined on PCU. Patient looks little bit better today. Patient denies any chest or abdominal pain. Patient lower leg edema again is moderately improved. Patient though still very very weak. BUN/creatinine 42/0.7. Hemoglobin 11.7. INR 1.5. Temperature 98.1 with heart rate 83 and blood pressure 121/62. O2 sat currently is 96% with the patient on heated high flow nasal cannula with FiO2 65%.   Urine output ranges 725-1550 cc a MISC Use as directed with nebulized medication. 21   Nia Melendez, DO   warfarin (COUMADIN) 4 MG tablet 4 mg daily 6 PM 21   Mingtrevor Mar, DO   tamsulosin (FLOMAX) 0.4 MG capsule Take 0.4 mg by mouth daily    Historical Provider, MD   terazosin (HYTRIN) 5 MG capsule Take 10 mg by mouth daily. Historical Provider, MD   metoprolol (TOPROL-XL) 50 MG XL tablet Take 50 mg by mouth daily. Historical Provider, MD   finasteride (PROSCAR) 5 MG tablet Take 5 mg by mouth daily. Historical Provider, MD   aspirin 81 MG tablet Take 81 mg by mouth daily. Historical Provider, MD       Allergies:  Dye [iodides]    Social History:   Social History     Socioeconomic History    Marital status:      Spouse name: Not on file    Number of children: Not on file    Years of education: Not on file    Highest education level: Not on file   Occupational History    Not on file   Tobacco Use    Smoking status: Former Smoker     Quit date: 3/27/1992     Years since quittin.6    Smokeless tobacco: Never Used   Substance and Sexual Activity    Alcohol use: No    Drug use: No    Sexual activity: Not on file   Other Topics Concern    Not on file   Social History Narrative    Not on file     Social Determinants of Health     Financial Resource Strain:     Difficulty of Paying Living Expenses: Not on file   Food Insecurity:     Worried About [address] in the Last Year: Not on file    Jian of Food in the Last Year: Not on file   Transportation Needs:     Lack of Transportation (Medical): Not on file    Lack of Transportation (Non-Medical):  Not on file   Physical Activity:     Days of Exercise per Week: Not on file    Minutes of Exercise per Session: Not on file   Stress:     Feeling of Stress : Not on file   Social Connections:     Frequency of Communication with Friends and Family: Not on file    Frequency of Social Gatherings with Friends and Family: Not on file    Attends Latter day Services: Not on file    Active Member of Clubs or Organizations: Not on file    Attends Club or Organization Meetings: Not on file    Marital Status: Not on file   Intimate Partner Violence:     Fear of Current or Ex-Partner: Not on file    Emotionally Abused: Not on file    Physically Abused: Not on file    Sexually Abused: Not on file   Housing Stability:     Unable to Pay for Housing in the Last Year: Not on file    Number of [place] in the Last Year: Not on file    Unstable Housing in the Last Year: Not on file       Family History:   History reviewed. No pertinent family history. REVIEW OF SYSTEMS:   Gen: Patient initially admitted from the nursing home with altered mental status but is doing much better at this time. Patient denies any lightheadedness or dizziness. No LOC or syncope. No fevers or chills. HEENT: No earache, sore throat or nasal congestion. Resp: Denies cough, hemoptysis or sputum production. Cardiac: Denies chest pain, +SOB, no diaphoresis or palpitations. GI: No nausea, vomiting, diarrhea or constipation. No melena or hematochezia. : No urinary complaints, dysuria, hematuria or frequency. MSK: + Diffuse lower extremity edema. No extremity weakness, paralysis or paresthesias. PHYSICAL EXAM:    Vitals:  /65   Pulse 72   Temp 97.6 °F (36.4 °C) (Oral)   Resp 20   Ht 5' 8\" (1.727 m)   Wt 222 lb 0.1 oz (100.7 kg)   SpO2 98%   BMI 33.76 kg/m²     General:  This is a [de-identified] y.o. yo male who is alert and oriented in mild respiratory distress  HEENT:  Head is normocephalic and atraumatic, PERRLA, EOMI, mucus membranes moist with no pharyngeal erythema or exudate. Neck:  Supple with no carotid bruits, JVD or thyromegaly.   No cervical adenopathy  CV:  Regular rate and rhythm, no murmurs  Lungs: Coarse decreased breath sounds to auscultation bilaterally with no wheezes, rales or rhonchi  Abdomen:  Soft, nontender,+ abdominal fat, nondistended, bowel sounds present  Extremities:  + 2-3 leg edema, peripheral pulses intact bilaterally  Neuro:  Cranial nerves II-XII grossly intact; motor and sensory function intact with no focal deficits  Skin:  No rashes, lesions or wounds    DATA:  CBC with Differential:    Lab Results   Component Value Date    WBC 9.7 11/13/2021    RBC 3.80 11/13/2021    HGB 11.7 11/15/2021    HCT 37.8 11/15/2021     11/13/2021    .7 11/13/2021    MCH 31.8 11/13/2021    MCHC 30.7 11/13/2021    RDW 14.1 11/13/2021    BLASTSPCT 0.0 11/04/2021    LYMPHOPCT 6.4 11/13/2021    MONOPCT 8.7 11/13/2021    BASOPCT 0.1 11/13/2021    MONOSABS 0.85 11/13/2021    LYMPHSABS 0.62 11/13/2021    EOSABS 0.05 11/13/2021    BASOSABS 0.01 11/13/2021     CMP:    Lab Results   Component Value Date     11/16/2021    K 4.1 11/16/2021    K 4.8 11/03/2021    CL 97 11/16/2021    CO2 37 11/16/2021    BUN 38 11/16/2021    CREATININE 0.6 11/16/2021    GFRAA >60 11/16/2021    LABGLOM >60 11/16/2021    GLUCOSE 92 11/16/2021    PROT 5.5 11/10/2021    LABALBU 2.9 11/10/2021    CALCIUM 8.2 11/16/2021    BILITOT 0.4 11/10/2021    ALKPHOS 108 11/10/2021    AST 18 11/10/2021    ALT 18 11/10/2021     Magnesium:    Lab Results   Component Value Date    MG 2.3 11/05/2021     Phosphorus:    Lab Results   Component Value Date    PHOS 4.4 11/05/2021     PT/INR:    Lab Results   Component Value Date    PROTIME 19.0 11/16/2021    INR 1.6 11/16/2021     Troponin:  No results found for: TROPONINI  U/A:    Lab Results   Component Value Date    COLORU Yellow 11/03/2021    PROTEINU Negative 11/03/2021    PHUR 5.0 11/03/2021    WBCUA 1-3 11/03/2021    RBCUA 10-20 11/03/2021    BACTERIA RARE 11/03/2021    CLARITYU Clear 11/03/2021    SPECGRAV 1.025 11/03/2021    LEUKOCYTESUR Negative 11/03/2021    UROBILINOGEN 0.2 11/03/2021    BILIRUBINUR Negative 11/03/2021    BLOODU LARGE 11/03/2021    GLUCOSEU Negative 11/03/2021     ABG:    Lab Results   Component Value Date    PH 7.471 11/10/2021    PCO2 54.9 11/10/2021    PO2 69.4 11/10/2021    HCO3 39.1 11/10/2021    BE 13.2 11/10/2021    O2SAT 94.6 11/10/2021     HgBA1c:  No results found for: LABA1C  FLP:    Lab Results   Component Value Date    TRIG 46 11/04/2021    HDL 81 11/04/2021    LDLCALC 46 11/04/2021    LABVLDL 9 11/04/2021     TSH:    Lab Results   Component Value Date    TSH 4.560 11/04/2021     IRON:  No results found for: IRON  LIPASE:    Lab Results   Component Value Date    LIPASE 74 09/08/2021       ASSESSMENT AND PLAN:      Patient Active Problem List    Diagnosis Date Noted    Acute on chronic respiratory failure with hypoxia and hypercapnia ([facility] [address] 11/03/2021    CAD (coronary artery disease)  Acute diastolic congestive heart failure     Atrial fibrillation - chronic     COPD with exacerbation     Hypertension-patient hypotensive in the ED      History of severe pulmonary hypertension at 99 mmHg with +34 tricuspid regurg 09/10/2021     Diffuse leg edema probably combination of the severe pulmonary hypertension and tricuspid regurg along with acute diastolic CHF 77/63/9040     Plan:  Admit to [facility]/PCU  Nursing home meds reviewed BiPAP  BiPAP -continuous at this time  DuoNeb aerosols every 4 hours  Pulmicort aerosol twice daily  Brovana aerosol twice daily  Continue warfarin   Daily INRs  Accurate I's and O's  BiPAP 16/8 at at bedtime and as needed   SCDs lower extremities    Consult pulmonology    MRSA screen  Add IV Rocephin, doxycycline    Colace 100 mg twice daily  MiraLAX 17 mg  daily as needed    Bladder scan > 900 cc  Urology consult-unable to dilate and insert Melchor catheter 11/8 PM  Cystoscopy and ureteral dilation 11/9 p.m. SCDs lower extremities    Warfarin increased 4 mg daily  Daily INRs  Lasix 40 mg IV push twice daily  Stop antibioticssee pulmonology note    CMP, CBC in a.m.       Oscar Li DO, D.O.  11/17/2021  9:21 AM

## 2021-11-18 NOTE — PROGRESS NOTES
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 male who presents with altered mental status at the nursing home. Patient symptoms started about a week ago patient was also noted to have increased weight gain and increasing lower leg edema. Patient normally alert and oriented x4 but has become much more lethargic and interactive. Patient gained 20 pounds at the nursing home. Patient was brought into the emergency room with patient chest x-ray showed interstitial lung disease and also cardiomegaly. ABG showed a pH 7.204 with a CO2 of 85 on 6 L nasal cannula. Patient then was started on BiPAP.  BUN/creatinine 43/1.0 with proBNP 3900 and troponin initially was 120. Liver enzymes are normal.  Heart rate was 75 normal temperature and blood pressure initially at 755 systolic currently is 14/36. Patient currently on 40% FiO2 BiPAP with O2 sat of 93%. Patient states he has been wearing O2 nasal cannula for last couple weeks at the nursing home but not before that. Patient denies wearing BiPAP in the past.  He denies any problem with any chest pain, abdominal pain, nausea vomiting or dizziness. ECG reviewed with patient have atrial fibrillation with no significant ST-T changes to suggest acute myocardial injury troponin was elevated on admission but troponin I-II hours later was less than on admission. 11/4/2021  Patient seen examined on PCU. Patient still very short of breath even at rest.  Patient seems to do fairly good on the BiPAP once he is off of it he seemed to have some more respiratory distress. BUN/creatinine 46/1.0 with patient's transaminases normal TSH 4.56. WBC 5.1 with hemoglobin 11. INR is 2.2 today. Temperature 98.3 with a heart rate of 100 blood pressure 130/40. O2 sat 91% on 7 L nasal cannula.  ABGs today were improved but the patient had a pH increase 7.355 with PCO2 decreased 56.2 while the patient was on BiPAP. Patient urine output has picked up since early this morning. 11/5/2021  Patient seen examined on PCU. Case discussed with intensivist yesterday afternoon. Patient looks a little bit better today. Nursing states that the patient has been changed to a DNR CC a with no chest compressions, intubation or cardioversion. Patient denies any chest or abdominal pain. Patient still with +34 lower leg edema. BUN/creatinine 48/1.0 with the patient's Liver enzymes are normal with a WBC 3.1 with hemoglobin 11.1. Temperature 97.7 with heart rate of 70 blood pressure 123/48. O2 sat is 91% on 10 L high flow nasal cannula. Urine output seems to be adequate. 11/6/2021  Patient seen examined on PCU. Patient is alert and oriented with patient currently had BiPAP on and feeling little bit more comfortable. BUN/creatinine 51/0.9 WBCs 5.5 hemoglobin 11.8. INR 2.8 today. Temperature 97.5 heart rate 85 blood pressure 107/69. O2 sat 97% on BiPAP at 75% FiO2. Urine output is inaccurate but seems to be adequate according to the patient who states he is urinating a lot. INR is 2.3 we will decrease the warfarin 1 mg p.o. daily. 11/7/2021  Patient seen examined on PCU. Patient seems to be doing little bit better today. Patient is wearing BiPAP almost all the time. Patient did eat all of his breakfast.  Patient denies any chest or abdominal pain. Patient states that he has not had a bowel movement yet. Patient's lower leg edema still prominent but seems to be mildly improved. BUN/creatinine 48/0.9 with a CO2 of 36. Transaminases are normal with free T4 1.1 and WBC 5.2 and hemoglobin 11.4. Platelet count 251. INR is 2.9 today. Temperature 97.7 with heart rate of 60 and blood pressure 108/869. O2 sat 98% on 12 L high flow nasal cannula alternating with BiPAP with FiO2 of 65%. .    11/8/2021  Patient seen and examined in PCU. Patient seems little bit more lethargic/confused this morning off of the BiPAP.  BUN/creatinine 48/0.8 with CO2 electrolytes at 38. Normal transaminases with INR down to 2.2 today. WBC 5.1 with hemoglobin 11.7. Temperature is 98.1 with heart rate 76 blood pressure 103/61. O2 sat 94% on 12 L high flow nasal cannula. Urinary output is   in accurate. 11/9/2021   Patient seen examined in PCU. Patient is again alert and oriented but somewhat short of breath with any activity and on the BiPAP most of the day. Patient denies any chest pain or abdominal pain. Patient supposed to go to surgery for cystoscopy and probable urethral dilation today with insertion of Melchor catheter. BUN/creatinine 54/1.0 today with liver enzymes normal WBC 11.1 hemoglobin 12.1. INR is 2.2 today. Temperature is 97.8 with heart rate of 90 and blood pressure 110/66. O2 sat is 95% with the patient on BiPAP 65% FiO2. Urine output is very inaccurate. Urology note reviewed with patient having severe bladder neck contracture and urology not able to dilate yesterday afternoon. 11/10/2021  Patient seen and examined in PCU. Patient looks better today. Patient is more alert and oriented. Patient denies any chest or abdominal pain. Patient urine output is improving. Patient still with marked lower leg edema. Urology note reviewed with the patient having a laser ablation of bladder neck contracture and placement of Melchor catheter yesterday afternoon. BUN/creatinine is 47/0.9 with normal liver enzymes. WBC 7.6 hemoglobin 11.8. INR is 1.9 today. ABGs performed showed pH 7.471 with PCO2 54.9 PO2 of 69. urinary output has improved with placement of Melchor catheter by 500 cc a shift. Stop IV fluids postop and continue the IV Lasix and will start SCDs lower extremities. 11/11/2021  Patient seen and examined in PCU. Patient sons at the bedside and case discussed. Patient looks and feels better.   Patient lower leg edema is moderately improved. Patient denies any problem with chest or abdominal pain. There is no nausea vomiting or dizziness. BUN/creatinine 49/0.8 with fasting blood sugar 135. Temperature 98.9 with heart rate in 96 and blood pressure ranges 90/50 6109/66. O2 sats 95% with the patient on 65% FiO2 BiPAP. Urinary output ranges 500-2900 cc a shift. 11/12/2021  Patient seen examined on PCU. Patient feels fairly good today. Case discussed with patient's nurse at bedside. Patient still has fairly good urinary output with the Melchor catheter in place. His lower leg edema is moderately improved but still has significant leg edema left greater than right. Temperature is 97.5 with a heart rate of 75 blood pressure 105/60. O2 sat 99% on heated high flow nasal cannula with FiO2 65%. 11/13/2021  Patient seen examined on PCU. Patient feels like he is breathing little better but still on BiPAP. Patient denies any chest or abdominal pain. There is no nausea or vomiting. BUN/creatinine 47/0.7. WBC is 9.7 hemoglobin 12.1. INR is 1.4 today. Urinary output ranges 500-700 cc a shift. Temperature 98.2 with heart rate 91 blood pressure currently 96/56. O2 sat 94% on 65% FiO2 BiPAP. 11/14/2021  Patient seen examined on PCU. BUN/creatinine 42/0.6 with INR 1.4 today. Temperature 98.7 with heart rate 88 blood pressure ranges 92/50102/60. O2 sat 93% on room 11 L high flow heated nasal cannula. Urine output ranges 700-950 cc a shift. 11/15/2021  Patient seen examined on PCU. Patient looks little bit better today. Patient denies any chest or abdominal pain. Patient lower leg edema again is moderately improved. Patient though still very very weak. BUN/creatinine 42/0.7. Hemoglobin 11.7. INR 1.5. Temperature 98.1 with heart rate 83 and blood pressure 121/62. O2 sat currently is 96% with the patient on heated high flow nasal cannula with FiO2 65%.   Urine output ranges 725-1550 cc a shift.    11/16/2021  Patient seen examined on PCU. Patient looks little bit more tired today. Patient is currently on the BiPAP.  BUN/creatinine 38/0.6 with CO2 electrolytes 37. INR 1.6 today. Temperature 97.6 with a heart rate of 78 and blood pressure 108/63. O2 sat is currently 92% on BiPAP. Patient is alternating with heated high flow nasal cannula FiO2 of 50%. Case discussed with pulmonology today. 11/17/2021  Patient seen examined on PCU. Patient is about the same. Patient denies any chest or abdominal pain. Patient still very very weak. Case discussed with pulmonologist today. INR 1.8. Temperature 97.6 72 blood pressure 103/65. O2 sat 96% on 15 L high flow heated nasal cannula. Patient desaturates with any activity. Urinary output is very good. 11/18/2021  Patient seen examined on PCU. Patient is alert and oriented. Patient still very very weak and desats with any activity including moving side to side in bed. BUN/creatinine 33/0.7. Liver enzymes normal with a WBC 8.4 and hemoglobin 1.9. INR is 1.8 today. Temperature 98.3 with heart rate of 74 with blood pressure 114/58 with heart rate 93% on 15 L high flow nasal cannula. Urinary output is fairly good.       Past Medical History:    Past Medical History:   Diagnosis Date    Atrial fibrillation ([address]     CAD (coronary artery disease)     coronary stent 12yrs ago    COPD (chronic obstructive pulmonary disease) ([address]     Hypertension      Past Surgical History:    Past Surgical History:   Procedure Laterality Date    BLADDER SURGERY N/A 11/9/2021    CYSTOSCOPY, LASER ABLATION OF BLADDER NECK STRICTURE,  PONCE CATHETER INSERTION performed by Maliha Rodas MD at [address] [place] [address]      coronary stent    CATARACT REMOVAL WITH IMPLANT Left 04 01 2013    COLONOSCOPY      CYST REMOVAL      tailbone    EYE SURGERY         Medications Prior to Admission:    @  Prior to Admission medications    Medication Sig Start Date End Date Taking? Authorizing Provider   ipratropium-albuterol (DUONEB) 0.5-2.5 (3) MG/3ML SOLN nebulizer solution Inhale 3 mLs into the lungs 4 times daily 21   Elizabeth Hernandez, DO   albuterol (PROVENTIL) (2.5 MG/3ML) 0.083% nebulizer solution Take 3 mLs by nebulization every 4 hours as needed for Wheezing or Shortness of Breath 21   Elizabeth Hernandez, DO   Respiratory Therapy Supplies (FULL KIT NEBULIZER SET) MISC Use as directed with nebulized medication. 21   Elizabeth Hernandez, DO   warfarin (COUMADIN) 4 MG tablet 4 mg daily 6 PM 21   Elizabeth Hernandez, DO   tamsulosin (FLOMAX) 0.4 MG capsule Take 0.4 mg by mouth daily    Historical Provider, MD   terazosin (HYTRIN) 5 MG capsule Take 10 mg by mouth daily. Historical Provider, MD   metoprolol (TOPROL-XL) 50 MG XL tablet Take 50 mg by mouth daily. Historical Provider, MD   finasteride (PROSCAR) 5 MG tablet Take 5 mg by mouth daily. Historical Provider, MD   aspirin 81 MG tablet Take 81 mg by mouth daily.     Historical Provider, MD       Allergies:  Dye [iodides]    Social History:   Social History     Socioeconomic History    Marital status:      Spouse name: Not on file    Number of children: Not on file    Years of education: Not on file    Highest education level: Not on file   Occupational History    Not on file   Tobacco Use    Smoking status: Former Smoker     Quit date: 3/27/1992     Years since quittin.6    Smokeless tobacco: Never Used   Substance and Sexual Activity    Alcohol use: No    Drug use: No    Sexual activity: Not on file   Other Topics Concern    Not on file   Social History Narrative    Not on file     Social Determinants of Health     Financial Resource Strain:     Difficulty of Paying Living Expenses: Not on file   Food Insecurity:     Worried About Running Out of Food in the Last Year: Not on file    Jian of Food in the Last Year: Not on file   Transportation Needs:     Lack of Transportation (Medical): Not on file    Lack of Transportation (Non-Medical): Not on file   Physical Activity:     Days of Exercise per Week: Not on file    Minutes of Exercise per Session: Not on file   Stress:     Feeling of Stress : Not on file   Social Connections:     Frequency of Communication with Friends and Family: Not on file    Frequency of Social Gatherings with Friends and Family: Not on file    Attends Scientologist Services: Not on file    Active Member of [address] [place] [address] or Organizations: Not on file    Attends Club or Organization Meetings: Not on file    Marital Status: Not on file   Intimate Partner Violence:     Fear of Current or Ex-Partner: Not on file    Emotionally Abused: Not on file    Physically Abused: Not on file    Sexually Abused: Not on file   Housing Stability:     Unable to Pay for Housing in the Last Year: Not on file    Number of [place] in the Last Year: Not on file    Unstable Housing in the Last Year: Not on file       Family History:   History reviewed. No pertinent family history. REVIEW OF SYSTEMS:   Gen: Patient initially admitted from the nursing home with altered mental status but is doing much better at this time. Patient denies any lightheadedness or dizziness. No LOC or syncope. No fevers or chills. HEENT: No earache, sore throat or nasal congestion. Resp: Denies cough, hemoptysis or sputum production. Cardiac: Denies chest pain, +SOB, no diaphoresis or palpitations. GI: No nausea, vomiting, diarrhea or constipation. No melena or hematochezia. : No urinary complaints, dysuria, hematuria or frequency. MSK: + Diffuse lower extremity edema. No extremity weakness, paralysis or paresthesias.      PHYSICAL EXAM:    Vitals:  BP (!) 98/46   Pulse 74   Temp 98 °F (36.7 °C) (Oral)   Resp 16   Ht 5' 8\" (1.727 m)   Wt 222 lb 0.1 oz (100.7 kg)   SpO2 95%   BMI 33.76 kg/m²     General:  This is a [de-identified] y.o. yo male who is alert and oriented in mild respiratory distress  HEENT:  Head is normocephalic and atraumatic, PERRLA, EOMI, mucus membranes moist with no pharyngeal erythema or exudate. Neck:  Supple with no carotid bruits, JVD or thyromegaly.   No cervical adenopathy  CV:  Regular rate and rhythm, no murmurs  Lungs: Coarse decreased breath sounds to auscultation bilaterally with no wheezes, rales or rhonchi  Abdomen:  Soft, nontender,+ abdominal fat, nondistended, bowel sounds present  Extremities:  + 2-3 leg edema, peripheral pulses intact bilaterally  Neuro:  Cranial nerves II-XII grossly intact; motor and sensory function intact with no focal deficits  Skin:  No rashes, lesions or wounds    DATA:  CBC with Differential:    Lab Results   Component Value Date    WBC 8.4 11/18/2021    RBC 3.78 11/18/2021    HGB 11.9 11/18/2021    HCT 39.1 11/18/2021     11/18/2021    .4 11/18/2021    MCH 31.5 11/18/2021    MCHC 30.4 11/18/2021    RDW 14.4 11/18/2021    BLASTSPCT 0.0 11/04/2021    LYMPHOPCT 7.5 11/18/2021    MONOPCT 5.8 11/18/2021    BASOPCT 0.1 11/18/2021    MONOSABS 0.49 11/18/2021    LYMPHSABS 0.63 11/18/2021    EOSABS 0.04 11/18/2021    BASOSABS 0.01 11/18/2021     CMP:    Lab Results   Component Value Date     11/18/2021    K 3.7 11/18/2021    K 4.8 11/03/2021    CL 92 11/18/2021    CO2 37 11/18/2021    BUN 33 11/18/2021    CREATININE 0.7 11/18/2021    GFRAA >60 11/18/2021    LABGLOM >60 11/18/2021    GLUCOSE 83 11/18/2021    PROT 5.3 11/18/2021    LABALBU 2.9 11/18/2021    CALCIUM 8.3 11/18/2021    BILITOT 0.7 11/18/2021    ALKPHOS 154 11/18/2021    AST 22 11/18/2021    ALT 25 11/18/2021     Magnesium:    Lab Results   Component Value Date    MG 2.3 11/05/2021     Phosphorus:    Lab Results   Component Value Date    PHOS 4.4 11/05/2021     PT/INR:    Lab Results   Component Value Date    PROTIME 21.6 11/18/2021    INR 1.8 11/18/2021     Troponin:  No results found for: TROPONINI  U/A:    Lab Results   Component Value Date COLORU Yellow 11/03/2021    PROTEINU Negative 11/03/2021    PHUR 5.0 11/03/2021    WBCUA 1-3 11/03/2021    RBCUA 10-20 11/03/2021    BACTERIA RARE 11/03/2021    CLARITYU Clear 11/03/2021    SPECGRAV 1.025 11/03/2021    LEUKOCYTESUR Negative 11/03/2021    UROBILINOGEN 0.2 11/03/2021    BILIRUBINUR Negative 11/03/2021    BLOODU LARGE 11/03/2021    GLUCOSEU Negative 11/03/2021     ABG:    Lab Results   Component Value Date    PH 7.471 11/10/2021    PCO2 54.9 11/10/2021    PO2 69.4 11/10/2021    HCO3 39.1 11/10/2021    BE 13.2 11/10/2021    O2SAT 94.6 11/10/2021     HgBA1c:  No results found for: LABA1C  FLP:    Lab Results   Component Value Date    TRIG 46 11/04/2021    HDL 81 11/04/2021    LDLCALC 46 11/04/2021    LABVLDL 9 11/04/2021     TSH:    Lab Results   Component Value Date    TSH 4.560 11/04/2021     IRON:  No results found for: IRON  LIPASE:    Lab Results   Component Value Date    LIPASE 74 09/08/2021       ASSESSMENT AND PLAN:      Patient Active Problem List    Diagnosis Date Noted    Acute on chronic respiratory failure with hypoxia and hypercapnia ([facility] [address] 11/03/2021    CAD (coronary artery disease)  Acute diastolic congestive heart failure     Atrial fibrillation - chronic     COPD with exacerbation     Hypertension-patient hypotensive in the ED      History of severe pulmonary hypertension at 99 mmHg with +34 tricuspid regurg 09/10/2021     Diffuse leg edema probably combination of the severe pulmonary hypertension and tricuspid regurg along with acute diastolic CHF 54/48/4750     Plan:  Admit to [facility]/PCU  Nursing home meds reviewed BiPAP  BiPAP -continuous at this time  DuoNeb aerosols every 4 hours  Pulmicort aerosol twice daily  Brovana aerosol twice daily  Continue warfarin   Daily INRs  Accurate I's and O's  BiPAP 16/8 at at bedtime and as needed   SCDs lower extremities    Consult pulmonology    MRSA screen  Add IV Rocephin, doxycycline    Colace 100 mg twice daily  MiraLAX 17 mg  daily as needed    Bladder scan > 900 cc  Urology consult-unable to dilate and insert Melchor catheter 11/8 PM  Cystoscopy and ureteral dilation 11/9 p.m. SCDs lower extremities    Warfarin increased 4 mg daily  Daily INRs  Lasix 40 mg IV push twice daily  Stop antibioticssee pulmonology note    CMP, CBC in a.m.       Sherrill Young DO, D.OMark  11/18/2021  12:25 PM

## 2021-11-18 NOTE — CARE COORDINATION
SS NOTE: COVID NEGATIVE 11/3- PT WILL NEED A RAPID NEGATIVE COVID TO RETURN TO THE NF. Pt is on 15 liters high flow O2 vs bipap. PT/OT are on. This pt is a long term care/ private pay Resident of [facility]. Plan is for pt to return there at [facility] per pts' son Osiris Mcclain. Pt will need NO PRECERT to return to [facility] - he will need a signed EMILY and current PT/OT notes. PT and OT are on consult. SW faxed updates to Thang Penny at the [place]- [phone]. SS to continue. CHRIS Galdamez.11/18/2021.12:42PM.

## 2021-11-18 NOTE — CARE COORDINATION
Updated clinicals faxed to Bonnie Segovia at the [facility] at her request to [phone].  Electronically signed by Mckenna Velez on 11/18/2021 at 11:47 AM

## 2021-11-19 NOTE — CARE COORDINATION
SS NOTE: COVID NEGATIVE 11/3- PT WILL NEED A RAPID NEGATIVE COVID TO RETURN TO THE NF. Pt is on 14 liters high flow O2 and bipap at night. Pt desats with activity. PT/OT are on. This pt is a long term care/ private pay Resident of [facility]. Plan is for pt to return there at [facility] per pts' son Marcial Jimenes. Pt will need NO PRECERT to return to [facility] - he will need a signed EMILY and current PT/OT notes. PT and OT are on consult. SW faxed updates to Kishore at the [place]- [phone]. SS to continue. CHRIS Smith.11/19/2021.12:47PM.

## 2021-11-19 NOTE — PROGRESS NOTES
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 male who presents with altered mental status at the nursing home. Patient symptoms started about a week ago patient was also noted to have increased weight gain and increasing lower leg edema. Patient normally alert and oriented x4 but has become much more lethargic and interactive. Patient gained 20 pounds at the nursing home. Patient was brought into the emergency room with patient chest x-ray showed interstitial lung disease and also cardiomegaly. ABG showed a pH 7.204 with a CO2 of 85 on 6 L nasal cannula. Patient then was started on BiPAP.  BUN/creatinine 43/1.0 with proBNP 3900 and troponin initially was 120. Liver enzymes are normal.  Heart rate was 75 normal temperature and blood pressure initially at 370 systolic currently is 31/10. Patient currently on 40% FiO2 BiPAP with O2 sat of 93%. Patient states he has been wearing O2 nasal cannula for last couple weeks at the nursing home but not before that. Patient denies wearing BiPAP in the past.  He denies any problem with any chest pain, abdominal pain, nausea vomiting or dizziness. ECG reviewed with patient have atrial fibrillation with no significant ST-T changes to suggest acute myocardial injury troponin was elevated on admission but troponin I-II hours later was less than on admission. 11/4/2021  Patient seen examined on PCU. Patient still very short of breath even at rest.  Patient seems to do fairly good on the BiPAP once he is off of it he seemed to have some more respiratory distress. BUN/creatinine 46/1.0 with patient's transaminases normal TSH 4.56. WBC 5.1 with hemoglobin 11. INR is 2.2 today. Temperature 98.3 with a heart rate of 100 blood pressure 130/40. O2 sat 91% on 7 L nasal cannula.  ABGs today were improved but the patient had a pH increase 7.355 with PCO2 decreased 56.2 while the patient was on BiPAP. Patient urine output has picked up since early this morning. 11/5/2021  Patient seen examined on PCU. Case discussed with intensivist yesterday afternoon. Patient looks a little bit better today. Nursing states that the patient has been changed to a DNR CC a with no chest compressions, intubation or cardioversion. Patient denies any chest or abdominal pain. Patient still with +34 lower leg edema. BUN/creatinine 48/1.0 with the patient's Liver enzymes are normal with a WBC 3.1 with hemoglobin 11.1. Temperature 97.7 with heart rate of 70 blood pressure 123/48. O2 sat is 91% on 10 L high flow nasal cannula. Urine output seems to be adequate. 11/6/2021  Patient seen examined on PCU. Patient is alert and oriented with patient currently had BiPAP on and feeling little bit more comfortable. BUN/creatinine 51/0.9 WBCs 5.5 hemoglobin 11.8. INR 2.8 today. Temperature 97.5 heart rate 85 blood pressure 107/69. O2 sat 97% on BiPAP at 75% FiO2. Urine output is inaccurate but seems to be adequate according to the patient who states he is urinating a lot. INR is 2.3 we will decrease the warfarin 1 mg p.o. daily. 11/7/2021  Patient seen examined on PCU. Patient seems to be doing little bit better today. Patient is wearing BiPAP almost all the time. Patient did eat all of his breakfast.  Patient denies any chest or abdominal pain. Patient states that he has not had a bowel movement yet. Patient's lower leg edema still prominent but seems to be mildly improved. BUN/creatinine 48/0.9 with a CO2 of 36. Transaminases are normal with free T4 1.1 and WBC 5.2 and hemoglobin 11.4. Platelet count 927. INR is 2.9 today. Temperature 97.7 with heart rate of 60 and blood pressure 108/869. O2 sat 98% on 12 L high flow nasal cannula alternating with BiPAP with FiO2 of 65%. .    11/8/2021  Patient seen and examined in PCU. Patient seems little bit more lethargic/confused this morning off of the BiPAP.  BUN/creatinine 48/0.8 with CO2 electrolytes at 38. Normal transaminases with INR down to 2.2 today. WBC 5.1 with hemoglobin 11.7. Temperature is 98.1 with heart rate 76 blood pressure 103/61. O2 sat 94% on 12 L high flow nasal cannula. Urinary output is   in accurate. 11/9/2021   Patient seen examined in PCU. Patient is again alert and oriented but somewhat short of breath with any activity and on the BiPAP most of the day. Patient denies any chest pain or abdominal pain. Patient supposed to go to surgery for cystoscopy and probable urethral dilation today with insertion of Melchor catheter. BUN/creatinine 54/1.0 today with liver enzymes normal WBC 11.1 hemoglobin 12.1. INR is 2.2 today. Temperature is 97.8 with heart rate of 90 and blood pressure 110/66. O2 sat is 95% with the patient on BiPAP 65% FiO2. Urine output is very inaccurate. Urology note reviewed with patient having severe bladder neck contracture and urology not able to dilate yesterday afternoon. 11/10/2021  Patient seen and examined in PCU. Patient looks better today. Patient is more alert and oriented. Patient denies any chest or abdominal pain. Patient urine output is improving. Patient still with marked lower leg edema. Urology note reviewed with the patient having a laser ablation of bladder neck contracture and placement of Melchor catheter yesterday afternoon. BUN/creatinine is 47/0.9 with normal liver enzymes. WBC 7.6 hemoglobin 11.8. INR is 1.9 today. ABGs performed showed pH 7.471 with PCO2 54.9 PO2 of 69. urinary output has improved with placement of Melchor catheter by 500 cc a shift. Stop IV fluids postop and continue the IV Lasix and will start SCDs lower extremities. 11/11/2021  Patient seen and examined in PCU. Patient sons at the bedside and case discussed. Patient looks and feels better.   Patient lower leg edema is moderately improved. Patient denies any problem with chest or abdominal pain. There is no nausea vomiting or dizziness. BUN/creatinine 49/0.8 with fasting blood sugar 135. Temperature 98.9 with heart rate in 96 and blood pressure ranges 90/50 6109/66. O2 sats 95% with the patient on 65% FiO2 BiPAP. Urinary output ranges 500-2900 cc a shift. 11/12/2021  Patient seen examined on PCU. Patient feels fairly good today. Case discussed with patient's nurse at bedside. Patient still has fairly good urinary output with the Melchor catheter in place. His lower leg edema is moderately improved but still has significant leg edema left greater than right. Temperature is 97.5 with a heart rate of 75 blood pressure 105/60. O2 sat 99% on heated high flow nasal cannula with FiO2 65%. 11/13/2021  Patient seen examined on PCU. Patient feels like he is breathing little better but still on BiPAP. Patient denies any chest or abdominal pain. There is no nausea or vomiting. BUN/creatinine 47/0.7. WBC is 9.7 hemoglobin 12.1. INR is 1.4 today. Urinary output ranges 500-700 cc a shift. Temperature 98.2 with heart rate 91 blood pressure currently 96/56. O2 sat 94% on 65% FiO2 BiPAP. 11/14/2021  Patient seen examined on PCU. BUN/creatinine 42/0.6 with INR 1.4 today. Temperature 98.7 with heart rate 88 blood pressure ranges 92/50102/60. O2 sat 93% on room 11 L high flow heated nasal cannula. Urine output ranges 700-950 cc a shift. 11/15/2021  Patient seen examined on PCU. Patient looks little bit better today. Patient denies any chest or abdominal pain. Patient lower leg edema again is moderately improved. Patient though still very very weak. BUN/creatinine 42/0.7. Hemoglobin 11.7. INR 1.5. Temperature 98.1 with heart rate 83 and blood pressure 121/62. O2 sat currently is 96% with the patient on heated high flow nasal cannula with FiO2 65%.   Urine output ranges 725-1550 cc a N/A 2021    CYSTOSCOPY, LASER ABLATION OF BLADDER NECK STRICTURE,  PONCE CATHETER INSERTION performed by Julio Conley MD at [address] [place] [address]      coronary stent    CATARACT REMOVAL WITH IMPLANT Left 2013    COLONOSCOPY      CYST REMOVAL      tailbone    EYE SURGERY         Medications Prior to Admission:    @  Prior to Admission medications    Medication Sig Start Date End Date Taking? Authorizing Provider   ipratropium-albuterol (DUONEB) 0.5-2.5 (3) MG/3ML SOLN nebulizer solution Inhale 3 mLs into the lungs 4 times daily 21   Elshun Quails, DO   albuterol (PROVENTIL) (2.5 MG/3ML) 0.083% nebulizer solution Take 3 mLs by nebulization every 4 hours as needed for Wheezing or Shortness of Breath 21   Elshun Quails, DO   Respiratory Therapy Supplies (FULL KIT NEBULIZER SET) MISC Use as directed with nebulized medication. 21   Elane Quails, DO   warfarin (COUMADIN) 4 MG tablet 4 mg daily 6 PM 21   Elshun Quails, DO   tamsulosin (FLOMAX) 0.4 MG capsule Take 0.4 mg by mouth daily    Historical Provider, MD   terazosin (HYTRIN) 5 MG capsule Take 10 mg by mouth daily. Historical Provider, MD   metoprolol (TOPROL-XL) 50 MG XL tablet Take 50 mg by mouth daily. Historical Provider, MD   finasteride (PROSCAR) 5 MG tablet Take 5 mg by mouth daily. Historical Provider, MD   aspirin 81 MG tablet Take 81 mg by mouth daily.     Historical Provider, MD       Allergies:  Dye [iodides]    Social History:   Social History     Socioeconomic History    Marital status:      Spouse name: Not on file    Number of children: Not on file    Years of education: Not on file    Highest education level: Not on file   Occupational History    Not on file   Tobacco Use    Smoking status: Former Smoker     Quit date: 3/27/1992     Years since quittin.6    Smokeless tobacco: Never Used   Substance and Sexual Activity    Alcohol use: No    Drug use: No    Sexual activity: Not on file   Other Topics Concern    Not on file   Social History Narrative    Not on file     Social Determinants of Health     Financial Resource Strain:     Difficulty of Paying Living Expenses: Not on file   Food Insecurity:     Worried About Running Out of Food in the Last Year: Not on file    Jian of Food in the Last Year: Not on file   Transportation Needs:     Lack of Transportation (Medical): Not on file    Lack of Transportation (Non-Medical): Not on file   Physical Activity:     Days of Exercise per Week: Not on file    Minutes of Exercise per Session: Not on file   Stress:     Feeling of Stress : Not on file   Social Connections:     Frequency of Communication with Friends and Family: Not on file    Frequency of Social Gatherings with Friends and Family: Not on file    Attends [employer] Services: Not on file    Active Member of [address] [place] [address] [employer] or Organizations: Not on file    Attends Club or Organization Meetings: Not on file    Marital Status: Not on file   Intimate Partner Violence:     Fear of Current or Ex-Partner: Not on file    Emotionally Abused: Not on file    Physically Abused: Not on file    Sexually Abused: Not on file   Housing Stability:     Unable to Pay for Housing in the Last Year: Not on file    Number of [place] in the Last Year: Not on file    Unstable Housing in the Last Year: Not on file       Family History:   History reviewed. No pertinent family history. REVIEW OF SYSTEMS:   Gen: Patient initially admitted from the nursing home with altered mental status but is doing much better at this time. Patient denies any lightheadedness or dizziness. No LOC or syncope. No fevers or chills. HEENT: No earache, sore throat or nasal congestion. Resp: Denies cough, hemoptysis or sputum production. Cardiac: Denies chest pain, +SOB, no diaphoresis or palpitations. GI: No nausea, vomiting, diarrhea or constipation. No melena or hematochezia.     : No urinary complaints, dysuria, hematuria or frequency. MSK: + Diffuse lower extremity edema. No extremity weakness, paralysis or paresthesias. PHYSICAL EXAM:    Vitals:  /62   Pulse 77   Temp 97.8 °F (36.6 °C) (Oral)   Resp 18   Ht 5' 8\" (1.727 m)   Wt 222 lb 0.1 oz (100.7 kg)   SpO2 95%   BMI 33.76 kg/m²     General:  This is a [de-identified] y.o. yo male who is alert and oriented in mild respiratory distress  HEENT:  Head is normocephalic and atraumatic, PERRLA, EOMI, mucus membranes moist with no pharyngeal erythema or exudate. Neck:  Supple with no carotid bruits, JVD or thyromegaly.   No cervical adenopathy  CV:  Regular rate and rhythm, no murmurs  Lungs: Coarse decreased breath sounds to auscultation bilaterally with no wheezes, rales or rhonchi  Abdomen:  Soft, nontender,+ abdominal fat, nondistended, bowel sounds present  Extremities:  + 2-3 leg edema, peripheral pulses intact bilaterally  Neuro:  Cranial nerves II-XII grossly intact; motor and sensory function intact with no focal deficits  Skin:  No rashes, lesions or wounds    DATA:  CBC with Differential:    Lab Results   Component Value Date    WBC 8.4 11/18/2021    RBC 3.78 11/18/2021    HGB 11.9 11/18/2021    HCT 39.1 11/18/2021     11/18/2021    .4 11/18/2021    MCH 31.5 11/18/2021    MCHC 30.4 11/18/2021    RDW 14.4 11/18/2021    BLASTSPCT 0.0 11/04/2021    LYMPHOPCT 7.5 11/18/2021    MONOPCT 5.8 11/18/2021    BASOPCT 0.1 11/18/2021    MONOSABS 0.49 11/18/2021    LYMPHSABS 0.63 11/18/2021    EOSABS 0.04 11/18/2021    BASOSABS 0.01 11/18/2021     CMP:    Lab Results   Component Value Date     11/18/2021    K 3.7 11/18/2021    K 4.8 11/03/2021    CL 92 11/18/2021    CO2 37 11/18/2021    BUN 33 11/18/2021    CREATININE 0.7 11/18/2021    GFRAA >60 11/18/2021    LABGLOM >60 11/18/2021    GLUCOSE 83 11/18/2021    PROT 5.3 11/18/2021    LABALBU 2.9 11/18/2021    CALCIUM 8.3 11/18/2021    BILITOT 0.7 11/18/2021    ALKPHOS 154 IMC/PCU  Nursing home meds reviewed BiPAP  BiPAP -continuous at this time  DuoNeb aerosols every 4 hours  Pulmicort aerosol twice daily  Brovana aerosol twice daily  Continue warfarin   Daily INRs  Accurate I's and O's  BiPAP 16/8 at at bedtime and as needed   SCDs lower extremities    Consult pulmonology    MRSA screen  Add IV Rocephin, doxycycline    Colace 100 mg twice daily  MiraLAX 17 mg  daily as needed    Bladder scan > 900 cc  Urology consult-unable to dilate and insert Melchor catheter 11/8 PM  Cystoscopy and ureteral dilation 11/9 p.m. SCDs lower extremities    Warfarin increased 4 mg daily  Daily INRs  Lasix 40 mg IV push twice daily  Stop antibioticssee pulmonology note    CMP, CBC in a.m.       Leigh Stokes DO, D.O.  11/19/2021  12:21 PM

## 2021-11-20 NOTE — PLAN OF CARE
Problem: Falls - Risk of:  Goal: Will remain free from falls  Outcome: Met This Shift     Problem: Skin Integrity:  Goal: Will show no infection signs and symptoms  Outcome: Met This Shift     Problem: OXYGENATION/RESPIRATORY FUNCTION  Goal: Patient will maintain patent airway  Outcome: Met This Shift  Goal: Patient will achieve/maintain normal respiratory rate/effort  Outcome: Met This Shift     Problem: SKIN INTEGRITY  Goal: Skin integrity is maintained or improved  Outcome: Met This Shift

## 2021-11-20 NOTE — PROGRESS NOTES
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 male who presents with altered mental status at the nursing home. Patient symptoms started about a week ago patient was also noted to have increased weight gain and increasing lower leg edema. Patient normally alert and oriented x4 but has become much more lethargic and interactive. Patient gained 20 pounds at the nursing home. Patient was brought into the emergency room with patient chest x-ray showed interstitial lung disease and also cardiomegaly. ABG showed a pH 7.204 with a CO2 of 85 on 6 L nasal cannula. Patient then was started on BiPAP.  BUN/creatinine 43/1.0 with proBNP 3900 and troponin initially was 120. Liver enzymes are normal.  Heart rate was 75 normal temperature and blood pressure initially at 844 systolic currently is 35/53. Patient currently on 40% FiO2 BiPAP with O2 sat of 93%. Patient states he has been wearing O2 nasal cannula for last couple weeks at the nursing home but not before that. Patient denies wearing BiPAP in the past.  He denies any problem with any chest pain, abdominal pain, nausea vomiting or dizziness. ECG reviewed with patient have atrial fibrillation with no significant ST-T changes to suggest acute myocardial injury troponin was elevated on admission but troponin I-II hours later was less than on admission. 11/4/2021  Patient seen examined on PCU. Patient still very short of breath even at rest.  Patient seems to do fairly good on the BiPAP once he is off of it he seemed to have some more respiratory distress. BUN/creatinine 46/1.0 with patient's transaminases normal TSH 4.56. WBC 5.1 with hemoglobin 11. INR is 2.2 today. Temperature 98.3 with a heart rate of 100 blood pressure 130/40. O2 sat 91% on 7 L nasal cannula.  ABGs today were improved but the patient had a pH increase 7.355 with PCO2 decreased 56.2 while the patient was on BiPAP. Patient urine output has picked up since early this morning. 11/5/2021  Patient seen examined on PCU. Case discussed with intensivist yesterday afternoon. Patient looks a little bit better today. Nursing states that the patient has been changed to a DNR CC a with no chest compressions, intubation or cardioversion. Patient denies any chest or abdominal pain. Patient still with +34 lower leg edema. BUN/creatinine 48/1.0 with the patient's Liver enzymes are normal with a WBC 3.1 with hemoglobin 11.1. Temperature 97.7 with heart rate of 70 blood pressure 123/48. O2 sat is 91% on 10 L high flow nasal cannula. Urine output seems to be adequate. 11/6/2021  Patient seen examined on PCU. Patient is alert and oriented with patient currently had BiPAP on and feeling little bit more comfortable. BUN/creatinine 51/0.9 WBCs 5.5 hemoglobin 11.8. INR 2.8 today. Temperature 97.5 heart rate 85 blood pressure 107/69. O2 sat 97% on BiPAP at 75% FiO2. Urine output is inaccurate but seems to be adequate according to the patient who states he is urinating a lot. INR is 2.3 we will decrease the warfarin 1 mg p.o. daily. 11/7/2021  Patient seen examined on PCU. Patient seems to be doing little bit better today. Patient is wearing BiPAP almost all the time. Patient did eat all of his breakfast.  Patient denies any chest or abdominal pain. Patient states that he has not had a bowel movement yet. Patient's lower leg edema still prominent but seems to be mildly improved. BUN/creatinine 48/0.9 with a CO2 of 36. Transaminases are normal with free T4 1.1 and WBC 5.2 and hemoglobin 11.4. Platelet count 709. INR is 2.9 today. Temperature 97.7 with heart rate of 60 and blood pressure 108/869. O2 sat 98% on 12 L high flow nasal cannula alternating with BiPAP with FiO2 of 65%. .    11/8/2021  Patient seen and examined in PCU. Patient seems little bit more lethargic/confused this morning off of the BiPAP.  BUN/creatinine 48/0.8 with CO2 electrolytes at 38. Normal transaminases with INR down to 2.2 today. WBC 5.1 with hemoglobin 11.7. Temperature is 98.1 with heart rate 76 blood pressure 103/61. O2 sat 94% on 12 L high flow nasal cannula. Urinary output is   in accurate. 11/9/2021   Patient seen examined in PCU. Patient is again alert and oriented but somewhat short of breath with any activity and on the BiPAP most of the day. Patient denies any chest pain or abdominal pain. Patient supposed to go to surgery for cystoscopy and probable urethral dilation today with insertion of Melchor catheter. BUN/creatinine 54/1.0 today with liver enzymes normal WBC 11.1 hemoglobin 12.1. INR is 2.2 today. Temperature is 97.8 with heart rate of 90 and blood pressure 110/66. O2 sat is 95% with the patient on BiPAP 65% FiO2. Urine output is very inaccurate. Urology note reviewed with patient having severe bladder neck contracture and urology not able to dilate yesterday afternoon. 11/10/2021  Patient seen and examined in PCU. Patient looks better today. Patient is more alert and oriented. Patient denies any chest or abdominal pain. Patient urine output is improving. Patient still with marked lower leg edema. Urology note reviewed with the patient having a laser ablation of bladder neck contracture and placement of Melchor catheter yesterday afternoon. BUN/creatinine is 47/0.9 with normal liver enzymes. WBC 7.6 hemoglobin 11.8. INR is 1.9 today. ABGs performed showed pH 7.471 with PCO2 54.9 PO2 of 69. urinary output has improved with placement of Melchor catheter by 500 cc a shift. Stop IV fluids postop and continue the IV Lasix and will start SCDs lower extremities. 11/11/2021  Patient seen and examined in PCU. Patient sons at the bedside and case discussed. Patient looks and feels better.   Patient lower leg edema is moderately improved. Patient denies any problem with chest or abdominal pain. There is no nausea vomiting or dizziness. BUN/creatinine 49/0.8 with fasting blood sugar 135. Temperature 98.9 with heart rate in 96 and blood pressure ranges 90/50 6109/66. O2 sats 95% with the patient on 65% FiO2 BiPAP. Urinary output ranges 500-2900 cc a shift. 11/12/2021  Patient seen examined on PCU. Patient feels fairly good today. Case discussed with patient's nurse at bedside. Patient still has fairly good urinary output with the Melchor catheter in place. His lower leg edema is moderately improved but still has significant leg edema left greater than right. Temperature is 97.5 with a heart rate of 75 blood pressure 105/60. O2 sat 99% on heated high flow nasal cannula with FiO2 65%. 11/13/2021  Patient seen examined on PCU. Patient feels like he is breathing little better but still on BiPAP. Patient denies any chest or abdominal pain. There is no nausea or vomiting. BUN/creatinine 47/0.7. WBC is 9.7 hemoglobin 12.1. INR is 1.4 today. Urinary output ranges 500-700 cc a shift. Temperature 98.2 with heart rate 91 blood pressure currently 96/56. O2 sat 94% on 65% FiO2 BiPAP. 11/14/2021  Patient seen examined on PCU. BUN/creatinine 42/0.6 with INR 1.4 today. Temperature 98.7 with heart rate 88 blood pressure ranges 92/50102/60. O2 sat 93% on room 11 L high flow heated nasal cannula. Urine output ranges 700-950 cc a shift. 11/15/2021  Patient seen examined on PCU. Patient looks little bit better today. Patient denies any chest or abdominal pain. Patient lower leg edema again is moderately improved. Patient though still very very weak. BUN/creatinine 42/0.7. Hemoglobin 11.7. INR 1.5. Temperature 98.1 with heart rate 83 and blood pressure 121/62. O2 sat currently is 96% with the patient on heated high flow nasal cannula with FiO2 65%.   Urine output ranges 725-1550 cc a shift.    11/16/2021  Patient seen examined on PCU. Patient looks little bit more tired today. Patient is currently on the BiPAP.  BUN/creatinine 38/0.6 with CO2 electrolytes 37. INR 1.6 today. Temperature 97.6 with a heart rate of 78 and blood pressure 108/63. O2 sat is currently 92% on BiPAP. Patient is alternating with heated high flow nasal cannula FiO2 of 50%. Case discussed with pulmonology today. 11/17/2021  Patient seen examined on PCU. Patient is about the same. Patient denies any chest or abdominal pain. Patient still very very weak. Case discussed with pulmonologist today. INR 1.8. Temperature 97.6 72 blood pressure 103/65. O2 sat 96% on 15 L high flow heated nasal cannula. Patient desaturates with any activity. Urinary output is very good. 11/18/2021  Patient seen examined on PCU. Patient is alert and oriented. Patient still very very weak and desats with any activity including moving side to side in bed. BUN/creatinine 33/0.7. Liver enzymes normal with a WBC 8.4 and hemoglobin 1.9. INR is 1.8 today. Temperature 98.3 with heart rate of 74 with blood pressure 114/58 with heart rate 93% on 15 L high flow nasal cannula. Urinary output is fairly good. 11/19/2021  Patient seen examined on PCU. Patient seems to be doing little bit better today. The patient though still extremely weak and short of breath with any activity. Patient's appetite is fair. Temperature 97.8 with heart rate of 77 blood pressure 110/62. O2 sat 95% on 14 L heated high flow nasal cannula. Urine output is very good. Patient did have one good bowel movement yesterday. 11/20/2021  Patient seen examined on PCU. Patient is about the same. Patient short of breath with any activity. Patient denies any chest or abdominal pain. There is no nausea vomiting or diarrhea. INR was 2.0 yesterday. Pending lab work today. Temperature 96.8 with heart rate of 65 blood pressure ranging 96/53100/50.   O2 sat tablet Take 81 mg by mouth daily. Historical Provider, MD       Allergies:  Dye [iodides]    Social History:   Social History     Socioeconomic History    Marital status:      Spouse name: Not on file    Number of children: Not on file    Years of education: Not on file    Highest education level: Not on file   Occupational History    Not on file   Tobacco Use    Smoking status: Former Smoker     Quit date: 3/27/1992     Years since quittin.6    Smokeless tobacco: Never Used   Substance and Sexual Activity    Alcohol use: No    Drug use: No    Sexual activity: Not on file   Other Topics Concern    Not on file   Social History Narrative    Not on file     Social Determinants of Health     Financial Resource Strain:     Difficulty of Paying Living Expenses: Not on file   Food Insecurity:     Worried About [address] [employer] in the Last Year: Not on file    Jian of Food in the Last Year: Not on file   Transportation Needs:     Lack of Transportation (Medical): Not on file    Lack of Transportation (Non-Medical):  Not on file   Physical Activity:     Days of Exercise per Week: Not on file    Minutes of Exercise per Session: Not on file   Stress:     Feeling of Stress : Not on file   Social Connections:     Frequency of Communication with Friends and Family: Not on file    Frequency of Social Gatherings with Friends and Family: Not on file    Attends [employer] Services: Not on file    Active Member of [address] [place] [address] or Organizations: Not on file    Attends Club or Organization Meetings: Not on file    Marital Status: Not on file   Intimate Partner Violence:     Fear of Current or Ex-Partner: Not on file    Emotionally Abused: Not on file    Physically Abused: Not on file    Sexually Abused: Not on file   Housing Stability:     Unable to Pay for Housing in the Last Year: Not on file    Number of [place] in the Last Year: Not on file    Unstable Housing in the Last Year: Not on file       Family History:   History reviewed. No pertinent family history. REVIEW OF SYSTEMS:   Gen: Patient initially admitted from the nursing home with altered mental status but is doing much better at this time. Patient denies any lightheadedness or dizziness. No LOC or syncope. No fevers or chills. HEENT: No earache, sore throat or nasal congestion. Resp: Denies cough, hemoptysis or sputum production. Cardiac: Denies chest pain, +SOB, no diaphoresis or palpitations. GI: No nausea, vomiting, diarrhea or constipation. No melena or hematochezia. : No urinary complaints, dysuria, hematuria or frequency. MSK: + Diffuse lower extremity edema. No extremity weakness, paralysis or paresthesias. PHYSICAL EXAM:    Vitals:  BP (!) 96/53   Pulse 65   Temp 96.8 °F (36 °C) (Oral)   Resp 19   Ht 5' 8\" (1.727 m)   Wt 222 lb 0.1 oz (100.7 kg)   SpO2 90%   BMI 33.76 kg/m²     General:  This is a [de-identified] y.o. yo male who is alert and oriented in mild respiratory distress  HEENT:  Head is normocephalic and atraumatic, PERRLA, EOMI, mucus membranes moist with no pharyngeal erythema or exudate. Neck:  Supple with no carotid bruits, JVD or thyromegaly.   No cervical adenopathy  CV:  Regular rate and rhythm, no murmurs  Lungs: Coarse decreased breath sounds to auscultation bilaterally with no wheezes, rales or rhonchi  Abdomen:  Soft, nontender,+ abdominal fat, nondistended, bowel sounds present  Extremities:  + 2-3 leg edema, peripheral pulses intact bilaterally  Neuro:  Cranial nerves II-XII grossly intact; motor and sensory function intact with no focal deficits  Skin:  No rashes, lesions or wounds    DATA:  CBC with Differential:    Lab Results   Component Value Date    WBC 8.4 11/18/2021    RBC 3.78 11/18/2021    HGB 11.9 11/18/2021    HCT 39.1 11/18/2021     11/18/2021    .4 11/18/2021    MCH 31.5 11/18/2021    MCHC 30.4 11/18/2021    RDW 14.4 11/18/2021    BLASTSPCT 0.0 11/04/2021    LYMPHOPCT 7.5 11/18/2021    MONOPCT 5.8 11/18/2021    BASOPCT 0.1 11/18/2021    MONOSABS 0.49 11/18/2021    LYMPHSABS 0.63 11/18/2021    EOSABS 0.04 11/18/2021    BASOSABS 0.01 11/18/2021     CMP:    Lab Results   Component Value Date     11/19/2021    K 4.0 11/19/2021    K 4.8 11/03/2021    CL 98 11/19/2021    CO2 37 11/19/2021    BUN 35 11/19/2021    CREATININE 0.8 11/19/2021    GFRAA >60 11/19/2021    LABGLOM >60 11/19/2021    GLUCOSE 120 11/19/2021    PROT 5.3 11/18/2021    LABALBU 2.9 11/18/2021    CALCIUM 8.4 11/19/2021    BILITOT 0.7 11/18/2021    ALKPHOS 154 11/18/2021    AST 22 11/18/2021    ALT 25 11/18/2021     Magnesium:    Lab Results   Component Value Date    MG 2.3 11/05/2021     Phosphorus:    Lab Results   Component Value Date    PHOS 4.4 11/05/2021     PT/INR:    Lab Results   Component Value Date    PROTIME 23.4 11/19/2021    INR 2.0 11/19/2021     Troponin:  No results found for: TROPONINI  U/A:    Lab Results   Component Value Date    COLORU Yellow 11/03/2021    PROTEINU Negative 11/03/2021    PHUR 5.0 11/03/2021    WBCUA 1-3 11/03/2021    RBCUA 10-20 11/03/2021    BACTERIA RARE 11/03/2021    CLARITYU Clear 11/03/2021    SPECGRAV 1.025 11/03/2021    LEUKOCYTESUR Negative 11/03/2021    UROBILINOGEN 0.2 11/03/2021    BILIRUBINUR Negative 11/03/2021    BLOODU LARGE 11/03/2021    GLUCOSEU Negative 11/03/2021     ABG:    Lab Results   Component Value Date    PH 7.471 11/10/2021    PCO2 54.9 11/10/2021    PO2 69.4 11/10/2021    HCO3 39.1 11/10/2021    BE 13.2 11/10/2021    O2SAT 94.6 11/10/2021     HgBA1c:  No results found for: LABA1C  FLP:    Lab Results   Component Value Date    TRIG 46 11/04/2021    HDL 81 11/04/2021    LDLCALC 46 11/04/2021    LABVLDL 9 11/04/2021     TSH:    Lab Results   Component Value Date    TSH 4.560 11/04/2021     IRON:  No results found for: IRON  LIPASE:    Lab Results   Component Value Date    LIPASE 74 09/08/2021       ASSESSMENT AND PLAN: Patient Active Problem List    Diagnosis Date Noted    Acute on chronic respiratory failure with hypoxia and hypercapnia ([facility] [address] 11/03/2021    CAD (coronary artery disease)  Acute diastolic congestive heart failure     Atrial fibrillation - chronic     COPD with exacerbation     Hypertension-patient hypotensive in the ED      History of severe pulmonary hypertension at 99 mmHg with +34 tricuspid regurg 09/10/2021     Diffuse leg edema probably combination of the severe pulmonary hypertension and tricuspid regurg along with acute diastolic CHF 62/52/5491     Plan:  Admit to [facility]/PCU  Nursing home meds reviewed BiPAP  BiPAP -continuous at this time  DuoNeb aerosols every 4 hours  Pulmicort aerosol twice daily  Brovana aerosol twice daily  Continue warfarin   Daily INRs  Accurate I's and O's  BiPAP 16/8 at at bedtime and as needed   SCDs lower extremities    Consult pulmonology    MRSA screen  Add IV Rocephin, doxycycline    Colace 100 mg twice daily  MiraLAX 17 mg  daily as needed    Bladder scan > 900 cc  Urology consult-unable to dilate and insert Melchor catheter 11/8 PM  Cystoscopy and ureteral dilation 11/9 p.m. SCDs lower extremities    Warfarin increased 4 mg daily  Daily INRs  Lasix 40 mg IV push twice daily  Stop antibioticssee pulmonology note    CMP, CBC in a.m.       Светлана Correa DO, D.SHAYLEE  11/20/2021  10:25 AM

## 2021-11-21 NOTE — PROGRESS NOTES
Physical Therapy Treatment Note/Plan of Care    Room #:  1372/4607-07  Patient Name: Didi Whitaker  YOB: 1940  MRN: [id number]    Date of Service: 11/21/2021     Tentative placement recommendation: Subacute rehab  Equipment recommendation: To be determined      Evaluating Physical Therapist: Rosa Armas #[id number]      Specific Provider Orders/Date/Referring Provider :   11/03/21 1300   PT eval and treat Start: 11/03/21 1300, End: 11/03/21 1300, ONE TIME, Standing Count: 1 Occurrences, R    Dakota Uriass, DO    Admitting Diagnosis:   Respiratory acidosis [E87.2]  Hypercarbia [R06.89]  Elevated troponin [R77.8]  Acute on chronic respiratory failure with hypoxia and hypercapnia (HCC) [J96.21, J96.22]  Hypervolemia, unspecified hypervolemia type [E87.70]      Surgery: none  Visit Diagnoses       Codes    Respiratory acidosis    -  Primary E87.2    Elevated troponin     R77.8    Hypercarbia     R06.89    Hypervolemia, unspecified hypervolemia type     E87.70    Stricture of male urethra, unspecified stricture type     N35.919          Patient Active Problem List   Diagnosis    Metabolic encephalopathy    Inability to walk    Acute on chronic respiratory failure with hypoxia and hypercapnia (HCC)    CAD (coronary artery disease)    Atrial fibrillation (HCC)    [facility] obstructive [facility] disease) [facility] [address]    Hypertension    Adjustment disorder with depressed mood        ASSESSMENT of Current Deficits Patient exhibits decreased strength, balance and endurance impairing functional mobility, transfers and gait . Patient on nasal cannula this treatment session, limiting functional mobility at this time due to quickly desaturates with any exertion . Patient needing moderate assist with bed rolling, able to help assist with bed rails; however needing trunk assist and bilateral lower extremity support. PROM/AAROM with supine exercise due to impaired strength.  Patient able to tolerate chair position, needing max assist for balance activities for trunk control. Pt will need subacute rehab at time of discharge in order to participate in a skilled comprehensive therapy program to address deficits and improve the patient's functional levels. PHYSICAL THERAPY  PLAN OF CARE       Physical therapy plan of care is established based on physician order,  patient diagnosis and clinical assessment    Current Treatment Recommendations:    -Bed Mobility: Lower extremity exercises   -Sitting Balance: Incorporate reaching activities to activate trunk muscles   -Standing Balance: Perform strengthening exercises in standing to promote motor control with or without upper extremity support   -Transfers: Provide instruction on proper hand and foot position for adequate transfer of weight onto lower extremities and use of gait device if needed and Cues for hand placement, technique and safety. Provide stabilization to prevent fall   -Gait: Gait training and Standing activities to improve: base of support, weight shift, weight bearing      PT long term treatment goals are located in below grid    Patient and or family understand(s) diagnosis, prognosis, and plan of care. Frequency of treatments: Patient will be seen  daily.          Prior Level of Function: Patient ambulated with wheeled walker with assistance   Rehab Potential: good  for baseline    Past medical history:   Past Medical History:   Diagnosis Date    Atrial fibrillation ([address]     CAD (coronary artery disease)     coronary stent 12yrs ago    [facility]) [facility] [address]     Hypertension      Past Surgical History:   Procedure Laterality Date    BLADDER SURGERY N/A 11/9/2021    CYSTOSCOPY, LASER ABLATION OF BLADDER NECK STRICTURE,  PONCE CATHETER INSERTION performed by Veroncia Vazquez MD at [address] [place] [address]      coronary stent    CATARACT REMOVAL WITH IMPLANT Left 04 01 2013    COLONOSCOPY      CYST REMOVAL      tailbone    EYE SURGERY         SUBJECTIVE:    Precautions: , falls, alarm and bipap , stewart catheter   Social history: Patient came to hospital from [place] [id number] owned: [employer] [address] Mobility Inpatient   How much difficulty turning over in bed?: A Lot  How much difficulty sitting down on / standing up from a chair with arms?: A Lot  How much difficulty moving from lying on back to sitting on side of bed?: A Lot  How much help from another person moving to and from a bed to a chair?: A Lot  How much help from another person needed to walk in hospital room?: A Lot  How much help from another person for climbing 3-5 steps with a railing?: Total  AM-PAC Inpatient Mobility Raw Score : 11  AM-PAC Inpatient T-Scale Score : 33.86  Mobility Inpatient CMS 0-100% Score: 72.57  Mobility Inpatient CMS G-Code Modifier : CL    Nursing cleared patient for PT treatment. .   OBJECTIVE;   Initial Evaluation  Date: 11/4/2021 Treatment Date:  11/21/2021       Short Term/ Long Term   Goals   Was pt agreeable to Eval/treatment? Yes yes To be met in 5 days   Pain level   0/10   0/10 however states discomfort. Bed Mobility    Rolling: Minimal assist of 1    Supine to sit: Maximal assist of 1    Sit to supine: Maximal assist of 1    Scooting: Moderate assist of 1   Rolling: Moderate assist of 1   Supine to sit: Not assessed    Sit to supine: Not assessed    Scooting: Not assessed       Rolling: Independent    Supine to sit: Minimal assist of 1    Sit to supine: Minimal assist of 1    Scooting: Minimal assist of 1     Transfers Sit to stand: Not assessed  d/t to pt overall debility, decreased activity tolerance, balance deficits, safety and fall risk. Sit to stand: Not assessed   d/t to pt overall debility, decreased activity tolerance, balance deficits, safety and fall risk. Sit to stand:  Moderate assist of 1    Ambulation    not assessed  not assessed     5 feet using  wheeled walker with Moderate assist of 1          ROM Within functional limits    Increase range of motion 10% of affected joints    Strength BUE:  refer to OT eval  RLE:  3/5  LLE:  3/5  Increase strength in affected mm groups by 1/3 grade   Balance Sitting EOB:  poor   Dynamic Standing:  not assessed  Sitting EOB: not assessed chair position  Dynamic Standing: not assessed    Sitting EOB:  fair   Dynamic Standing: fair -     Patient is Alert & Oriented x person, place, time and situation and follows directions    Sensation:  Patient  reports numbness/tingling right foot     Edema:  yes bilateral lower extremities   Endurance: fair  -    Vitals: 15 liters high flow nasal cannula   Blood Pressure at rest  Blood Pressure during session    Heart Rate at rest  Heart Rate during session    SPO2 at rest 91%  SPO2 during session 81-92 % while rolling, unsafe at this time for edge of bed activity while on 15L NC and quickly desaturates. Patient education  Patient educated on role of Physical Therapy, risks of immobility, safety and plan of care,  importance of mobility while in hospital , purse lip breathing, ankle pumps, quad set and glut set for edema control, blood clot prevention, importance and purpose of adaptive device and adjusted to proper height for the patient. and safety      Patient response to education:   Pt verbalized understanding Pt demonstrated skill Pt requires further education in this area   Yes Partial Yes      Treatment:  Patient practiced and was instructed/facilitated in the following treatment: Patient performed supine exercises. Rolled multiple times,      able to use his left upper extremity to hold himself up with the bed rail; needing assist for trunk and lower extremities.  Chair position balance and reaching activities for trunk control needing max assist. Max cues for pursed lip breathing to increase lung capacity and quickly desaturates with exertion on 15L NC. Donned and doffed at beginning and end of session SCDs and Prevalon boots for skin integrity. Therapeutic Exercises:  ankle pumps, quad sets, glut sets, heel slide, hip abduction/adduction and straight leg raise x 12 reps. At end of session, patient in bed with alarm call light and phone within reach,  all lines and tubes intact, nursing notified. Patient would benefit from continued skilled Physical Therapy to improve functional independence and quality of life.          Patient's/ family goals   home    Time in 200  Time out  1037    Total Treatment Time  31 minutes        CPT codes:    Therapeutic activities (74070)   18 minutes  1 unit(s)  Therapeutic exercises (54242)   13 minutes  1 unit(s)   Lolita Hand PTA  SOP#[id number]

## 2021-11-21 NOTE — PROGRESS NOTES
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 male who presents with altered mental status at the nursing home. Patient symptoms started about a week ago patient was also noted to have increased weight gain and increasing lower leg edema. Patient normally alert and oriented x4 but has become much more lethargic and interactive. Patient gained 20 pounds at the nursing home. Patient was brought into the emergency room with patient chest x-ray showed interstitial lung disease and also cardiomegaly. ABG showed a pH 7.204 with a CO2 of 85 on 6 L nasal cannula. Patient then was started on BiPAP.  BUN/creatinine 43/1.0 with proBNP 3900 and troponin initially was 120. Liver enzymes are normal.  Heart rate was 75 normal temperature and blood pressure initially at 769 systolic currently is 80/69. Patient currently on 40% FiO2 BiPAP with O2 sat of 93%. Patient states he has been wearing O2 nasal cannula for last couple weeks at the nursing home but not before that. Patient denies wearing BiPAP in the past.  He denies any problem with any chest pain, abdominal pain, nausea vomiting or dizziness. ECG reviewed with patient have atrial fibrillation with no significant ST-T changes to suggest acute myocardial injury troponin was elevated on admission but troponin I-II hours later was less than on admission. 11/4/2021  Patient seen examined on PCU. Patient still very short of breath even at rest.  Patient seems to do fairly good on the BiPAP once he is off of it he seemed to have some more respiratory distress. BUN/creatinine 46/1.0 with patient's transaminases normal TSH 4.56. WBC 5.1 with hemoglobin 11. INR is 2.2 today. Temperature 98.3 with a heart rate of 100 blood pressure 130/40. O2 sat 91% on 7 L nasal cannula.  ABGs today were improved but the patient had a pH increase 7.355 with PCO2 decreased 56.2 while the patient was on BiPAP. Patient urine output has picked up since early this morning. 11/5/2021  Patient seen examined on PCU. Case discussed with intensivist yesterday afternoon. Patient looks a little bit better today. Nursing states that the patient has been changed to a DNR CC a with no chest compressions, intubation or cardioversion. Patient denies any chest or abdominal pain. Patient still with +34 lower leg edema. BUN/creatinine 48/1.0 with the patient's Liver enzymes are normal with a WBC 3.1 with hemoglobin 11.1. Temperature 97.7 with heart rate of 70 blood pressure 123/48. O2 sat is 91% on 10 L high flow nasal cannula. Urine output seems to be adequate. 11/6/2021  Patient seen examined on PCU. Patient is alert and oriented with patient currently had BiPAP on and feeling little bit more comfortable. BUN/creatinine 51/0.9 WBCs 5.5 hemoglobin 11.8. INR 2.8 today. Temperature 97.5 heart rate 85 blood pressure 107/69. O2 sat 97% on BiPAP at 75% FiO2. Urine output is inaccurate but seems to be adequate according to the patient who states he is urinating a lot. INR is 2.3 we will decrease the warfarin 1 mg p.o. daily. 11/7/2021  Patient seen examined on PCU. Patient seems to be doing little bit better today. Patient is wearing BiPAP almost all the time. Patient did eat all of his breakfast.  Patient denies any chest or abdominal pain. Patient states that he has not had a bowel movement yet. Patient's lower leg edema still prominent but seems to be mildly improved. BUN/creatinine 48/0.9 with a CO2 of 36. Transaminases are normal with free T4 1.1 and WBC 5.2 and hemoglobin 11.4. Platelet count 844. INR is 2.9 today. Temperature 97.7 with heart rate of 60 and blood pressure 108/869. O2 sat 98% on 12 L high flow nasal cannula alternating with BiPAP with FiO2 of 65%. .    11/8/2021  Patient seen leg edema is moderately improved. Patient denies any problem with chest or abdominal pain. There is no nausea vomiting or dizziness. BUN/creatinine 49/0.8 with fasting blood sugar 135. Temperature 98.9 with heart rate in 96 and blood pressure ranges 90/50 6109/66. O2 sats 95% with the patient on 65% FiO2 BiPAP. Urinary output ranges 500-2900 cc a shift. 11/12/2021  Patient seen examined on PCU. Patient feels fairly good today. Case discussed with patient's nurse at bedside. Patient still has fairly good urinary output with the Melchor catheter in place. His lower leg edema is moderately improved but still has significant leg edema left greater than right. Temperature is 97.5 with a heart rate of 75 blood pressure 105/60. O2 sat 99% on heated high flow nasal cannula with FiO2 65%. 11/13/2021  Patient seen examined on PCU. Patient feels like he is breathing little better but still on BiPAP. Patient denies any chest or abdominal pain. There is no nausea or vomiting. BUN/creatinine 47/0.7. WBC is 9.7 hemoglobin 12.1. INR is 1.4 today. Urinary output ranges 500-700 cc a shift. Temperature 98.2 with heart rate 91 blood pressure currently 96/56. O2 sat 94% on 65% FiO2 BiPAP. 11/14/2021  Patient seen examined on PCU. BUN/creatinine 42/0.6 with INR 1.4 today. Temperature 98.7 with heart rate 88 blood pressure ranges 92/50102/60. O2 sat 93% on room 11 L high flow heated nasal cannula. Urine output ranges 700-950 cc a shift. 11/15/2021  Patient seen examined on PCU. Patient looks little bit better today. Patient denies any chest or abdominal pain. Patient lower leg edema again is moderately improved. Patient though still very very weak. BUN/creatinine 42/0.7. Hemoglobin 11.7. INR 1.5. Temperature 98.1 with heart rate 83 and blood pressure 121/62. O2 sat currently is 96% with the patient on heated high flow nasal cannula with FiO2 65%.   Urine output ranges 725-1550 cc a currently 91% on 15 L high flow nasal cannula. Urine output is adequate. If patient is not a candidate because of his increasing oxygen needs to go back to nursing home we will have social workers work on getting the patient to an [place] [id number].    11/21/2021  Patient seen and examined in the PCU. Patient resting comfortable in bed. The patient currently on heated high flow nasal cannula with 15 L/min with O2 saturation 92% at rest.  Temperature 97.7 with heart rate 84 blood pressure 105/60. O2 sat 90-95% when patient is on BiPAP with FiO2 of 60%. Urinary output is fairly good. Patient lower leg edema seems to improve. Patient though is not dramatically improving pulmonary wise and / to check with nursing home about transfer back and if unable to then to transfer to [place] [id number] for further care. Past Medical History:    Past Medical History:   Diagnosis Date    [facility] [address] [facility]     CAD (coronary artery disease)     coronary stent 12yrs ago    COPD (chronic obstructive pulmonary disease) [facility] [address]     Hypertension      Past Surgical History:    Past Surgical History:   Procedure Laterality Date    BLADDER SURGERY N/A 11/9/2021    CYSTOSCOPY, LASER ABLATION OF BLADDER NECK STRICTURE,  PONCE CATHETER INSERTION performed by Jc Lucio MD at [address] [place] [address]      coronary stent    CATARACT REMOVAL WITH IMPLANT Left 04 01 2013    COLONOSCOPY      CYST REMOVAL      [facility]    EYE SURGERY         Medications Prior to Admission:    @  Prior to Admission medications    Medication Sig Start Date End Date Taking?  Authorizing Provider   ipratropium-albuterol (DUONEB) 0.5-2.5 (3) MG/3ML SOLN nebulizer solution Inhale 3 mLs into the lungs 4 times daily 9/16/21   Keenan [place], DO   albuterol (PROVENTIL) (2.5 MG/3ML) 0.083% nebulizer solution Take 3 mLs by nebulization every 4 hours as needed for Wheezing or Shortness of Breath 9/16/21   Keenan Leach, DO Respiratory Therapy Supplies (FULL KIT NEBULIZER SET) MISC Use as directed with nebulized medication. 21   Keagan Villagomez, DO   warfarin (COUMADIN) 4 MG tablet 4 mg daily 6 PM 21   Keagan Villagomez, DO   tamsulosin (FLOMAX) 0.4 MG capsule Take 0.4 mg by mouth daily    Historical Provider, MD   terazosin (HYTRIN) 5 MG capsule Take 10 mg by mouth daily. Historical Provider, MD   metoprolol (TOPROL-XL) 50 MG XL tablet Take 50 mg by mouth daily. Historical Provider, MD   finasteride (PROSCAR) 5 MG tablet Take 5 mg by mouth daily. Historical Provider, MD   aspirin 81 MG tablet Take 81 mg by mouth daily. Historical Provider, MD       Allergies:  Dye [iodides]    Social History:   Social History     Socioeconomic History    Marital status:      Spouse name: Not on file    Number of children: Not on file    Years of education: Not on file    Highest education level: Not on file   Occupational History    Not on file   Tobacco Use    Smoking status: Former Smoker     Quit date: 3/27/1992     Years since quittin.6    Smokeless tobacco: Never Used   Substance and Sexual Activity    Alcohol use: No    Drug use: No    Sexual activity: Not on file   Other Topics Concern    Not on file   Social History Narrative    Not on file     Social Determinants of Health     Financial Resource Strain:     Difficulty of Paying Living Expenses: Not on file   Food Insecurity:     Worried About [address] in the Last Year: Not on file    Jian of Food in the Last Year: Not on file   Transportation Needs:     Lack of Transportation (Medical): Not on file    Lack of Transportation (Non-Medical):  Not on file   Physical Activity:     Days of Exercise per Week: Not on file    Minutes of Exercise per Session: Not on file   Stress:     Feeling of Stress : Not on file   Social Connections:     Frequency of Communication with Friends and Family: Not on file    Frequency of Social Gatherings with Friends and Family: Not on file    Attends Confucianism Services: Not on file    Active Member of Clubs or Organizations: Not on file    Attends Club or Organization Meetings: Not on file    Marital Status: Not on file   Intimate Partner Violence:     Fear of Current or Ex-Partner: Not on file    Emotionally Abused: Not on file    Physically Abused: Not on file    Sexually Abused: Not on file   Housing Stability:     Unable to Pay for Housing in the Last Year: Not on file    Number of [place] in the Last Year: Not on file    Unstable Housing in the Last Year: Not on file       Family History:   History reviewed. No pertinent family history. REVIEW OF SYSTEMS:   Gen: Patient initially admitted from the nursing home with altered mental status but is doing much better at this time. Patient denies any lightheadedness or dizziness. No LOC or syncope. No fevers or chills. HEENT: No earache, sore throat or nasal congestion. Resp: Denies cough, hemoptysis or sputum production. Cardiac: Denies chest pain, +SOB, no diaphoresis or palpitations. GI: No nausea, vomiting, diarrhea or constipation. No melena or hematochezia. : No urinary complaints, dysuria, hematuria or frequency. MSK: + Diffuse lower extremity edema. No extremity weakness, paralysis or paresthesias. PHYSICAL EXAM:    Vitals:  /60   Pulse 84   Temp 97.7 °F (36.5 °C) (Axillary)   Resp 20   Ht 5' 8\" (1.727 m)   Wt 222 lb 0.1 oz (100.7 kg)   SpO2 90%   BMI 33.76 kg/m²     General:  This is a [de-identified] y.o. yo male who is alert and oriented in mild respiratory distress  HEENT:  Head is normocephalic and atraumatic, PERRLA, EOMI, mucus membranes moist with no pharyngeal erythema or exudate. Neck:  Supple with no carotid bruits, JVD or thyromegaly.   No cervical adenopathy  CV:  Regular rate and rhythm, no murmurs  Lungs: Coarse decreased breath sounds to auscultation bilaterally with no wheezes, rales or rhonchi  Abdomen:  Soft, nontender,+ abdominal fat, nondistended, bowel sounds present  Extremities:  + 2-3 leg edema, peripheral pulses intact bilaterally  Neuro:  Cranial nerves II-XII grossly intact; motor and sensory function intact with no focal deficits  Skin:  No rashes, lesions or wounds    DATA:  CBC with Differential:    Lab Results   Component Value Date    WBC 8.4 11/18/2021    RBC 3.78 11/18/2021    HGB 11.9 11/18/2021    HCT 39.1 11/18/2021     11/18/2021    .4 11/18/2021    MCH 31.5 11/18/2021    MCHC 30.4 11/18/2021    RDW 14.4 11/18/2021    BLASTSPCT 0.0 11/04/2021    LYMPHOPCT 7.5 11/18/2021    MONOPCT 5.8 11/18/2021    BASOPCT 0.1 11/18/2021    MONOSABS 0.49 11/18/2021    LYMPHSABS 0.63 11/18/2021    EOSABS 0.04 11/18/2021    BASOSABS 0.01 11/18/2021     CMP:    Lab Results   Component Value Date     11/20/2021    K 4.2 11/20/2021    K 4.8 11/03/2021    CL 96 11/20/2021    CO2 35 11/20/2021    BUN 32 11/20/2021    CREATININE 0.6 11/20/2021    GFRAA >60 11/20/2021    LABGLOM >60 11/20/2021    GLUCOSE 93 11/20/2021    PROT 5.3 11/18/2021    LABALBU 2.9 11/18/2021    CALCIUM 8.4 11/20/2021    BILITOT 0.7 11/18/2021    ALKPHOS 154 11/18/2021    AST 22 11/18/2021    ALT 25 11/18/2021     Magnesium:    Lab Results   Component Value Date    MG 2.3 11/05/2021     Phosphorus:    Lab Results   Component Value Date    PHOS 4.4 11/05/2021     PT/INR:    Lab Results   Component Value Date    PROTIME 23.8 11/20/2021    INR 2.0 11/20/2021     Troponin:  No results found for: TROPONINI  U/A:    Lab Results   Component Value Date    COLORU Yellow 11/03/2021    PROTEINU Negative 11/03/2021    PHUR 5.0 11/03/2021    WBCUA 1-3 11/03/2021    RBCUA 10-20 11/03/2021    BACTERIA RARE 11/03/2021    CLARITYU Clear 11/03/2021    SPECGRAV 1.025 11/03/2021    LEUKOCYTESUR Negative 11/03/2021    UROBILINOGEN 0.2 11/03/2021    BILIRUBINUR Negative 11/03/2021    BLOODU LARGE 11/03/2021    GLUCOSEU Negative 11/03/2021     ABG:    Lab Results   Component Value Date    PH 7.471 11/10/2021    PCO2 54.9 11/10/2021    PO2 69.4 11/10/2021    HCO3 39.1 11/10/2021    BE 13.2 11/10/2021    O2SAT 94.6 11/10/2021     HgBA1c:  No results found for: LABA1C  FLP:    Lab Results   Component Value Date    TRIG 46 11/04/2021    HDL 81 11/04/2021    LDLCALC 46 11/04/2021    LABVLDL 9 11/04/2021     TSH:    Lab Results   Component Value Date    TSH 4.560 11/04/2021     IRON:  No results found for: IRON  LIPASE:    Lab Results   Component Value Date    LIPASE 74 09/08/2021       ASSESSMENT AND PLAN:      Patient Active Problem List    Diagnosis Date Noted    Acute on chronic respiratory failure with hypoxia and hypercapnia ([facility] [address] 11/03/2021    CAD (coronary artery disease)  Acute diastolic congestive heart failure     Atrial fibrillation - chronic     COPD with exacerbation     Hypertension-patient hypotensive in the ED      History of severe pulmonary hypertension at 99 mmHg with +34 tricuspid regurg 09/10/2021     Diffuse leg edema probably combination of the severe pulmonary hypertension and tricuspid regurg along with acute diastolic CHF 92/00/2520     Plan:  Admit to [facility]/PCU  Nursing home meds reviewed BiPAP  BiPAP -continuous at this time  DuoNeb aerosols every 4 hours  Pulmicort aerosol twice daily  Brovana aerosol twice daily  Continue warfarin   Daily INRs  Accurate I's and O's  BiPAP 16/8 at at bedtime and as needed   SCDs lower extremities    Consult pulmonology    MRSA screen  Add IV Rocephin, doxycycline    Colace 100 mg twice daily  MiraLAX 17 mg  daily as needed    Bladder scan > 900 cc  Urology consult-unable to dilate and insert Melchor catheter 11/8 PM  Cystoscopy and ureteral dilation 11/9 p.m.     SCDs lower extremities    Warfarin increased 4 mg daily  Daily INRs  Lasix 40 mg IV push twice daily  Stop antibioticssee pulmonology note    Consult / for discharge planning to either nursing home or to [facility]. CMP, CBC in aMark Norris DO, D.O.  11/21/2021  12:12 PM

## 2021-11-21 NOTE — PROGRESS NOTES
Patient continues to refuse turning/ repositioning. Education on skin breakdown and prevention given, patient continues to refuse to be turned, however is  agreeable to meplex application.

## 2021-11-21 NOTE — PLAN OF CARE
Problem: Falls - Risk of:  Goal: Will remain free from falls  Description: Will remain free from falls  Outcome: Met This Shift  Goal: Absence of physical injury  Description: Absence of physical injury  Outcome: Met This Shift     Problem: Skin Integrity:  Goal: Will show no infection signs and symptoms  Description: Will show no infection signs and symptoms  Outcome: Met This Shift  Goal: Absence of new skin breakdown  Description: Absence of new skin breakdown  Outcome: Met This Shift     Problem: MECHANICAL VENTILATION  Goal: Oral health is maintained or improved  Outcome: Met This Shift

## 2021-11-22 NOTE — PROGRESS NOTES
OT BEDSIDE TREATMENT NOTE      Date:2021  Patient Name: Fadi Lerma  MRN: [id number]  : 1940  Room: [address] [place] [address]       Evaluating OT: Mg Goldsmith OTR/L #[id number]      Referring Provider and Specific Provider Orders/Date:      21 1300   OT eval and treat Start: 21 1300, End: 21 1300, ONE TIME, Standing Count: 1 Occurrences, R      Keith Pearce,        Placement Recommendation: LOBITO (pt wants to go to \"rest home\" where his wife is in [place])       Diagnosis:   1. Respiratory acidosis    2. Elevated troponin    3. Hypercarbia    4. Hypervolemia, unspecified hypervolemia type            Surgery: None        Pertinent Medical History:       Past Medical History        Past Medical History:   Diagnosis Date    Atrial fibrillation ([facility] [address]      CAD (coronary artery disease)       coronary stent 12yrs ago    COPD (chronic obstructive pulmonary disease) (HCC)      Hypertension              Past Surgical History         Past Surgical History:   Procedure Laterality Date    CARDIAC SURGERY         coronary stent    CATARACT REMOVAL WITH IMPLANT Left 2013    COLONOSCOPY        CYST REMOVAL         tailbone    EYE SURGERY                 Precautions:  Fall Risk, falls and alarm, 15-16L O2 at this session, contact and droplet isolation (covid R/O)      Assessment of current deficits    [x]? Functional mobility            [x]?ADLs           [x]? Strength                   [x]? Cognition    [x]? Functional transfers          [x]? IADLs         [x]? Safety Awareness   [x]? Endurance    []? Fine Coordination              [x]? Balance      []? Vision/perception    []? Sensation      []? Gross Motor Coordination  []? ROM           [x]?  Delirium                   []? Motor Control      OT PLAN OF CARE   OT POC based on physician orders, patient diagnosis and results of clinical assessment     Frequency/Duration 1-3 days/wk for 2 weeks PRN      Specific OT Treatment Interventions to include:   * Instruction/training on adapted ADL techniques and AE recommendations to increase functional independence within precautions       * Training on energy conservation strategies, correct breathing pattern and techniques to improve independence/tolerance for self-care routine  * Functional transfer/mobility training/DME recommendations for increased independence, safety, and fall prevention  * Patient/Family education to increase follow through with safety techniques and functional independence  * Recommendation of environmental modifications for increased safety with functional transfers/mobility and ADLs  * Cognitive retraining/development of therapeutic activities to improve problem solving, judgement, memory, and attention for increased safety/participation in ADL/IADL tasks  * Therapeutic exercise to improve motor endurance, ROM, and functional strength for ADLs/functional transfers  * Therapeutic activities to facilitate/challenge dynamic balance, stand tolerance for increased safety and independence with ADLs  * Therapeutic activities to facilitate gross/fine motor skills for increased independence with ADLs  * Positioning to improve skin integrity, interaction with environment and functional independence  * Delirium prevention/treatment     Recommended Adaptive Equipment: TBD      Home Living: Pt resides at [facility].          Prior Level of Function: Requires assist with ADLs , Dependent with IADLs; transfers to wheelchair with assist and use of jamar steady, per patient's report.      Pain Level: Pt c/o pain and stiffness in back, although states breathing is worse than pain               Cognition: A&O: 3/4; Follows 1-2 step directions              Memory: impaired              Sequencing: poor              Problem solving: poor              Judgement/safety: poor     [facility]   AM-PAC Daily Activity Inpatient   How much help for putting on and taking off regular lower body clothing?: Total  How much help for Bathing?: A Lot  How much help for Toileting?: Total  How much help for putting on and taking off regular upper body clothing?: A Lot  How much help for taking care of personal grooming?: A Little  How much help for eating meals?: A Little  AM-PAC Inpatient Daily Activity Raw Score: 12                Functional Assessment:     Initial Eval Status  Date: 11/5/21 Treatment Status  Date:11/22/2021 STGs = LTGs  Time frame: 10-14 days   Feeding Minimal Assist     N/T Supervision    Grooming Moderate Assist   For hair grooming d/t reduced endurance with overhead activity. Pt washed face with set-up assist.  N/T; pt had assist for grooming tasks prior to session Supervision    UB Dressing Maximal Assist  To doff/don gown over shoulders and manage tele monitor   Max A to tie and adjust back of gown when seated EOB  Minimal Assist    LB Dressing Dependent     Dep to don/adjust prevo boots  Moderate Assist    Bathing Maximal Assist  N/T; pt had assist with bathing tasks prior to session Moderate Assist    Toileting Dependent     N/T - Pt has stewart catheter Moderate Assist    Bed Mobility  Supine to sit:  NT  Sit to supine:  NT  Rolling: Maximal Assist x2  Max A for supine to sit with assist to guide UB and LE's to EOB; max A for scooting with use of TAPS as sling; max A for sit to supine with assist to guide LE's to supine; Max A for side rolling B ways to adjust TAPS system; Max A to scoot to [facility] with use of TAPS as sling Supine to sit: Moderate Assist   Sit to supine: Moderate Assist    Functional Transfers NT  N/T  Moderate Assist with use of jamar steady as needed    Balance Sitting:     Static: NT    Dynamic: NT   Standing: NT      Pt displays right trunk lean while supine with HOB elevated. Max A to correct posture/facilitate symmetry.   Sitting:     Static: fair/fair minus with min A at EOB for >30 seconds    Dynamic:fair/fair minus with min/mod A   Standing: NT     Sitting:     Static: fair Dynamic: fair minus    Activity Tolerance poor  fair minus d/t SPO2 sats dropping to ~72% with EOB activities and bed mobility; Pt RTB; pt required cuing on deep breathing technique with HOB elevated and increased time (~4 minutes) for pt to return to 87-90% Increase sitting tolerance >3 minutes for improved engagement with functional transfers and indep in ADLs   Visual/  Perceptual Glasses: Not reported      NA       Hand Dominance: Right        AROM (PROM) Strength Additional Info:    RUE  WFL 3+/5 good  and wfl FMC/dexterity noted during ADL tasks      LUE WFL 3+/5 good  and wfl FMC/dexterity noted during ADL tasks       - BUE AAROM exercises: 10 reps in all planes of movement to increase ROM/endurance required for functional transfers/ADL participation. Exercises completed in shoulder and elbow flexion/extension, internal/external rotation, abduction/adduction, supination/pronation, digit and wrist flexion/extension. B UE [facility] with assist. Min rest breaks provided 2* to decreased endurance/tolerance . Ex's completed on pt's RTB with HOB elevated. Hearing:  WFL   Sensation:   No c/o numbness or tingling  Tone:  WFL   Edema: LEs; nsg aware and prevo boots donned     Comments: Patient cleared by nursing staff. Upon arrival pt seated in bed. Pt agreeable to OT tx session, however c/o fatigue and SOB with activities, as well as \"stiffness in back\". Pt educated with regards to bed mobility, hand placement, safety awareness, static sitting balance in bed and at EOB, LE dressing/UE dressing, B UE ROM ex's, deep breathing techniques,  ECT's, positioning. At end of session pt seated in bed with HOB elevated  with all lines and tubes intact, call light within reach. Overall, pt demonstrated decreased independence and safety during completion of ADL/functional transfers/mobility tasks.  Pt would benefit from continued skilled OT to increase safety and independence with completion of ADL/IADL tasks for functional independence and quality of life. Pt required cues and education as noted above for safe facilitation and completion of tasks. Therapist provided skilled monitoring of patient's response during treatment session. Prior to and at the end of session, environmental modifications/line management completed for patients safety and efficiency of treatment session. Overall, patient demonstrates moderate difficulties with completion of BADLs and IADLs. Factors contributing to these difficulties include stiffness in back, decreased SPO2 sats with minimal activities, decreased endurance, and generalized weakness. As noted above, patient likely to benefit from further OT intervention to increase independence, safety, and overall quality of life. Treatment:     ? ADL completion: Self-care retraining for the above-mentioned ADLs; training on proper hand placement, safety technique, sequencing, and energy conservation techniques. ? Postural Balance: Sitting balance retraining to improve righting reactions with postural changes during ADLs. ? Skilled positioning: Proper positioning to improve interaction with environment, overall functioning and decrease/prevent edema and contractures. · Pt has made fair progress towards set goals   · OT 1-3x/week for 5-7 days during hospitalization       Treatment Time also includes thorough review of current medical information, gathering information on past medical history/social history and prior level of function, informal observation of tasks, assessment of data and education on plan of care and goals.     Treatment Time In: 10:40 AM     Treatment Time Out: 11:15 AM            Treatment Charges: Mins Units   ADL/Home Mgt     [id number]     Thera Activities     [id number] 17 1   Ther Ex                 [id number] 18 1   Manual Therapy    [id number]     Neuro Re-ed         [id number]     Orthotic manage/training                               [id number]     Non Billable Time Total Timed Treatment 12 [address] [facility], [facility] [address]

## 2021-11-22 NOTE — PROGRESS NOTES
were improved but the patient had a pH increase 7.355 with PCO2 decreased 56.2 while the patient was on BiPAP. Patient urine output has picked up since early this morning. 11/5/2021  Patient seen examined on PCU. Case discussed with intensivist yesterday afternoon. Patient looks a little bit better today. Nursing states that the patient has been changed to a DNR CC a with no chest compressions, intubation or cardioversion. Patient denies any chest or abdominal pain. Patient still with +34 lower leg edema. BUN/creatinine 48/1.0 with the patient's Liver enzymes are normal with a WBC 3.1 with hemoglobin 11.1. Temperature 97.7 with heart rate of 70 blood pressure 123/48. O2 sat is 91% on 10 L high flow nasal cannula. Urine output seems to be adequate. 11/6/2021  Patient seen examined on PCU. Patient is alert and oriented with patient currently had BiPAP on and feeling little bit more comfortable. BUN/creatinine 51/0.9 WBCs 5.5 hemoglobin 11.8. INR 2.8 today. Temperature 97.5 heart rate 85 blood pressure 107/69. O2 sat 97% on BiPAP at 75% FiO2. Urine output is inaccurate but seems to be adequate according to the patient who states he is urinating a lot. INR is 2.3 we will decrease the warfarin 1 mg p.o. daily. 11/7/2021  Patient seen examined on PCU. Patient seems to be doing little bit better today. Patient is wearing BiPAP almost all the time. Patient did eat all of his breakfast.  Patient denies any chest or abdominal pain. Patient states that he has not had a bowel movement yet. Patient's lower leg edema still prominent but seems to be mildly improved. BUN/creatinine 48/0.9 with a CO2 of 36. Transaminases are normal with free T4 1.1 and WBC 5.2 and hemoglobin 11.4. Platelet count 533. INR is 2.9 today. Temperature 97.7 with heart rate of 60 and blood pressure 108/869. O2 sat 98% on 12 L high flow nasal cannula alternating with BiPAP with FiO2 of 65%. .    11/8/2021  Patient seen and examined in PCU. Patient seems little bit more lethargic/confused this morning off of the BiPAP.  BUN/creatinine 48/0.8 with CO2 electrolytes at 38. Normal transaminases with INR down to 2.2 today. WBC 5.1 with hemoglobin 11.7. Temperature is 98.1 with heart rate 76 blood pressure 103/61. O2 sat 94% on 12 L high flow nasal cannula. Urinary output is   in accurate. 11/9/2021   Patient seen examined in PCU. Patient is again alert and oriented but somewhat short of breath with any activity and on the BiPAP most of the day. Patient denies any chest pain or abdominal pain. Patient supposed to go to surgery for cystoscopy and probable urethral dilation today with insertion of Melchor catheter. BUN/creatinine 54/1.0 today with liver enzymes normal WBC 11.1 hemoglobin 12.1. INR is 2.2 today. Temperature is 97.8 with heart rate of 90 and blood pressure 110/66. O2 sat is 95% with the patient on BiPAP 65% FiO2. Urine output is very inaccurate. Urology note reviewed with patient having severe bladder neck contracture and urology not able to dilate yesterday afternoon. 11/10/2021  Patient seen and examined in PCU. Patient looks better today. Patient is more alert and oriented. Patient denies any chest or abdominal pain. Patient urine output is improving. Patient still with marked lower leg edema. Urology note reviewed with the patient having a laser ablation of bladder neck contracture and placement of Melchor catheter yesterday afternoon. BUN/creatinine is 47/0.9 with normal liver enzymes. WBC 7.6 hemoglobin 11.8. INR is 1.9 today. ABGs performed showed pH 7.471 with PCO2 54.9 PO2 of 69. urinary output has improved with placement of Melchor catheter by 500 cc a shift. Stop IV fluids postop and continue the IV Lasix and will start SCDs lower extremities. 11/11/2021  Patient seen and examined in PCU. Patient sons at the bedside and case discussed. Patient looks and feels better.   Patient lower leg edema is moderately improved. Patient denies any problem with chest or abdominal pain. There is no nausea vomiting or dizziness. BUN/creatinine 49/0.8 with fasting blood sugar 135. Temperature 98.9 with heart rate in 96 and blood pressure ranges 90/50 6109/66. O2 sats 95% with the patient on 65% FiO2 BiPAP. Urinary output ranges 500-2900 cc a shift. 11/12/2021  Patient seen examined on PCU. Patient feels fairly good today. Case discussed with patient's nurse at bedside. Patient still has fairly good urinary output with the Melchor catheter in place. His lower leg edema is moderately improved but still has significant leg edema left greater than right. Temperature is 97.5 with a heart rate of 75 blood pressure 105/60. O2 sat 99% on heated high flow nasal cannula with FiO2 65%. 11/13/2021  Patient seen examined on PCU. Patient feels like he is breathing little better but still on BiPAP. Patient denies any chest or abdominal pain. There is no nausea or vomiting. BUN/creatinine 47/0.7. WBC is 9.7 hemoglobin 12.1. INR is 1.4 today. Urinary output ranges 500-700 cc a shift. Temperature 98.2 with heart rate 91 blood pressure currently 96/56. O2 sat 94% on 65% FiO2 BiPAP. 11/14/2021  Patient seen examined on PCU. BUN/creatinine 42/0.6 with INR 1.4 today. Temperature 98.7 with heart rate 88 blood pressure ranges 92/50102/60. O2 sat 93% on room 11 L high flow heated nasal cannula. Urine output ranges 700-950 cc a shift. 11/15/2021  Patient seen examined on PCU. Patient looks little bit better today. Patient denies any chest or abdominal pain. Patient lower leg edema again is moderately improved. Patient though still very very weak. BUN/creatinine 42/0.7. Hemoglobin 11.7. INR 1.5. Temperature 98.1 with heart rate 83 and blood pressure 121/62. O2 sat currently is 96% with the patient on heated high flow nasal cannula with FiO2 65%.   Urine output ranges 725-1550 cc a shift.    11/16/2021  Patient seen examined on PCU. Patient looks little bit more tired today. Patient is currently on the BiPAP.  BUN/creatinine 38/0.6 with CO2 electrolytes 37. INR 1.6 today. Temperature 97.6 with a heart rate of 78 and blood pressure 108/63. O2 sat is currently 92% on BiPAP. Patient is alternating with heated high flow nasal cannula FiO2 of 50%. Case discussed with pulmonology today. 11/17/2021  Patient seen examined on PCU. Patient is about the same. Patient denies any chest or abdominal pain. Patient still very very weak. Case discussed with pulmonologist today. INR 1.8. Temperature 97.6 72 blood pressure 103/65. O2 sat 96% on 15 L high flow heated nasal cannula. Patient desaturates with any activity. Urinary output is very good. 11/18/2021  Patient seen examined on PCU. Patient is alert and oriented. Patient still very very weak and desats with any activity including moving side to side in bed. BUN/creatinine 33/0.7. Liver enzymes normal with a WBC 8.4 and hemoglobin 1.9. INR is 1.8 today. Temperature 98.3 with heart rate of 74 with blood pressure 114/58 with heart rate 93% on 15 L high flow nasal cannula. Urinary output is fairly good. 11/19/2021  Patient seen examined on PCU. Patient seems to be doing little bit better today. The patient though still extremely weak and short of breath with any activity. Patient's appetite is fair. Temperature 97.8 with heart rate of 77 blood pressure 110/62. O2 sat 95% on 14 L heated high flow nasal cannula. Urine output is very good. Patient did have one good bowel movement yesterday. 11/20/2021  Patient seen examined on PCU. Patient is about the same. Patient short of breath with any activity. Patient denies any chest or abdominal pain. There is no nausea vomiting or diarrhea. INR was 2.0 yesterday. Pending lab work today. Temperature 96.8 with heart rate of 65 blood pressure ranging 96/53100/50.   O2 sat currently 91% on 15 L high flow nasal cannula. Urine output is adequate. If patient is not a candidate because of his increasing oxygen needs to go back to nursing home we will have social workers work on getting the patient to an [facility].    11/21/2021  Patient seen and examined in the PCU. Patient resting comfortable in bed. The patient currently on heated high flow nasal cannula with 15 L/min with O2 saturation 92% at rest.  Temperature 97.7 with heart rate 84 blood pressure 105/60. O2 sat 90-95% when patient is on BiPAP with FiO2 of 60%. Urinary output is fairly good. Patient lower leg edema seems to improve. Patient though is not dramatically improving pulmonary wise and / to check with nursing home about transfer back and if unable to then to transfer to [facility] for further care. 11/22/2021  Patient seen and examined in PCU. Patient states he feels about the same. There is no significant changes with his breathing. There is no chest pain or abdominal pain. BUN/creatinine 33/0.7. WBC 7.6 hemoglobin 12.5. Platelet count is down to 76,000. Temperature 97.3 with heart rate 78 blood pressure 103/56. O2 sat 92% on 15 L high flow heated nasal cannula. Urine output still very good. Patient still has 3+ 12 lower leg edema. This that was markedly improved from admission.  informed me that the patient will probably not be a candidate for LTAC because of his insurance.       Past Medical History:    Past Medical History:   Diagnosis Date    Atrial fibrillation ([facility] [address]     CAD (coronary artery disease)     coronary stent 12yrs ago    COPD (chronic obstructive pulmonary disease) ([facility] [address]     Hypertension      Past Surgical History:    Past Surgical History:   Procedure Laterality Date    BLADDER SURGERY N/A 11/9/2021    CYSTOSCOPY, LASER ABLATION OF BLADDER NECK STRICTURE,  PONCE CATHETER INSERTION performed by Otilia White MD at [address] [place] [address] coronary stent    CATARACT REMOVAL WITH IMPLANT Left 2013    COLONOSCOPY      CYST REMOVAL      tailbone    EYE SURGERY         Medications Prior to Admission:    @  Prior to Admission medications    Medication Sig Start Date End Date Taking? Authorizing Provider   ipratropium-albuterol (DUONEB) 0.5-2.5 (3) MG/3ML SOLN nebulizer solution Inhale 3 mLs into the lungs 4 times daily 21   Brianne Harvey DO   albuterol (PROVENTIL) (2.5 MG/3ML) 0.083% nebulizer solution Take 3 mLs by nebulization every 4 hours as needed for Wheezing or Shortness of Breath 21   Brianne Harvey DO   Respiratory Therapy Supplies (FULL KIT NEBULIZER SET) MISC Use as directed with nebulized medication. 21   Brianne Harvey DO   warfarin (COUMADIN) 4 MG tablet 4 mg daily 6 PM 21   Brianne Harvey DO   tamsulosin (FLOMAX) 0.4 MG capsule Take 0.4 mg by mouth daily    Historical Provider, MD   terazosin (HYTRIN) 5 MG capsule Take 10 mg by mouth daily. Historical Provider, MD   metoprolol (TOPROL-XL) 50 MG XL tablet Take 50 mg by mouth daily. Historical Provider, MD   finasteride (PROSCAR) 5 MG tablet Take 5 mg by mouth daily. Historical Provider, MD   aspirin 81 MG tablet Take 81 mg by mouth daily.     Historical Provider, MD       Allergies:  Dye [iodides]    Social History:   Social History     Socioeconomic History    Marital status:      Spouse name: Not on file    Number of children: Not on file    Years of education: Not on file    Highest education level: Not on file   Occupational History    Not on file   Tobacco Use    Smoking status: Former Smoker     Quit date: 3/27/1992     Years since quittin.6    Smokeless tobacco: Never Used   Substance and Sexual Activity    Alcohol use: No    Drug use: No    Sexual activity: Not on file   Other Topics Concern    Not on file   Social History Narrative    Not on file     Social Determinants of Health     Financial Resource Strain:     Difficulty of Paying Living Expenses: Not on file   Food Insecurity:     Worried About Running Out of Food in the Last Year: Not on file    Jian of Food in the Last Year: Not on file   Transportation Needs:     Lack of Transportation (Medical): Not on file    Lack of Transportation (Non-Medical): Not on file   Physical Activity:     Days of Exercise per Week: Not on file    Minutes of Exercise per Session: Not on file   Stress:     Feeling of Stress : Not on file   Social Connections:     Frequency of Communication with Friends and Family: Not on file    Frequency of Social Gatherings with Friends and Family: Not on file    Attends Holiness Services: Not on file    Active Member of [address] [place] [address] [employer] or Organizations: Not on file    Attends Club or Organization Meetings: Not on file    Marital Status: Not on file   Intimate Partner Violence:     Fear of Current or Ex-Partner: Not on file    Emotionally Abused: Not on file    Physically Abused: Not on file    Sexually Abused: Not on file   Housing Stability:     Unable to Pay for Housing in the Last Year: Not on file    Number of [place] in the Last Year: Not on file    Unstable Housing in the Last Year: Not on file       Family History:   History reviewed. No pertinent family history. REVIEW OF SYSTEMS:   Gen: Patient initially admitted from the nursing home with altered mental status but is doing much better at this time. Patient denies any lightheadedness or dizziness. No LOC or syncope. No fevers or chills. HEENT: No earache, sore throat or nasal congestion. Resp: Denies cough, hemoptysis or sputum production. Cardiac: Denies chest pain, +SOB, no diaphoresis or palpitations. GI: No nausea, vomiting, diarrhea or constipation. No melena or hematochezia. : No urinary complaints, dysuria, hematuria or frequency. MSK: + Diffuse lower extremity edema. No extremity weakness, paralysis or paresthesias.      PHYSICAL EXAM:    Vitals:  BP (!) 103/56   Pulse 78   Temp 97.3 °F (36.3 °C) (Axillary)   Resp 20   Ht 5' 8\" (1.727 m)   Wt 222 lb 0.1 oz (100.7 kg)   SpO2 92%   BMI 33.76 kg/m²     General:  This is a [de-identified] y.o. yo male who is alert and oriented in mild respiratory distress  HEENT:  Head is normocephalic and atraumatic, PERRLA, EOMI, mucus membranes moist with no pharyngeal erythema or exudate. Neck:  Supple with no carotid bruits, JVD or thyromegaly.   No cervical adenopathy  CV:  Regular rate and rhythm, no murmurs  Lungs: Coarse decreased breath sounds to auscultation bilaterally with no wheezes, rales or rhonchi  Abdomen:  Soft, nontender,+ abdominal fat, nondistended, bowel sounds present  Extremities:  + 2-3 leg edema, peripheral pulses intact bilaterally  Neuro:  Cranial nerves II-XII grossly intact; motor and sensory function intact with no focal deficits  Skin:  No rashes, lesions or wounds    DATA:  CBC with Differential:    Lab Results   Component Value Date    WBC 7.6 11/22/2021    RBC 3.96 11/22/2021    HGB 12.5 11/22/2021    HCT 40.7 11/22/2021    PLT 76 11/22/2021    .8 11/22/2021    MCH 31.6 11/22/2021    MCHC 30.7 11/22/2021    RDW 14.6 11/22/2021    BLASTSPCT 0.0 11/04/2021    METASPCT 0.9 11/22/2021    LYMPHOPCT 7.8 11/22/2021    MONOPCT 4.3 11/22/2021    BASOPCT 0.3 11/22/2021    MONOSABS 0.30 11/22/2021    LYMPHSABS 0.61 11/22/2021    EOSABS 0.00 11/22/2021    BASOSABS 0.00 11/22/2021     CMP:    Lab Results   Component Value Date     11/22/2021    K 4.0 11/22/2021    K 4.8 11/03/2021    CL 95 11/22/2021    CO2 35 11/22/2021    BUN 33 11/22/2021    CREATININE 0.7 11/22/2021    GFRAA >60 11/22/2021    LABGLOM >60 11/22/2021    GLUCOSE 121 11/22/2021    PROT 5.3 11/18/2021    LABALBU 2.9 11/18/2021    CALCIUM 8.5 11/22/2021    BILITOT 0.7 11/18/2021    ALKPHOS 154 11/18/2021    AST 22 11/18/2021    ALT 25 11/18/2021     Magnesium:    Lab Results   Component Value Date    MG 2.3 11/05/2021 Phosphorus:    Lab Results   Component Value Date    PHOS 4.4 11/05/2021     PT/INR:    Lab Results   Component Value Date    PROTIME 27.7 11/22/2021    INR 2.4 11/22/2021     Troponin:  No results found for: TROPONINI  U/A:    Lab Results   Component Value Date    COLORU Yellow 11/03/2021    PROTEINU Negative 11/03/2021    PHUR 5.0 11/03/2021    WBCUA 1-3 11/03/2021    RBCUA 10-20 11/03/2021    BACTERIA RARE 11/03/2021    CLARITYU Clear 11/03/2021    SPECGRAV 1.025 11/03/2021    LEUKOCYTESUR Negative 11/03/2021    UROBILINOGEN 0.2 11/03/2021    BILIRUBINUR Negative 11/03/2021    BLOODU LARGE 11/03/2021    GLUCOSEU Negative 11/03/2021     ABG:    Lab Results   Component Value Date    PH 7.471 11/10/2021    PCO2 54.9 11/10/2021    PO2 69.4 11/10/2021    HCO3 39.1 11/10/2021    BE 13.2 11/10/2021    O2SAT 94.6 11/10/2021     HgBA1c:  No results found for: LABA1C  FLP:    Lab Results   Component Value Date    TRIG 46 11/04/2021    HDL 81 11/04/2021    LDLCALC 46 11/04/2021    LABVLDL 9 11/04/2021     TSH:    Lab Results   Component Value Date    TSH 4.560 11/04/2021     IRON:  No results found for: IRON  LIPASE:    Lab Results   Component Value Date    LIPASE 74 09/08/2021       ASSESSMENT AND PLAN:      Patient Active Problem List    Diagnosis Date Noted    Acute on chronic respiratory failure with hypoxia and hypercapnia ([facility] [address] 11/03/2021    CAD (coronary artery disease)  Acute diastolic congestive heart failure     Atrial fibrillation - chronic     COPD with exacerbation     Hypertension-patient hypotensive in the ED      History of severe pulmonary hypertension at 99 mmHg with +34 tricuspid regurg 09/10/2021     Diffuse leg edema probably combination of the severe pulmonary hypertension and tricuspid regurg along with acute diastolic CHF 32/74/6048     Plan:  Admit to [facility]MC/PCU  Nursing home meds reviewed BiPAP  BiPAP -continuous at this time  DuoNeb aerosols every 4 hours  Pulmicort aerosol twice daily  Brovana aerosol twice daily  Continue warfarin   Daily INRs  Accurate I's and O's  BiPAP 16/8 at at bedtime and as needed   SCDs lower extremities    Consult pulmonology    MRSA screen  Add IV Rocephin, doxycycline    Colace 100 mg twice daily  MiraLAX 17 mg  daily as needed    Bladder scan > 900 cc  Urology consult-unable to dilate and insert Melchor catheter 11/8 PM  Cystoscopy and ureteral dilation 11/9 p.m.     SCDs lower extremities    Warfarin increased 4 mg daily  Daily INRs  Lasix 40 mg IV push twice daily  Stop antibioticssee pulmonology note    Consult / for discharge planning to either nursing home     CMP, CBC every other day       China Quispe DO, D.O.  11/22/2021  12:17 PM

## 2021-11-22 NOTE — PLAN OF CARE
Problem: Falls - Risk of:  Goal: Will remain free from falls  Description: Will remain free from falls  11/22/2021 0046 by Raisa Key RN  Outcome: Met This Shift  11/21/2021 1841 by Luz Multani RN  Outcome: Met This Shift  Goal: Absence of physical injury  Description: Absence of physical injury  11/22/2021 0046 by Raisa Key RN  Outcome: Met This Shift  11/21/2021 1841 by Luz Multani RN  Outcome: Met This Shift     Problem: Skin Integrity:  Goal: Will show no infection signs and symptoms  Description: Will show no infection signs and symptoms  11/22/2021 0046 by Raisa Key RN  Outcome: Met This Shift  11/21/2021 1841 by Luz Multani RN  Outcome: Met This Shift  Goal: Absence of new skin breakdown  Description: Absence of new skin breakdown  11/22/2021 0046 by Raisa Key RN  Outcome: Met This Shift  11/21/2021 1841 by Luz Multani RN  Outcome: Met This Shift     Problem: MECHANICAL VENTILATION  Goal: Oral health is maintained or improved  11/21/2021 1841 by Luz Multani RN  Outcome: Met This Shift

## 2021-11-23 NOTE — PROGRESS NOTES
Flow Rate (L/min): 15 L/min  O2 Device: High flow nasal cannula      Vent Information  Skin Assessment: Clean, dry, & intact  FiO2 : 60 %  SpO2: 90 %  SpO2/FiO2 ratio: 150  I Time/ I Time %: 0.9 s  Mask Type: Full face mask  Mask Size: Large    Additional Respiratory  Assessments  Pulse: 93  Resp: 20  SpO2: 90 %  Oral Care: Teeth brushed, Mouthwash     URINARY CATHETER OUTPUT (Melchor):    [REMOVED] External Urinary Catheter-Output (mL): 150 mL  Urethral Catheter Double-lumen; Latex 22 fr-Output (mL): 400 mL    LABS:    WBC   Date Value Ref Range Status   11/22/2021 7.6 4.5 - 11.5 E9/L Final   11/18/2021 8.4 4.5 - 11.5 E9/L Final   11/13/2021 9.7 4.5 - 11.5 E9/L Final     Hemoglobin   Date Value Ref Range Status   11/22/2021 12.5 12.5 - 16.5 g/dL Final   11/18/2021 11.9 (L) 12.5 - 16.5 g/dL Final   11/15/2021 11.7 (L) 12.5 - 16.5 g/dL Final     Hematocrit   Date Value Ref Range Status   11/22/2021 40.7 37.0 - 54.0 % Final   11/18/2021 39.1 37.0 - 54.0 % Final   11/15/2021 37.8 37.0 - 54.0 % Final     MCV   Date Value Ref Range Status   11/22/2021 102.8 (H) 80.0 - 99.9 fL Final   11/18/2021 103.4 (H) 80.0 - 99.9 fL Final   11/13/2021 103.7 (H) 80.0 - 99.9 fL Final     Platelets   Date Value Ref Range Status   11/22/2021 76 (L) 130 - 450 E9/L Final   11/18/2021 101 (L) 130 - 450 E9/L Final   11/13/2021 115 (L) 130 - 450 E9/L Final     Sodium   Date Value Ref Range Status   11/22/2021 143 132 - 146 mmol/L Final   11/20/2021 141 132 - 146 mmol/L Final   11/19/2021 144 132 - 146 mmol/L Final     Potassium   Date Value Ref Range Status   11/22/2021 4.0 3.5 - 5.0 mmol/L Final   11/20/2021 4.2 3.5 - 5.0 mmol/L Final   11/19/2021 4.0 3.5 - 5.0 mmol/L Final     Potassium reflex Magnesium   Date Value Ref Range Status   11/03/2021 4.8 3.5 - 5.0 mmol/L Final   09/08/2021 6.5 (H) 3.5 - 5.0 mmol/L Final     Comment:     Specimen is moderately Hemolyzed. Result may be artificially increased.      Chloride   Date Value Ref Range Status   11/22/2021 >60 >=60 mL/min/1.73 Final     Comment:     Chronic Kidney Disease: less than 60 ml/min/1.73 sq.m. Kidney Failure: less than 15 ml/min/1.73 sq.m. Results valid for patients 18 years and older. 11/20/2021 >60 >=60 mL/min/1.73 Final     Comment:     Chronic Kidney Disease: less than 60 ml/min/1.73 sq.m. Kidney Failure: less than 15 ml/min/1.73 sq.m. Results valid for patients 18 years and older. 11/19/2021 >60 >=60 mL/min/1.73 Final     Comment:     Chronic Kidney Disease: less than 60 ml/min/1.73 sq.m. Kidney Failure: less than 15 ml/min/1.73 sq.m. Results valid for patients 18 years and older. GFR    Date Value Ref Range Status   11/22/2021 >60  Final   11/20/2021 >60  Final   11/19/2021 >60  Final     Magnesium   Date Value Ref Range Status   11/05/2021 2.3 1.6 - 2.6 mg/dL Final   11/04/2021 2.2 1.6 - 2.6 mg/dL Final     Phosphorus   Date Value Ref Range Status   11/05/2021 4.4 2.5 - 4.5 mg/dL Final   11/04/2021 4.2 2.5 - 4.5 mg/dL Final     No results for input(s): PH, PO2, PCO2, HCO3, BE, O2SAT in the last 72 hours. No results for input(s): CULTRESP in the last 72 hours. Lab Results   Component Value Date    PH 7.471 11/10/2021    PH 7.355 11/04/2021    PH 7.204 11/03/2021    PH 7.443 09/13/2021    PO2 69.4 11/10/2021    PO2 105.6 11/04/2021    PO2 97.4 11/03/2021    PO2 85.1 09/13/2021    PCO2 54.9 11/10/2021    PCO2 56.2 11/04/2021    PCO2 85.0 11/03/2021    PCO2 35.9 09/13/2021    HCO3 39.1 11/10/2021    HCO3 30.7 11/04/2021    HCO3 32.8 11/03/2021    HCO3 24.0 09/13/2021    O2SAT 94.6 11/10/2021    O2SAT 97.5 11/04/2021    O2SAT 96.2 11/03/2021    O2SAT 96.3 09/13/2021       RADIOLOGY:  XR CHEST PORTABLE   Final Result   Significant regression of findings of volume overload/decompensated   congestive heart failure since the previous study of November 5.          XR UROGRAM W OR WO KUB W OR WO TOMOGRAM   Final Result Fluoroscopic support provided to subspecialty service. Please see   subspecialty report for full details and interpretation of real time imaging. XR CHEST PORTABLE   Final Result   Slight worsening of CHF with new infiltrate and effusion at the right base         XR CHEST PORTABLE   Final Result   Interstitial lung disease which may relate to chronic fibrosis. The   appearance may be somewhat exacerbated by suboptimal inspiration. Cardiomegaly. XR CHEST PORTABLE    (Results Pending)     Echo 9/11/21  IMPRESSION:  1. The left and right atria are both mildly to moderately dilated. The  remaining cardiac chamber sizes including aortic root size are within  normal limits. 2.  The left ventricle shows moderate concentric left ventricular  hypertrophy at 1.6 cm. Systolic function is hyperdynamic with ejection  fraction of 72%. No definite focal wall motion abnormality is seen. Diastolic filling is indeterminate. 3.  The mitral valve shows moderate to severe mitral annular  calcification. Maximal gradient across the mitral valve 7.3 mmHg. Mitral valve area by pressure half time 4.9 cm2. There is no  significant mitral stenosis. There is 1+ mitral regurgitation. 4.  Aortic valve is thickened and restricted. Maximal gradient 28 mmHg. Mean gradient 11.3. Aortic valve area estimated at 1.2 cm2. On  visualization, there is mild to at most moderate aortic stenosis with  trace aortic insufficiency only. 5.  There is no pulmonic stenosis nor insufficiency. There is 3+  moderately severe tricuspid regurgitation with severe pulmonary  hypertension at 89 mmHg. 6.  There is a small to medium circumferential pericardial effusion. There is no convincing evidence of tamponade physiology. There is no  definite intracavitary mass or thrombus or shunt identified on this  study.

## 2021-11-23 NOTE — CARE COORDINATION
SS NOTE: COVID NEGATIVE 11/31, WILL NEED A RAPID NEGATIVE COVID TO RETURN TO THE NF. Per nursing pt POX dropped to 76% when not placed on Bipap while sleeping or napping. SW completed referrals to both [facility] to see which might be interested in trying to [address] this pt with his insurance. [facility] is reviewing and [facility] DOES NOT have an [place] contract- they declined. Pt is a long term care/ private pay resident of [facility]. Pt's son Yanna Matute is aware that pt on 15 liters and most SNF's- and [facility] unable to provide high liter flow O2. Son stated he is in agreement with LTAC if physician is in agreement with pursuing, prefers either [address] or [address]. SS to continue. CHRIS Oliver.11/23/2021.9:20 AM.

## 2021-11-23 NOTE — PROGRESS NOTES
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 male who presents with altered mental status at the nursing home. Patient symptoms started about a week ago patient was also noted to have increased weight gain and increasing lower leg edema. Patient normally alert and oriented x4 but has become much more lethargic and interactive. Patient gained 20 pounds at the nursing home. Patient was brought into the emergency room with patient chest x-ray showed interstitial lung disease and also cardiomegaly. ABG showed a pH 7.204 with a CO2 of 85 on 6 L nasal cannula. Patient then was started on BiPAP.  BUN/creatinine 43/1.0 with proBNP 3900 and troponin initially was 120. Liver enzymes are normal.  Heart rate was 75 normal temperature and blood pressure initially at 237 systolic currently is 90/30. Patient currently on 40% FiO2 BiPAP with O2 sat of 93%. Patient states he has been wearing O2 nasal cannula for last couple weeks at the nursing home but not before that. Patient denies wearing BiPAP in the past.  He denies any problem with any chest pain, abdominal pain, nausea vomiting or dizziness. ECG reviewed with patient have atrial fibrillation with no significant ST-T changes to suggest acute myocardial injury troponin was elevated on admission but troponin I-II hours later was less than on admission. 11/4/2021  Patient seen examined on PCU. Patient still very short of breath even at rest.  Patient seems to do fairly good on the BiPAP once he is off of it he seemed to have some more respiratory distress. BUN/creatinine 46/1.0 with patient's transaminases normal TSH 4.56. WBC 5.1 with hemoglobin 11. INR is 2.2 today. Temperature 98.3 with a heart rate of 100 blood pressure 130/40. O2 sat 91% on 7 L nasal cannula.  ABGs today were improved but the patient had a pH increase 7.355 with PCO2 decreased 56.2 while the patient was on BiPAP. Patient urine output has picked up since early this morning. 11/5/2021  Patient seen examined on PCU. Case discussed with intensivist yesterday afternoon. Patient looks a little bit better today. Nursing states that the patient has been changed to a DNR CC a with no chest compressions, intubation or cardioversion. Patient denies any chest or abdominal pain. Patient still with +34 lower leg edema. BUN/creatinine 48/1.0 with the patient's Liver enzymes are normal with a WBC 3.1 with hemoglobin 11.1. Temperature 97.7 with heart rate of 70 blood pressure 123/48. O2 sat is 91% on 10 L high flow nasal cannula. Urine output seems to be adequate. 11/6/2021  Patient seen examined on PCU. Patient is alert and oriented with patient currently had BiPAP on and feeling little bit more comfortable. BUN/creatinine 51/0.9 WBCs 5.5 hemoglobin 11.8. INR 2.8 today. Temperature 97.5 heart rate 85 blood pressure 107/69. O2 sat 97% on BiPAP at 75% FiO2. Urine output is inaccurate but seems to be adequate according to the patient who states he is urinating a lot. INR is 2.3 we will decrease the warfarin 1 mg p.o. daily. 11/7/2021  Patient seen examined on PCU. Patient seems to be doing little bit better today. Patient is wearing BiPAP almost all the time. Patient did eat all of his breakfast.  Patient denies any chest or abdominal pain. Patient states that he has not had a bowel movement yet. Patient's lower leg edema still prominent but seems to be mildly improved. BUN/creatinine 48/0.9 with a CO2 of 36. Transaminases are normal with free T4 1.1 and WBC 5.2 and hemoglobin 11.4. Platelet count 677. INR is 2.9 today. Temperature 97.7 with heart rate of 60 and blood pressure 108/869. O2 sat 98% on 12 L high flow nasal cannula alternating with BiPAP with FiO2 of 65%. .    11/8/2021  Patient seen and examined in PCU. Patient seems little bit more lethargic/confused this morning off of the BiPAP.  BUN/creatinine 48/0.8 with CO2 electrolytes at 38. Normal transaminases with INR down to 2.2 today. WBC 5.1 with hemoglobin 11.7. Temperature is 98.1 with heart rate 76 blood pressure 103/61. O2 sat 94% on 12 L high flow nasal cannula. Urinary output is   in accurate. 11/9/2021   Patient seen examined in PCU. Patient is again alert and oriented but somewhat short of breath with any activity and on the BiPAP most of the day. Patient denies any chest pain or abdominal pain. Patient supposed to go to surgery for cystoscopy and probable urethral dilation today with insertion of Melchor catheter. BUN/creatinine 54/1.0 today with liver enzymes normal WBC 11.1 hemoglobin 12.1. INR is 2.2 today. Temperature is 97.8 with heart rate of 90 and blood pressure 110/66. O2 sat is 95% with the patient on BiPAP 65% FiO2. Urine output is very inaccurate. Urology note reviewed with patient having severe bladder neck contracture and urology not able to dilate yesterday afternoon. 11/10/2021  Patient seen and examined in PCU. Patient looks better today. Patient is more alert and oriented. Patient denies any chest or abdominal pain. Patient urine output is improving. Patient still with marked lower leg edema. Urology note reviewed with the patient having a laser ablation of bladder neck contracture and placement of Melchor catheter yesterday afternoon. BUN/creatinine is 47/0.9 with normal liver enzymes. WBC 7.6 hemoglobin 11.8. INR is 1.9 today. ABGs performed showed pH 7.471 with PCO2 54.9 PO2 of 69. urinary output has improved with placement of Melchor catheter by 500 cc a shift. Stop IV fluids postop and continue the IV Lasix and will start SCDs lower extremities. 11/11/2021  Patient seen and examined in PCU. Patient sons at the bedside and case discussed. Patient looks and feels better.   Patient lower shift.    11/16/2021  Patient seen examined on PCU. Patient looks little bit more tired today. Patient is currently on the BiPAP.  BUN/creatinine 38/0.6 with CO2 electrolytes 37. INR 1.6 today. Temperature 97.6 with a heart rate of 78 and blood pressure 108/63. O2 sat is currently 92% on BiPAP. Patient is alternating with heated high flow nasal cannula FiO2 of 50%. Case discussed with pulmonology today. 11/17/2021  Patient seen examined on PCU. Patient is about the same. Patient denies any chest or abdominal pain. Patient still very very weak. Case discussed with pulmonologist today. INR 1.8. Temperature 97.6 72 blood pressure 103/65. O2 sat 96% on 15 L high flow heated nasal cannula. Patient desaturates with any activity. Urinary output is very good. 11/18/2021  Patient seen examined on PCU. Patient is alert and oriented. Patient still very very weak and desats with any activity including moving side to side in bed. BUN/creatinine 33/0.7. Liver enzymes normal with a WBC 8.4 and hemoglobin 1.9. INR is 1.8 today. Temperature 98.3 with heart rate of 74 with blood pressure 114/58 with heart rate 93% on 15 L high flow nasal cannula. Urinary output is fairly good. 11/19/2021  Patient seen examined on PCU. Patient seems to be doing little bit better today. The patient though still extremely weak and short of breath with any activity. Patient's appetite is fair. Temperature 97.8 with heart rate of 77 blood pressure 110/62. O2 sat 95% on 14 L heated high flow nasal cannula. Urine output is very good. Patient did have one good bowel movement yesterday. 11/20/2021  Patient seen examined on PCU. Patient is about the same. Patient short of breath with any activity. Patient denies any chest or abdominal pain. There is no nausea vomiting or diarrhea. INR was 2.0 yesterday. Pending lab work today. Temperature 96.8 with heart rate of 65 blood pressure ranging 96/53100/50.   O2 sat coronary stent 12yrs ago    COPD (chronic obstructive pulmonary disease) ([facility] [address]     Hypertension      Past Surgical History:    Past Surgical History:   Procedure Laterality Date    BLADDER SURGERY N/A 11/9/2021    CYSTOSCOPY, LASER ABLATION OF BLADDER NECK STRICTURE,  PONCE CATHETER INSERTION performed by Gayla Loyola MD at [address]      coronary stent    CATARACT REMOVAL WITH IMPLANT Left 04 01 2013    COLONOSCOPY      CYST REMOVAL      tailbone    EYE SURGERY         Medications Prior to Admission:    @  Prior to Admission medications    Medication Sig Start Date End Date Taking? Authorizing Provider   ipratropium-albuterol (DUONEB) 0.5-2.5 (3) MG/3ML SOLN nebulizer solution Inhale 3 mLs into the lungs 4 times daily 9/16/21   [facility], DO   albuterol (PROVENTIL) (2.5 MG/3ML) 0.083% nebulizer solution Take 3 mLs by nebulization every 4 hours as needed for Wheezing or Shortness of Breath 9/16/21   [facility], DO   Respiratory Therapy Supplies (FULL KIT NEBULIZER SET) MISC Use as directed with nebulized medication. 9/16/21   Favio Shell, DO   warfarin (COUMADIN) 4 MG tablet 4 mg daily 6 PM 9/16/21   [facility], DO   tamsulosin (FLOMAX) 0.4 MG capsule Take 0.4 mg by mouth daily    Historical Provider, MD   terazosin (HYTRIN) 5 MG capsule Take 10 mg by mouth daily. Historical Provider, MD   metoprolol (TOPROL-XL) 50 MG XL tablet Take 50 mg by mouth daily. Historical Provider, MD   finasteride (PROSCAR) 5 MG tablet Take 5 mg by mouth daily. Historical Provider, MD   aspirin 81 MG tablet Take 81 mg by mouth daily.     Historical Provider, MD       Allergies:  Dye [iodides]    Social History:   Social History     Socioeconomic History    Marital status:      Spouse name: Not on file    Number of children: Not on file    Years of education: Not on file    Highest education level: Not on file   Occupational History    Not on file   Tobacco Use    Smoking status: Former Smoker     Quit date: 3/27/1992     Years since quittin.6    Smokeless tobacco: Never Used   Substance and Sexual Activity    Alcohol use: No    Drug use: No    Sexual activity: Not on file   Other Topics Concern    Not on file   Social History Narrative    Not on file     Social Determinants of Health     Financial Resource Strain:     Difficulty of Paying Living Expenses: Not on file   Food Insecurity:     Worried About Running Out of Food in the Last Year: Not on file    Jian of Food in the Last Year: Not on file   Transportation Needs:     Lack of Transportation (Medical): Not on file    Lack of Transportation (Non-Medical): Not on file   Physical Activity:     Days of Exercise per Week: Not on file    Minutes of Exercise per Session: Not on file   Stress:     Feeling of Stress : Not on file   Social Connections:     Frequency of Communication with Friends and Family: Not on file    Frequency of Social Gatherings with Friends and Family: Not on file    Attends Taoist Services: Not on file    Active Member of [address] [place] [address] or Organizations: Not on file    Attends Club or Organization Meetings: Not on file    Marital Status: Not on file   Intimate Partner Violence:     Fear of Current or Ex-Partner: Not on file    Emotionally Abused: Not on file    Physically Abused: Not on file    Sexually Abused: Not on file   Housing Stability:     Unable to Pay for Housing in the Last Year: Not on file    Number of [place] in the Last Year: Not on file    Unstable Housing in the Last Year: Not on file       Family History:   History reviewed. No pertinent family history. REVIEW OF SYSTEMS:   Gen: Patient initially admitted from the nursing home with altered mental status but is doing much better at this time. Patient denies any lightheadedness or dizziness. No LOC or syncope. No fevers or chills. HEENT: No earache, sore throat or nasal congestion.     Resp: BUN 35 11/23/2021    CREATININE 0.7 11/23/2021    GFRAA >60 11/23/2021    LABGLOM >60 11/23/2021    GLUCOSE 86 11/23/2021    PROT 5.3 11/18/2021    LABALBU 2.9 11/18/2021    CALCIUM 8.3 11/23/2021    BILITOT 0.7 11/18/2021    ALKPHOS 154 11/18/2021    AST 22 11/18/2021    ALT 25 11/18/2021     Magnesium:    Lab Results   Component Value Date    MG 2.3 11/05/2021     Phosphorus:    Lab Results   Component Value Date    PHOS 4.4 11/05/2021     PT/INR:    Lab Results   Component Value Date    PROTIME 33.7 11/23/2021    INR 2.9 11/23/2021     Troponin:  No results found for: TROPONINI  U/A:    Lab Results   Component Value Date    COLORU Yellow 11/03/2021    PROTEINU Negative 11/03/2021    PHUR 5.0 11/03/2021    WBCUA 1-3 11/03/2021    RBCUA 10-20 11/03/2021    BACTERIA RARE 11/03/2021    CLARITYU Clear 11/03/2021    SPECGRAV 1.025 11/03/2021    LEUKOCYTESUR Negative 11/03/2021    UROBILINOGEN 0.2 11/03/2021    BILIRUBINUR Negative 11/03/2021    BLOODU LARGE 11/03/2021    GLUCOSEU Negative 11/03/2021     ABG:    Lab Results   Component Value Date    PH 7.471 11/10/2021    PCO2 54.9 11/10/2021    PO2 69.4 11/10/2021    HCO3 39.1 11/10/2021    BE 13.2 11/10/2021    O2SAT 94.6 11/10/2021     HgBA1c:  No results found for: LABA1C  FLP:    Lab Results   Component Value Date    TRIG 46 11/04/2021    HDL 81 11/04/2021    LDLCALC 46 11/04/2021    LABVLDL 9 11/04/2021     TSH:    Lab Results   Component Value Date    TSH 4.560 11/04/2021     IRON:  No results found for: IRON  LIPASE:    Lab Results   Component Value Date    LIPASE 74 09/08/2021       ASSESSMENT AND PLAN:      Patient Active Problem List    Diagnosis Date Noted    Acute on chronic respiratory failure with hypoxia and hypercapnia ([facility] [address] 11/03/2021    CAD (coronary artery disease)  Acute diastolic congestive heart failure     Atrial fibrillation - chronic     COPD with exacerbation     Hypertension-patient hypotensive in the ED      History of severe pulmonary hypertension at 99 mmHg with +34 tricuspid regurg 09/10/2021     Diffuse leg edema probably combination of the severe pulmonary hypertension and tricuspid regurg along with acute diastolic CHF 89/78/6636     Plan:  Admit to IMC/PCU  Nursing home meds reviewed BiPAP  BiPAP -continuous at this time  DuoNeb aerosols every 4 hours  Pulmicort aerosol twice daily  Brovana aerosol twice daily  Continue warfarin   Daily INRs  Accurate I's and O's  BiPAP 16/8 at at bedtime and as needed   SCDs lower extremities    Consult pulmonology    MRSA screen  Add IV Rocephin, doxycycline    Colace 100 mg twice daily  MiraLAX 17 mg  daily as needed    Bladder scan > 900 cc  Urology consult-unable to dilate and insert Melchor catheter 11/8 PM  Cystoscopy and ureteral dilation 11/9 p.m.     SCDs lower extremities    Warfarin increased 4 mg daily  Daily INRs  Lasix 40 mg IV push twice daily  Stop antibioticssee pulmonology note    Consult / for discharge planning to either nursing home     CMP, CBC every other day       Oscar Li DO, D.O.  11/23/2021  12:14 PM

## 2021-11-23 NOTE — CARE COORDINATION
SS NOTE: COVID NEGATIVE 11/31, WILL NEED A RAPID NEGATIVE COVID TO FOR A NEW SNF PLACEMENT. Pt is currently on 15liters high flow O2. Per nursing pt POX dropped to 76% when not placed on Bipap while sleeping or napping. SW completed referrals to both [facility] and [facility] neither facility is able to accept. [address] declined pt admission. With pt's son Jd's approval a referral was made to [facility]. SW completed the referral to that SNF and await their decision. (If approved pt will need weaned to 10 liters pnc). For the new SNF pt will need PRECERT, signed EMILY, current PT/OT notes and SW will need to retrieve the [place] from [facility]. SS to  continue. CHRIS Kelly.11/23/2021.2:22PM.

## 2021-11-23 NOTE — CARE COORDINATION
SS NOTE: COVID NEGATIVE 11/31, WILL NEED A RAPID NEGATIVE COVID TO RETURN TO THE NF. Pt is currently on 15liters high flow O2. Per nursing pt POX dropped to 76% when not placed on Bipap while sleeping or napping. ARABELLA completed referrals to both [facility] and [facility] neither facility is able to accept. SW made contact with [facility] today and they relates that pt's son Curly Bruner picked up pt's belongings yesterday saying that he was going to [facility]. SW contacted son Curly Bruner and advised him of the [facility] declining pt. Curly Bruner approves going forth with placement at [facility]. ARABELLA contacted Liaison from [facility] and completed a referral there. If pt is accepted at the new SNF pt will need PRECERT, signed EMILY, current PT/OT notes and SW will need to retrieve the [place] from [facility]. SS to  continue. CHRIS Perez.11/23/2021.12:25PM.

## 2021-11-24 PROBLEM — J96.00 ACUTE RESPIRATORY FAILURE (HCC): Status: ACTIVE | Noted: 2021-01-01

## 2021-11-24 NOTE — PROGRESS NOTES
Physical Therapy Treatment Note/Plan of Care    Room #:  2219/4144-75  Patient Name: Daryle Munroe  YOB: 1940  MRN: [id number]    Date of Service: 11/24/2021     Tentative placement recommendation: Subacute rehab  Equipment recommendation: To be determined      Evaluating Physical Therapist: Mady Gee, [facility] #[id number]      Specific Provider Orders/Date/Referring Provider :   11/03/21 1300   PT eval and treat Start: 11/03/21 1300, End: 11/03/21 1300, ONE TIME, Standing Count: 1 Occurrences, R    Mingl Mar, DO    Admitting Diagnosis:   Respiratory acidosis [E87.2]  Hypercarbia [R06.89]  Elevated troponin [R77.8]  Acute on chronic respiratory failure with hypoxia and hypercapnia (HCC) [J96.21, J96.22]  Hypervolemia, unspecified hypervolemia type [E87.70]      Surgery: none  Visit Diagnoses       Codes    Respiratory acidosis    -  Primary E87.2    Elevated troponin     R77.8    Hypercarbia     R06.89    Hypervolemia, unspecified hypervolemia type     E87.70    Stricture of male urethra, unspecified stricture type     N35.919          Patient Active Problem List   Diagnosis    Metabolic encephalopathy    Inability to walk    Acute on chronic respiratory failure with hypoxia and hypercapnia (HCC)    CAD (coronary artery disease)    Atrial fibrillation (HCC)    COPD (chronic obstructive pulmonary disease) ([facility] [address]    Hypertension    Adjustment disorder with depressed mood    Acute respiratory failure (HCC)        ASSESSMENT of Current Deficits Patient exhibits decreased strength, balance and endurance impairing functional mobility, transfers and gait . Fatigues quickly. Patient able to get edge of bed; however tolerates only 1-2 minutes due to O2 saturation and needing to lay supine immediately. Pt will need subacute rehab at time of discharge in order to participate in a skilled comprehensive therapy program to address deficits and improve the patient's functional levels.           PHYSICAL Balance Sitting EOB:  poor   Dynamic Standing:  not assessed  Sitting EOB: not assessed chair position  Dynamic Standing: not assessed    Sitting EOB:  fair   Dynamic Standing: fair -     Patient is Alert & Oriented x person, place, time and situation and follows directions    Sensation:  Patient  reports numbness/tingling right foot     Edema:  yes bilateral lower extremities   Endurance: fair  -    Vitals: Bipap   Blood Pressure at rest  Blood Pressure during session    Heart Rate at rest  Heart Rate during session    SPO2 at rest 89%  SPO2 during session 79-92 % patient desaturates quickly with rolling and getting to edge of bed. Patient education  Patient educated on role of Physical Therapy, risks of immobility, safety and plan of care,  importance of mobility while in hospital , purse lip breathing, ankle pumps, quad set and glut set for edema control, blood clot prevention, importance and purpose of adaptive device and adjusted to proper height for the patient. and safety      Patient response to education:   Pt verbalized understanding Pt demonstrated skill Pt requires further education in this area   Yes Partial Yes      Treatment:  Patient practiced and was instructed/facilitated in the following treatment: Patient performed supine exercises. Assisted to edge of bed. Sat edge of bed ~2 minutes with Maximal assist of 1 to increase dynamic sitting balance and activity tolerance. oxygen saturation, immediately laid back to supine. Dependent of 2 to head of bed. rolled multiple times for reposition of taps. Donned and doffed at beginning and end of session SCDs and Prevalon boots for skin integrity. Therapeutic Exercises:  ankle pumps, quad sets, glut sets, heel slide, hip abduction/adduction and straight leg raise x 12 reps. At end of session, patient in bed with alarm and and left with nursing call light and phone within reach,  all lines and tubes intact, nursing notified.       Patient would benefit from continued skilled Physical Therapy to improve functional independence and quality of life.          Patient's/ family goals   home    Time in 340  Time out  404    Total Treatment Time 24 minutes        CPT codes:    Therapeutic activities (78790)   12 minutes  1 unit(s)  Therapeutic exercises (43762)   12 minutes  1 unit(s)   [facility]#[id number]

## 2021-11-24 NOTE — PROGRESS NOTES
were improved but the patient had a pH increase 7.355 with PCO2 decreased 56.2 while the patient was on BiPAP. Patient urine output has picked up since early this morning. 11/5/2021  Patient seen examined on PCU. Case discussed with intensivist yesterday afternoon. Patient looks a little bit better today. Nursing states that the patient has been changed to a DNR CC a with no chest compressions, intubation or cardioversion. Patient denies any chest or abdominal pain. Patient still with +34 lower leg edema. BUN/creatinine 48/1.0 with the patient's Liver enzymes are normal with a WBC 3.1 with hemoglobin 11.1. Temperature 97.7 with heart rate of 70 blood pressure 123/48. O2 sat is 91% on 10 L high flow nasal cannula. Urine output seems to be adequate. 11/6/2021  Patient seen examined on PCU. Patient is alert and oriented with patient currently had BiPAP on and feeling little bit more comfortable. BUN/creatinine 51/0.9 WBCs 5.5 hemoglobin 11.8. INR 2.8 today. Temperature 97.5 heart rate 85 blood pressure 107/69. O2 sat 97% on BiPAP at 75% FiO2. Urine output is inaccurate but seems to be adequate according to the patient who states he is urinating a lot. INR is 2.3 we will decrease the warfarin 1 mg p.o. daily. 11/7/2021  Patient seen examined on PCU. Patient seems to be doing little bit better today. Patient is wearing BiPAP almost all the time. Patient did eat all of his breakfast.  Patient denies any chest or abdominal pain. Patient states that he has not had a bowel movement yet. Patient's lower leg edema still prominent but seems to be mildly improved. BUN/creatinine 48/0.9 with a CO2 of 36. Transaminases are normal with free T4 1.1 and WBC 5.2 and hemoglobin 11.4. Platelet count 541. INR is 2.9 today. Temperature 97.7 with heart rate of 60 and blood pressure 108/869. O2 sat 98% on 12 L high flow nasal cannula alternating with BiPAP with FiO2 of 65%. .    11/8/2021  Patient seen and examined in PCU. Patient seems little bit more lethargic/confused this morning off of the BiPAP.  BUN/creatinine 48/0.8 with CO2 electrolytes at 38. Normal transaminases with INR down to 2.2 today. WBC 5.1 with hemoglobin 11.7. Temperature is 98.1 with heart rate 76 blood pressure 103/61. O2 sat 94% on 12 L high flow nasal cannula. Urinary output is   in accurate. 11/9/2021   Patient seen examined in PCU. Patient is again alert and oriented but somewhat short of breath with any activity and on the BiPAP most of the day. Patient denies any chest pain or abdominal pain. Patient supposed to go to surgery for cystoscopy and probable urethral dilation today with insertion of Melchor catheter. BUN/creatinine 54/1.0 today with liver enzymes normal WBC 11.1 hemoglobin 12.1. INR is 2.2 today. Temperature is 97.8 with heart rate of 90 and blood pressure 110/66. O2 sat is 95% with the patient on BiPAP 65% FiO2. Urine output is very inaccurate. Urology note reviewed with patient having severe bladder neck contracture and urology not able to dilate yesterday afternoon. 11/10/2021  Patient seen and examined in PCU. Patient looks better today. Patient is more alert and oriented. Patient denies any chest or abdominal pain. Patient urine output is improving. Patient still with marked lower leg edema. Urology note reviewed with the patient having a laser ablation of bladder neck contracture and placement of Melchor catheter yesterday afternoon. BUN/creatinine is 47/0.9 with normal liver enzymes. WBC 7.6 hemoglobin 11.8. INR is 1.9 today. ABGs performed showed pH 7.471 with PCO2 54.9 PO2 of 69. urinary output has improved with placement of Melchor catheter by 500 cc a shift. Stop IV fluids postop and continue the IV Lasix and will start SCDs lower extremities. 11/11/2021  Patient seen and examined in PCU. Patient sons at the bedside and case discussed. Patient looks and feels better.   Patient lower leg edema is moderately improved. Patient denies any problem with chest or abdominal pain. There is no nausea vomiting or dizziness. BUN/creatinine 49/0.8 with fasting blood sugar 135. Temperature 98.9 with heart rate in 96 and blood pressure ranges 90/50 6109/66. O2 sats 95% with the patient on 65% FiO2 BiPAP. Urinary output ranges 500-2900 cc a shift. 11/12/2021  Patient seen examined on PCU. Patient feels fairly good today. Case discussed with patient's nurse at bedside. Patient still has fairly good urinary output with the Melchor catheter in place. His lower leg edema is moderately improved but still has significant leg edema left greater than right. Temperature is 97.5 with a heart rate of 75 blood pressure 105/60. O2 sat 99% on heated high flow nasal cannula with FiO2 65%. 11/13/2021  Patient seen examined on PCU. Patient feels like he is breathing little better but still on BiPAP. Patient denies any chest or abdominal pain. There is no nausea or vomiting. BUN/creatinine 47/0.7. WBC is 9.7 hemoglobin 12.1. INR is 1.4 today. Urinary output ranges 500-700 cc a shift. Temperature 98.2 with heart rate 91 blood pressure currently 96/56. O2 sat 94% on 65% FiO2 BiPAP. 11/14/2021  Patient seen examined on PCU. BUN/creatinine 42/0.6 with INR 1.4 today. Temperature 98.7 with heart rate 88 blood pressure ranges 92/50102/60. O2 sat 93% on room 11 L high flow heated nasal cannula. Urine output ranges 700-950 cc a shift. 11/15/2021  Patient seen examined on PCU. Patient looks little bit better today. Patient denies any chest or abdominal pain. Patient lower leg edema again is moderately improved. Patient though still very very weak. BUN/creatinine 42/0.7. Hemoglobin 11.7. INR 1.5. Temperature 98.1 with heart rate 83 and blood pressure 121/62. O2 sat currently is 96% with the patient on heated high flow nasal cannula with FiO2 65%.   Urine output ranges 725-1550 cc a shift.    11/16/2021  Patient seen examined on PCU. Patient looks little bit more tired today. Patient is currently on the BiPAP.  BUN/creatinine 38/0.6 with CO2 electrolytes 37. INR 1.6 today. Temperature 97.6 with a heart rate of 78 and blood pressure 108/63. O2 sat is currently 92% on BiPAP. Patient is alternating with heated high flow nasal cannula FiO2 of 50%. Case discussed with pulmonology today. 11/17/2021  Patient seen examined on PCU. Patient is about the same. Patient denies any chest or abdominal pain. Patient still very very weak. Case discussed with pulmonologist today. INR 1.8. Temperature 97.6 72 blood pressure 103/65. O2 sat 96% on 15 L high flow heated nasal cannula. Patient desaturates with any activity. Urinary output is very good. 11/18/2021  Patient seen examined on PCU. Patient is alert and oriented. Patient still very very weak and desats with any activity including moving side to side in bed. BUN/creatinine 33/0.7. Liver enzymes normal with a WBC 8.4 and hemoglobin 1.9. INR is 1.8 today. Temperature 98.3 with heart rate of 74 with blood pressure 114/58 with heart rate 93% on 15 L high flow nasal cannula. Urinary output is fairly good. 11/19/2021  Patient seen examined on PCU. Patient seems to be doing little bit better today. The patient though still extremely weak and short of breath with any activity. Patient's appetite is fair. Temperature 97.8 with heart rate of 77 blood pressure 110/62. O2 sat 95% on 14 L heated high flow nasal cannula. Urine output is very good. Patient did have one good bowel movement yesterday. 11/20/2021  Patient seen examined on PCU. Patient is about the same. Patient short of breath with any activity. Patient denies any chest or abdominal pain. There is no nausea vomiting or diarrhea. INR was 2.0 yesterday. Pending lab work today. Temperature 96.8 with heart rate of 65 blood pressure ranging 96/53100/50.   O2 sat currently 91% on 15 L high flow nasal cannula. Urine output is adequate. If patient is not a candidate because of his increasing oxygen needs to go back to nursing home we will have social workers work on getting the patient to an [facility].    11/21/2021  Patient seen and examined in the PCU. Patient resting comfortable in bed. The patient currently on heated high flow nasal cannula with 15 L/min with O2 saturation 92% at rest.  Temperature 97.7 with heart rate 84 blood pressure 105/60. O2 sat 90-95% when patient is on BiPAP with FiO2 of 60%. Urinary output is fairly good. Patient lower leg edema seems to improve. Patient though is not dramatically improving pulmonary wise and / to check with nursing home about transfer back and if unable to then to transfer to [facility] for further care. 11/22/2021  Patient seen and examined in PCU. Patient states he feels about the same. There is no significant changes with his breathing. There is no chest pain or abdominal pain. BUN/creatinine 33/0.7. WBC 7.6 hemoglobin 12.5. Platelet count is down to 76,000. Temperature 97.3 with heart rate 78 blood pressure 103/56. O2 sat 92% on 15 L high flow heated nasal cannula. Urine output still very good. Patient still has 3+ 12 lower leg edema. This that was markedly improved from admission.  informed me that the patient will probably not be a candidate for LTAC because of his insurance. 11/23/2021  Patient seen examined in PCU. Patient is about the same and denies any problem with chest pain, abdominal pain, nausea or vomiting. Patient very short of breath with any activity. BUN/creatinine 35/0.7. Temperature 97.8 with heart rate 83 and blood pressure currently 92/51. O2 sat 91% on 6% FiO2 BiPAP. Urine output still remains to be good. 11/24/2021  Patient seen and examined in PCU. Case discussed the patient's nurse at the bedside.   Patient still alert and oriented x23 with poor appetite. Patient still is needing BiPAP frequently to keep his O2 sats greater than 90%. Patient lower leg edema is dramatically improved and is down to a +1. BUN/creatinine 37/0.7 with liver enzymes essentially normal with a WBC 7.21 hemoglobin 12. Platelet count is 85 but stable. INR is slowly increasing and currently 3.2 and we will hold it today and resume 11/26 with decreased dose of 4 mg daily. Reviewed  notes. Patient put on heated high flow nasal cannula. Past Medical History:    Past Medical History:   Diagnosis Date    Atrial fibrillation ([address]     CAD (coronary artery disease)     coronary stent 12yrs ago    COPD (chronic obstructive pulmonary disease) ([facility] [address]     Hypertension      Past Surgical History:    Past Surgical History:   Procedure Laterality Date    BLADDER SURGERY N/A 11/9/2021    CYSTOSCOPY, LASER ABLATION OF BLADDER NECK STRICTURE,  PONCE CATHETER INSERTION performed by Eloisa Garza MD at [address] [place] [address]      coronary stent    CATARACT REMOVAL WITH IMPLANT Left 04 01 2013    COLONOSCOPY      CYST REMOVAL      tailbone    EYE SURGERY         Medications Prior to Admission:    @  Prior to Admission medications    Medication Sig Start Date End Date Taking? Authorizing Provider   ipratropium-albuterol (DUONEB) 0.5-2.5 (3) MG/3ML SOLN nebulizer solution Inhale 3 mLs into the lungs 4 times daily 9/16/21   Sabar Ricks DO   albuterol (PROVENTIL) (2.5 MG/3ML) 0.083% nebulizer solution Take 3 mLs by nebulization every 4 hours as needed for Wheezing or Shortness of Breath 9/16/21   Sabra Ricks DO   Respiratory Therapy Supplies (FULL KIT NEBULIZER SET) MISC Use as directed with nebulized medication.  9/16/21   Sabra Ricks DO   warfarin (COUMADIN) 4 MG tablet 4 mg daily 6 PM 9/16/21   Sabra Ricks DO   tamsulosin (FLOMAX) 0.4 MG capsule Take 0.4 mg by mouth daily    Historical Provider, MD   terazosin (HYTRIN) 5 MG capsule Take 10 mg by mouth daily. Historical Provider, MD   metoprolol (TOPROL-XL) 50 MG XL tablet Take 50 mg by mouth daily. Historical Provider, MD   finasteride (PROSCAR) 5 MG tablet Take 5 mg by mouth daily. Historical Provider, MD   aspirin 81 MG tablet Take 81 mg by mouth daily. Historical Provider, MD       Allergies:  Dye [iodides]    Social History:   Social History     Socioeconomic History    Marital status:      Spouse name: Not on file    Number of children: Not on file    Years of education: Not on file    Highest education level: Not on file   Occupational History    Not on file   Tobacco Use    Smoking status: Former Smoker     Quit date: 3/27/1992     Years since quittin.6    Smokeless tobacco: Never Used   Substance and Sexual Activity    Alcohol use: No    Drug use: No    Sexual activity: Not on file   Other Topics Concern    Not on file   Social History Narrative    Not on file     Social Determinants of Health     Financial Resource Strain:     Difficulty of Paying Living Expenses: Not on file   Food Insecurity:     Worried About [address] [employer] in the Last Year: Not on file    Jian of Food in the Last Year: Not on file   Transportation Needs:     Lack of Transportation (Medical): Not on file    Lack of Transportation (Non-Medical):  Not on file   Physical Activity:     Days of Exercise per Week: Not on file    Minutes of Exercise per Session: Not on file   Stress:     Feeling of Stress : Not on file   Social Connections:     Frequency of Communication with Friends and Family: Not on file    Frequency of Social Gatherings with Friends and Family: Not on file    Attends Shinto Services: Not on file    Active Member of Clubs or Organizations: Not on file    Attends Club or Organization Meetings: Not on file    Marital Status: Not on file   Intimate Partner Violence:     Fear of Current or Ex-Partner: Not on file    Emotionally Abused: Not on file    Physically Abused: Not on file    Sexually Abused: Not on file   Housing Stability:     Unable to Pay for Housing in the Last Year: Not on file    Number of Places Lived in the Last Year: Not on file    Unstable Housing in the Last Year: Not on file       Family History:   History reviewed. No pertinent family history. REVIEW OF SYSTEMS:   Gen: Patient initially admitted from the nursing home with altered mental status but is doing much better at this time. Patient denies any lightheadedness or dizziness. No LOC or syncope. No fevers or chills. HEENT: No earache, sore throat or nasal congestion. Resp: Denies cough, hemoptysis or sputum production. Cardiac: Denies chest pain, +SOB, no diaphoresis or palpitations. GI: No nausea, vomiting, diarrhea or constipation. No melena or hematochezia. : No urinary complaints, dysuria, hematuria or frequency. MSK: + Diffuse lower extremity edema. No extremity weakness, paralysis or paresthesias. PHYSICAL EXAM:    Vitals:  /60   Pulse 76   Temp 98.4 °F (36.9 °C) (Axillary)   Resp 22   Ht 5' 8\" (1.727 m)   Wt 222 lb 0.1 oz (100.7 kg)   SpO2 91%   BMI 33.76 kg/m²     General:  This is a [de-identified] y.o. yo male who is alert and oriented in mild respiratory distress  HEENT:  Head is normocephalic and atraumatic, PERRLA, EOMI, mucus membranes moist with no pharyngeal erythema or exudate. Neck:  Supple with no carotid bruits, JVD or thyromegaly.   No cervical adenopathy  CV:  Regular rate and rhythm, no murmurs  Lungs: Coarse decreased breath sounds to auscultation bilaterally with no wheezes, rales or rhonchi  Abdomen:  Soft, nontender,+ abdominal fat, nondistended, bowel sounds present  Extremities:  + 1 leg edema, peripheral pulses intact bilaterally  Neuro:  Cranial nerves II-XII grossly intact; motor and sensory function intact with no focal deficits  Skin:  No rashes, lesions or wounds    DATA:  CBC with Differential:    Lab Results   Component Value Date    WBC 7.2 11/24/2021    RBC 3.79 11/24/2021    HGB 12.0 11/24/2021    HCT 39.0 11/24/2021    PLT 85 11/24/2021    .9 11/24/2021    MCH 31.7 11/24/2021    MCHC 30.8 11/24/2021    RDW 14.9 11/24/2021    BLASTSPCT 0.0 11/04/2021    METASPCT 0.9 11/22/2021    LYMPHOPCT 7.8 11/22/2021    MONOPCT 4.3 11/22/2021    BASOPCT 0.3 11/22/2021    MONOSABS 0.30 11/22/2021    LYMPHSABS 0.61 11/22/2021    EOSABS 0.00 11/22/2021    BASOSABS 0.00 11/22/2021     CMP:    Lab Results   Component Value Date     11/24/2021    K 3.9 11/24/2021    K 4.8 11/03/2021    CL 93 11/24/2021    CO2 36 11/24/2021    BUN 37 11/24/2021    CREATININE 0.7 11/24/2021    GFRAA >60 11/24/2021    LABGLOM >60 11/24/2021    GLUCOSE 107 11/24/2021    PROT 5.7 11/24/2021    LABALBU 3.0 11/24/2021    CALCIUM 8.5 11/24/2021    BILITOT 0.6 11/24/2021    ALKPHOS 156 11/24/2021    AST 23 11/24/2021    ALT 16 11/24/2021     Magnesium:    Lab Results   Component Value Date    MG 2.3 11/05/2021     Phosphorus:    Lab Results   Component Value Date    PHOS 4.4 11/05/2021     PT/INR:    Lab Results   Component Value Date    PROTIME 38.0 11/24/2021    INR 3.2 11/24/2021     Troponin:  No results found for: TROPONINI  U/A:    Lab Results   Component Value Date    COLORU Yellow 11/03/2021    PROTEINU Negative 11/03/2021    PHUR 5.0 11/03/2021    WBCUA 1-3 11/03/2021    RBCUA 10-20 11/03/2021    BACTERIA RARE 11/03/2021    CLARITYU Clear 11/03/2021    SPECGRAV 1.025 11/03/2021    LEUKOCYTESUR Negative 11/03/2021    UROBILINOGEN 0.2 11/03/2021    BILIRUBINUR Negative 11/03/2021    BLOODU LARGE 11/03/2021    GLUCOSEU Negative 11/03/2021     ABG:    Lab Results   Component Value Date    PH 7.471 11/10/2021    PCO2 54.9 11/10/2021    PO2 69.4 11/10/2021    HCO3 39.1 11/10/2021    BE 13.2 11/10/2021    O2SAT 94.6 11/10/2021     HgBA1c:  No results found for: LABA1C  FLP:    Lab Results   Component Value Date    TRIG 46 11/04/2021 HDL 81 11/04/2021    LDLCALC 46 11/04/2021    LABVLDL 9 11/04/2021     TSH:    Lab Results   Component Value Date    TSH 4.560 11/04/2021     IRON:  No results found for: IRON  LIPASE:    Lab Results   Component Value Date    LIPASE 74 09/08/2021       ASSESSMENT AND PLAN:      Patient Active Problem List    Diagnosis Date Noted    Acute on chronic respiratory failure with hypoxia and hypercapnia (HCC) 11/03/2021    CAD (coronary artery disease)  Acute diastolic congestive heart failure     Atrial fibrillation - chronic     COPD with exacerbation     Hypertension-patient hypotensive in the ED      History of severe pulmonary hypertension at 99 mmHg with +34 tricuspid regurg 09/10/2021     Diffuse leg edema probably combination of the severe pulmonary hypertension and tricuspid regurg along with acute diastolic CHF 26/02/8272     Plan:  Admit to IMC/PCU  Nursing home meds reviewed BiPAP  BiPAP -continuous at this time  DuoNeb aerosols every 4 hours  Pulmicort aerosol twice daily  Brovana aerosol twice daily  Continue warfarin   Daily INRs  Accurate I's and O's  BiPAP 16/8 at at bedtime and as needed   SCDs lower extremities    Consult pulmonology    MRSA screen  Add IV Rocephin, doxycycline    Colace 100 mg twice daily  MiraLAX 17 mg  daily as needed    Bladder scan > 900 cc  Urology consult-unable to dilate and insert Melchor catheter 11/8 PM  Cystoscopy and ureteral dilation 11/9 p.m.     SCDs lower extremities    Daily INRs  Stop antibioticssee pulmonology note    Consult / for discharge planning to either nursing home     Hold warfarin 48 hours resume at 4 mg a day  Heated high flow nasal cannula  Decrease Lasix 20 mg IV push daily    CMP, CBC every other day       China Quispe DO, D.O.  11/24/2021  2:31 PM

## 2021-11-24 NOTE — CARE COORDINATION
SS NOTE: COVID NEGATIVE 11/31, WILL NEED A RAPID NEGATIVE COVID TO FOR A NEW SNF PLACEMENT. Pt's FIO2 95% SPO2 87%. Pt is in need of a SNF that has a Respiratory Therapist.  Pt's son approved [facility] referral completed and they will accept pt once he is on 8 liters of O2 or less and FIO2 us 40% or less. (Both [facility]s have declined pt for admission) For the new SNF pt will need PRECERT, signed EMILY, current PT/OT notes and SW will need to retrieve the [facility] from [facility]. SS to  continue. CHRIS Lemus.11/24/2021.12:04PM.

## 2021-11-24 NOTE — PROGRESS NOTES
Comprehensive Nutrition Assessment    Type and Reason for Visit:  Reassess    Nutrition Recommendations/Plan: Continue Current Diet, Continue Oral Nutrition Supplement and Start Boost Pudding daily    Nutrition Assessment:  Pt w/ acute on chronic respiratory failure/acute diastolic CHF. Hx COPD. PO meal intakes remain sporadic, will add additional ONS daily. Malnutrition Assessment:  Malnutrition Status: At risk for malnutrition (Comment)    Context:  Chronic Illness     Findings of the 6 clinical characteristics of malnutrition:  Energy Intake:  Mild decrease in energy intake (Comment)  Weight Loss:  No significant weight loss     Body Fat Loss:  No significant body fat loss     Muscle Mass Loss:  No significant muscle mass loss    Fluid Accumulation:  No significant fluid accumulation     Strength:  Not Performed    Estimated Daily Nutrient Needs:  Energy (kcal):   (MSJ 1691 X 1.1 SF); Weight Used for Energy Requirements:  Current     Protein (g):   (1.2-1.4); Weight Used for Protein Requirements:  Ideal        Fluid (ml/day):  per critical care; Method Used for Fluid Requirements:         Nutrition Related Findings:  A&O X4, -25 L, BLE +2/LUE +1/RUE trace/generalized +1/sacral +1/perineal +2 edema, abd rounded/soft, BS active      Wounds:   (coccyx- flaky/dry/red/bleeding)       Current Nutrition Therapies:    ADULT DIET; Regular  ADULT ORAL NUTRITION SUPPLEMENT; Breakfast, Dinner; Low Calorie/High Protein Oral Supplement    Anthropometric Measures:  · Height: 5' 8\" (172.7 cm)  · Current Body Weight: 222 lb (100.7 kg) (11/4 actual)   · Usual Body Weight: 177 lb (80.3 kg) (9/2021 bed per EMR)     · Ideal Body Weight: 154 lbs; % Ideal Body Weight 144.2 %   · BMI: 33.8  · BMI Categories: Obese Class 1 (BMI 30.0-34. 9)       Nutrition Diagnosis:   · Inadequate oral intake related to impaired respiratory function as evidenced by intake 26-50%, intake 51-75%    Nutrition Interventions: Nutrition Education/Counseling:  No recommendation at this time   Coordination of Nutrition Care:  Continue to monitor while inpatient    Goals:  Pt to consume >50% of meals/ONS       Nutrition Monitoring and Evaluation:   Food/Nutrient Intake Outcomes:  Food and Nutrient Intake, Supplement Intake  Physical Signs/Symptoms Outcomes:  Biochemical Data, GI Status, Fluid Status or Edema, Nutrition Focused Physical Findings, Skin, Weight     Discharge Planning:     Too soon to determine     Electronically signed by Martir Dutta RD, LD on 11/24/21 at 10:00 AM EST  Contact: [phone]

## 2021-11-24 NOTE — CARE COORDINATION
Updated clinicals faxed to Jenn Hernandez at the [facility] at her request to .  Electronically signed by Malachi Hernandez on 11/24/2021 at 9:21 AM

## 2021-11-25 NOTE — PROGRESS NOTES
Internal Medicine Progress Note    STEPHANIE=[facility]    Margarita Lainez. Matt Raza., F.A.C.OMarkI. Ney Cai D.O., ATIFOLAMBERT Haines, MSN, APRN, NP-C  [facility]. Missy Bae, MSN, APRN-CNP     Primary Care Physician: Gabriella Blair MD   Admitting Physician:  Nia Melendez DO  Admission date and time: 11/3/2021  8:03 AM    Room:  [address] [place] [address]  Admitting diagnosis: Respiratory acidosis [E87.2]  Hypercarbia [R06.89]  Elevated troponin [R77.8]  Acute on chronic respiratory failure with hypoxia and hypercapnia (HCC) [J96.21, J96.22]  Hypervolemia, unspecified hypervolemia type [E87.70]    Patient Name: Daryle Munroe  MRN: [id number]    Date of Service: 11/25/2021     Covering for Dr Manfred Mcguire:  Long Saenz is a [de-identified] y.o. male who was seen and examined today,11/25/2021, at the bedside. Admits to fatigue/malaise. Currently no shortness of breath but admits to exertional dyspnea, occasional cough. Appetite poor to fair. Voiding  without difficulty with stewart catheter. Admits to bruising of the arms. States that they extremity edema is much improved since hospitalization. Admits to generalized weakness. No family present during my examination. Review of System:   ROS: Review of rest of 12 systems negative except as mentioned under subjective. Physical Exam:  No intake/output data recorded. Intake/Output Summary (Last 24 hours) at 11/25/2021 0941  Last data filed at 11/25/2021 0622  Gross per 24 hour   Intake 240 ml   Output 1100 ml   Net -860 ml   I/O last 3 completed shifts: In: 240 [P.O.:240]  Out: 1100 [Urine:1100]  No data found. Vital Signs:   Blood pressure (!) 94/50, pulse 67, temperature 97.7 °F (36.5 °C), temperature source Oral, resp. rate 20, height 5' 8\" (1.727 m), weight 222 lb 0.1 oz (100.7 kg), SpO2 90 %. General appearance:  Alert, responsive, oriented to person, place, and time. No acute distress.   Appears ill.  Head:  Normocephalic. No masses, lesions or tenderness. Eyes:  PERRLA. EOMI. Sclera clear. Impaired vision. ENT:  Ears normal. Mucosa normal.  Oxygen-nasal cannula. Missing teeth. Neck:    Supple. Trachea midline. No thyromegaly. No JVD. No bruits. Heart:    Irregularly irregular rate and rhythm consistent with atrial fibrillation as seen on the monitor. Rate is controlled. Murmur present. Lungs:    No rales or rhonchi. Expiratory wheezes throughout. Diminished throughout. Abdomen:   Soft. Non-tender. Non-distended. Bowel sounds positive. No organomegaly or masses. No pain on palpation. Extremities:    Peripheral pulses present. No peripheral edema. No ulcers. No cyanosis. No clubbing. Neurologic:    Alert x 3. No focal deficit. Cranial nerves grossly intact. No focal weakness. Psych:   Behavior is normal. Mood appears normal. Speech is not rapid and/or pressured. Musculoskeletal:   Spine ROM normal. Muscular strength weak. Gait not assessed. Integumentary:  No rashes  Skin normal color and texture.   Genitalia/Breast:  Deferred    Medication:  Scheduled Meds:   [START ON 11/26/2021] warfarin  4 mg Oral Daily    furosemide  20 mg IntraVENous Daily    predniSONE  20 mg Oral Daily    sertraline  50 mg Oral Daily    docusate sodium  100 mg Oral BID    Vitamin D  2,000 Units Oral Daily    pantoprazole  40 mg IntraVENous Daily    And    sodium chloride (PF)  10 mL IntraVENous Daily    doxazosin  4 mg Oral Daily    metoprolol succinate  50 mg Oral Daily    aspirin  81 mg Oral Daily    tamsulosin  0.4 mg Oral Dinner    ipratropium-albuterol  3 mL Inhalation 4x Daily    Arformoterol Tartrate  15 mcg Nebulization BID    finasteride  5 mg Oral Daily    influenza virus vaccine  0.5 mL IntraMUSCular Prior to discharge     Continuous Infusions:    Objective Data:  CBC with Differential:    Lab Results   Component Value Date    WBC 7.2 11/24/2021    RBC 3.79 11/24/2021    HGB 12.0 11/24/2021    HCT 39.0 11/24/2021    PLT 85 11/24/2021    .9 11/24/2021    MCH 31.7 11/24/2021    MCHC 30.8 11/24/2021    RDW 14.9 11/24/2021    BLASTSPCT 0.0 11/04/2021    METASPCT 0.9 11/22/2021    LYMPHOPCT 7.8 11/22/2021    MONOPCT 4.3 11/22/2021    BASOPCT 0.3 11/22/2021    MONOSABS 0.30 11/22/2021    LYMPHSABS 0.61 11/22/2021    EOSABS 0.00 11/22/2021    BASOSABS 0.00 11/22/2021     CMP:    Lab Results   Component Value Date     11/24/2021    K 3.9 11/24/2021    K 4.8 11/03/2021    CL 93 11/24/2021    CO2 36 11/24/2021    BUN 37 11/24/2021    CREATININE 0.7 11/24/2021    GFRAA >60 11/24/2021    LABGLOM >60 11/24/2021    GLUCOSE 107 11/24/2021    PROT 5.7 11/24/2021    LABALBU 3.0 11/24/2021    CALCIUM 8.5 11/24/2021    BILITOT 0.6 11/24/2021    ALKPHOS 156 11/24/2021    AST 23 11/24/2021    ALT 16 11/24/2021       Wound Documentation:   Wound Coccyx (Active)   Dressing Status Clean; Dry; Intact 11/24/21 2045   Dressing/Treatment Foam 11/24/21 2045   Wound Length (cm) 1 cm 11/22/21 0840   Wound Width (cm) 1 cm 11/22/21 0840   Wound Surface Area (cm^2) 1 cm^2 11/22/21 0840   Wound Assessment Pink/red; Dry; Bleeding 11/24/21 2045   Drainage Amount Small 11/24/21 2045   Number of days:        Assessment:  · Chronic respiratory failure with hypoxia and hypercapnia  · Coronary disease  · Acute diastolic heart failure  · Aerococcus urine a UTI  · Chronic atrial fibrillation  · COPD with exacerbation  · Hypertension with hypotension in the emergency department  · History of severe pulmonary hypertension  · Tricuspid regurgitation  · Diffuse leg edema multifactorial in nature as patient insulin-dependent hypertension, tricuspid regurgitation, and acute diastolic heart failure  · BPH        Plan:   Covering for Dr. Joe Acevedo. Patient is being followed by  and assisting with discharge planning. Referral has been made to [facility] however neither is able to accept the patient.   [facility] declined admission also. Referral has been made to [facility]. Will await their decision. Continue to wean patient off oxygen therapy as tolerated. PT/OT/speech therapy to continue to work with the patient. Encourage the patient to increase activity. Continue respiratory treatments as prescribed. Labs as ordered. Monitor Coumadin therapy and adjust accordingly based on INR. Continue protein supplementation. Protonix for GI prophylaxis. C  Continue current therapy. See orders for further plan of care. Labs at time of this dictation are still pending. More than 50% of my  time was spent at the bedside counseling/coordinating care with the patient and/or family with face to face contact. This time was spent reviewing notes and laboratory data as well as instructing and counseling the patient. Time I spent with the family or surrogate(s) is included only if the patient was incapable of providing the necessary information or participating in medical decisions. I also discussed the differential diagnosis and all of the proposed management plans with the patient and individuals accompanying the patient. Tenzin Ibarra requires this high level of physician care and nursing on the IMC/Telemetry unit due the complexity of decision management and chance of rapid decline or death. Continued cardiac monitoring and higher level of nursing are required. I am readily available for any further decision-making and intervention. The patient was seen, examined and then discussed with Dr. Jayjay Fried. LILIANA Woo - CNP   11/25/2021  9:41 AM       I saw and evaluated the patient. I agree with the findings and the plan of care as documented in Viviane Nicholas NP-C's  note.     Randa Lemon DO, D.O., [facility]  10:27 AM  11/25/2021

## 2021-11-25 NOTE — PROGRESS NOTES
Pulmonary/Critical Care Progress Note    We are following patient for severe pulmonary hypertension, cor pulmonale, COPD, diastolic dysfunction    SUBJECTIVE:  The patient is currently on intermittent BiPAP required because of his severe cor pulmonale right ventricular overload. There may be a component of diastolic dysfunction as well. He is currently on prednisone 20 mg/day but we can reduce this to 10 mg/day and probably even further since I do not hear any wheezing at this point. Patient is not to be resuscitated. He remains awake alert and very cooperative and compliant. The chest x-ray suggests bilateral pleural effusions possibly from diastolic dysfunction and some consolidation. However, given the extent of the effusions, it may be beneficial to have them tapped if there is a significant amount of fluid which would improve his respiratory status and make it easier for him to be discharged, since he has been here for 21 days.     MEDICATIONS:  [employer] ON 11/26/2021] predniSONE  10 mg Oral Daily    [START ON 11/26/2021] warfarin  4 mg Oral Daily    furosemide  20 mg IntraVENous Daily    sertraline  50 mg Oral Daily    docusate sodium  100 mg Oral BID    Vitamin D  2,000 Units Oral Daily    pantoprazole  40 mg IntraVENous Daily    And    sodium chloride (PF)  10 mL IntraVENous Daily    doxazosin  4 mg Oral Daily    metoprolol succinate  50 mg Oral Daily    aspirin  81 mg Oral Daily    tamsulosin  0.4 mg Oral Dinner    ipratropium-albuterol  3 mL Inhalation 4x Daily    Arformoterol Tartrate  15 mcg Nebulization BID    finasteride  5 mg Oral Daily    influenza virus vaccine  0.5 mL IntraMUSCular Prior to discharge       HYDROcodone 5 mg - acetaminophen, medicated lip ointment, hydrOXYzine, albuterol, acetaminophen, trimethobenzamide, polyethylene glycol      REVIEW OF SYSTEMS:  Constitutional: Denies fever, weight loss, night sweats, and fatigue  Skin: Denies pigmentation, dark lesions, and rashes   HEENT: Denies hearing loss, tinnitus, ear drainage, epistaxis, sore throat, and hoarseness. Cardiovascular: Denies palpitations, chest pain, and chest pressure. Respiratory: Denies cough, dyspnea at rest, hemoptysis, apnea, and choking. Gastrointestinal: Denies nausea, vomiting, poor appetite, diarrhea, heartburn or reflux  Genitourinary: Denies dysuria, frequency, urgency or hematuria  Musculoskeletal: Denies myalgias, muscle weakness, and bone pain  Neurological: Denies dizziness, vertigo, headache, and focal weakness  Psychological: Denies anxiety and depression  Endocrine: Denies heat intolerance and cold intolerance  Hematopoietic/Lymphatic: Denies bleeding problems and blood transfusions    OBJECTIVE:  Vitals:    11/25/21 1500   BP:    Pulse:    Resp:    Temp:    SpO2: 92%     FiO2 : (S) 100 % (INCREASED % sPo2 85% AND STEADY ON hhfnc)  O2 Flow Rate (L/min): 60 L/min  O2 Device: PAP (positive airway pressure)    PHYSICAL EXAM:  Constitutional: No fever, chills, diaphoresis  Skin: Skin rash, no skin breakdown  HEENT: Unremarkable  Neck: No JVD, lymphadenopathy, thyromegaly  Cardiovascular: S1, S2 regular. No S3 murmurs rubs present  Respiratory: Some inspiratory crackles are present in both posterior lung bases with slight dullness to percussion  Gastrointestinal: Soft, obese, nontender  Genitourinary: No CVA tenderness  Extremities: 1-2+ edema feet legs and ankles  Neurological: Awake, alert, oriented x3.   No evidence of focal motor or sensory deficits  Psychological: Intact affect    LABS:  WBC   Date Value Ref Range Status   11/24/2021 7.2 4.5 - 11.5 E9/L Final   11/22/2021 7.6 4.5 - 11.5 E9/L Final   11/18/2021 8.4 4.5 - 11.5 E9/L Final     Hemoglobin   Date Value Ref Range Status   11/24/2021 12.0 (L) 12.5 - 16.5 g/dL Final   11/22/2021 12.5 12.5 - 16.5 g/dL Final   11/18/2021 11.9 (L) 12.5 - 16.5 g/dL Final     Hematocrit   Date Value Ref Range Status   11/24/2021 39.0 37.0 - 54.0 % Final     Total Protein   Date Value Ref Range Status   11/24/2021 5.7 (L) 6.4 - 8.3 g/dL Final   11/18/2021 5.3 (L) 6.4 - 8.3 g/dL Final   11/10/2021 5.5 (L) 6.4 - 8.3 g/dL Final     Albumin   Date Value Ref Range Status   11/24/2021 3.0 (L) 3.5 - 5.2 g/dL Final   11/18/2021 2.9 (L) 3.5 - 5.2 g/dL Final   11/10/2021 2.9 (L) 3.5 - 5.2 g/dL Final     Total Bilirubin   Date Value Ref Range Status   11/24/2021 0.6 0.0 - 1.2 mg/dL Final   11/18/2021 0.7 0.0 - 1.2 mg/dL Final   11/10/2021 0.4 0.0 - 1.2 mg/dL Final     Alkaline Phosphatase   Date Value Ref Range Status   11/24/2021 156 (H) 40 - 129 U/L Final   11/18/2021 154 (H) 40 - 129 U/L Final   11/10/2021 108 40 - 129 U/L Final     AST   Date Value Ref Range Status   11/24/2021 23 0 - 39 U/L Final     Comment:     Specimen is slightly Hemolyzed. Result may be artificially increased. 11/18/2021 22 0 - 39 U/L Final   11/10/2021 18 0 - 39 U/L Final     ALT   Date Value Ref Range Status   11/24/2021 16 0 - 40 U/L Final   11/18/2021 25 0 - 40 U/L Final   11/10/2021 18 0 - 40 U/L Final     GFR Non-   Date Value Ref Range Status   11/25/2021 >60 >=60 mL/min/1.73 Final     Comment:     Chronic Kidney Disease: less than 60 ml/min/1.73 sq.m. Kidney Failure: less than 15 ml/min/1.73 sq.m. Results valid for patients 18 years and older. 11/24/2021 >60 >=60 mL/min/1.73 Final     Comment:     Chronic Kidney Disease: less than 60 ml/min/1.73 sq.m. Kidney Failure: less than 15 ml/min/1.73 sq.m. Results valid for patients 18 years and older. 11/23/2021 >60 >=60 mL/min/1.73 Final     Comment:     Chronic Kidney Disease: less than 60 ml/min/1.73 sq.m. Kidney Failure: less than 15 ml/min/1.73 sq.m. Results valid for patients 18 years and older.        GFR    Date Value Ref Range Status   11/25/2021 >60  Final   11/24/2021 >60  Final   11/23/2021 >60  Final     Magnesium   Date Value Ref Range Status   11/05/2021 2.3 1.6 - 2.6 mg/dL Final   11/04/2021 2.2 1.6 - 2.6 mg/dL Final     Phosphorus   Date Value Ref Range Status   11/05/2021 4.4 2.5 - 4.5 mg/dL Final   11/04/2021 4.2 2.5 - 4.5 mg/dL Final     No results for input(s): PH, PO2, PCO2, HCO3, BE, O2SAT in the last 72 hours. RADIOLOGY:  XR CHEST PORTABLE   Final Result   Cardiomegaly, pulmonary venous hypertension, perihilar edema and effusions. Findings are compatible with moderate CHF, not significantly changed. XR CHEST PORTABLE   Final Result   1. Cardiomegaly with findings of CHF   2. Bilateral pleural effusions         XR CHEST PORTABLE   Final Result   Significant regression of findings of volume overload/decompensated   congestive heart failure since the previous study of November 5. XR UROGRAM W OR WO KUB W OR WO TOMOGRAM   Final Result   Fluoroscopic support provided to subspecialty service. Please see   subspecialty report for full details and interpretation of real time imaging. XR CHEST PORTABLE   Final Result   Slight worsening of CHF with new infiltrate and effusion at the right base         XR CHEST PORTABLE   Final Result   Interstitial lung disease which may relate to chronic fibrosis. The   appearance may be somewhat exacerbated by suboptimal inspiration. Cardiomegaly. CT CHEST WO CONTRAST    (Results Pending)           PROBLEM LIST:  Principal Problem:    Acute respiratory failure (HCC)  Active Problems:    Acute on chronic respiratory failure with hypoxia and hypercapnia (HCC)    Adjustment disorder with depressed mood  Resolved Problems:    * No resolved hospital problems. *      IMPRESSION:  1. Acute hypoxemic respiratory failure  2. Severe pulmonary hypertension  3. Cor pulmonale  4. Likely diastolic dysfunction with hyperdynamic left ventricle  5. Bilateral pleural effusions  6.  Possible underlying interstitial lung disease    PLAN:  1. CT chest in a.m. to see whether there is enough pleural fluid to access and that would benefit him if he were tapped  2. Reduce prednisone from 20 to 10 mginterstitial lung disease such as this is not likely to be treatable to any positive effect by steroids. The only reason to keep him on any prednisone at all would be if he has a component of COPD  3. Possible component of COPD to be treated with aerosol treatments and inhaled steroids and bronchodilators  4.  Labs in a.m. as already ordered        Electronically signed by Rukhsana Goodrich MD on 11/25/2021 at 4:51 PM

## 2021-11-26 NOTE — PROGRESS NOTES
Pt is not appropriate for occupational therapy treatment at this time d/t respiratory issues and pt's son and daughter called to visit with pt.  Marti Flynn, [address]

## 2021-11-26 NOTE — PROGRESS NOTES
EOMI.  Sclera clear. Impaired vision. ENT:  Ears normal. Mucosa normal.  On BiPAP. Missing teeth. Neck:    Supple. Trachea midline. No thyromegaly. No JVD. No bruits. Heart:    Irregularly irregular rate and rhythm consistent with atrial fibrillation as seen on the monitor. Rate is controlled. Murmur present. Lungs:    No rales or rhonchi. Expiratory wheezes throughout. Diminished throughout. Abdomen:   Soft. Non-tender. Non-distended. Bowel sounds positive. No organomegaly or masses. No pain on palpation. Extremities:    Peripheral pulses present. No peripheral edema. No ulcers. No cyanosis. No clubbing. Neurologic:    Alert x 3. No focal deficit. Cranial nerves grossly intact. No focal weakness. Psych:   Behavior is normal. Mood appears normal. Speech is not rapid and/or pressured. Musculoskeletal:   Spine ROM normal. Muscular strength weak. Gait not assessed. Integumentary:  No rashes  Skin normal color and texture.   Genitalia/Breast:  Deferred    Medication:  Scheduled Meds:   predniSONE  10 mg Oral Daily    warfarin  4 mg Oral Daily    furosemide  20 mg IntraVENous Daily    sertraline  50 mg Oral Daily    docusate sodium  100 mg Oral BID    Vitamin D  2,000 Units Oral Daily    pantoprazole  40 mg IntraVENous Daily    And    sodium chloride (PF)  10 mL IntraVENous Daily    doxazosin  4 mg Oral Daily    metoprolol succinate  50 mg Oral Daily    aspirin  81 mg Oral Daily    tamsulosin  0.4 mg Oral Dinner    ipratropium-albuterol  3 mL Inhalation 4x Daily    Arformoterol Tartrate  15 mcg Nebulization BID    finasteride  5 mg Oral Daily    influenza virus vaccine  0.5 mL IntraMUSCular Prior to discharge     Continuous Infusions:    Objective Data:  CBC with Differential:    Lab Results   Component Value Date    WBC 7.2 11/24/2021    RBC 3.79 11/24/2021    HGB 12.0 11/24/2021    HCT 39.0 11/24/2021    PLT 85 11/24/2021    .9 11/24/2021    MCH 31.7 11/24/2021    MCHC 30.8 11/24/2021    RDW 14.9 11/24/2021    BLASTSPCT 0.0 11/04/2021    METASPCT 0.9 11/22/2021    LYMPHOPCT 7.8 11/22/2021    MONOPCT 4.3 11/22/2021    BASOPCT 0.3 11/22/2021    MONOSABS 0.30 11/22/2021    LYMPHSABS 0.61 11/22/2021    EOSABS 0.00 11/22/2021    BASOSABS 0.00 11/22/2021     CMP:    Lab Results   Component Value Date     11/26/2021    K 4.0 11/26/2021    K 4.8 11/03/2021    CL 95 11/26/2021    CO2 32 11/26/2021    BUN 35 11/26/2021    CREATININE 0.6 11/26/2021    GFRAA >60 11/26/2021    LABGLOM >60 11/26/2021    GLUCOSE 86 11/26/2021    PROT 5.7 11/24/2021    LABALBU 3.0 11/24/2021    CALCIUM 8.6 11/26/2021    BILITOT 0.6 11/24/2021    ALKPHOS 156 11/24/2021    AST 23 11/24/2021    ALT 16 11/24/2021       Wound Documentation:   Wound Coccyx (Active)   Dressing Status Clean; Dry; Intact 11/24/21 2045   Dressing/Treatment Foam 11/24/21 2045   Wound Length (cm) 1 cm 11/22/21 0840   Wound Width (cm) 1 cm 11/22/21 0840   Wound Surface Area (cm^2) 1 cm^2 11/22/21 0840   Wound Assessment Pink/red; Dry; Bleeding 11/24/21 2045   Drainage Amount Small 11/24/21 2045   Number of days:        Assessment:    · Chronic respiratory failure with hypoxia and hypercapnia  · Coronary disease  · Acute diastolic heart failure  · Aerococcus urine a UTI  · Chronic atrial fibrillation  · COPD with exacerbation  · Hypertension with hypotension in the emergency department  · History of severe pulmonary hypertension  · Tricuspid regurgitation  · Diffuse leg edema multifactorial in nature as patient insulin-dependent hypertension, tricuspid regurgitation, and acute diastolic heart failure  · BPH        Plan:     · Continue current treatment.   · Patient being followed by pulmonary  · Continue diuretics  · Tinea monitor INR and adjust Coumadin  · We'll check albumin level with possible dose of Zaroxolyn  · Physical therapy to see evaluate and treat        More than 50% of my  time was spent at the bedside counseling/coordinating care with the patient and/or family with face to face contact. This time was spent reviewing notes and laboratory data as well as instructing and counseling the patient. Time I spent with the family or surrogate(s) is included only if the patient was incapable of providing the necessary information or participating in medical decisions. I also discussed the differential diagnosis and all of the proposed management plans with the patient and individuals accompanying the patient. Pablo Krishna requires this high level of physician care and nursing on the [facility] unit due the complexity of decision management and chance of rapid decline or death. Continued cardiac monitoring and higher level of nursing are required. I am readily available for any further decision-making and intervention.        Balbir Ballard DO, D.O., [place]  10:47 AM  11/26/2021

## 2021-11-26 NOTE — CONSULTS
Today's Date: 11/26/2021  Patient Name: Mel Carrel  Date of admission: 11/3/2021  8:03 AM  Patient's age: [de-identified] y.o., 1940male    Admission Dx: Respiratory acidosis [E87.2]  Hypercarbia [R06.89]  Elevated troponin [R77.8]  Acute on chronic respiratory failure with hypoxia and hypercapnia (HCC) [J96.21, J96.22]  Hypervolemia, unspecified hypervolemia type [E87.70]    Requesting Physician: Hyun Ayala DO    Chief Complaint   Patient presents with    Fatigue     [facility] from [facility], sent in for general weakness, has had a recent 20lb weight gain in 2 weeks. Pt normally on 6L NC.       HISTORY OF PRESENT ILLNESS:      27-year-old patient that has history of atrial fibrillation and has been on warfarin, hypertension, COPD, and other problems as below and is being followed by Dr. Karla Sue for his cardiac problems, was admitted secondary to increased edema, orthopnea, and shortness of breath with mild activities. His chest x-ray showed bilateral pulmonary opacities and pleural effusions. He had an echocardiogram on September 11, 2021 that showed normal LV systolic function and severe pulmonary hypertension above 80 mmHg. He was on nasal cannula oxygen for 2 weeks prior to admission. His blood gases showed significantly elevated PCO2 and respiratory acidosis. He did have some improvement of his edema and shortness of breath after IV diuresis. His Lasix was decreased to 20 mg once a day. He is also on steroids for his COPD  His BUN is elevated but creatinine is normal.  He is in atrial fibrillation was controlled response. Review of systems:    Negative 10 systems review, except as mentioned above. Past Medical History:   has a past medical history of Atrial fibrillation ([address] CAD (coronary artery disease), COPD (chronic obstructive pulmonary disease) ([address] and Hypertension.     Past Surgical History:   has a past surgical history that includes cyst removal; Cardiac surgery; Cataract removal with implant (Left, 04 01 2013); Colonoscopy; eye surgery; and Bladder surgery (N/A, 11/9/2021). Social History:   reports that he quit smoking about 29 years ago. He has never used smokeless tobacco. He reports that he does not drink alcohol and does not use drugs. Family History: Positive for hypertension and heart problems. family history is not on file. .     Allergies:  Dye [iodides]    MEDICATIONS:   midodrine  5 mg Oral TID WC    albumin human  25 g IntraVENous Once    predniSONE  10 mg Oral Daily    warfarin  4 mg Oral Daily    furosemide  20 mg IntraVENous Daily    sertraline  50 mg Oral Daily    docusate sodium  100 mg Oral BID    Vitamin D  2,000 Units Oral Daily    pantoprazole  40 mg IntraVENous Daily    And    sodium chloride (PF)  10 mL IntraVENous Daily    doxazosin  4 mg Oral Daily    metoprolol succinate  50 mg Oral Daily    aspirin  81 mg Oral Daily    tamsulosin  0.4 mg Oral Dinner    ipratropium-albuterol  3 mL Inhalation 4x Daily    Arformoterol Tartrate  15 mcg Nebulization BID    finasteride  5 mg Oral Daily    influenza virus vaccine  0.5 mL IntraMUSCular Prior to discharge       [facility]  Current Discharge Medication List      CONTINUE these medications which have NOT CHANGED    Details   ipratropium-albuterol (DUONEB) 0.5-2.5 (3) MG/3ML SOLN nebulizer solution Inhale 3 mLs into the lungs 4 times daily  Qty: 360 mL      albuterol (PROVENTIL) (2.5 MG/3ML) 0.083% nebulizer solution Take 3 mLs by nebulization every 4 hours as needed for Wheezing or Shortness of Breath  Qty: 120 each, Refills: 3      Respiratory Therapy Supplies (FULL KIT NEBULIZER SET) MISC Use as directed with nebulized medication. Qty: 1 each, Refills: 0    Comments: Supply prescription to Pharmacy or [address] [place] [address] location of patient or coverage preference.  Dispense full nebulizer kit - compressor, tubing, mouthpiece, mask, ancillary supplies - per requirements of ordered product, patient preference, or coverage allowances. warfarin (COUMADIN) 4 MG tablet 4 mg daily 6 PM  Qty: 30 tablet, Refills: 3      tamsulosin (FLOMAX) 0.4 MG capsule Take 0.4 mg by mouth daily      terazosin (HYTRIN) 5 MG capsule Take 10 mg by mouth daily. metoprolol (TOPROL-XL) 50 MG XL tablet Take 50 mg by mouth daily. finasteride (PROSCAR) 5 MG tablet Take 5 mg by mouth daily. aspirin 81 MG tablet Take 81 mg by mouth daily. PHYSICAL EXAM:      BP (!) 79/47   Pulse 70   Temp 98 °F (36.7 °C) (Infrared)   Resp 29   Ht 5' 8\" (1.727 m)   Wt 222 lb 0.1 oz (100.7 kg)   SpO2 (!) 87%   BMI 33.76 kg/m²    Examination:    General: Alert and oriented, No acute distress. Appears as stated age. Eye:  Pupils are equal, round and reactive to light, Extraocular movements are intact, Normal conjunctiva. Head: Normocephalic, Normal hearing, Oral mucosa is moist.  Neck: Supple, No carotid bruit, Mild jugular venous distention, No lymphadenopathy. Respiratory: Diminished lung sounds bilaterally in the bases, bibasilar crackles, Respirations are non-labored, Breath sounds are equal, Symmetrical chest wall expansion. Cardiovascular: Normal rate, 1/6 systolic precordial murmur, loud P2, No gallop, Good pulses equal in all extremities, Bilateral pedaledema. Gastrointestinal: Soft, Non-tender, Non-distended, Normal bowel sounds. Musculoskeletal: Normal strength, No tenderness, No deformity. Integumentary:  Warm, Dry. Neurologic: Alert, Oriented, No focal deficits. Cognition and Speech: Oriented, Speech clear and coherent. Psychiatric: Cooperative, Appropriate mood & affect, Normal judgment. ECG:Atrial fibrillation was controlled ventricular response, left anterior fascicular block, Q waves in leads V1 and V2.             CBC:   Recent Labs     11/24/21  1127   WBC 7.2   HGB 12.0*   HCT 39.0   PLT 85*     BMP:   Recent Labs     11/25/21  1141 the lower lobes due to the pleural effusions     XR CHEST PORTABLE    Result Date: 11/25/2021  EXAMINATION: ONE XRAY VIEW OF THE CHEST 11/25/2021 1:47 pm COMPARISON: 11/23/2021 HISTORY: ORDERING SYSTEM PROVIDED HISTORY: chf TECHNOLOGIST PROVIDED HISTORY: Reason for exam:->chf FINDINGS: Again noted is moderate generalized cardiomegaly pulmonary venous hypertension and moderate bilateral pleural effusions. Perihilar pulmonary edema is present. The findings are similar to the prior examination. Osseous structures are stable. Cardiomegaly, pulmonary venous hypertension, perihilar edema and effusions. Findings are compatible with moderate CHF, not significantly changed. XR CHEST PORTABLE    Result Date: 11/23/2021  EXAMINATION: ONE XRAY VIEW OF THE CHEST 11/23/2021 6:50 am COMPARISON: 11/09/2021 HISTORY: ORDERING SYSTEM PROVIDED HISTORY: SOB TECHNOLOGIST PROVIDED HISTORY: Reason for exam:->SOB FINDINGS: The heart is enlarged. Multifocal bilateral perihilar and lower lobe airspace disease is noted most consistent with CHF. There are small bilateral pleural effusions. 1. Cardiomegaly with findings of CHF 2. Bilateral pleural effusions     XR CHEST PORTABLE    Result Date: 11/9/2021  EXAMINATION: ONE XRAY VIEW OF THE CHEST 11/9/2021 4:56 pm COMPARISON: September 8-November 5 HISTORY: ORDERING SYSTEM PROVIDED HISTORY: Hypoxemia TECHNOLOGIST PROVIDED HISTORY: Reason for exam:->Hypoxemia FINDINGS: Heart is diffusely enlarged in moderate degree. There is some prominence of perihilar vessels. Discrete ground-glass densities seen in the right parahilar region, minimal at the right base and in the left base. No conspicuous pleural effusions are seen. Since the recent study of November 5 there is better visualized lower lung bases with the resolved the most likely or most of bilateral pleural effusions.      Significant regression of findings of volume overload/decompensated congestive heart failure since the previous study of November 5. XR CHEST PORTABLE    Result Date: 11/5/2021  EXAMINATION: ONE XRAY VIEW OF THE CHEST 11/5/2021 6:08 am COMPARISON: 11/03/2021 HISTORY: ORDERING SYSTEM PROVIDED HISTORY: SOB TECHNOLOGIST PROVIDED HISTORY: Reason for exam:->SOB FINDINGS: Heart is moderately enlarged. There is some prominence of the interstitium. There is some new infiltrate at the right base and small effusion. There is some prominence of the pulmonary vascularity. Slight worsening of CHF with new infiltrate and effusion at the right base     XR CHEST PORTABLE    Result Date: 11/3/2021  EXAMINATION: ONE XRAY VIEW OF THE CHEST 11/3/2021 8:16 am COMPARISON: 8 September 2021 HISTORY: ORDERING SYSTEM PROVIDED HISTORY: SOB TECHNOLOGIST PROVIDED HISTORY: Reason for exam:->SOB FINDINGS: Suboptimal inspiration. Coarsening of pulmonary markings likely relates to underlying chronic interstitial disease. This appears somewhat exacerbated which may relate to the comparable E suboptimal inspiration. Heart is enlarged. Pulmonary vascularity is normal.  Neither costophrenic angle is blunted. Interstitial lung disease which may relate to chronic fibrosis. The appearance may be somewhat exacerbated by suboptimal inspiration. Cardiomegaly. XR UROGRAM W OR WO KUB W OR WO TOMOGRAM    Result Date: 11/9/2021  EXAMINATION: ANTE PYELOGRAM NEPHROST 11/9/2021 4:18 pm COMPARISON: CT abdomen and pelvis from September 15, 2021 Total images: 2 images FLUOROSCOPY TIME: 10.8 seconds HISTORY: ORDERING SYSTEM PROVIDED HISTORY: Stricture of male urethra, unspecified stricture type TECHNOLOGIST PROVIDED HISTORY: Reason for exam:->cysto,urethral dilations ins stewart FINDINGS: Fluoroscopic support provided to subspecialty service for laser ablation of a bladder neck stricture and Stewart catheter insertion. No obvious complication on the images provided.  Please see subspecialty report for full details and interpretation of real time imaging. Fluoroscopic support provided to subspecialty service. Please see subspecialty report for full details and interpretation of real time imaging. IMPRESSION:        Acute diastolic CHF  Severe pulmonary hypertension  COPD  Edema  Permanent atrial fibrillation  Respiratory acidosis. RECOMMENDATIONS:    Chest x-ray is quite abnormal and is consistent with either pulmonary edema or interstitial pulmonary fibrosis. He does have severe pulmonary hypertension probably secondary to chronic lung disease. His PCO2 was high on admission secondary to COPD. His edema is related to his severe pulmonary hypertension. His edema is going to require loop diuresis as long as creatinine is elevated. Consider using pressure stockings during the daytime as much as possible and remove at bedtime. His blood pressure is low. He was started on Midodrin  Continue metoprolol for rate control. Continue warfarin  Target PT/INR is 2.5.       CC Dr. Darlene Das MD, MD, [facility]

## 2021-11-26 NOTE — CARE COORDINATION
SS NOTE: COVID NEGATIVE 11/31, WILL NEED A RAPID NEGATIVE COVID TO FOR A NEW SNF PLACEMENT. Pt's FIO2 100%  60 heated high flow O2. Cinda Amaya Pt is on IV Rocephin, Lasix and Protonix. Pt is in need of a SNF that has a Respiratory Therapist.  Pt's son approved [facility]- referral completed and they will accept pt once he is on 8 liters of O2 or less and FIO2 us 40% or less. (Both [facility]ACHs have declined pt for admission) For the new SNF pt will need PRECERT, signed EMILY, current PT/OT notes and SW will need to retrieve the [place] from [facility]. SS to  continue. CHRIS Cottrell.11/26/2021.1:04PM.

## 2021-11-26 NOTE — PROGRESS NOTES
Physical Therapy Treatment Note/Plan of Care    Room #:  8466/8956-39  Patient Name: Sussy Chavez  YOB: 1940  MRN: [id number]    Date of Service: 11/26/2021     Tentative placement recommendation: Subacute rehab  Equipment recommendation: To be determined      Evaluating Physical Therapist: Bharath Santiago, [facility] #[id number]      Specific Provider Orders/Date/Referring Provider :   11/03/21 1300   PT eval and treat Start: 11/03/21 1300, End: 11/03/21 1300, ONE TIME, Standing Count: 1 Occurrences, R    Leigh Stokes DO    Admitting Diagnosis:   Respiratory acidosis [E87.2]  Hypercarbia [R06.89]  Elevated troponin [R77.8]  Acute on chronic respiratory failure with hypoxia and hypercapnia (HCC) [J96.21, J96.22]  Hypervolemia, unspecified hypervolemia type [E87.70]      Surgery: none  Visit Diagnoses       Codes    Respiratory acidosis    -  Primary E87.2    Elevated troponin     R77.8    Hypercarbia     R06.89    Hypervolemia, unspecified hypervolemia type     E87.70    Stricture of male urethra, unspecified stricture type     N35.919          Patient Active Problem List   Diagnosis    Metabolic encephalopathy    Inability to walk    Acute on chronic respiratory failure with hypoxia and hypercapnia (HCC)    CAD (coronary artery disease)    Atrial fibrillation (HCC)    COPD (chronic obstructive pulmonary disease) [address]    Hypertension    Adjustment disorder with depressed mood    Acute respiratory failure (HCC)        ASSESSMENT of Current Deficits Patient exhibits decreased strength, balance and endurance impairing functional mobility, transfers and gait . Patient exhibited limited ROM and strength while performing supine exercises. Active assistance is needed for many supine exercises. Patient was alert during session and willing to participate. Patient would benefit from continued therapy to increase strength and overall functional mobility.         PHYSICAL THERAPY  PLAN OF CARE       Physical turning over in bed?: A Lot  How much difficulty sitting down on / standing up from a chair with arms?: A Lot  How much difficulty moving from lying on back to sitting on side of bed?: A Lot  How much help from another person moving to and from a bed to a chair?: Total  How much help from another person needed to walk in hospital room?: Total  How much help from another person for climbing 3-5 steps with a railing?: Total  AM-P[facility] Inpatient Mobility Raw Score : 9  AM-[facility] Inpatient T-Scale Score : 30.55  Mobility Inpatient CMS 0-100% Score: 81.38  Mobility Inpatient CMS G-Code Modifier : CM    Nursing cleared patient for PT treatment. . Nursing only approved supine exercises due to patient being on bipap. OBJECTIVE;   Initial Evaluation  Date: 11/4/2021 Treatment Date:  11/26/2021       Short Term/ Long Term   Goals   Was pt agreeable to Eval/treatment? Yes yes To be met in 5 days   Pain level   0/10   0/10     Bed Mobility    Rolling: Minimal assist of 1    Supine to sit: Maximal assist of 1    Sit to supine: Maximal assist of 1    Scooting: Moderate assist of 1   Rolling: Not assessed    Supine to sit: Not assessed    Sit to supine: Not assessed    Scooting: Not assessed       Rolling: Independent    Supine to sit: Minimal assist of 1    Sit to supine: Minimal assist of 1    Scooting: Minimal assist of 1     Transfers Sit to stand: Not assessed  d/t to pt overall debility, decreased activity tolerance, balance deficits, safety and fall risk. Sit to stand: Not assessed        Sit to stand:  Moderate assist of 1    Ambulation    not assessed  not assessed     5 feet using  wheeled walker with Moderate assist of 1          ROM Within functional limits    Increase range of motion 10% of affected joints    Strength BUE:  refer to OT eval  RLE:  3/5  LLE:  3/5  Increase strength in affected mm groups by 1/3 grade   Balance Sitting EOB:  poor   Dynamic Standing:  not assessed  Sitting EOB: not assessed chair position  Dynamic Standing: not assessed    Sitting EOB:  fair   Dynamic Standing: fair -     Patient is Alert & Oriented x person, place, time and situation and follows directions    Sensation:  Patient  reports numbness/tingling right foot     Edema:  yes bilateral lower extremities   Endurance: fair  -    Vitals: Bipap   Blood Pressure at rest  Blood Pressure during session    Heart Rate at rest  Heart Rate during session 82   SPO2 at rest %  SPO2 87%     Patient education  Patient educated on role of Physical Therapy, risks of immobility, safety and plan of care,  importance of mobility while in hospital , ankle pumps, quad set and glut set for edema control, blood clot prevention and safety      Patient response to education:   Pt verbalized understanding Pt demonstrated skill Pt requires further education in this area   Yes Partial Yes      Treatment:  Patient practiced and was instructed/facilitated in the following treatment: Doffed R LE SCD and Prevalon boots. Patient performed supine exercises. Donned Prevalon boots and R LE SCD for skin integrity. Therapeutic Exercises: AROM/AAROM  ankle pumps, quad sets, heel slide, hip abduction/adduction, straight leg raise and wrist grasp x 12 reps. At end of session, patient in bed with alarm and and left with nursing call light and phone within reach,  all lines and tubes intact, nursing notified. Patient would benefit from continued skilled Physical Therapy to improve functional independence and quality of life.          Patient's/ family goals   home    Time in 1036  Time out  1050    Total Treatment Time 14 minutes        CPT codes:    Therapeutic exercises (27081)   14 minutes  1 unit(s)   Mei [address] [place] [address] #[id number]

## 2021-11-26 NOTE — PLAN OF CARE
Problem: Falls - Risk of:  Goal: Will remain free from falls  Description: Will remain free from falls  Outcome: Met This Shift  Goal: Absence of physical injury  Description: Absence of physical injury  Outcome: Met This Shift     Problem: Skin Integrity:  Goal: Will show no infection signs and symptoms  Description: Will show no infection signs and symptoms  Outcome: Met This Shift  Goal: Absence of new skin breakdown  Description: Absence of new skin breakdown  Outcome: Met This Shift     Problem: Pain:  Goal: Pain level will decrease  Description: Pain level will decrease  Outcome: Met This Shift  Goal: Control of acute pain  Description: Control of acute pain  Outcome: Met This Shift  Goal: Control of chronic pain  Description: Control of chronic pain  Outcome: Met This Shift     Problem: OXYGENATION/RESPIRATORY FUNCTION  Goal: Patient will maintain patent airway  Outcome: Met This Shift  Goal: Patient will achieve/maintain normal respiratory rate/effort  Description: Respiratory rate and effort will be within normal limits for the patient  Outcome: Met This Shift     Problem: SKIN INTEGRITY  Goal: Skin integrity is maintained or improved  Outcome: Met This Shift

## 2021-11-26 NOTE — PROGRESS NOTES
Pulmonary/Critical Care Progress Note    We are following patient for acute superimposed on chronic respiratory failure, bilateral large pleural effusions probably secondary to advanced diastolic heart failure, COPD, cor pulmonale, severe pulmonary hypertension    SUBJECTIVE:  CT scan of the chest shows large bilateral pleural effusions and extensive interstitial edema probably secondary to same. Patient is on 100% FiO2 and BiPAP at alternating with AVAPS but still cannot be oxygenated. I met with the patient's 3 sons and they all desire that he be made comfortable but not have any supportive devices removed at this time. We have made BiPAP adjustments to 18/12 and FiO2 of 100%. Additional comfort measures will be added as needed.     MEDICATIONS:   midodrine  5 mg Oral TID WC    furosemide  40 mg IntraVENous BID    predniSONE  10 mg Oral Daily    warfarin  4 mg Oral Daily    sertraline  50 mg Oral Daily    docusate sodium  100 mg Oral BID    Vitamin D  2,000 Units Oral Daily    pantoprazole  40 mg IntraVENous Daily    And    sodium chloride (PF)  10 mL IntraVENous Daily    metoprolol succinate  50 mg Oral Daily    aspirin  81 mg Oral Daily    tamsulosin  0.4 mg Oral Dinner    ipratropium-albuterol  3 mL Inhalation 4x Daily    Arformoterol Tartrate  15 mcg Nebulization BID    finasteride  5 mg Oral Daily    influenza virus vaccine  0.5 mL IntraMUSCular Prior to discharge       HYDROcodone 5 mg - acetaminophen, medicated lip ointment, hydrOXYzine, albuterol, acetaminophen, trimethobenzamide, polyethylene glycol      REVIEW OF SYSTEMS:  Patient is noncommunicative in terms of speaking at this time    OBJECTIVE:  Vitals:    11/26/21 1501   BP: 100/68   Pulse: 81   Resp: 22   Temp: 98.4 °F (36.9 °C)   SpO2:      FiO2 : 100 %  O2 Flow Rate (L/min): 60 L/min  O2 Device: PAP (positive airway pressure)    PHYSICAL EXAM:  Constitutional: No fever, chills, diaphoresis  Skin: Skin rash, no skin breakdown  HEENT: Unremarkable  Neck: No JVD, lymphadenopathy, thyromegaly  Cardiovascular: S1, S2 regular. No S3 murmurs rubs present  Respiratory: Inspiratory crackles over upper lung fields, decreased breath sounds over lower lung fields consistent with large bilateral pleural effusions seen on CT scan  Gastrointestinal: Soft, obese, nontender  Genitourinary: No CVA tenderness apparent  Extremities: 2+ edema feet legs and ankles  Neurological: Responsive to voice requests. Moves all extremities  Psychological: Anxious but appropriate affect    LABS:  WBC   Date Value Ref Range Status   11/24/2021 7.2 4.5 - 11.5 E9/L Final   11/22/2021 7.6 4.5 - 11.5 E9/L Final   11/18/2021 8.4 4.5 - 11.5 E9/L Final     Hemoglobin   Date Value Ref Range Status   11/24/2021 12.0 (L) 12.5 - 16.5 g/dL Final   11/22/2021 12.5 12.5 - 16.5 g/dL Final   11/18/2021 11.9 (L) 12.5 - 16.5 g/dL Final     Hematocrit   Date Value Ref Range Status   11/24/2021 39.0 37.0 - 54.0 % Final   11/22/2021 40.7 37.0 - 54.0 % Final   11/18/2021 39.1 37.0 - 54.0 % Final     MCV   Date Value Ref Range Status   11/24/2021 102.9 (H) 80.0 - 99.9 fL Final   11/22/2021 102.8 (H) 80.0 - 99.9 fL Final   11/18/2021 103.4 (H) 80.0 - 99.9 fL Final     Platelets   Date Value Ref Range Status   11/24/2021 85 (L) 130 - 450 E9/L Final   11/22/2021 76 (L) 130 - 450 E9/L Final   11/18/2021 101 (L) 130 - 450 E9/L Final     Sodium   Date Value Ref Range Status   11/26/2021 137 132 - 146 mmol/L Final   11/25/2021 137 132 - 146 mmol/L Final   11/24/2021 136 132 - 146 mmol/L Final     Potassium   Date Value Ref Range Status   11/26/2021 4.0 3.5 - 5.0 mmol/L Final   11/25/2021 3.7 3.5 - 5.0 mmol/L Final   11/24/2021 3.9 3.5 - 5.0 mmol/L Final     Potassium reflex Magnesium   Date Value Ref Range Status   11/03/2021 4.8 3.5 - 5.0 mmol/L Final   09/08/2021 6.5 (H) 3.5 - 5.0 mmol/L Final     Comment:     Specimen is moderately Hemolyzed. Result may be artificially increased. Chloride   Date Value Ref Range Status   11/26/2021 95 (L) 98 - 107 mmol/L Final   11/25/2021 95 (L) 98 - 107 mmol/L Final   11/24/2021 93 (L) 98 - 107 mmol/L Final     CO2   Date Value Ref Range Status   11/26/2021 32 (H) 22 - 29 mmol/L Final   11/25/2021 34 (H) 22 - 29 mmol/L Final   11/24/2021 36 (H) 22 - 29 mmol/L Final     BUN   Date Value Ref Range Status   11/26/2021 35 (H) 6 - 23 mg/dL Final   11/25/2021 32 (H) 6 - 23 mg/dL Final   11/24/2021 37 (H) 6 - 23 mg/dL Final     CREATININE   Date Value Ref Range Status   11/26/2021 0.6 (L) 0.7 - 1.2 mg/dL Final   11/25/2021 0.7 0.7 - 1.2 mg/dL Final   11/24/2021 0.7 0.7 - 1.2 mg/dL Final     Glucose   Date Value Ref Range Status   11/26/2021 86 74 - 99 mg/dL Final   11/25/2021 97 74 - 99 mg/dL Final   11/24/2021 107 (H) 74 - 99 mg/dL Final     Calcium   Date Value Ref Range Status   11/26/2021 8.6 8.6 - 10.2 mg/dL Final   11/25/2021 8.7 8.6 - 10.2 mg/dL Final   11/24/2021 8.5 (L) 8.6 - 10.2 mg/dL Final     Total Protein   Date Value Ref Range Status   11/24/2021 5.7 (L) 6.4 - 8.3 g/dL Final   11/18/2021 5.3 (L) 6.4 - 8.3 g/dL Final   11/10/2021 5.5 (L) 6.4 - 8.3 g/dL Final     Albumin   Date Value Ref Range Status   11/24/2021 3.0 (L) 3.5 - 5.2 g/dL Final   11/18/2021 2.9 (L) 3.5 - 5.2 g/dL Final   11/10/2021 2.9 (L) 3.5 - 5.2 g/dL Final     Total Bilirubin   Date Value Ref Range Status   11/24/2021 0.6 0.0 - 1.2 mg/dL Final   11/18/2021 0.7 0.0 - 1.2 mg/dL Final   11/10/2021 0.4 0.0 - 1.2 mg/dL Final     Alkaline Phosphatase   Date Value Ref Range Status   11/24/2021 156 (H) 40 - 129 U/L Final   11/18/2021 154 (H) 40 - 129 U/L Final   11/10/2021 108 40 - 129 U/L Final     AST   Date Value Ref Range Status   11/24/2021 23 0 - 39 U/L Final     Comment:     Specimen is slightly Hemolyzed. Result may be artificially increased.    11/18/2021 22 0 - 39 U/L Final   11/10/2021 18 0 - 39 U/L Final     ALT   Date Value Ref Range Status   11/24/2021 16 0 - 40 U/L Final 11/18/2021 25 0 - 40 U/L Final   11/10/2021 18 0 - 40 U/L Final     GFR Non-   Date Value Ref Range Status   11/26/2021 >60 >=60 mL/min/1.73 Final     Comment:     Chronic Kidney Disease: less than 60 ml/min/1.73 sq.m. Kidney Failure: less than 15 ml/min/1.73 sq.m. Results valid for patients 18 years and older. 11/25/2021 >60 >=60 mL/min/1.73 Final     Comment:     Chronic Kidney Disease: less than 60 ml/min/1.73 sq.m. Kidney Failure: less than 15 ml/min/1.73 sq.m. Results valid for patients 18 years and older. 11/24/2021 >60 >=60 mL/min/1.73 Final     Comment:     Chronic Kidney Disease: less than 60 ml/min/1.73 sq.m. Kidney Failure: less than 15 ml/min/1.73 sq.m. Results valid for patients 18 years and older. GFR    Date Value Ref Range Status   11/26/2021 >60  Final   11/25/2021 >60  Final   11/24/2021 >60  Final     Magnesium   Date Value Ref Range Status   11/05/2021 2.3 1.6 - 2.6 mg/dL Final   11/04/2021 2.2 1.6 - 2.6 mg/dL Final     Phosphorus   Date Value Ref Range Status   11/05/2021 4.4 2.5 - 4.5 mg/dL Final   11/04/2021 4.2 2.5 - 4.5 mg/dL Final     No results for input(s): PH, PO2, PCO2, HCO3, BE, O2SAT in the last 72 hours. RADIOLOGY:  CT CHEST WO CONTRAST   Final Result   1. 3rd spacing of fluid as described above, including apparent interstitial   pulmonary edema, etiology nonspecific but likely CHF   2. Lack of aeration noted in the lower lobes due to the pleural effusions         XR CHEST PORTABLE   Final Result   Cardiomegaly, pulmonary venous hypertension, perihilar edema and effusions. Findings are compatible with moderate CHF, not significantly changed. XR CHEST PORTABLE   Final Result   1. Cardiomegaly with findings of CHF   2.  Bilateral pleural effusions         XR CHEST PORTABLE   Final Result   Significant regression of findings of volume overload/decompensated   congestive heart failure since the previous study of November 5. XR UROGRAM W OR WO KUB W OR WO TOMOGRAM   Final Result   Fluoroscopic support provided to subspecialty service. Please see   subspecialty report for full details and interpretation of real time imaging. XR CHEST PORTABLE   Final Result   Slight worsening of CHF with new infiltrate and effusion at the right base         XR CHEST PORTABLE   Final Result   Interstitial lung disease which may relate to chronic fibrosis. The   appearance may be somewhat exacerbated by suboptimal inspiration. Cardiomegaly. PROBLEM LIST:  Principal Problem:    Acute respiratory failure (HCC)  Active Problems:    Acute on chronic respiratory failure with hypoxia and hypercapnia (HCC)    Adjustment disorder with depressed mood  Resolved Problems:    * No resolved hospital problems. *      IMPRESSION:  1. Acute hypoxemic respiratory failure  2. Severe diastolic dysfunction  3. Severe pulmonary hypertension  4. Cor pulmonale  5. Large bilateral pleural effusions  6. Question interstitial lung disease, chronic    PLAN:  1. Adjust BiPAP to keep patient comfortable and reasonably well saturated  2. Diuretics increased by cardiology service  3. Keep patient as comfortable as possible. Family would like him to stay on supportive equipment and care for now unless he becomes more unstable at which point complete comfort care measures will be invoked.     Electronically signed by Paola Richter MD on 11/26/2021 at 5:00 PM

## 2021-11-27 NOTE — PROGRESS NOTES
Internal Medicine Progress Note    [facility]IE=[facility]    Razia Rivera. [facility]., F.A.C.O.I. Kathia Morales D.O., F.A.C.OMarkI. [employer], D.O. Leonora Huger, MSN, APRN, NP-C  Rina Barron. Cas Moore, MSN, APRN-CNP     Primary Care Physician: Nathalie Day MD   Admitting Physician:  Vinayak Negron DO  Admission date and time: 11/3/2021  8:03 AM    Room:  [address] [place] [address]  Admitting diagnosis: Respiratory acidosis [E87.2]  Hypercarbia [R06.89]  Elevated troponin [R77.8]  Acute on chronic respiratory failure with hypoxia and hypercapnia (HCC) [J96.21, J96.22]  Hypervolemia, unspecified hypervolemia type [E87.70]    Patient Name: Smita Rivas  MRN: [id number]    Date of Service: 11/27/2021     Covering for Dr Duane Ireland:  Luciano Simmons is a [de-identified] y.o. male who was seen and examined today,11/27/2021, at the bedside. Patient remained BiPAP dependent at the present time very dyspneic while removing. Discussed goal of treatment with daughter-in-law at the bedside. Also discussed possibility of transitioning to hospice. Family had discussed no intubation      Daughter-in-law was present during my examination. Review of System:   ROS: Review of rest of 12 systems negative except as mentioned under subjective. Physical Exam:  No intake/output data recorded. Intake/Output Summary (Last 24 hours) at 11/27/2021 1515  Last data filed at 11/27/2021 1008  Gross per 24 hour   Intake 100 ml   Output 1400 ml   Net -1300 ml   I/O last 3 completed shifts: In: 100 [P.O.:100]  Out: 1400 [Urine:1400]  No data found. Vital Signs:   Blood pressure (!) 95/56, pulse 90, temperature 97.5 °F (36.4 °C), temperature source Infrared, resp. rate 25, height 5' 8\" (1.727 m), weight 222 lb 0.1 oz (100.7 kg), SpO2 (!) 87 %. General appearance:  Alert, responsive, oriented to person, place, and time. No acute distress. Appears ill. Head:  Normocephalic.  No masses, lesions or tenderness. Eyes:  PERRLA. EOMI. Sclera clear. Impaired vision. ENT:  Ears normal. Mucosa normal.  On BiPAP. Missing teeth. Neck:    Supple. Trachea midline. No thyromegaly. No JVD. No bruits. Heart:    Irregularly irregular rate and rhythm consistent with atrial fibrillation as seen on the monitor. Rate is controlled. Murmur present. Lungs:    No rales or rhonchi. Expiratory wheezes throughout. Diminished throughout. Abdomen:   Soft. Non-tender. Non-distended. Bowel sounds positive. No organomegaly or masses. No pain on palpation. Extremities:    Peripheral pulses present. No peripheral edema. No ulcers. No cyanosis. No clubbing. Neurologic:    Alert x 3. No focal deficit. Cranial nerves grossly intact. No focal weakness. Psych:   Behavior is normal. Mood appears normal. Speech is not rapid and/or pressured. Musculoskeletal:   Spine ROM normal. Muscular strength weak. Gait not assessed. Integumentary:  No rashes  Skin normal color and texture.   Genitalia/Breast:  Deferred    Medication:  Scheduled Meds:   midodrine  5 mg Oral TID WC    furosemide  40 mg IntraVENous BID    predniSONE  10 mg Oral Daily    warfarin  4 mg Oral Daily    sertraline  50 mg Oral Daily    docusate sodium  100 mg Oral BID    Vitamin D  2,000 Units Oral Daily    pantoprazole  40 mg IntraVENous Daily    And    sodium chloride (PF)  10 mL IntraVENous Daily    metoprolol succinate  50 mg Oral Daily    aspirin  81 mg Oral Daily    tamsulosin  0.4 mg Oral Dinner    ipratropium-albuterol  3 mL Inhalation 4x Daily    Arformoterol Tartrate  15 mcg Nebulization BID    finasteride  5 mg Oral Daily    influenza virus vaccine  0.5 mL IntraMUSCular Prior to discharge     Continuous Infusions:    Objective Data:  CBC with Differential:    Lab Results   Component Value Date    WBC 7.2 11/24/2021    RBC 3.79 11/24/2021    HGB 12.0 11/24/2021    HCT 39.0 11/24/2021    PLT 85 11/24/2021    .9 11/24/2021    MCH 31.7 11/24/2021    MCHC 30.8 11/24/2021    RDW 14.9 11/24/2021    BLASTSPCT 0.0 11/04/2021    METASPCT 0.9 11/22/2021    LYMPHOPCT 7.8 11/22/2021    MONOPCT 4.3 11/22/2021    BASOPCT 0.3 11/22/2021    MONOSABS 0.30 11/22/2021    LYMPHSABS 0.61 11/22/2021    EOSABS 0.00 11/22/2021    BASOSABS 0.00 11/22/2021     CMP:    Lab Results   Component Value Date     11/27/2021    K 4.0 11/27/2021    K 4.8 11/03/2021    CL 95 11/27/2021    CO2 32 11/27/2021    BUN 36 11/27/2021    CREATININE 0.7 11/27/2021    GFRAA >60 11/27/2021    LABGLOM >60 11/27/2021    GLUCOSE 88 11/27/2021    PROT 5.8 11/27/2021    LABALBU 3.3 11/27/2021    CALCIUM 8.9 11/27/2021    BILITOT 1.2 11/27/2021    ALKPHOS 137 11/27/2021    AST 18 11/27/2021    ALT 15 11/27/2021       Wound Documentation:   Wound Coccyx (Active)   Dressing Status Clean; Dry; Intact 11/24/21 2045   Dressing/Treatment Foam 11/24/21 2045   Wound Length (cm) 1 cm 11/22/21 0840   Wound Width (cm) 1 cm 11/22/21 0840   Wound Surface Area (cm^2) 1 cm^2 11/22/21 0840   Wound Assessment Pink/red; Dry; Bleeding 11/24/21 2045   Drainage Amount Small 11/24/21 2045   Number of days:        Assessment:    · Chronic respiratory failure with hypoxia and hypercapnia  · Coronary disease  · Acute diastolic heart failure  · Aerococcus urine a UTI  · Chronic atrial fibrillation  · COPD with exacerbation  · Hypertension with hypotension in the emergency department  · History of severe pulmonary hypertension  · Tricuspid regurgitation  · Diffuse leg edema multifactorial in nature as patient insulin-dependent hypertension, tricuspid regurgitation, and acute diastolic heart failure  · BPH        Plan:     · Patient appear to be BiPAP dependent. Family has declined intubation. Considering palliative or hospice care  · Cardiology has been consulted.   Echocardiogram on September 11 there is so EF of 72% but evidence of valvular heart disease is noted with small to medium pericardial effusion  · Possible palliative and therapeutic thoracentesis  · Chest x-ray show interstitial edema. Continue diuretic therapy  · Prognosis poor    Greater than 40 minutes of critical care time was spent with the patient. This includes chart review, , and discussion with those consultants involved in the patient's care. More than 50% of my  time was spent at the bedside counseling/coordinating care with the patient and/or family with face to face contact. This time was spent reviewing notes and laboratory data as well as instructing and counseling the patient. Time I spent with the family or surrogate(s) is included only if the patient was incapable of providing the necessary information or participating in medical decisions. I also discussed the differential diagnosis and all of the proposed management plans with the patient and individuals accompanying the patient. Oralia Whitaker requires this high level of physician care and nursing on the [facility] unit due the complexity of decision management and chance of rapid decline or death. Continued cardiac monitoring and higher level of nursing are required. I am readily available for any further decision-making and intervention.        Joaquin Gonzalez DO, D.O., FACOI  3:15 PM  11/27/2021

## 2021-11-28 NOTE — PROGRESS NOTES
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 male who presents with altered mental status at the nursing home. Patient symptoms started about a week ago patient was also noted to have increased weight gain and increasing lower leg edema. Patient normally alert and oriented x4 but has become much more lethargic and interactive. Patient gained 20 pounds at the nursing home. Patient was brought into the emergency room with patient chest x-ray showed interstitial lung disease and also cardiomegaly. ABG showed a pH 7.204 with a CO2 of 85 on 6 L nasal cannula. Patient then was started on BiPAP.  BUN/creatinine 43/1.0 with proBNP 3900 and troponin initially was 120. Liver enzymes are normal.  Heart rate was 75 normal temperature and blood pressure initially at 768 systolic currently is 04/54. Patient currently on 40% FiO2 BiPAP with O2 sat of 93%. Patient states he has been wearing O2 nasal cannula for last couple weeks at the nursing home but not before that. Patient denies wearing BiPAP in the past.  He denies any problem with any chest pain, abdominal pain, nausea vomiting or dizziness. ECG reviewed with patient have atrial fibrillation with no significant ST-T changes to suggest acute myocardial injury troponin was elevated on admission but troponin I-II hours later was less than on admission. 11/4/2021  Patient seen examined on PCU. Patient still very short of breath even at rest.  Patient seems to do fairly good on the BiPAP once he is off of it he seemed to have some more respiratory distress. BUN/creatinine 46/1.0 with patient's transaminases normal TSH 4.56. WBC 5.1 with hemoglobin 11. INR is 2.2 today. Temperature 98.3 with a heart rate of 100 blood pressure 130/40. O2 sat 91% on 7 L nasal cannula.  ABGs today were improved but the patient had a pH increase 7.355 with PCO2 decreased 56.2 while the patient was on BiPAP. Patient urine output has picked up since early this morning. 11/5/2021  Patient seen examined on PCU. Case discussed with intensivist yesterday afternoon. Patient looks a little bit better today. Nursing states that the patient has been changed to a DNR CC a with no chest compressions, intubation or cardioversion. Patient denies any chest or abdominal pain. Patient still with +34 lower leg edema. BUN/creatinine 48/1.0 with the patient's Liver enzymes are normal with a WBC 3.1 with hemoglobin 11.1. Temperature 97.7 with heart rate of 70 blood pressure 123/48. O2 sat is 91% on 10 L high flow nasal cannula. Urine output seems to be adequate. 11/6/2021  Patient seen examined on PCU. Patient is alert and oriented with patient currently had BiPAP on and feeling little bit more comfortable. BUN/creatinine 51/0.9 WBCs 5.5 hemoglobin 11.8. INR 2.8 today. Temperature 97.5 heart rate 85 blood pressure 107/69. O2 sat 97% on BiPAP at 75% FiO2. Urine output is inaccurate but seems to be adequate according to the patient who states he is urinating a lot. INR is 2.3 we will decrease the warfarin 1 mg p.o. daily. 11/7/2021  Patient seen examined on PCU. Patient seems to be doing little bit better today. Patient is wearing BiPAP almost all the time. Patient did eat all of his breakfast.  Patient denies any chest or abdominal pain. Patient states that he has not had a bowel movement yet. Patient's lower leg edema still prominent but seems to be mildly improved. BUN/creatinine 48/0.9 with a CO2 of 36. Transaminases are normal with free T4 1.1 and WBC 5.2 and hemoglobin 11.4. Platelet count 345. INR is 2.9 today. Temperature 97.7 with heart rate of 60 and blood pressure 108/869. O2 sat 98% on 12 L high flow nasal cannula alternating with BiPAP with FiO2 of 65%. .    11/8/2021  Patient seen and examined in PCU. Patient seems little bit more lethargic/confused this morning off of the BiPAP.  BUN/creatinine 48/0.8 with CO2 electrolytes at 38. Normal transaminases with INR down to 2.2 today. WBC 5.1 with hemoglobin 11.7. Temperature is 98.1 with heart rate 76 blood pressure 103/61. O2 sat 94% on 12 L high flow nasal cannula. Urinary output is   in accurate. 11/9/2021   Patient seen examined in PCU. Patient is again alert and oriented but somewhat short of breath with any activity and on the BiPAP most of the day. Patient denies any chest pain or abdominal pain. Patient supposed to go to surgery for cystoscopy and probable urethral dilation today with insertion of Melchor catheter. BUN/creatinine 54/1.0 today with liver enzymes normal WBC 11.1 hemoglobin 12.1. INR is 2.2 today. Temperature is 97.8 with heart rate of 90 and blood pressure 110/66. O2 sat is 95% with the patient on BiPAP 65% FiO2. Urine output is very inaccurate. Urology note reviewed with patient having severe bladder neck contracture and urology not able to dilate yesterday afternoon. 11/10/2021  Patient seen and examined in PCU. Patient looks better today. Patient is more alert and oriented. Patient denies any chest or abdominal pain. Patient urine output is improving. Patient still with marked lower leg edema. Urology note reviewed with the patient having a laser ablation of bladder neck contracture and placement of Melchor catheter yesterday afternoon. BUN/creatinine is 47/0.9 with normal liver enzymes. WBC 7.6 hemoglobin 11.8. INR is 1.9 today. ABGs performed showed pH 7.471 with PCO2 54.9 PO2 of 69. urinary output has improved with placement of Melchor catheter by 500 cc a shift. Stop IV fluids postop and continue the IV Lasix and will start SCDs lower extremities. 11/11/2021  Patient seen and examined in PCU. Patient sons at the bedside and case discussed. Patient looks and feels better.   Patient lower leg edema is moderately improved. Patient denies any problem with chest or abdominal pain. There is no nausea vomiting or dizziness. BUN/creatinine 49/0.8 with fasting blood sugar 135. Temperature 98.9 with heart rate in 96 and blood pressure ranges 90/50 6109/66. O2 sats 95% with the patient on 65% FiO2 BiPAP. Urinary output ranges 500-2900 cc a shift. 11/12/2021  Patient seen examined on PCU. Patient feels fairly good today. Case discussed with patient's nurse at bedside. Patient still has fairly good urinary output with the Melchor catheter in place. His lower leg edema is moderately improved but still has significant leg edema left greater than right. Temperature is 97.5 with a heart rate of 75 blood pressure 105/60. O2 sat 99% on heated high flow nasal cannula with FiO2 65%. 11/13/2021  Patient seen examined on PCU. Patient feels like he is breathing little better but still on BiPAP. Patient denies any chest or abdominal pain. There is no nausea or vomiting. BUN/creatinine 47/0.7. WBC is 9.7 hemoglobin 12.1. INR is 1.4 today. Urinary output ranges 500-700 cc a shift. Temperature 98.2 with heart rate 91 blood pressure currently 96/56. O2 sat 94% on 65% FiO2 BiPAP. 11/14/2021  Patient seen examined on PCU. BUN/creatinine 42/0.6 with INR 1.4 today. Temperature 98.7 with heart rate 88 blood pressure ranges 92/50102/60. O2 sat 93% on room 11 L high flow heated nasal cannula. Urine output ranges 700-950 cc a shift. 11/15/2021  Patient seen examined on PCU. Patient looks little bit better today. Patient denies any chest or abdominal pain. Patient lower leg edema again is moderately improved. Patient though still very very weak. BUN/creatinine 42/0.7. Hemoglobin 11.7. INR 1.5. Temperature 98.1 with heart rate 83 and blood pressure 121/62. O2 sat currently is 96% with the patient on heated high flow nasal cannula with FiO2 65%.   Urine output ranges 725-1550 cc a shift.    11/16/2021  Patient seen examined on PCU. Patient looks little bit more tired today. Patient is currently on the BiPAP.  BUN/creatinine 38/0.6 with CO2 electrolytes 37. INR 1.6 today. Temperature 97.6 with a heart rate of 78 and blood pressure 108/63. O2 sat is currently 92% on BiPAP. Patient is alternating with heated high flow nasal cannula FiO2 of 50%. Case discussed with pulmonology today. 11/17/2021  Patient seen examined on PCU. Patient is about the same. Patient denies any chest or abdominal pain. Patient still very very weak. Case discussed with pulmonologist today. INR 1.8. Temperature 97.6 72 blood pressure 103/65. O2 sat 96% on 15 L high flow heated nasal cannula. Patient desaturates with any activity. Urinary output is very good. 11/18/2021  Patient seen examined on PCU. Patient is alert and oriented. Patient still very very weak and desats with any activity including moving side to side in bed. BUN/creatinine 33/0.7. Liver enzymes normal with a WBC 8.4 and hemoglobin 1.9. INR is 1.8 today. Temperature 98.3 with heart rate of 74 with blood pressure 114/58 with heart rate 93% on 15 L high flow nasal cannula. Urinary output is fairly good. 11/19/2021  Patient seen examined on PCU. Patient seems to be doing little bit better today. The patient though still extremely weak and short of breath with any activity. Patient's appetite is fair. Temperature 97.8 with heart rate of 77 blood pressure 110/62. O2 sat 95% on 14 L heated high flow nasal cannula. Urine output is very good. Patient did have one good bowel movement yesterday. 11/20/2021  Patient seen examined on PCU. Patient is about the same. Patient short of breath with any activity. Patient denies any chest or abdominal pain. There is no nausea vomiting or diarrhea. INR was 2.0 yesterday. Pending lab work today. Temperature 96.8 with heart rate of 65 blood pressure ranging 96/53100/50.   O2 sat currently 91% on 15 L high flow nasal cannula. Urine output is adequate. If patient is not a candidate because of his increasing oxygen needs to go back to nursing home we will have social workers work on getting the patient to an [place] [id number].    11/21/2021  Patient seen and examined in the PCU. Patient resting comfortable in bed. The patient currently on heated high flow nasal cannula with 15 L/min with O2 saturation 92% at rest.  Temperature 97.7 with heart rate 84 blood pressure 105/60. O2 sat 90-95% when patient is on BiPAP with FiO2 of 60%. Urinary output is fairly good. Patient lower leg edema seems to improve. Patient though is not dramatically improving pulmonary wise and / to check with nursing home about transfer back and if unable to then to transfer to [place] [id number] for further care. 11/22/2021  Patient seen and examined in PCU. Patient states he feels about the same. There is no significant changes with his breathing. There is no chest pain or abdominal pain. BUN/creatinine 33/0.7. WBC 7.6 hemoglobin 12.5. Platelet count is down to 76,000. Temperature 97.3 with heart rate 78 blood pressure 103/56. O2 sat 92% on 15 L high flow heated nasal cannula. Urine output still very good. Patient still has 3+ 12 lower leg edema. This that was markedly improved from admission.  informed me that the patient will probably not be a candidate for LTAC because of his insurance. 11/23/2021  Patient seen examined in PCU. Patient is about the same and denies any problem with chest pain, abdominal pain, nausea or vomiting. Patient very short of breath with any activity. BUN/creatinine 35/0.7. Temperature 97.8 with heart rate 83 and blood pressure currently 92/51. O2 sat 91% on 6% FiO2 BiPAP. Urine output still remains to be good. 11/24/2021  Patient seen and examined in PCU. Case discussed the patient's nurse at the bedside.   Patient still alert and oriented x23 with poor appetite. Patient still is needing BiPAP frequently to keep his O2 sats greater than 90%. Patient lower leg edema is dramatically improved and is down to a +1. BUN/creatinine 37/0.7 with liver enzymes essentially normal with a WBC 7.21 hemoglobin 12. Platelet count is 85 but stable. INR is slowly increasing and currently 3.2 and we will hold it today and resume 11/26 with decreased dose of 4 mg daily. Reviewed  notes. Patient put on heated high flow nasal cannula. 11/2511/27/2021-covered by Dr. Deonna Avila    11/28/2021  Patient seen examined on PCU. Case discussed with patient's grandson at the bedside. Patient is alert but very very weak. He denies any pain. Lab work pending today. Temperature is 97.1 with heart rate 82 blood pressure 115/79. O2 sat is 100% FiO2 with BiPAP. Urine output about 400 cc a shift. Patient did have 2 bowel movements yesterday. Cardiology was consulted with patient's atrial fib and repeat CAT scan showing large bilateral pleural effusions along with increasing cardiomegaly with history of chronic A. fib. Past Medical History:    Past Medical History:   Diagnosis Date    Atrial fibrillation ([address]     CAD (coronary artery disease)     coronary stent 12yrs ago    COPD [facility] obstructive [facility] disease) [facility]     Hypertension      Past Surgical History:    Past Surgical History:   Procedure Laterality Date    BLADDER SURGERY N/A 11/9/2021    CYSTOSCOPY, LASER ABLATION OF BLADDER NECK STRICTURE,  PONCE CATHETER INSERTION performed by Shoaib Connelly MD at [address] [place] [address]      coronary stent    CATARACT REMOVAL WITH IMPLANT Left 04 01 2013    COLONOSCOPY      [facility]      [facility]    EYE SURGERY         Medications Prior to Admission:    @  Prior to Admission medications    Medication Sig Start Date End Date Taking?  Authorizing Provider   ipratropium-albuterol (DUONEB) 0.5-2.5 (3) MG/3ML SOLN nebulizer solution Inhale 3 mLs into the lungs 4 times daily 21   [facility], DO   albuterol (PROVENTIL) (2.5 MG/3ML) 0.083% nebulizer solution Take 3 mLs by nebulization every 4 hours as needed for Wheezing or Shortness of Breath 21   [facility], DO   Respiratory Therapy Supplies (FULL KIT NEBULIZER SET) MISC Use as directed with nebulized medication. 21   Keenan Fairland, DO   warfarin (COUMADIN) 4 MG tablet 4 mg daily 6 PM 21   [facility], DO   tamsulosin (FLOMAX) 0.4 MG capsule Take 0.4 mg by mouth daily    Historical Provider, MD   terazosin (HYTRIN) 5 MG capsule Take 10 mg by mouth daily. Historical Provider, MD   metoprolol (TOPROL-XL) 50 MG XL tablet Take 50 mg by mouth daily. Historical Provider, MD   finasteride (PROSCAR) 5 MG tablet Take 5 mg by mouth daily. Historical Provider, MD   aspirin 81 MG tablet Take 81 mg by mouth daily. Historical Provider, MD       Allergies:  Dye [iodides]    Social History:   Social History     Socioeconomic History    Marital status:      Spouse name: Not on file    Number of children: Not on file    Years of education: Not on file    Highest education level: Not on file   Occupational History    Not on file   Tobacco Use    Smoking status: Former Smoker     Quit date: 3/27/1992     Years since quittin.6    Smokeless tobacco: Never Used   Substance and Sexual Activity    Alcohol use: No    Drug use: No    Sexual activity: Not on file   Other Topics Concern    Not on file   Social History Narrative    Not on file     Social Determinants of Health     Financial Resource Strain:     Difficulty of Paying Living Expenses: Not on file   Food Insecurity:     Worried About [address] in the Last Year: Not on file    Jian of Food in the Last Year: Not on file   Transportation Needs:     Lack of Transportation (Medical): Not on file    Lack of Transportation (Non-Medical):  Not on file   Physical Activity:     Days of Exercise per Week: Not on file    Minutes of Exercise per Session: Not on file   Stress:     Feeling of Stress : Not on file   Social Connections:     Frequency of Communication with Friends and Family: Not on file    Frequency of Social Gatherings with Friends and Family: Not on file    Attends [employer] Services: Not on file    Active Member of [address] [place] [address] or Organizations: Not on file    Attends Club or Organization Meetings: Not on file    Marital Status: Not on file   Intimate Partner Violence:     Fear of Current or Ex-Partner: Not on file    Emotionally Abused: Not on file    Physically Abused: Not on file    Sexually Abused: Not on file   Housing Stability:     Unable to Pay for Housing in the Last Year: Not on file    Number of [place] in the Last Year: Not on file    Unstable Housing in the Last Year: Not on file       Family History:   History reviewed. No pertinent family history. REVIEW OF SYSTEMS:   Gen: Patient initially admitted from the nursing home with altered mental status but is doing much better at this time. Patient denies any lightheadedness or dizziness. No LOC or syncope. No fevers or chills. HEENT: No earache, sore throat or nasal congestion. Resp: Denies cough, hemoptysis or sputum production. Cardiac: Denies chest pain, +SOB, no diaphoresis or palpitations. GI: No nausea, vomiting, diarrhea or constipation. No melena or hematochezia. : No urinary complaints, dysuria, hematuria or frequency. MSK: + Diffuse lower extremity edema. No extremity weakness, paralysis or paresthesias.      PHYSICAL EXAM:    Vitals:  /79   Pulse 82   Temp 97.1 °F (36.2 °C) (Infrared)   Resp 24   Ht 5' 8\" (1.727 m)   Wt 222 lb 0.1 oz (100.7 kg)   SpO2 (!) 89%   BMI 33.76 kg/m²     General:  This is a [de-identified] y.o. yo male who is alert and oriented in mild respiratory distress  HEENT:  Head is normocephalic and atraumatic, PERRLA, EOMI, mucus membranes moist with no pharyngeal erythema or exudate. Neck:  Supple with no carotid bruits, JVD or thyromegaly.   No cervical adenopathy  CV:  Regular rate and rhythm, no murmurs  Lungs: Coarse decreased breath sounds to auscultation bilaterally with no wheezes, rales or rhonchi  Abdomen:  Soft, nontender,+ abdominal fat, nondistended, bowel sounds present  Extremities:  + 1 leg edema, peripheral pulses intact bilaterally  Neuro:  Cranial nerves II-XII grossly intact; motor and sensory function intact with no focal deficits  Skin:  No rashes, lesions or wounds    DATA:  CBC with Differential:    Lab Results   Component Value Date    WBC 7.2 11/24/2021    RBC 3.79 11/24/2021    HGB 12.0 11/24/2021    HCT 39.0 11/24/2021    PLT 85 11/24/2021    .9 11/24/2021    MCH 31.7 11/24/2021    MCHC 30.8 11/24/2021    RDW 14.9 11/24/2021    BLASTSPCT 0.0 11/04/2021    METASPCT 0.9 11/22/2021    LYMPHOPCT 7.8 11/22/2021    MONOPCT 4.3 11/22/2021    BASOPCT 0.3 11/22/2021    MONOSABS 0.30 11/22/2021    LYMPHSABS 0.61 11/22/2021    EOSABS 0.00 11/22/2021    BASOSABS 0.00 11/22/2021     CMP:    Lab Results   Component Value Date     11/27/2021    K 4.0 11/27/2021    K 4.8 11/03/2021    CL 95 11/27/2021    CO2 32 11/27/2021    BUN 36 11/27/2021    CREATININE 0.7 11/27/2021    GFRAA >60 11/27/2021    LABGLOM >60 11/27/2021    GLUCOSE 88 11/27/2021    PROT 5.8 11/27/2021    LABALBU 3.3 11/27/2021    CALCIUM 8.9 11/27/2021    BILITOT 1.2 11/27/2021    ALKPHOS 137 11/27/2021    AST 18 11/27/2021    ALT 15 11/27/2021     Magnesium:    Lab Results   Component Value Date    MG 2.3 11/05/2021     Phosphorus:    Lab Results   Component Value Date    PHOS 4.4 11/05/2021     PT/INR:    Lab Results   Component Value Date    PROTIME 24.8 11/27/2021    INR 2.1 11/27/2021     Troponin:  No results found for: TROPONINI  U/A:    Lab Results   Component Value Date    COLORU Yellow 11/03/2021    PROTEINU Negative 11/03/2021    PHUR 5.0 11/03/2021 catheter 11/8 PM  Cystoscopy and ureteral dilation 11/9 p.m. SCDs lower extremities    Daily INRs  Stop antibioticssee pulmonology note    Consult / for discharge planning to either nursing home     Heated high flow nasal cannula alternating with BiPAP    Cardiology consult  Family is with the patient a DNR CCAno intubation or chest compressions but continue current BiPAP treatments and current medical treatment plan. If the patient's overall condition continues to worsen family does not want any other further aggressive care and to initiate IV morphine for comfort care.           Norma Iglesias DO, D.O.  11/28/2021  11:15 AM

## 2021-11-28 NOTE — PLAN OF CARE
Problem: Falls - Risk of:  Goal: Will remain free from falls  Description: Will remain free from falls  11/27/2021 2312 by Leonides Alonso RN  Outcome: Met This Shift  11/27/2021 2030 by Mat Yo RN  Outcome: Met This Shift  Goal: Absence of physical injury  Description: Absence of physical injury  11/27/2021 2312 by Leonides Alonso RN  Outcome: Met This Shift  11/27/2021 2030 by Mat Yo RN  Outcome: Met This Shift     Problem: Skin Integrity:  Goal: Will show no infection signs and symptoms  Description: Will show no infection signs and symptoms  11/27/2021 2312 by Leonides Alonso RN  Outcome: Met This Shift  11/27/2021 2030 by Mat Yo RN  Outcome: Met This Shift  Goal: Absence of new skin breakdown  Description: Absence of new skin breakdown  11/27/2021 2312 by Leonides Alonso RN  Outcome: Met This Shift  11/27/2021 2030 by Mat Yo RN  Outcome: Met This Shift     Problem: Pain:  Goal: Pain level will decrease  Description: Pain level will decrease  11/27/2021 2312 by Leonides Alonso RN  Outcome: Met This Shift  11/27/2021 2030 by Mat Yo RN  Outcome: Met This Shift  Goal: Control of acute pain  Description: Control of acute pain  11/27/2021 2312 by Leonides Alonso RN  Outcome: Met This Shift  11/27/2021 2030 by Mat Yo RN  Outcome: Met This Shift  Goal: Control of chronic pain  Description: Control of chronic pain  11/27/2021 2312 by Leonides Alonso RN  Outcome: Met This Shift  11/27/2021 2030 by Mat Yo RN  Outcome: Met This Shift     Problem: OXYGENATION/RESPIRATORY FUNCTION  Goal: Patient will maintain patent airway  11/27/2021 2312 by Leonides Alonso RN  Outcome: Met This Shift  11/27/2021 2030 by Mat Yo RN  Outcome: Met This Shift  Goal: Patient will achieve/maintain normal respiratory rate/effort  Description: Respiratory rate and effort will be within normal limits for the patient  11/27/2021 2312 by Yahaira Gomez ETHAN Olvera  Outcome: Met This Shift  11/27/2021 2030 by Nidia Ramos RN  Outcome: Not Met This Shift     Problem: MECHANICAL VENTILATION  Goal: Oral health is maintained or improved  11/27/2021 2312 by Dionna Currie RN  Outcome: Met This Shift  11/27/2021 2030 by Nidia Ramos RN  Outcome: Met This Shift     Problem: SKIN INTEGRITY  Goal: Skin integrity is maintained or improved  11/27/2021 2312 by Dionna Currie RN  Outcome: Met This Shift  11/27/2021 2030 by Nidia Ramos RN  Outcome: Met This Shift

## 2021-11-28 NOTE — PLAN OF CARE
Problem: Falls - Risk of:  Goal: Will remain free from falls  Description: Will remain free from falls  Outcome: Met This Shift  Goal: Absence of physical injury  Description: Absence of physical injury  Outcome: Met This Shift     Problem: Skin Integrity:  Goal: Will show no infection signs and symptoms  Description: Will show no infection signs and symptoms  Outcome: Met This Shift  Goal: Absence of new skin breakdown  Description: Absence of new skin breakdown  Outcome: Met This Shift     Problem: Pain:  Goal: Pain level will decrease  Description: Pain level will decrease  Outcome: Met This Shift  Goal: Control of acute pain  Description: Control of acute pain  Outcome: Met This Shift  Goal: Control of chronic pain  Description: Control of chronic pain  Outcome: Met This Shift     Problem: OXYGENATION/RESPIRATORY FUNCTION  Goal: Patient will maintain patent airway  Outcome: Met This Shift     Problem: MECHANICAL VENTILATION  Goal: Oral health is maintained or improved  Outcome: Met This Shift     Problem: SKIN INTEGRITY  Goal: Skin integrity is maintained or improved  Outcome: Met This Shift
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
## 2021-12-10 NOTE — DISCHARGE SUMMARY
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 male who presents with altered mental status at the nursing home. Patient symptoms started about a week ago patient was also noted to have increased weight gain and increasing lower leg edema. Patient normally alert and oriented x4 but has become much more lethargic and interactive. Patient gained 20 pounds at the nursing home. Patient was brought into the emergency room with patient chest x-ray showed interstitial lung disease and also cardiomegaly. ABG showed a pH 7.204 with a CO2 of 85 on 6 L nasal cannula. Patient then was started on BiPAP.  BUN/creatinine 43/1.0 with proBNP 3900 and troponin initially was 120. Liver enzymes are normal.  Heart rate was 75 normal temperature and blood pressure initially at 790 systolic currently is 76/95. Patient currently on 40% FiO2 BiPAP with O2 sat of 93%. Patient states he has been wearing O2 nasal cannula for last couple weeks at the nursing home but not before that. Patient denies wearing BiPAP in the past.  He denies any problem with any chest pain, abdominal pain, nausea vomiting or dizziness. ECG reviewed with patient have atrial fibrillation with no significant ST-T changes to suggest acute myocardial injury troponin was elevated on admission but troponin I-II hours later was less than on admission. 11/4/2021  Patient seen examined on PCU. Patient still very short of breath even at rest.  Patient seems to do fairly good on the BiPAP once he is off of it he seemed to have some more respiratory distress. BUN/creatinine 46/1.0 with patient's transaminases normal TSH 4.56. WBC 5.1 with hemoglobin 11. INR is 2.2 today. Temperature 98.3 with a heart rate of 100 blood pressure 130/40. O2 sat 91% on 7 L nasal cannula.  ABGs today were improved but the patient had a pH increase 7.355 with PCO2 decreased 56.2 while the patient was on BiPAP. Patient urine output has picked up since early this morning. 11/5/2021  Patient seen examined on PCU. Case discussed with intensivist yesterday afternoon. Patient looks a little bit better today. Nursing states that the patient has been changed to a DNR CC a with no chest compressions, intubation or cardioversion. Patient denies any chest or abdominal pain. Patient still with +34 lower leg edema. BUN/creatinine 48/1.0 with the patient's Liver enzymes are normal with a WBC 3.1 with hemoglobin 11.1. Temperature 97.7 with heart rate of 70 blood pressure 123/48. O2 sat is 91% on 10 L high flow nasal cannula. Urine output seems to be adequate. 11/6/2021  Patient seen examined on PCU. Patient is alert and oriented with patient currently had BiPAP on and feeling little bit more comfortable. BUN/creatinine 51/0.9 WBCs 5.5 hemoglobin 11.8. INR 2.8 today. Temperature 97.5 heart rate 85 blood pressure 107/69. O2 sat 97% on BiPAP at 75% FiO2. Urine output is inaccurate but seems to be adequate according to the patient who states he is urinating a lot. INR is 2.3 we will decrease the warfarin 1 mg p.o. daily. 11/7/2021  Patient seen examined on PCU. Patient seems to be doing little bit better today. Patient is wearing BiPAP almost all the time. Patient did eat all of his breakfast.  Patient denies any chest or abdominal pain. Patient states that he has not had a bowel movement yet. Patient's lower leg edema still prominent but seems to be mildly improved. BUN/creatinine 48/0.9 with a CO2 of 36. Transaminases are normal with free T4 1.1 and WBC 5.2 and hemoglobin 11.4. Platelet count 056. INR is 2.9 today. Temperature 97.7 with heart rate of 60 and blood pressure 108/869. O2 sat 98% on 12 L high flow nasal cannula alternating with BiPAP with FiO2 of 65%. .    11/8/2021  Patient seen and examined in PCU. Patient seems little bit more lethargic/confused this morning off of the BiPAP.  BUN/creatinine 48/0.8 with CO2 electrolytes at 38. Normal transaminases with INR down to 2.2 today. WBC 5.1 with hemoglobin 11.7. Temperature is 98.1 with heart rate 76 blood pressure 103/61. O2 sat 94% on 12 L high flow nasal cannula. Urinary output is   in accurate. 11/9/2021   Patient seen examined in PCU. Patient is again alert and oriented but somewhat short of breath with any activity and on the BiPAP most of the day. Patient denies any chest pain or abdominal pain. Patient supposed to go to surgery for cystoscopy and probable urethral dilation today with insertion of Melchor catheter. BUN/creatinine 54/1.0 today with liver enzymes normal WBC 11.1 hemoglobin 12.1. INR is 2.2 today. Temperature is 97.8 with heart rate of 90 and blood pressure 110/66. O2 sat is 95% with the patient on BiPAP 65% FiO2. Urine output is very inaccurate. Urology note reviewed with patient having severe bladder neck contracture and urology not able to dilate yesterday afternoon. 11/10/2021  Patient seen and examined in PCU. Patient looks better today. Patient is more alert and oriented. Patient denies any chest or abdominal pain. Patient urine output is improving. Patient still with marked lower leg edema. Urology note reviewed with the patient having a laser ablation of bladder neck contracture and placement of Melchor catheter yesterday afternoon. BUN/creatinine is 47/0.9 with normal liver enzymes. WBC 7.6 hemoglobin 11.8. INR is 1.9 today. ABGs performed showed pH 7.471 with PCO2 54.9 PO2 of 69. urinary output has improved with placement of Melchor catheter by 500 cc a shift. Stop IV fluids postop and continue the IV Lasix and will start SCDs lower extremities. 11/11/2021  Patient seen and examined in PCU. Patient sons at the bedside and case discussed. Patient looks and feels better.   Patient lower leg edema is moderately improved. Patient denies any problem with chest or abdominal pain. There is no nausea vomiting or dizziness. BUN/creatinine 49/0.8 with fasting blood sugar 135. Temperature 98.9 with heart rate in 96 and blood pressure ranges 90/50 6109/66. O2 sats 95% with the patient on 65% FiO2 BiPAP. Urinary output ranges 500-2900 cc a shift. 11/12/2021  Patient seen examined on PCU. Patient feels fairly good today. Case discussed with patient's nurse at bedside. Patient still has fairly good urinary output with the Melchor catheter in place. His lower leg edema is moderately improved but still has significant leg edema left greater than right. Temperature is 97.5 with a heart rate of 75 blood pressure 105/60. O2 sat 99% on heated high flow nasal cannula with FiO2 65%. 11/13/2021  Patient seen examined on PCU. Patient feels like he is breathing little better but still on BiPAP. Patient denies any chest or abdominal pain. There is no nausea or vomiting. BUN/creatinine 47/0.7. WBC is 9.7 hemoglobin 12.1. INR is 1.4 today. Urinary output ranges 500-700 cc a shift. Temperature 98.2 with heart rate 91 blood pressure currently 96/56. O2 sat 94% on 65% FiO2 BiPAP. 11/14/2021  Patient seen examined on PCU. BUN/creatinine 42/0.6 with INR 1.4 today. Temperature 98.7 with heart rate 88 blood pressure ranges 92/50102/60. O2 sat 93% on room 11 L high flow heated nasal cannula. Urine output ranges 700-950 cc a shift. 11/15/2021  Patient seen examined on PCU. Patient looks little bit better today. Patient denies any chest or abdominal pain. Patient lower leg edema again is moderately improved. Patient though still very very weak. BUN/creatinine 42/0.7. Hemoglobin 11.7. INR 1.5. Temperature 98.1 with heart rate 83 and blood pressure 121/62. O2 sat currently is 96% with the patient on heated high flow nasal cannula with FiO2 65%.   Urine output ranges 725-1550 cc a shift.    11/16/2021  Patient seen examined on PCU. Patient looks little bit more tired today. Patient is currently on the BiPAP.  BUN/creatinine 38/0.6 with CO2 electrolytes 37. INR 1.6 today. Temperature 97.6 with a heart rate of 78 and blood pressure 108/63. O2 sat is currently 92% on BiPAP. Patient is alternating with heated high flow nasal cannula FiO2 of 50%. Case discussed with pulmonology today. 11/17/2021  Patient seen examined on PCU. Patient is about the same. Patient denies any chest or abdominal pain. Patient still very very weak. Case discussed with pulmonologist today. INR 1.8. Temperature 97.6 72 blood pressure 103/65. O2 sat 96% on 15 L high flow heated nasal cannula. Patient desaturates with any activity. Urinary output is very good. 11/18/2021  Patient seen examined on PCU. Patient is alert and oriented. Patient still very very weak and desats with any activity including moving side to side in bed. BUN/creatinine 33/0.7. Liver enzymes normal with a WBC 8.4 and hemoglobin 1.9. INR is 1.8 today. Temperature 98.3 with heart rate of 74 with blood pressure 114/58 with heart rate 93% on 15 L high flow nasal cannula. Urinary output is fairly good. 11/19/2021  Patient seen examined on PCU. Patient seems to be doing little bit better today. The patient though still extremely weak and short of breath with any activity. Patient's appetite is fair. Temperature 97.8 with heart rate of 77 blood pressure 110/62. O2 sat 95% on 14 L heated high flow nasal cannula. Urine output is very good. Patient did have one good bowel movement yesterday. 11/20/2021  Patient seen examined on PCU. Patient is about the same. Patient short of breath with any activity. Patient denies any chest or abdominal pain. There is no nausea vomiting or diarrhea. INR was 2.0 yesterday. Pending lab work today. Temperature 96.8 with heart rate of 65 blood pressure ranging 96/53100/50.   O2 sat currently 91% on 15 L high flow nasal cannula. Urine output is adequate. If patient is not a candidate because of his increasing oxygen needs to go back to nursing home we will have social workers work on getting the patient to an [facility].    11/21/2021  Patient seen and examined in the PCU. Patient resting comfortable in bed. The patient currently on heated high flow nasal cannula with 15 L/min with O2 saturation 92% at rest.  Temperature 97.7 with heart rate 84 blood pressure 105/60. O2 sat 90-95% when patient is on BiPAP with FiO2 of 60%. Urinary output is fairly good. Patient lower leg edema seems to improve. Patient though is not dramatically improving pulmonary wise and / to check with nursing home about transfer back and if unable to then to transfer to [facility] for further care. 11/22/2021  Patient seen and examined in PCU. Patient states he feels about the same. There is no significant changes with his breathing. There is no chest pain or abdominal pain. BUN/creatinine 33/0.7. WBC 7.6 hemoglobin 12.5. Platelet count is down to 76,000. Temperature 97.3 with heart rate 78 blood pressure 103/56. O2 sat 92% on 15 L high flow heated nasal cannula. Urine output still very good. Patient still has 3+ 12 lower leg edema. This that was markedly improved from admission.  informed me that the patient will probably not be a candidate for LTAC because of his insurance. 11/23/2021  Patient seen examined in PCU. Patient is about the same and denies any problem with chest pain, abdominal pain, nausea or vomiting. Patient very short of breath with any activity. BUN/creatinine 35/0.7. Temperature 97.8 with heart rate 83 and blood pressure currently 92/51. O2 sat 91% on 6% FiO2 BiPAP. Urine output still remains to be good. 11/24/2021  Patient seen and examined in PCU. Case discussed the patient's nurse at the bedside.   Patient still alert and oriented x23 with poor appetite. Patient still is needing BiPAP frequently to keep his O2 sats greater than 90%. Patient lower leg edema is dramatically improved and is down to a +1. BUN/creatinine 37/0.7 with liver enzymes essentially normal with a WBC 7.21 hemoglobin 12. Platelet count is 85 but stable. INR is slowly increasing and currently 3.2 and we will hold it today and resume 11/26 with decreased dose of 4 mg daily. Reviewed  notes. Patient put on heated high flow nasal cannula. 11/2511/27/2021-covered by Dr. Katarzyna Ruiz    11/28/2021  Patient seen examined on PCU. Case discussed with patient's grandson at the bedside. Patient is alert but very very weak. He denies any pain. Lab work pending today. Temperature is 97.1 with heart rate 82 blood pressure 115/79. O2 sat is 100% FiO2 with BiPAP. Urine output about 400 cc a shift. Patient did have 2 bowel movements yesterday. Cardiology was consulted with patient's atrial fib and repeat CAT scan showing large bilateral pleural effusions along with increasing cardiomegaly with history of chronic A. Fib. Patient's condition continued to deteriorate throughout the day and night. Family stopped all aggressive measures and patient started on morphine drip.     Patient passed away at 9:59 PM.      ASSESSMENT AND PLAN:      Patient Active Problem List    Diagnosis Date Noted    Acute on chronic respiratory failure with hypoxia and hypercapnia ([facility] [address] 11/03/2021    CAD (coronary artery disease)  Acute diastolic congestive heart failure  Bilateral large pleural effusions     Atrial fibrillation - chronic     COPD with exacerbation     Hypertension-patient hypotensive in the ED      History of severe pulmonary hypertension at 99 mmHg with +34 tricuspid regurg 09/10/2021     Diffuse leg edema probably combination of the severe pulmonary hypertension and tricuspid regurg along with acute diastolic CHF  Constipation  Thrombocytopenia 09/09/2021 Plan:    Patient passed away about 9:59 PM    Pretty Dunaway DO, D.O.  12/10/2021  3:31 PM

## 2022-10-19 NOTE — CARE COORDINATION
SS Note: Covid negative 11/31/21, WILL NEED A RAPID NEGATIVE COVID TO RETURN TO THE [facility]. Pt is a long term care/ private pay resident of [facility]. SW met with pt's son Parvin Briceño, stated while pt at [facility] they were already exploring, with SNF's assistance, possible transfer of pt to [facility] since pt's wife has dementia and is a permanent resident there. Son is aware that pt on 15 liters and most SNF's are not able to provide high liter flow. Son inquired about LTAC as he had been discussing LTAC with a nurse over weekend. Son is aware that insurance would need to approve. Son stated he is in agreement with LTAC if physician is in agreement with pursuing, prefers either [address] or [place]. No referral called yet.   Electronically signed by CHRIS Yates on 11/22/2021 at 6:21 PM Quality 110: Preventive Care And Screening: Influenza Immunization: Influenza Immunization previously received during influenza season Detail Level: Detailed Quality 111:Pneumonia Vaccination Status For Older Adults: Pneumococcal vaccine (PPSV23) administered on or after patient’s 60th birthday and before the end of the measurement period Quality 226: Preventive Care And Screening: Tobacco Use: Screening And Cessation Intervention: Patient screened for tobacco use and is an ex/non-smoker Quality 130: Documentation Of Current Medications In The Medical Record: Current Medications Documented

## (undated) DEVICE — SOLUTION IV IRRIG WATER 1000ML POUR BRL 2F7114

## (undated) DEVICE — GLOVE ORANGE PI 7 1/2   MSG9075

## (undated) DEVICE — GAUZE,SPONGE,4"X4",16PLY,XRAY,STRL,LF: Brand: MEDLINE

## (undated) DEVICE — BASIC SINGLE BASIN 1-LF: Brand: MEDLINE INDUSTRIES, INC.

## (undated) DEVICE — CYSTO PACK: Brand: MEDLINE INDUSTRIES, INC.

## (undated) DEVICE — Z INACTIVE USE 2635503 SOLUTION IRRIG 3000ML ST H2O USP UROMATIC PLAS CONT

## (undated) DEVICE — TOWEL,OR,DSP,ST,BLUE,STD,6/PK,12PK/CS: Brand: MEDLINE

## (undated) DEVICE — GARMENT,MEDLINE,DVT,INT,CALF,MED, GEN2: Brand: MEDLINE

## (undated) DEVICE — Z INACTIVE USE 2660664 SOLUTION IRRIG 3000ML 0.9% SOD CHL USP UROMATIC PLAS CONT

## (undated) DEVICE — CIRCON 30/70 URO LENS

## (undated) DEVICE — CATHETER F BLLN 5CC 22FR 2 W HYDRGEL COAT LESS TRAUM LUB

## (undated) DEVICE — CIRCON 21F CYSTO TRAY

## (undated) DEVICE — CAMERA STRYKER 1488 HD GEN

## (undated) DEVICE — BAG DRNGE COMB PK

## (undated) DEVICE — COVER,LIGHT HANDLE,FLX,1/PK: Brand: MEDLINE INDUSTRIES, INC.

## (undated) DEVICE — TUBING, SUCTION, 1/4" X 10', STRAIGHT: Brand: MEDLINE

## (undated) DEVICE — GUIDEWIRE ENDOSCP L150CM DIA0.035IN TIP 3CM PTFE NIT

## (undated) DEVICE — GOWN,SIRUS,NONRNF,SETINSLV,XL,20/CS: Brand: MEDLINE

## (undated) DEVICE — Device: Brand: FLEXIVA

## (undated) DEVICE — BAG DRAINAGE CONTAINER 15 LT FLUID COLLCTN

## (undated) DEVICE — DRAINBAG,ANTI-REFLUX TOWER,L/F,2000ML,LL: Brand: MEDLINE